# Patient Record
Sex: FEMALE | Race: WHITE | NOT HISPANIC OR LATINO | Employment: OTHER | URBAN - METROPOLITAN AREA
[De-identification: names, ages, dates, MRNs, and addresses within clinical notes are randomized per-mention and may not be internally consistent; named-entity substitution may affect disease eponyms.]

---

## 2019-05-24 ENCOUNTER — TELEPHONE (OUTPATIENT)
Dept: GASTROENTEROLOGY | Facility: CLINIC | Age: 24
End: 2019-05-24

## 2019-05-24 DIAGNOSIS — R79.89 ELEVATED LFTS: Primary | ICD-10-CM

## 2019-06-20 ENCOUNTER — OFFICE VISIT (OUTPATIENT)
Dept: GASTROENTEROLOGY | Facility: CLINIC | Age: 24
End: 2019-06-20
Payer: COMMERCIAL

## 2019-06-20 ENCOUNTER — TELEPHONE (OUTPATIENT)
Dept: GASTROENTEROLOGY | Facility: CLINIC | Age: 24
End: 2019-06-20

## 2019-06-20 VITALS
WEIGHT: 126 LBS | SYSTOLIC BLOOD PRESSURE: 100 MMHG | HEIGHT: 55 IN | HEART RATE: 72 BPM | DIASTOLIC BLOOD PRESSURE: 58 MMHG | BODY MASS INDEX: 29.16 KG/M2

## 2019-06-20 DIAGNOSIS — R79.89 ABNORMAL LFTS: Primary | ICD-10-CM

## 2019-06-20 PROCEDURE — 99213 OFFICE O/P EST LOW 20 MIN: CPT | Performed by: INTERNAL MEDICINE

## 2019-06-20 RX ORDER — MULTIVITAMIN
1 TABLET ORAL DAILY
COMMUNITY
End: 2020-06-30 | Stop reason: HOSPADM

## 2019-06-20 RX ORDER — BUDESONIDE 0.5 MG/2ML
0.5 INHALANT ORAL 2 TIMES DAILY
COMMUNITY
End: 2020-06-30 | Stop reason: HOSPADM

## 2019-06-20 RX ORDER — GLYCOPYRROLATE 1 MG/5ML
0.5 SOLUTION ORAL EVERY 12 HOURS PRN
COMMUNITY
End: 2020-06-30 | Stop reason: HOSPADM

## 2019-06-20 RX ORDER — DOXYCYCLINE 100 MG/1
100 TABLET ORAL DAILY
COMMUNITY
End: 2020-06-30 | Stop reason: HOSPADM

## 2019-06-20 RX ORDER — OXCARBAZEPINE 300 MG/1
450 TABLET, FILM COATED ORAL EVERY 12 HOURS SCHEDULED
COMMUNITY
End: 2020-06-30 | Stop reason: HOSPADM

## 2019-06-20 RX ORDER — POLYETHYLENE GLYCOL 3350 17 G/17G
8.5 POWDER, FOR SOLUTION ORAL 2 TIMES DAILY
COMMUNITY
End: 2019-09-27 | Stop reason: ALTCHOICE

## 2019-06-20 RX ORDER — DESMOPRESSIN ACETATE 0.1 MG/1
0.05 TABLET ORAL EVERY OTHER DAY
COMMUNITY
End: 2020-06-30 | Stop reason: HOSPADM

## 2019-06-20 RX ORDER — URSODIOL 300 MG/1
300 CAPSULE ORAL 2 TIMES DAILY
COMMUNITY
End: 2019-07-17 | Stop reason: ALTCHOICE

## 2019-06-20 RX ORDER — MELATONIN
1000 DAILY
COMMUNITY
End: 2020-06-30 | Stop reason: HOSPADM

## 2019-06-20 RX ORDER — CRANBERRY FRUIT EXTRACT 425 MG
425 CAPSULE ORAL 2 TIMES DAILY
COMMUNITY
End: 2020-06-30 | Stop reason: HOSPADM

## 2019-06-20 RX ORDER — LEVETIRACETAM 100 MG/ML
1000 SOLUTION ORAL 2 TIMES DAILY
COMMUNITY
End: 2020-06-30 | Stop reason: HOSPADM

## 2019-06-20 RX ORDER — METOCLOPRAMIDE HYDROCHLORIDE 5 MG/5ML
SOLUTION ORAL 2 TIMES DAILY
COMMUNITY
End: 2020-06-30 | Stop reason: HOSPADM

## 2019-06-20 RX ORDER — ALBUTEROL SULFATE 1.25 MG/3ML
1 SOLUTION RESPIRATORY (INHALATION) EVERY 6 HOURS PRN
COMMUNITY
End: 2020-06-30 | Stop reason: HOSPADM

## 2019-06-20 RX ORDER — B-COMPLEX WITH VITAMIN C
1 TABLET ORAL
COMMUNITY
End: 2020-06-30 | Stop reason: HOSPADM

## 2019-06-20 RX ORDER — SENNA PLUS 8.6 MG/1
1 TABLET ORAL DAILY
COMMUNITY
End: 2019-12-04 | Stop reason: SDUPTHER

## 2019-06-20 RX ORDER — DANTROLENE SODIUM 100 MG/1
100 CAPSULE ORAL 3 TIMES DAILY
COMMUNITY
End: 2020-06-30 | Stop reason: HOSPADM

## 2019-06-24 ENCOUNTER — TELEPHONE (OUTPATIENT)
Dept: GASTROENTEROLOGY | Facility: CLINIC | Age: 24
End: 2019-06-24

## 2019-06-24 DIAGNOSIS — R79.89 ABNORMAL LFTS: Primary | ICD-10-CM

## 2019-07-17 ENCOUNTER — TELEPHONE (OUTPATIENT)
Dept: GASTROENTEROLOGY | Facility: CLINIC | Age: 24
End: 2019-07-17

## 2019-07-17 NOTE — TELEPHONE ENCOUNTER
Can you please fax an order to discontinue the Ursodiol medication for this patient to both [facility] pharmacy and [facility]?     [facility] fax [phone]  [facility] fax [phone]    Can you please fax the attached letter to Dr Jamel Runner to his office as well send to [facility] and her PCP Dr Casie Taveras?     Thanks HPR

## 2019-07-17 NOTE — TELEPHONE ENCOUNTER
Records faxed as requested to [facility] , [facility], Dr Griselda Martens and Dr Shantanu Crowley

## 2019-08-13 ENCOUNTER — TELEPHONE (OUTPATIENT)
Dept: GASTROENTEROLOGY | Facility: CLINIC | Age: 24
End: 2019-08-13

## 2019-08-13 DIAGNOSIS — R79.89 ABNORMAL LFTS: Primary | ICD-10-CM

## 2019-08-13 NOTE — TELEPHONE ENCOUNTER
Call from Renetta Mills, nurse at [facility] ; states she wants to f/u on patient's labs that were requested; they fax'd on the 5th  Labs were collected on 8/4 for liver enzymes  She asks for [phone]; states have call go to main nurse's station

## 2019-08-13 NOTE — TELEPHONE ENCOUNTER
I called [facility] Cardona and left message stating we have labs faxed over, HPR will review when back on Thursday and call with instructions/follow up   Previous labs in folder on your desk for comparison

## 2019-08-16 NOTE — TELEPHONE ENCOUNTER
I already spoke directly to [facility] prior to my vacation and reviewed the labs and ultrasound that I ordered  I advised discontinuation of the Ursodiol and there is a telephone encounter with my request to send cancellation orders for this  I also wrote a letter to the neurologist, Dr Deb Quintana, who handles her anti spasmodic requesting that he consider changing that medication as her current anti spasmodic could also cause hepatotoxicity  The most recent labs were ordered by her PCP, Dr Keya Amaya    HPR

## 2019-08-21 NOTE — TELEPHONE ENCOUNTER
Noted that you previously provided instructions regarding patient  They would like input on elevation on Alk Phos 250 and AST 82,   On most recent med list Ursodiol not listed  Please advise if any other recommendations

## 2019-08-21 NOTE — TELEPHONE ENCOUNTER
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 that I recommend changing the antispasmodic to something that may not cause hepatotoxicity  That is still my recommendation  The need to speak with Dr Timothy Bosworth about alternatives  If she is no longer taking the doxycycline, I would recheck the LFTs in a month or 2 to see if they trend down again  I hope this makes sense and please let me know if you need any other information from me  Can you please let Joe zavala know this information? If not, let me know and I will call them    Thanks HPR

## 2019-08-22 NOTE — TELEPHONE ENCOUNTER
I called Ike Bliss and obtained fax number to forward your 8/21/19 reply (information)  I did confirm that patient is still on Doxycycline through infectious disease so we will not reorder LFT's at this time   Fax #[phone]

## 2019-08-23 NOTE — TELEPHONE ENCOUNTER
Please confirm that you want LFT's repeated in 2 months whether patient still on Doxycycline or not (you previously noted repeat if off medication) Thanks

## 2019-08-23 NOTE — TELEPHONE ENCOUNTER
Yes, let us repeat them in 2 months  Even if she is still in a doxycycline, if they are worse, then they really need to get the antispasmodic dantrolene changed to something else before she ends up with liver damage  I want to make sure the LFTs are better or worse at that time  I hope this makes sense    HPR

## 2019-08-28 ENCOUNTER — TELEPHONE (OUTPATIENT)
Dept: GASTROENTEROLOGY | Facility: CLINIC | Age: 24
End: 2019-08-28

## 2019-08-28 NOTE — TELEPHONE ENCOUNTER
Attempted to call [place] to schedule  Someone answered but then connection was lost twice   Will attempt to call again tomorrow   Pb Baird

## 2019-08-28 NOTE — TELEPHONE ENCOUNTER
----- Message from Valentino Ludwig, [address] sent at 8/28/2019 12:40 PM EDT -----  Results noted that inflammatory markers elevated    Please schedule an office visit with Margaret Medina in a month or two as she knows the patient  to go over blood work

## 2019-08-29 NOTE — TELEPHONE ENCOUNTER
Attempted to call Sarah again, a lot of static on the line  Sarah stated they would call back     gunnar

## 2019-09-09 ENCOUNTER — TELEPHONE (OUTPATIENT)
Dept: GASTROENTEROLOGY | Facility: CLINIC | Age: 24
End: 2019-09-09

## 2019-09-09 NOTE — TELEPHONE ENCOUNTER
Prachi Daley from [facility]  left  mss stating Pt will have appt this mo but they don't want to wait; ask if Melia Stockton could call back [phone]; if she is unavail/can spk w/ nurse in charge of Pt

## 2019-09-16 ENCOUNTER — TELEPHONE (OUTPATIENT)
Dept: GASTROENTEROLOGY | Facility: CLINIC | Age: 24
End: 2019-09-16

## 2019-09-16 DIAGNOSIS — R79.89 ELEVATED LFTS: Primary | ICD-10-CM

## 2019-09-16 NOTE — TELEPHONE ENCOUNTER
Placed telephone call to [facility] and spoke to the patient's nurse  She confirmed that the dantrolene was discontinued July 17th  The doxycycline was decreased to 100 mg daily on August 13th  Both can cause hepatotoxicity  We did also stop her Ursodiol since her ultrasound was normal-no evidence of cholelithiasis or choledocholithiasis  Will recheck LFTs now prior to office visit for 9/27  If LFTs improved with the medication adjustments will continue to monitor for normalization  If they are not improved, will need to repeat ultrasound with further planning after those results      Can you please fax the hepatic function test order to [facility]?    Thanks,  HPR

## 2019-09-27 ENCOUNTER — OFFICE VISIT (OUTPATIENT)
Dept: GASTROENTEROLOGY | Facility: CLINIC | Age: 24
End: 2019-09-27
Payer: COMMERCIAL

## 2019-09-27 VITALS
DIASTOLIC BLOOD PRESSURE: 70 MMHG | HEART RATE: 96 BPM | SYSTOLIC BLOOD PRESSURE: 112 MMHG | HEIGHT: 55 IN | BODY MASS INDEX: 29.9 KG/M2 | WEIGHT: 129.2 LBS

## 2019-09-27 DIAGNOSIS — Z93.1 GASTROSTOMY TUBE IN PLACE (HCC): ICD-10-CM

## 2019-09-27 DIAGNOSIS — R14.0 ABDOMINAL DISTENSION (GASEOUS): ICD-10-CM

## 2019-09-27 DIAGNOSIS — Z93.4 JEJUNOSTOMY TUBE PRESENT (HCC): ICD-10-CM

## 2019-09-27 DIAGNOSIS — R79.89 ABNORMAL LFTS: Primary | ICD-10-CM

## 2019-09-27 DIAGNOSIS — R19.4 DECREASED FREQUENCY OF BOWEL MOVEMENTS: ICD-10-CM

## 2019-09-27 PROCEDURE — 99214 OFFICE O/P EST MOD 30 MIN: CPT | Performed by: NURSE PRACTITIONER

## 2019-09-27 RX ORDER — CYCLOBENZAPRINE HCL 5 MG
5 TABLET ORAL 3 TIMES DAILY PRN
COMMUNITY
End: 2020-06-30 | Stop reason: HOSPADM

## 2019-09-27 RX ORDER — LORAZEPAM 2 MG/ML
2 CONCENTRATE ORAL AS NEEDED
COMMUNITY
End: 2020-06-30 | Stop reason: HOSPADM

## 2019-09-27 RX ORDER — MEDROXYPROGESTERONE ACETATE 10 MG/1
10 TABLET ORAL DAILY
COMMUNITY
End: 2020-06-30 | Stop reason: HOSPADM

## 2019-09-27 NOTE — PROGRESS NOTES
[facility] - Outpatient Follow-up Note  Yordan Ponce 24 y o  female MRN: [id number]  Encounter: [id number]    ASSESSMENT AND PLAN:      1  Abnormal LFTs  Long-term elevation of LFTs of  indeterminate etiology  Previous viral, autoimmune, metabolic workup has been negative  Previous ultrasounds, including most recent in July, were normal, without cholelithiasis  Given that she is 24years old, with no clear indication for Ursodiol, the medication was discontinued  The chronic elevation of alkaline phosphatase may be related to medication, specifically dantrolene  per our recommendations, the dantrolene was discontinued by Dr Regan Diaz, who prescribed the medication  However she continue being treated with doxycycline for a bone infection and this can cause hepatotoxicity so may be the etiology for recent re-elevation  The dose was recently decreased to daily instead of b i d  It may be discontinued soon which will hopefully allow for normalization of her LFTs  GGT in July was elevated at 164  LFTs repeated this month showed improvement in both AST and ALT  Alk-phos remains elevated and fractionated value showed 73% hepatic and 27% bone, 0% intestinal   Repeat LFTs in November  May need to repeat ultrasound if LFTs remain elevated to exclude any hepatobiliary abnormalities  - Hepatic function panel    2  Abdominal distension (gaseous)  3  Decreased frequency of bowel movements  Abdominal distension was likely related to the MiraLax as this is a common affect  The medication was discontinued and the abdominal distension resolved  Since that time she has been unable to produce a bowel movement independently, requiring a suppository or Fleet enema every 1-2 days  These bowel movements are large, soft  Will add Colace b i d to replace the MiraLax for stool softening  Continue the senna daily    The patient's residence facility will continue to use the suppositories and Fleet enemas as needed to produce bowel movements but hopefully the need for this will decrease by adding the Colace  - docusate (COLACE) 50 mg/5 mL liquid; 10 mL (100 mg total) by Per J Tube route 2 (two) times a day (Patient not taking: Reported on 9/27/2019)  Dispense: 473 mL; Refill: 15    4  Jejunostomy tube present ([facility] [address] )  5  Gastrostomy tube in place [facility])  These tubes were recently changed in August for balloon rupture  New tube remains intact  Feedings and medications continue as ordered via jejunostomy tube  The G-tube is currently capped but occasionally used to gravity for gastric decompression  Followup Appointment:  3 months  ______________________________________________________________________    Chief Complaint   Patient presents with    Follow up to abnormal LFT's     HPI:  41-year-old female patient with complicated past medical history including intellectual disability, spastic quadriplegia, seizure disorder, trach, feeding tube, returns to the office for follow-up  She is accompanied by a facility nurse and staff attended  The patient is unable to provide information or interaction regarding her personal health information or symptoms  All information is provided by the staff who accompanied the patient  Nurse reports that she had some abdominal distension with increased flatus and liquid stool  She was seen by PCP, Dr Li Peralta who stopped her MiraLax  The distension and liquid stools resolved but without the MiraLax she began requiring rectal suppositories or fleets enema to produce bowel movements  Previously with daily senna and the MiraLax she was able to produce bowel movements on her own  Bowel movements remain soft and easy to pass  No evidence of rectal bleeding, abdominal or rectal pain  She is tolerating her tube feeds via jejunostomy tube which was recently changed on 08/20 after the balloon ruptured    The G-tube is capped but occasionally released to gravity for abdominal distension  Insertion site remains slightly reddened and scaly but no evidence of infection following recent treatment of redness and excoriation with Silvadene  She is tolerating her tube feedings without evidence of reflux, aspiration  No nausea or vomiting  She has not been hospitalized for respiratory issues since 11/20/2018 and remains stable with her trach without evidence of dyspnea or shortness of breath  Her weight remains stable  Her LFTs have been elevated but there was improvement recently after discontinuation of dantrolene by Dr Dennis Irene  She remains on the doxycycline, to treat a spinal bone infection, but the dose was recently reduced to daily from b i d  In June her AST was 82,  and alkaline phos 250  Her recent labs from September show AST now normal at 35, ALT reduced to 47 but alk-phos remains elevated at 255  Staff reports that the doxycycline made to be discontinued soon        Historical Information   Past Medical History:   Diagnosis Date    Asthma     Cerebral palsy ([facility] [address] )     Cortical blindness     Diabetes insipidus (HCC)     Elevated LFTs     Encephalopathy     Herpes encephalopathy    GERD (gastroesophageal reflux disease)     History of chronic respiratory failure     Hydrocephalus     Nephrolithiasis     Profound intellectual disability     Pulmonary valve stenosis     Scoliosis     Seizure disorder (HCC)     Spastic quadriplegia (HCC)      Past Surgical History:   Procedure Laterality Date    CSF SHUNT      HIP SURGERY Bilateral     IR PEG/GJ TUBE PLACEMENT  2016    NISSEN FUNDOPLICATION      PEG TUBE PLACEMENT      PEG TUBE REMOVAL  2016    SPINAL FUSION W/ JOSE MARIA UNIT RAJAT      Fontenot rajat    TRACHEOSTOMY       Social History     Substance and Sexual Activity   Alcohol Use Never    Frequency: Never     Social History     Substance and Sexual Activity   Drug Use Never     Social History     Tobacco Use   Smoking Status Never Smoker Smokeless Tobacco Never Used     Family History   Problem Relation Age of Onset    No Known Problems Mother     No Known Problems Father     No Known Problems Sister     No Known Problems Sister          Current Outpatient Medications:     acetaminophen (TYLENOL) 160 MG/5ML elixir    albuterol (ACCUNEB) 1 25 MG/3ML nebulizer solution    bisacodyl (FLEET) 10 MG/30ML ENEM    budesonide (PULMICORT) 0 5 mg/2 mL nebulizer solution    calcium carbonate-vitamin D (OSCAL-D) 500 mg-200 units per tablet    carbamide peroxide (DEBROX) 6 5 % otic solution    cholecalciferol (VITAMIN D3) 1,000 units tablet    Cranberry 425 MG CAPS    cyclobenzaprine (FLEXERIL) 5 mg tablet    desmopressin (DDAVP) 0 1 mg tablet    diazepam (VALIUM) 1 mg/mL solution    doxycycline (ADOXA) 100 MG tablet    fluticasone (FLOVENT DISKUS) 50 MCG/BLIST diskus inhaler    Glycopyrrolate 1 MG/5ML SOLN    guaiFENesin (ROBITUSSIN) 100 MG/5ML oral liquid    ibuprofen (MOTRIN) 100 mg/5 mL suspension    ipratropium (ATROVENT) 0 02 % nebulizer solution    levETIRAcetam (KEPPRA) 100 mg/mL oral solution    LORazepam (ATIVAN) 2 mg/mL concentrated solution    Magnesium Carbonate (MAGONATE) 54 (Mag Equiv) MG/5ML LIQD    medroxyPROGESTERone (PROVERA) 10 mg tablet    metoclopramide (REGLAN) 5 mg/5 mL oral solution    Multiple Vitamin (MULTIVITAMIN) tablet    Nutritional Supplements (PEPTAMEN 1 UTE PO)    OXcarbazepine (TRILEPTAL) 300 mg tablet    senna (SENOKOT) 8 6 MG tablet    dantrolene (DANTRIUM) 100 mg capsule    docusate (COLACE) 50 mg/5 mL liquid    Elastic Bandages & Supports (ABDOMINAL BINDER/ELASTIC MED) MISC  Allergies   Allergen Reactions    Baclofen Other (See Comments)     Reaction Date: [id number];   apena      Cefepime     Cephalosporins      Other reaction(s): Unknown Reaction  Other reaction(s): Unknown Reaction  Other reaction(s): Unknown Reaction      Clonidine      Other reaction(s): Respiratory Distress, Respiratory Distress    Lactase     Penicillins Hives     Reaction Date: [id number];       Phenobarbital Other (See Comments)     Reaction Date: 22Nov2011; Other reaction(s): Other (See Comments)  apnea  Other reaction(s): Other (See Comments)  apnea  apnea      Vancomycin     Zolpidem Other (See Comments)     Other reaction(s): Other (see comments)  Reaction Date: 22Nov2011;   apnea  apnea  apnea      Latex Rash     ROS:  All systems were reviewed with accompanying staff and positives noted in HPI  All other systems are reported as negative per the nurse accompanying the patient today  PHYSICAL EXAM:    Blood pressure 112/70, pulse 96, height 4' 4" (1 321 m), weight 58 6 kg (129 lb 3 2 oz)  Body mass index is 33 59 kg/m²  General Appearance: NAD, alert, smiling   Eyes: Anicteric, EOMI  ENT:  Normocephalic, atraumatic  Neck: symmetrical, trachea midline, trach tube in place and secured  Resp:  Clear but decreased to auscultation bilaterally; no rales, rhonchi or wheezing; respirations unlabored   CV:  S1 S2, Regular rhythm but remains slightly tachycardic at 102; no murmur  GI:  Soft, no evidence of pain, grimace or guarding with palpation, non-distended; normal bowel sounds; no masses, no organomegaly  GJ tube in place, bumper in good position, with mild erythema and scaling noted to insertion site  Rectal: Deferred  Musculoskeletal: No cyanosis, clubbing or edema  Decreased ROM due to medical comorbidities  Seen and examined in special wheelchair  Skin:  No jaundice, rashes, or lesions   Heme/Lymph: No palpable cervical lymphadenopathy  Psych:  Passive, no apparent distress  Neuro: No gross deficits, alert, no evidence of seizure activity     Lab Results:  Reviewed lab results from August and September, 2019       No results found for: WBC, HGB, HCT, MCV, PLT  No results found for: NA, K, CL, CO2, ANIONGAP, BUN, CREATININE, GLUCOSE, GLUF, CALCIUM, CORRECTEDCA, AST, ALT, ALKPHOS, PROT, BILITOT, EGFR  No results found for: IRON, TIBC, FERRITIN  No results found for: LIPASE    Radiology Results:   No results found

## 2019-09-27 NOTE — PATIENT INSTRUCTIONS
Continue this Senokot daily  Will add Colace 100 mg b i d    Continue the suppositories and enemas as previously ordered as needed  Elevated LFTs probably from doxycycline-discontinue if and when able

## 2019-12-04 ENCOUNTER — OFFICE VISIT (OUTPATIENT)
Dept: GASTROENTEROLOGY | Facility: CLINIC | Age: 24
End: 2019-12-04
Payer: COMMERCIAL

## 2019-12-04 VITALS
SYSTOLIC BLOOD PRESSURE: 114 MMHG | DIASTOLIC BLOOD PRESSURE: 70 MMHG | WEIGHT: 125.6 LBS | BODY MASS INDEX: 29.07 KG/M2 | HEIGHT: 55 IN | HEART RATE: 105 BPM

## 2019-12-04 DIAGNOSIS — T14.8XXA SKIN EXCORIATION: ICD-10-CM

## 2019-12-04 DIAGNOSIS — Z93.4 JEJUNOSTOMY TUBE PRESENT (HCC): ICD-10-CM

## 2019-12-04 DIAGNOSIS — K59.04 CHRONIC IDIOPATHIC CONSTIPATION: ICD-10-CM

## 2019-12-04 DIAGNOSIS — R19.4 DECREASED FREQUENCY OF BOWEL MOVEMENTS: ICD-10-CM

## 2019-12-04 DIAGNOSIS — R79.89 ABNORMAL LFTS: Primary | ICD-10-CM

## 2019-12-04 DIAGNOSIS — Z93.1 GASTROSTOMY TUBE IN PLACE (HCC): ICD-10-CM

## 2019-12-04 PROBLEM — R14.0 ABDOMINAL DISTENSION (GASEOUS): Status: RESOLVED | Noted: 2019-09-27 | Resolved: 2019-12-04

## 2019-12-04 PROCEDURE — 99214 OFFICE O/P EST MOD 30 MIN: CPT | Performed by: NURSE PRACTITIONER

## 2019-12-04 RX ORDER — SENNA PLUS 8.6 MG/1
1 TABLET ORAL 2 TIMES DAILY
Qty: 60 TABLET | Refills: 12 | Status: SHIPPED | OUTPATIENT
Start: 2019-12-04 | End: 2019-12-04 | Stop reason: SDUPTHER

## 2019-12-04 RX ORDER — SULFAMETHOXAZOLE AND TRIMETHOPRIM 800; 160 MG/1; MG/1
1 TABLET ORAL EVERY 12 HOURS SCHEDULED
COMMUNITY
End: 2020-06-30 | Stop reason: HOSPADM

## 2019-12-04 RX ORDER — SENNA PLUS 8.6 MG/1
1 TABLET ORAL 2 TIMES DAILY
Qty: 60 TABLET | Refills: 12 | Status: SHIPPED | OUTPATIENT
Start: 2019-12-04 | End: 2020-06-30 | Stop reason: HOSPADM

## 2019-12-04 NOTE — PROGRESS NOTES
[facility] - Outpatient Follow-up Note  Radha Melendez 21 y o  female MRN: [id number]  Encounter: [id number]    ASSESSMENT AND PLAN:      1  Abnormal LFTs  Long term elevation with indeterminate etiology  Most likely explanation is combination of previous medications including Ursodiol, dantrolene and doxycycline which have all been discontinued recently  Most recent alkaline phosphatase was reduced to 208 and all other LFTs are currently normalized  Will recheck LFTs and abdominal US in January  Caution for hepatic impairment with recent addition of Bactrim to treat sacral wound infection with possible osteomyelitis  Fractionated alkaline phosphatase showed 73% originating from liver and 27% originating from bone  - US abdomen complete; Future  - Hepatic function panel; Future      2  Decreased frequency of bowel movements  3  Chronic idiopathic constipation  Per nursing staff abdominal distension resolved after discontinuation of MiraLax  Requiring suppository or enema frequently to facilitate bowel movements  Continue Colace b i d  Increase senna to b i d  Consider increasing Colace dose if this is ineffective  - senna (SENOKOT) 8 6 MG tablet; Take 1 tablet (8 6 mg total) by mouth 2 (two) times a day  Dispense: 60 tablet; Refill: 12    4  Jejunostomy tube present ([address] )  5  Gastrostomy tube in place ([address] )  6  Skin excoriation  Tube changed in August for balloon rupture  New tube remains intact  Feedings and medications continue as ordered via jejunostomy tube  The G-tube is currently capped but occasionally used to gravity for gastric decompression  The site is excoriated with irritation from bumper  The bumper was was adjusted slightly away from direct contact with the skin  Advised to use Silvadene cream or triple paste b i d until healed    Recommend leaving site open to air as much as possible, position to avoid compression of bumper against skin with clothing or abdominal binder  This was discussed with Dr Princess Gonzalez who did view and examine the tube site          Followup Appointment:  3 months  ______________________________________________________________________    Chief Complaint   Patient presents with    Abnormal LFTs     HPI:  25-year-old female patient with complicated past medical history including intellectual disability, spastic quadriplegia, seizure disorder, trach, feeding tube, returns to the office for follow-up  She is accompanied by a facility nurse and staff attended  The patient is unable to provide information or interaction regarding her personal health information or symptoms  All information is provided by the staff who accompanied the patient  no further abdominal distension since discontinuation of MiraLax  Now the patient is requiring rectal suppository or enema for decreased bowel movement frequency to every 2-5 days  No evidence of rectal bleeding, abdominal or rectal pain  She is tolerating her tube feeds via jejunostomy tube without evidence of reflux, aspiration  No nausea or vomiting  She has not been hospitalized for respiratory issues for 1 year and remains stable with her trach without evidence of dyspnea or shortness of breath  The G-tube is capped but occasionally released to gravity for abdominal distension  Insertion site very reddened, excoriated which appears to be from direct compression of the bumper against the skin  Previous excoriation was healed following use of Silvadene cream which was stopped  Her weight remains stable noting 4 lb weight loss since previous office visit      Her LFTs have improved  Transaminases are normalized  Alkaline phosphatase has decreased to 208 likely following previous discontinuation of Ursodiol, dantrolene and doxycycline  Recently started on Bactrim to treat the sacral wound infection with possible osteomyelitis       Historical Information   Past Medical History: Diagnosis Date    Asthma     Cerebral pals[facility] ([facility] [address]     Cortical blindness     Diabetes insipidus (HCC)     Elevated LFTs     Encephalopathy     Herpes encephalopathy    GERD (gastroesophageal reflux disease)     History of chronic respiratory failure     Hydrocephalus (HCC)     Nephrolithiasis     Profound intellectual disability     Pulmonary valve stenosis     Scoliosis     Seizure disorder (HCC)     Spastic quadriplegia (HCC)      Past Surgical History:   Procedure Laterality Date    CSF SHUNT      HIP SURGERY Bilateral     IR PEG/GJ TUBE PLACEMENT  2016    NISSEN FUNDOPLICATION      PEG TUBE PLACEMENT      PEG TUBE REMOVAL  2016    SPINAL FUSION W/ JOSE MARIA UNIT RAJAT      Fontenot rajat    TRACHEOSTOMY       Social History     Substance and Sexual Activity   Alcohol Use Never    Frequency: Never     Social History     Substance and Sexual Activity   Drug Use Never     Social History     Tobacco Use   Smoking Status Never Smoker   Smokeless Tobacco Never Used     Family History   Problem Relation Age of Onset    No Known Problems Mother     No Known Problems Father     No Known Problems Sister     No Known Problems Sister          Current Outpatient Medications:     acetaminophen (TYLENOL) 160 MG/5ML elixir    albuterol (ACCUNEB) 1 25 MG/3ML nebulizer solution    bisacodyl (FLEET) 10 MG/30ML ENEM    budesonide (PULMICORT) 0 5 mg/2 mL nebulizer solution    calcium carbonate-vitamin D (OSCAL-D) 500 mg-200 units per tablet    carbamide peroxide (DEBROX) 6 5 % otic solution    cholecalciferol (VITAMIN D3) 1,000 units tablet    Cranberry 425 MG CAPS    cyclobenzaprine (FLEXERIL) 5 mg tablet    dantrolene (DANTRIUM) 100 mg capsule    desmopressin (DDAVP) 0 1 mg tablet    diazepam (VALIUM) 1 mg/mL solution    docusate (COLACE) 50 mg/5 mL liquid    doxycycline (ADOXA) 100 MG tablet    Elastic Bandages & Supports (ABDOMINAL BINDER/ELASTIC MED) MISC    fluticasone (FLOVENT DISKUS) 50 MCG/BLIST diskus inhaler    Glycopyrrolate 1 MG/5ML SOLN    guaiFENesin (ROBITUSSIN) 100 MG/5ML oral liquid    ibuprofen (MOTRIN) 100 mg/5 mL suspension    ipratropium (ATROVENT) 0 02 % nebulizer solution    levETIRAcetam (KEPPRA) 100 mg/mL oral solution    LORazepam (ATIVAN) 2 mg/mL concentrated solution    Magnesium Carbonate (MAGONATE) 54 (Mag Equiv) MG/5ML LIQD    medroxyPROGESTERone (PROVERA) 10 mg tablet    metoclopramide (REGLAN) 5 mg/5 mL oral solution    Multiple Vitamin (MULTIVITAMIN) tablet    Nutritional Supplements (PEPTAMEN 1 UTE PO)    OXcarbazepine (TRILEPTAL) 300 mg tablet    senna (SENOKOT) 8 6 MG tablet    sulfamethoxazole-trimethoprim (BACTRIM) 400-80 mg per tablet  Allergies   Allergen Reactions    Baclofen Other (See Comments)     Reaction Date: [id number];   apena      Cefepime     Cephalosporins      Other reaction(s): Unknown Reaction  Other reaction(s): Unknown Reaction  Other reaction(s): Unknown Reaction      Clonidine      Other reaction(s): Respiratory Distress, Respiratory Distress    Lactase     Penicillins Hives     Reaction Date: [id number];       Phenobarbital Other (See Comments)     Reaction Date: [id number]; Other reaction(s): Other (See Comments)  apnea  Other reaction(s): Other (See Comments)  apnea  apnea      Vancomycin     Zolpidem Other (See Comments)     Other reaction(s): Other (see comments)  Reaction Date: 22Nov2011;   apnea  apnea  apnea      Latex Rash     Reviewed medications and allergies and updated as indicated    ROS unobtainable by the patient due to intellectual disability, nonverbal, trach    PHYSICAL EXAM:    Blood pressure 114/70, pulse 105, height 4' 4" (1 321 m), weight 57 kg (125 lb 9 6 oz)  Body mass index is 32 66 kg/m²  General Appearance: NAD, alert, nonverbal, seen and examined in wheelchair  Head:Normocephalic, atraumatic  Eyes: Anicteric  ENT:   normal external appearance, normal mucosa      Neck:  Supple, symmetrical, trachea midline  Resp:  Clear to auscultation bilaterally; no rales, rhonchi or wheezing; respirations unlabored   CV:  S1 S2, Regular rate and rhythm; no murmur, rub, or gallop  GI:  Soft, no evidence of tenderness, grimace or guarding with palpation, non-distended; normal bowel sounds; no masses, no organomegaly , JG site reddened, excoriated with some brown drainage,  Rectal: Deferred  Musculoskeletal: No cyanosis, clubbing or edema  Normal ROM  Skin:  No jaundice, rashes, or lesions   Heme/Lymph: No palpable cervical lymphadenopathy  Neuro: No gross deficits    Lab Results:  Reviewed lab results from November, 2019  No results found for: WBC, HGB, HCT, MCV, PLT  No results found for: NA, K, CL, CO2, ANIONGAP, BUN, CREATININE, GLUCOSE, GLUF, CALCIUM, CORRECTEDCA, AST, ALT, ALKPHOS, PROT, BILITOT, EGFR  No results found for: IRON, TIBC, FERRITIN  No results found for: LIPASE    Radiology Results:   No results found

## 2019-12-04 NOTE — PATIENT INSTRUCTIONS
Will increase Senokot to b i d  Continue Colace b i d    will continue to monitor LFTs (watch with initiation of Bactrim)   for GJ tube excoriation; the bumper was readjusted so it is not so tight against her skin, try to adjust J G-tube so it is not applying down work pressure allowing the bumper to press on her skin  Also make sure that the abdominal binder is adjusted well over the feeding tube again so it does not apply downward pressure  Apply Silvadene cream or triple paste b i d  to excoriated J/G site until healed    Try to leave open to air as much as possible  Obtain the repeat LFTs and ultrasound in January, 2020

## 2020-01-29 ENCOUNTER — OFFICE VISIT (OUTPATIENT)
Dept: GASTROENTEROLOGY | Facility: CLINIC | Age: 25
End: 2020-01-29
Payer: COMMERCIAL

## 2020-01-29 VITALS
SYSTOLIC BLOOD PRESSURE: 122 MMHG | BODY MASS INDEX: 29.62 KG/M2 | DIASTOLIC BLOOD PRESSURE: 88 MMHG | WEIGHT: 128 LBS | HEART RATE: 96 BPM | HEIGHT: 55 IN

## 2020-01-29 DIAGNOSIS — R79.89 ABNORMAL LFTS: Primary | ICD-10-CM

## 2020-01-29 DIAGNOSIS — R19.4 DECREASED FREQUENCY OF BOWEL MOVEMENTS: ICD-10-CM

## 2020-01-29 DIAGNOSIS — L73.2 HIDRADENITIS: ICD-10-CM

## 2020-01-29 DIAGNOSIS — K59.04 CHRONIC IDIOPATHIC CONSTIPATION: ICD-10-CM

## 2020-01-29 DIAGNOSIS — Z93.4 JEJUNOSTOMY TUBE PRESENT (HCC): ICD-10-CM

## 2020-01-29 DIAGNOSIS — T14.8XXA SKIN EXCORIATION: ICD-10-CM

## 2020-01-29 DIAGNOSIS — Z93.1 GASTROSTOMY TUBE IN PLACE (HCC): ICD-10-CM

## 2020-01-29 PROCEDURE — 99214 OFFICE O/P EST MOD 30 MIN: CPT | Performed by: NURSE PRACTITIONER

## 2020-01-29 NOTE — PATIENT INSTRUCTIONS
Increase Colace to 200 mg b i d   And continue senna  B i d    continue to use the silver nitrate and stoma he has of powder to the [address] [place] [address] tube site as previously ordered   obtain additional lab work to what Dermatology ordered to complete evaluation of liver

## 2020-01-29 NOTE — PROGRESS NOTES
[facility] - Outpatient Follow-up Note  Duy Melgoza 21 y o  female MRN: [id number]  Encounter: [id number]    ASSESSMENT AND PLAN:      1  Abnormal LFTs  Long term elevation with indeterminate etiology  Most likely explanation is combination of previous medications including Ursodiol, dantrolene and doxycycline which have all been discontinued recently  Most recent alkaline phosphatase was reduced to 204 and all other LFTs remain normalized  Ultrasound showed no gallbladder abnormalities, cholelithiasis  unclear if the complete serological workup for viral, autoimmune , metabolic etiologies was ever completed so will add labs to current Dermatology orders to complete  Caution for hepatic impairment with continuation of Bactrim to treat sacral wound infection  She is currently following with Derm for hidradenitis  Fractionated alkaline phosphatase showed 73% originating from liver and 27% originating from bone  - Alpha-1-antitrypsin; Future  - SUNI w/Reflex; Future  - Antimitochondrial antibody; Future  - Hepatitis A antibody, total; Future  - HCV FIBROSURE; Future  - Anti-smooth muscle antibody, IgG; Future  - EBV acute panel; Future  - CMV IgG/IgM Antibodies; Future    2  Jejunostomy tube present ([facility] [address] )  3  Gastrostomy tube in place [facility])  Tube changed again 1/07/2020 for balloon rupture   New tube remains intact   Feedings and medications continue as ordered via jejunostomy tube   The G-tube is currently capped but occasionally used to gravity for gastric decompression  The site is mildly reddened but bumper remains adjusted slightly away from direct contact with the skin  -continue to use Silvadene cream b i d   -continue to use stomahesive powder daily   - Recommend leaving site open to air as much as possible, position to avoid compression of bumper against skin with clothing or abdominal binder  4  Chronic idiopathic constipation  6   Decreased frequency of bowel movements  Requiring enema almost daily to facilitate bowel movements  Stools are soft according to staff when she does defecate        -increase Colace to 200 mg b i d    -continue senna to b i d    - docusate (COLACE) 50 mg/5 mL liquid; 20 mL (200 mg total) by Per J Tube route 2 (two) times a day  Dispense: 473 mL; Refill: 30    5  Skin excoriation  7  Hidradenitis  Currently working with Dermatology for diagnosis of hidradenitis  She is continuing on the Bactrim for this  Derm is considering using Humira for treatment  Followup Appointment:  3 months  ______________________________________________________________________    Chief Complaint   Patient presents with    Follow-up     Abnormal LFTs     HPI: 69-year-old female patient with complicated past medical history including intellectual disability, spastic quadriplegia, seizure disorder, trach, feeding tube, returns to the office for follow-up  Fan Thomas is accompanied by a facility nurse and staff attended  Sunday [place] patient is unable to provide information or interaction regarding her personal health information or symptoms   All information is provided by the staff who accompanied the patient  Still requiring rectal suppository almost daily to produce bowel movements  No evidence of rectal bleeding, abdominal or rectal pain   She is tolerating her tube feeds via jejunostomy tube without evidence of reflux, aspiration   No nausea or vomiting   She has not been hospitalized for respiratory issues for 1 year and remains stable with her trach without evidence of dyspnea or shortness of breath  The G-tube is capped but occasionally released to gravity for abdominal distension   Insertion site  remains somewhat reddened but improved since the bumper was adjusted away from direct contact with the skin  Staff continue to use Silvadene cream and stomahesive powder    Her weight remains stable noting 3 lb weight loss since previous office visit      Her LFTs are normalized  Alkaline phosphatase has decreased to 204 likely following previous discontinuation of Ursodiol, dantrolene and doxycycline  Continues on Bactrim to treat the sacral wound infection now diagnosed as hidradenitis following with Dermatology    No evidence for biliary disease, cholelithiasis on recent ultrasound        Historical Information   Past Medical History:   Diagnosis Date    Asthma     Cerebral palsy (HCC)     Cortical blindness     Diabetes insipidus (HCC)     Elevated LFTs     Encephalopathy     Herpes encephalopathy    GERD (gastroesophageal reflux disease)     History of chronic respiratory failure     Hydrocephalus (HCC)     Nephrolithiasis     Profound intellectual disability     Pulmonary valve stenosis     Scoliosis     Seizure disorder (HCC)     Spastic quadriplegia (HCC)      Past Surgical History:   Procedure Laterality Date    CSF SHUNT      HIP SURGERY Bilateral     IR PEG/GJ TUBE PLACEMENT  2016    NISSEN FUNDOPLICATION      PEG TUBE PLACEMENT      PEG TUBE REMOVAL  2016    SPINAL FUSION W/ JOSE MARIA UNIT RAJAT      Fontenot rajat    TRACHEOSTOMY       Social History     Substance and Sexual Activity   Alcohol Use Never    Frequency: Never     Social History     Substance and Sexual Activity   Drug Use Never     Social History     Tobacco Use   Smoking Status Never Smoker   Smokeless Tobacco Never Used     Family History   Problem Relation Age of Onset    No Known Problems Mother     No Known Problems Father     No Known Problems Sister     No Known Problems Sister          Current Outpatient Medications:     acetaminophen (TYLENOL) 160 MG/5ML elixir    albuterol (ACCUNEB) 1 25 MG/3ML nebulizer solution    bisacodyl (FLEET) 10 MG/30ML ENEM    budesonide (PULMICORT) 0 5 mg/2 mL nebulizer solution    calcium carbonate-vitamin D (OSCAL-D) 500 mg-200 units per tablet    carbamide peroxide (DEBROX) 6 5 % otic solution    cholecalciferol (VITAMIN D3) 1,000 units tablet    Cranberry 425 MG CAPS    cyclobenzaprine (FLEXERIL) 5 mg tablet    dantrolene (DANTRIUM) 100 mg capsule    desmopressin (DDAVP) 0 1 mg tablet    diazepam (VALIUM) 1 mg/mL solution    docusate (COLACE) 50 mg/5 mL liquid    doxycycline (ADOXA) 100 MG tablet    Elastic Bandages & Supports (ABDOMINAL BINDER/ELASTIC MED) MISC    fluticasone (FLOVENT DISKUS) 50 MCG/BLIST diskus inhaler    Glycopyrrolate 1 MG/5ML SOLN    guaiFENesin (ROBITUSSIN) 100 MG/5ML oral liquid    ibuprofen (MOTRIN) 100 mg/5 mL suspension    ipratropium (ATROVENT) 0 02 % nebulizer solution    levETIRAcetam (KEPPRA) 100 mg/mL oral solution    LORazepam (ATIVAN) 2 mg/mL concentrated solution    Magnesium Carbonate (MAGONATE) 54 (Mag Equiv) MG/5ML LIQD    medroxyPROGESTERone (PROVERA) 10 mg tablet    metoclopramide (REGLAN) 5 mg/5 mL oral solution    Multiple Vitamin (MULTIVITAMIN) tablet    Nutritional Supplements (PEPTAMEN 1 UTE PO)    OXcarbazepine (TRILEPTAL) 300 mg tablet    senna (SENOKOT) 8 6 MG tablet    sulfamethoxazole-trimethoprim (BACTRIM DS) 800-160 mg per tablet  Allergies   Allergen Reactions    Baclofen Other (See Comments)     Reaction Date: [id number];   apena      Cefepime     Cephalosporins      Other reaction(s): Unknown Reaction  Other reaction(s): Unknown Reaction  Other reaction(s): Unknown Reaction      Clonidine      Other reaction(s): Respiratory Distress, Respiratory Distress    Lactase     Penicillins Hives     Reaction Date: [id number];       Phenobarbital Other (See Comments)     Reaction Date: [id number]; Other reaction(s): Other (See Comments)  apnea  Other reaction(s): Other (See Comments)  apnea  apnea      Vancomycin     Zolpidem Other (See Comments)     Other reaction(s):  Other (see comments)  Reaction Date: 22Nov2011;   apnea  apnea  apnea      Latex Rash     Reviewed medications and allergies and updated as indicated    ROS:  All systems were reviewed and positives noted as per HPI  All other systems were reported as negative  PHYSICAL EXAM:    Blood pressure 122/88, pulse 96, height 4' 4" (1 321 m), weight 58 1 kg (128 lb)  Body mass index is 33 28 kg/m²  General Appearance: NAD, alert, nonverbal, seen and examined in wheelchair  Head:  Normocephalic, atraumatic  Eyes: Anicteric, PERRLA, EOMI  ENT:  Normal external appearance, normal mucosa, tracheostomy in place   Neck:  Supple, symmetrical, trachea midline with tracheostomy tube in place  Resp:  Clear but decreased to auscultation bilaterally; no rales, rhonchi or wheezing; respirations unlabored   CV:  S1 S2, Regular rate and rhythm; no murmur, rub, or gallop  GI:  Soft, no evidence of tenderness, grimace or guarding with palpation, non-distended; normal bowel sounds; no masses, no organomegaly, GJ tube in place with some redness to site but otherwise intact    Rectal: Deferred  Musculoskeletal: No cyanosis, clubbing or edema  Normal ROM  Skin:  No jaundice, rashes, or lesions   Heme/Lymph: No palpable cervical lymphadenopathy  Neuro: No gross deficits, alert, smiles    Lab Results:  Reviewed lab results from January, 2020  No results found for: WBC, HGB, HCT, MCV, PLT  No results found for: NA, K, CL, CO2, ANIONGAP, BUN, CREATININE, GLUCOSE, GLUF, CALCIUM, CORRECTEDCA, AST, ALT, ALKPHOS, PROT, BILITOT, EGFR  No results found for: IRON, TIBC, FERRITIN  No results found for: LIPASE    Radiology Results:   Reviewed ultrasound results per [facility] from January, 2020

## 2020-03-13 LAB — HCV AB SER-ACNC: <0.1

## 2020-03-25 ENCOUNTER — TELEPHONE (OUTPATIENT)
Dept: GASTROENTEROLOGY | Facility: CLINIC | Age: 25
End: 2020-03-25

## 2020-03-25 DIAGNOSIS — R79.89 ABNORMAL LFTS: Primary | ICD-10-CM

## 2020-03-27 NOTE — TELEPHONE ENCOUNTER
Please review recall orders  Recent labs scanned into Epic and AST/ALT within normal limits  Do you still want hepatic function panel or do you want to postpone lab recall to later date? Please advise

## 2020-03-31 ENCOUNTER — TELEPHONE (OUTPATIENT)
Dept: GASTROENTEROLOGY | Facility: CLINIC | Age: 25
End: 2020-03-31

## 2020-05-15 ENCOUNTER — TELEMEDICINE (OUTPATIENT)
Dept: GASTROENTEROLOGY | Facility: CLINIC | Age: 25
End: 2020-05-15
Payer: COMMERCIAL

## 2020-05-15 ENCOUNTER — TELEPHONE (OUTPATIENT)
Dept: GASTROENTEROLOGY | Facility: CLINIC | Age: 25
End: 2020-05-15

## 2020-05-15 VITALS
WEIGHT: 127 LBS | SYSTOLIC BLOOD PRESSURE: 125 MMHG | HEIGHT: 55 IN | BODY MASS INDEX: 29.39 KG/M2 | DIASTOLIC BLOOD PRESSURE: 77 MMHG

## 2020-05-15 DIAGNOSIS — Z93.1 GASTROSTOMY TUBE IN PLACE (HCC): ICD-10-CM

## 2020-05-15 DIAGNOSIS — K59.04 CHRONIC IDIOPATHIC CONSTIPATION: ICD-10-CM

## 2020-05-15 DIAGNOSIS — L73.2 HIDRADENITIS: ICD-10-CM

## 2020-05-15 DIAGNOSIS — R79.89 ABNORMAL LFTS: Primary | ICD-10-CM

## 2020-05-15 DIAGNOSIS — Z93.4 JEJUNOSTOMY TUBE PRESENT (HCC): ICD-10-CM

## 2020-05-15 DIAGNOSIS — T14.8XXA SKIN EXCORIATION: ICD-10-CM

## 2020-05-15 PROBLEM — R19.4 DECREASED FREQUENCY OF BOWEL MOVEMENTS: Status: RESOLVED | Noted: 2019-09-27 | Resolved: 2020-05-15

## 2020-05-15 PROCEDURE — 99214 OFFICE O/P EST MOD 30 MIN: CPT | Performed by: NURSE PRACTITIONER

## 2020-05-15 RX ORDER — BISACODYL 10 MG
10 SUPPOSITORY, RECTAL RECTAL DAILY
COMMUNITY
End: 2020-06-30 | Stop reason: HOSPADM

## 2020-06-03 ENCOUNTER — HOSPITAL ENCOUNTER (INPATIENT)
Facility: HOSPITAL | Age: 25
LOS: 27 days | DRG: 711 | End: 2020-06-30
Attending: EMERGENCY MEDICINE | Admitting: EMERGENCY MEDICINE
Payer: COMMERCIAL

## 2020-06-03 ENCOUNTER — APPOINTMENT (INPATIENT)
Dept: NEUROLOGY | Facility: CLINIC | Age: 25
DRG: 711 | End: 2020-06-03
Payer: COMMERCIAL

## 2020-06-03 DIAGNOSIS — R50.9 FEVER: ICD-10-CM

## 2020-06-03 DIAGNOSIS — S31.000A: ICD-10-CM

## 2020-06-03 DIAGNOSIS — G80.9 CEREBRAL PALSY (HCC): ICD-10-CM

## 2020-06-03 DIAGNOSIS — Z98.2 S/P VP SHUNT: ICD-10-CM

## 2020-06-03 DIAGNOSIS — Z93.1 GASTROSTOMY TUBE IN PLACE (HCC): ICD-10-CM

## 2020-06-03 DIAGNOSIS — G93.41 ACUTE METABOLIC ENCEPHALOPATHY: Primary | ICD-10-CM

## 2020-06-03 DIAGNOSIS — R65.20: ICD-10-CM

## 2020-06-03 DIAGNOSIS — A41.53: ICD-10-CM

## 2020-06-03 DIAGNOSIS — Z93.4 JEJUNOSTOMY TUBE PRESENT (HCC): ICD-10-CM

## 2020-06-03 LAB
ANION GAP SERPL CALCULATED.3IONS-SCNC: 11 MMOL/L (ref 4–13)
ARTERIAL PATENCY WRIST A: YES
ATRIAL RATE: 113 BPM
ATRIAL RATE: 117 BPM
BASE EXCESS BLDA CALC-SCNC: -3.5 MMOL/L
BASOPHILS # BLD AUTO: 0.04 THOUSANDS/ΜL (ref 0–0.1)
BASOPHILS NFR BLD AUTO: 0 % (ref 0–1)
BUN SERPL-MCNC: 6 MG/DL (ref 5–25)
CA-I BLD-SCNC: 1.2 MMOL/L (ref 1.12–1.32)
CALCIUM SERPL-MCNC: 9 MG/DL (ref 8.3–10.1)
CHLORIDE SERPL-SCNC: 107 MMOL/L (ref 100–108)
CO2 SERPL-SCNC: 22 MMOL/L (ref 21–32)
CREAT SERPL-MCNC: 0.6 MG/DL (ref 0.6–1.3)
EOSINOPHIL # BLD AUTO: 0.1 THOUSAND/ΜL (ref 0–0.61)
EOSINOPHIL NFR BLD AUTO: 1 % (ref 0–6)
ERYTHROCYTE [DISTWIDTH] IN BLOOD BY AUTOMATED COUNT: 17.4 % (ref 11.6–15.1)
GFR SERPL CREATININE-BSD FRML MDRD: 127 ML/MIN/1.73SQ M
GLUCOSE SERPL-MCNC: 78 MG/DL (ref 65–140)
HCO3 BLDA-SCNC: 20.8 MMOL/L (ref 22–28)
HCT VFR BLD AUTO: 33.2 % (ref 34.8–46.1)
HGB BLD-MCNC: 10 G/DL (ref 11.5–15.4)
HOROWITZ INDEX BLDA+IHG-RTO: 35 MM[HG]
IMM GRANULOCYTES # BLD AUTO: 0.06 THOUSAND/UL (ref 0–0.2)
IMM GRANULOCYTES NFR BLD AUTO: 1 % (ref 0–2)
LACTATE SERPL-SCNC: 1.1 MMOL/L (ref 0.5–2)
LACTATE SERPL-SCNC: 3.5 MMOL/L (ref 0.5–2)
LYMPHOCYTES # BLD AUTO: 3.09 THOUSANDS/ΜL (ref 0.6–4.47)
LYMPHOCYTES NFR BLD AUTO: 28 % (ref 14–44)
MAGNESIUM SERPL-MCNC: 2 MG/DL (ref 1.6–2.6)
MCH RBC QN AUTO: 25.8 PG (ref 26.8–34.3)
MCHC RBC AUTO-ENTMCNC: 30.1 G/DL (ref 31.4–37.4)
MCV RBC AUTO: 86 FL (ref 82–98)
MONOCYTES # BLD AUTO: 1.01 THOUSAND/ΜL (ref 0.17–1.22)
MONOCYTES NFR BLD AUTO: 9 % (ref 4–12)
NEUTROPHILS # BLD AUTO: 6.94 THOUSANDS/ΜL (ref 1.85–7.62)
NEUTS SEG NFR BLD AUTO: 61 % (ref 43–75)
NRBC BLD AUTO-RTO: 0 /100 WBCS
O2 CT BLDA-SCNC: 13.3 ML/DL (ref 16–23)
OXYHGB MFR BLDA: 94.1 % (ref 94–97)
P AXIS: 56 DEGREES
P AXIS: 89 DEGREES
PCO2 BLDA: 34.3 MM HG (ref 36–44)
PEEP RESPIRATORY: 5 CM[H2O]
PH BLDA: 7.4 [PH] (ref 7.35–7.45)
PHOSPHATE SERPL-MCNC: 2.8 MG/DL (ref 2.7–4.5)
PLATELET # BLD AUTO: 154 THOUSANDS/UL (ref 149–390)
PMV BLD AUTO: 8.9 FL (ref 8.9–12.7)
PO2 BLDA: 83.7 MM HG (ref 75–129)
POTASSIUM SERPL-SCNC: 5.1 MMOL/L (ref 3.5–5.3)
PR INTERVAL: 150 MS
PR INTERVAL: 179 MS
PROCALCITONIN SERPL-MCNC: 5.08 NG/ML
QRS AXIS: 104 DEGREES
QRS AXIS: 106 DEGREES
QRSD INTERVAL: 79 MS
QRSD INTERVAL: 92 MS
QT INTERVAL: 317 MS
QT INTERVAL: 329 MS
QTC INTERVAL: 435 MS
QTC INTERVAL: 459 MS
RBC # BLD AUTO: 3.87 MILLION/UL (ref 3.81–5.12)
SODIUM SERPL-SCNC: 140 MMOL/L (ref 136–145)
SPECIMEN SOURCE: ABNORMAL
T WAVE AXIS: 27 DEGREES
T WAVE AXIS: 42 DEGREES
VENT AC: 15
VENT- AC: AC
VENTRICULAR RATE: 113 BPM
VENTRICULAR RATE: 117 BPM
VT SETTING VENT: 250 ML
WBC # BLD AUTO: 11.24 THOUSAND/UL (ref 4.31–10.16)

## 2020-06-03 PROCEDURE — 85025 COMPLETE CBC W/AUTO DIFF WBC: CPT | Performed by: PHYSICIAN ASSISTANT

## 2020-06-03 PROCEDURE — 87186 SC STD MICRODIL/AGAR DIL: CPT | Performed by: EMERGENCY MEDICINE

## 2020-06-03 PROCEDURE — 93005 ELECTROCARDIOGRAM TRACING: CPT

## 2020-06-03 PROCEDURE — 94002 VENT MGMT INPAT INIT DAY: CPT

## 2020-06-03 PROCEDURE — 80048 BASIC METABOLIC PNL TOTAL CA: CPT | Performed by: PHYSICIAN ASSISTANT

## 2020-06-03 PROCEDURE — 93010 ELECTROCARDIOGRAM REPORT: CPT | Performed by: INTERNAL MEDICINE

## 2020-06-03 PROCEDURE — 87070 CULTURE OTHR SPECIMN AEROBIC: CPT | Performed by: EMERGENCY MEDICINE

## 2020-06-03 PROCEDURE — 82330 ASSAY OF CALCIUM: CPT | Performed by: PHYSICIAN ASSISTANT

## 2020-06-03 PROCEDURE — 99291 CRITICAL CARE FIRST HOUR: CPT | Performed by: PHYSICIAN ASSISTANT

## 2020-06-03 PROCEDURE — 84145 PROCALCITONIN (PCT): CPT | Performed by: PHYSICIAN ASSISTANT

## 2020-06-03 PROCEDURE — 83605 ASSAY OF LACTIC ACID: CPT | Performed by: PHYSICIAN ASSISTANT

## 2020-06-03 PROCEDURE — 82805 BLOOD GASES W/O2 SATURATION: CPT | Performed by: PHYSICIAN ASSISTANT

## 2020-06-03 PROCEDURE — 62270 DX LMBR SPI PNXR: CPT | Performed by: EMERGENCY MEDICINE

## 2020-06-03 PROCEDURE — 87205 SMEAR GRAM STAIN: CPT | Performed by: EMERGENCY MEDICINE

## 2020-06-03 PROCEDURE — 84100 ASSAY OF PHOSPHORUS: CPT | Performed by: PHYSICIAN ASSISTANT

## 2020-06-03 PROCEDURE — 95700 EEG CONT REC W/VID EEG TECH: CPT

## 2020-06-03 PROCEDURE — 95715 VEEG EA 12-26HR INTMT MNTR: CPT

## 2020-06-03 PROCEDURE — 8C01X6J COLLECTION OF CEREBROSPINAL FLUID FROM INDWELLING DEVICE IN NERVOUS SYSTEM: ICD-10-PCS | Performed by: EMERGENCY MEDICINE

## 2020-06-03 PROCEDURE — 94760 N-INVAS EAR/PLS OXIMETRY 1: CPT

## 2020-06-03 PROCEDURE — 87077 CULTURE AEROBIC IDENTIFY: CPT | Performed by: EMERGENCY MEDICINE

## 2020-06-03 PROCEDURE — 99292 CRITICAL CARE ADDL 30 MIN: CPT | Performed by: EMERGENCY MEDICINE

## 2020-06-03 PROCEDURE — 5A1955Z RESPIRATORY VENTILATION, GREATER THAN 96 CONSECUTIVE HOURS: ICD-10-PCS | Performed by: EMERGENCY MEDICINE

## 2020-06-03 PROCEDURE — 36600 WITHDRAWAL OF ARTERIAL BLOOD: CPT

## 2020-06-03 PROCEDURE — 83735 ASSAY OF MAGNESIUM: CPT | Performed by: PHYSICIAN ASSISTANT

## 2020-06-03 RX ORDER — PROPOFOL 10 MG/ML
5-50 INJECTION, EMULSION INTRAVENOUS
Status: DISCONTINUED | OUTPATIENT
Start: 2020-06-03 | End: 2020-06-03

## 2020-06-03 RX ORDER — LEVETIRACETAM 100 MG/ML
1000 SOLUTION ORAL 2 TIMES DAILY
Status: DISCONTINUED | OUTPATIENT
Start: 2020-06-04 | End: 2020-06-03

## 2020-06-03 RX ORDER — LEVETIRACETAM 500 MG/1
500 TABLET ORAL
COMMUNITY
End: 2020-06-30 | Stop reason: HOSPADM

## 2020-06-03 RX ORDER — LEVETIRACETAM 100 MG/ML
500 SOLUTION ORAL EVERY 24 HOURS
Status: DISCONTINUED | OUTPATIENT
Start: 2020-06-03 | End: 2020-06-05

## 2020-06-03 RX ORDER — DESMOPRESSIN ACETATE 0.1 MG/1
0.05 TABLET ORAL 2 TIMES DAILY
Status: DISCONTINUED | OUTPATIENT
Start: 2020-06-03 | End: 2020-06-04

## 2020-06-03 RX ORDER — LEVETIRACETAM 100 MG/ML
500 SOLUTION ORAL EVERY 24 HOURS
Status: DISCONTINUED | OUTPATIENT
Start: 2020-06-03 | End: 2020-06-03

## 2020-06-03 RX ORDER — LEVETIRACETAM 100 MG/ML
500 SOLUTION ORAL EVERY 12 HOURS SCHEDULED
Status: DISCONTINUED | OUTPATIENT
Start: 2020-06-03 | End: 2020-06-03

## 2020-06-03 RX ORDER — FLUTICASONE PROPIONATE 220 UG/1
2 AEROSOL, METERED RESPIRATORY (INHALATION) 2 TIMES DAILY
COMMUNITY
End: 2020-06-30 | Stop reason: HOSPADM

## 2020-06-03 RX ORDER — LEVETIRACETAM 100 MG/ML
1000 SOLUTION ORAL 2 TIMES DAILY
Status: DISCONTINUED | OUTPATIENT
Start: 2020-06-04 | End: 2020-06-05

## 2020-06-03 RX ORDER — HEPARIN SODIUM 5000 [USP'U]/ML
5000 INJECTION, SOLUTION INTRAVENOUS; SUBCUTANEOUS EVERY 8 HOURS SCHEDULED
Status: DISPENSED | OUTPATIENT
Start: 2020-06-03 | End: 2020-06-17

## 2020-06-03 RX ORDER — CHLORHEXIDINE GLUCONATE 0.12 MG/ML
15 RINSE ORAL EVERY 12 HOURS SCHEDULED
Status: DISCONTINUED | OUTPATIENT
Start: 2020-06-03 | End: 2020-06-28

## 2020-06-03 RX ADMIN — DESMOPRESSIN ACETATE 0.05 MG: 0.1 TABLET ORAL at 18:56

## 2020-06-03 RX ADMIN — HEPARIN SODIUM 5000 UNITS: 5000 INJECTION INTRAVENOUS; SUBCUTANEOUS at 23:13

## 2020-06-03 RX ADMIN — MEROPENEM 1000 MG: 1 INJECTION, POWDER, FOR SOLUTION INTRAVENOUS at 17:38

## 2020-06-03 RX ADMIN — PROPOFOL 25 MCG/KG/MIN: 10 INJECTION, EMULSION INTRAVENOUS at 14:30

## 2020-06-03 RX ADMIN — OXCARBAZEPINE 450 MG: 300 TABLET, FILM COATED ORAL at 18:57

## 2020-06-03 RX ADMIN — LEVETIRACETAM 500 MG: 100 SOLUTION ORAL at 21:59

## 2020-06-03 RX ADMIN — DAPTOMYCIN 275 MG: 500 INJECTION, POWDER, LYOPHILIZED, FOR SOLUTION INTRAVENOUS at 18:57

## 2020-06-03 RX ADMIN — DEXMEDETOMIDINE 0.2 MCG/KG/HR: 100 INJECTION, SOLUTION, CONCENTRATE INTRAVENOUS at 22:00

## 2020-06-03 RX ADMIN — CHLORHEXIDINE GLUCONATE 0.12% ORAL RINSE 15 ML: 1.2 LIQUID ORAL at 21:11

## 2020-06-03 RX ADMIN — HEPARIN SODIUM 5000 UNITS: 5000 INJECTION INTRAVENOUS; SUBCUTANEOUS at 17:39

## 2020-06-03 RX ADMIN — CHLORHEXIDINE GLUCONATE 0.12% ORAL RINSE 15 ML: 1.2 LIQUID ORAL at 17:29

## 2020-06-03 NOTE — PLAN OF CARE
Problem: PAIN - ADULT  Goal: Verbalizes/displays adequate comfort level or baseline comfort level  Description  Interventions:  - Encourage patient to monitor pain and request assistance  - Assess pain using appropriate pain scale  - Administer analgesics based on type and severity of pain and evaluate response  - Implement non-pharmacological measures as appropriate and evaluate response  - Consider cultural and social influences on pain and pain management  - Notify physician/advanced practitioner if interventions unsuccessful or patient reports new pain  Outcome: Progressing     Problem: INFECTION - ADULT  Goal: Absence or prevention of progression during hospitalization  Description  INTERVENTIONS:  - Assess and monitor for signs and symptoms of infection  - Monitor lab/diagnostic results  - Monitor all insertion sites, i e  indwelling lines, tubes, and drains  - Monitor endotracheal if appropriate and nasal secretions for changes in amount and color  - [place] appropriate cooling/warming therapies per order  - Administer medications as ordered  - Instruct and encourage patient and family to use good hand hygiene technique  - Identify and instruct in appropriate isolation precautions for identified infection/condition  Outcome: Progressing     Problem: SAFETY ADULT  Goal: Maintain or return mobility status to optimal level  Description  INTERVENTIONS:  - Assess patient's baseline mobility status (ambulation, transfers, stairs, etc )    - Identify cognitive and physical deficits and behaviors that affect mobility  - Identify mobility aids required to assist with transfers and/or ambulation (gait belt, sit-to-stand, lift, walker, cane, etc )  - [place] fall precautions as indicated by assessment  - Record patient progress and toleration of activity level on Mobility SBAR; progress patient to next Phase/Stage  - Instruct patient to call for assistance with activity based on assessment  - Consider rehabilitation consult to assist with strengthening/weightbearing, etc   Outcome: Progressing     Problem: DISCHARGE PLANNING  Goal: Discharge to home or other facility with appropriate resources  Description  INTERVENTIONS:  - Identify barriers to discharge w/patient and caregiver  - Arrange for needed discharge resources and transportation as appropriate  - Identify discharge learning needs (meds, wound care, etc )  - Arrange for interpretive services to assist at discharge as needed  - Refer to Case Management Department for coordinating discharge planning if the patient needs post-hospital services based on physician/advanced practitioner order or complex needs related to functional status, cognitive ability, or social support system  Outcome: Progressing     Problem: Knowledge Deficit  Goal: Patient/family/caregiver demonstrates understanding of disease process, treatment plan, medications, and discharge instructions  Description  Complete learning assessment and assess knowledge base    Interventions:  - Provide teaching at level of understanding  - Provide teaching via preferred learning methods  Outcome: Progressing

## 2020-06-03 NOTE — H&P
H&P Exam - Niru [address] 21 y o  female MRN: [id number]  Unit/Bed#: ICU 02 Encounter: [id number]      -------------------------------------------------------------------------------------------------------------  Chief Complaint: Unable to provide 2/2 baseline nonverbal with AMS     History of Present Illness   HX and PE limited by: encephalopathy   Swati Beebe is a 21 y o  female w PMH cerebral palsy w spastic quadraplegia, profound mental retardation (nonverbal, bed and wheel-chair bound at baseline), hydrocephalus with  shunt, chronic respiratory failure s/p tracheostomy, asthma, GERD, nephrolithiasis, seizures, pancreatitis and  diabetes insipidus who presented to [facility] on 6/1/20 with a fever of 101 5, HR of 169, RR of 82, in respiratory distress  Her uncuffed trach was changed for a cuffed trach and she was ventilated  She had an attempt at an IJ CVC which caused her to go into a VF arrest; ROSC achieved following CPR, shock x1, and epinephrine x1  EKG following with ST elevations in III and aVF, more pronounced than prior  She was noted to have L facial and LLE twitching shortly after admission concerning for seizure from benzodiazepine w/d v anoxic encephalopathy  She was loaded w keppra and started on keppra and trileptal per neurology  She continued to have fevers with Tmax of 104 and CT scan showed collections in her L retroperitoneum along her iliac crest and paraspinal lumbar vertebrae   shunt series with lucency and possible infection adjacent to R SI joint and iliac bone  She had several attempts at an LP with concern for meningitis given possible  shunt infection communicating with pelvic infections  LP unsuccessful at bedside and under fluoroscopy with IR  She was started on daptomycin and meropenem as she has listed allergies to vanc and cefepime and has tolerated the above abx previously    Patient is a morfin of the [facility]; code status full as per prior documentation      History obtained from chart review and unobtainable from patient due to mental status   -------------------------------------------------------------------------------------------------------------  Assessment and Plan:    Neuro:   · Acute metabolic encephalopathy with hx cerebral palsy and severe mental retardation   · L facial and LLE twitching on admission at [facility]  · Consider benzo w/d seizure  · Loaded with keppra  · Continue keppra 1g 600 and 1400, 500mg at 2200  · Trileptal 450mg Q12  · Ativan given once at [facility]  · Will initiate cEEG  · Appreciate neuro consultation   · Consider meningitis / infected  shunt   · Multiple failed attempts at LP bedside and under IR fluoroscopy      CV:   · Cardiac arrest 6/1   · At outside hospital during IJ CVC placement  · Consider 2/2 respiratory insufficiency (unclear if she was ventilated at this point)  · Consider 2/2 guidewire insertion given short frame  · CT negative for PE     · Hypotension   · A line for accurate BP goals if needed  · Transiently on levo at [facility]  · ECHO 6/2  · LVEF 55-60%  · Decreased LV internal cavity size  · Normal RV size and function   · Mildly dilated PA 2 2cm       Pulm:  · Chronic respiratory failure s/p trach  · Switched to cuffed trach on arrival to [facility]   · Daily SBT      GI:   · Transaminitis   · Elevated LFTs following cardiac arrest   · Trend / monitor   · On admission  /       :   · No listed history of CKD   · Follow up renal function       F/E/N:   · Fluids  · No mIVF   · Nutrition  · NPO  · G to gravity  · TF via J tube   · Lytes  · Check lytes and replete as needed      Heme/Onc:  · Follow up CBC   · DVT ppx   · SQH     Endo:  · Hyperglycemia   · BS around 300 on arrival   · Presumed stress respone  · No listed hx of DM  · Diabetes insipidus   · On DDAVP at home, resume at 0 05 QOD    ID:  · Febrile illness  · Tmax 104 at outside hospital   · Day 3 abx, daptomycin and meropenem   · CT a/p: small collection 3 3 x 3 1 x 3 3 cm in L retroperitoneum above iliac crest, possible abscess v seroma with additional tiny fluid collection in L paraspinal region along upper lumbar vertebrae   ·  shunt series: "lucency around the screw transfixing the R SI joint into the R iliac bone"  · Iliac collection possibly communicating with shunt, f/u wound culture from posterior spine drainage  · Failed bedside and IR LP attempts   · Covid negative    MSK/Skin:  · Repetitive repositioning and off-loading to prevent skin break down and ulcerative formation  · PT / OT      Disposition: Admit to Critical Care   Code Status: Level 1 - Full Code  --------------------------------------------------------------------------------------------------------------  Review of Systems   Unable to perform ROS: Patient nonverbal       Review of systems was unable to be performed secondary to encephalopathy     Physical Exam   Constitutional: She appears well-developed and well-nourished  No distress  HENT:   Head: Normocephalic and atraumatic  Eyes: Pupils are equal, round, and reactive to light  Neck: Normal range of motion  Neck supple  No tracheal deviation present  Cardiovascular: Normal rate, regular rhythm, normal heart sounds and intact distal pulses  Exam reveals no gallop and no friction rub  No murmur heard  Pulmonary/Chest: Effort normal and breath sounds normal  No respiratory distress  She has no wheezes  She has no rales  [address] 15/250/35/5   Abdominal: Soft  Bowel sounds are normal  She exhibits no distension and no mass  There is no tenderness  There is no guarding  Genitourinary:   Genitourinary Comments: Her w clear UO   Musculoskeletal: She exhibits no edema or deformity  Neurological:   Had twitching of the L side on arrival, resolved following propofol increase to 50  Passive movement of UE  LE w/d to pain   No eye opening   GCS6 [id number]   Skin: Skin is warm and dry  She is not diaphoretic     There is some purulent greenish drainage from posterior wound overlying lumbar spine   No abscess or erythema    Psychiatric: She has a normal mood and affect  Her behavior is normal    Nursing note and vitals reviewed  --------------------------------------------------------------------------------------------------------------  Vitals:   Vitals:    06/03/20 1419 06/03/20 1421 06/03/20 1500 06/03/20 1600   BP:  148/63 111/71 91/52   BP Location:  Right arm     Pulse: (!) 114 72 (!) 116 (!) 108   Resp: (!) 29 (!) 43 (!) 36 (!) 23   Temp:  99 °F (37 2 °C) 98 6 °F (37 °C) 99 °F (37 2 °C)   TempSrc:  Bladder  Bladder   SpO2: 100% 95% 96% 97%   Weight:  61 3 kg (135 lb 2 3 oz)     Height:  4' 2" (1 27 m)       Temp  Min: 98 6 °F (37 °C)  Max: 99 °F (37 2 °C)  IBW: 22 5 kg  Height: 4' 2" (127 cm)  Body mass index is 38 01 kg/m²  N/A    Laboratory and Diagnostics:  Results from last 7 days   Lab Units 06/03/20  1726   WBC Thousand/uL 11 24*   HEMOGLOBIN g/dL 10 0*   HEMATOCRIT % 33 2*   PLATELETS Thousands/uL 154   NEUTROS PCT % 61   MONOS PCT % 9     Results from last 7 days   Lab Units 06/03/20  1726   SODIUM mmol/L 140   POTASSIUM mmol/L 5 1   CHLORIDE mmol/L 107   CO2 mmol/L 22   ANION GAP mmol/L 11   BUN mg/dL 6   CREATININE mg/dL 0 60   CALCIUM mg/dL 9 0   GLUCOSE RANDOM mg/dL 78     Results from last 7 days   Lab Units 06/03/20  1726   MAGNESIUM mg/dL 2 0   PHOSPHORUS mg/dL 2 8                   ABG:  Results from last 7 days   Lab Units 06/03/20  1724   PH ART  7 400   PCO2 ART mm Hg 34 3*   PO2 ART mm Hg 83 7   HCO3 ART mmol/L 20 8*   BASE EXC ART mmol/L -3 5   ABG SOURCE  Radial, Right     VBG:  Results from last 7 days   Lab Units 06/03/20  1724   ABG SOURCE  Radial, Right           Micro:        EKG: as per tele NSR  Imaging: I have personally reviewed pertinent reports     and I have personally reviewed pertinent films in PACS    Historical Information   Past Medical History:   Diagnosis Date    Asthma     Cerebral palsy ([address] )     Cortical blindness     Diabetes insipidus (HCC)     Elevated LFTs     Encephalopathy     Herpes encephalopathy    GERD (gastroesophageal reflux disease)     History of chronic respiratory failure     Hydrocephalus (HCC)     Nephrolithiasis     Profound intellectual disability     Pulmonary valve stenosis     Scoliosis     Seizure disorder (HCC)     Spastic quadriplegia (HCC)      Past Surgical History:   Procedure Laterality Date    CSF SHUNT      HIP SURGERY Bilateral     IR PEG/GJ TUBE PLACEMENT  2016    NISSEN FUNDOPLICATION      PEG TUBE PLACEMENT      PEG TUBE REMOVAL  2016    SPINAL FUSION W/ JOSE MARIA UNIT RAJAT      Fontenot rajat    TRACHEOSTOMY       Social History   Social History     Substance and Sexual Activity   Alcohol Use Never    Frequency: Never     Social History     Substance and Sexual Activity   Drug Use Never     Social History     Tobacco Use   Smoking Status Never Smoker   Smokeless Tobacco Never Used     Exercise History: no exercise, bed / wheelchair bound   Family History:   Family History   Problem Relation Age of Onset    No Known Problems Mother     No Known Problems Father     No Known Problems Sister     No Known Problems Sister      I have reviewed this patient's family history and commented on sigificant items within the HPI      Medications:  Current Facility-Administered Medications   Medication Dose Route Frequency    chlorhexidine (PERIDEX) 0 12 % oral rinse 15 mL  15 mL Swish & Spit Q12H [facility]    DAPTOmycin (CUBICIN) 275 mg in sodium chloride 0 9 % 50 mL IVPB  6 mg/kg (Adjusted) Intravenous Q24H    desmopressin (DDAVP) tablet 0 05 mg  0 05 mg Per J Tube BID    dexmedetomidine (PRECEDEX) 400 mcg in sodium chloride 0 9 % 100 mL infusion  0 1-0 7 mcg/kg/hr Intravenous Titrated    heparin (porcine) subcutaneous injection 5,000 Units  5,000 Units Subcutaneous Q8H [facility]    [START ON 6/4/2020] levETIRAcetam (KEPPRA) oral solution 1,000 mg  1,000 mg Per J Tube BID    levETIRAcetam (KEPPRA) oral solution 500 mg  500 mg Per J Tube Q24H    meropenem (MERREM) 1,000 mg in sodium chloride 0 9 % 100 mL IVPB  1,000 mg Intravenous Q8H    OXcarbazepine (TRILEPTAL) tablet 450 mg  450 mg Per J Tube Q12H [address]     Home medications:  Prior to Admission Medications   Prescriptions Last Dose Informant Patient Reported? Taking?    Cranberry 425 MG CAPS  Outside Facility (Specify) Yes No   Sig: Take 425 mg by mouth 2 (two) times a day   Elastic Bandages & Supports (ABDOMINAL BINDER/ELASTIC MED) MISC  Outside Facility (Specify) Yes No   Sig: by Does not apply route   Glycopyrrolate 1 MG/5ML SOLN  Outside Facility (Specify) Yes No   Sig: Take 0 5 mL by mouth every 12 (twelve) hours as needed    LORazepam (ATIVAN) 2 mg/mL concentrated solution  Outside Facility (Specify) Yes No   Sig: Take 2 mg by mouth as needed for anxiety    Magnesium Carbonate (MAGONATE) 54 (Mag Equiv) MG/5ML LIQD  Outside Facility (Specify) Yes No   Sig: Take 9 mL by mouth 2 (two) times a day   Multiple Vitamin (MULTIVITAMIN) tablet  Outside Facility (Specify) Yes No   Sig: Take 1 tablet by mouth daily   Nutritional Supplements (PEPTAMEN 1 UTE PO)  Outside Facility (Specify) Yes No   Sig: Take 750 mL by mouth 50mL/hr for 15 hours or til complete   OXcarbazepine (TRILEPTAL) 300 mg tablet  Outside Facility (Specify) Yes No   Sig: Take 450 mg by mouth every 12 (twelve) hours   acetaminophen (TYLENOL) 160 MG/5ML elixir  Outside Facility (Specify) Yes No   Sig: Take 15 mg/kg by mouth every 4 (four) hours as needed   albuterol (ACCUNEB) 1 25 MG/3ML nebulizer solution  Outside Facility (Specify) Yes No   Sig: Take 1 ampule by nebulization every 6 (six) hours as needed for wheezing Give with Atrovent   bisacodyl (DULCOLAX) 10 mg suppository   Yes No   Sig: Insert 10 mg into the rectum daily   bisacodyl (FLEET) 10 MG/30ML ENEM  Outside Facility (Specify) Yes No   Sig: Insert 10 mg into the rectum as needed for constipation   budesonide (PULMICORT) 0 5 mg/2 mL nebulizer solution  Outside Facility (Specify) Yes No   Sig: Take 0 5 mg by nebulization 2 (two) times a day Rinse mouth after use  calcium carbonate-vitamin D (OSCAL-D) 500 mg-200 units per tablet  Outside Facility (Specify) Yes No   Sig: Take 1 tablet by mouth daily with breakfast   carbamide peroxide (DEBROX) 6 5 % otic solution  Outside Facility (Specify) Yes No   Si drops 2 (two) times a day   cholecalciferol (VITAMIN D3) 1,000 units tablet  Outside Facility (Specify) Yes No   Sig: Take 1,000 Units by mouth daily   cyclobenzaprine (FLEXERIL) 5 mg tablet  Outside Facility (Specify) Yes No   Sig: Take 5 mg by mouth 3 (three) times a day as needed for muscle spasms   dantrolene (DANTRIUM) 100 mg capsule  Outside Facility (Specify) Yes No   Sig: Take 100 mg by mouth 3 (three) times a day   desmopressin (DDAVP) 0 1 mg tablet  Outside Facility (Specify) Yes No   Sig: Take 0 05 mg by mouth every other day    diazepam (VALIUM) 1 mg/mL solution  Outside Facility (Specify) Yes No   Sig: Take 4 mL by mouth every 8 (eight) hours    doxycycline (ADOXA) 100 MG tablet Not Taking at Unknown time Outside Facility (Specify) Yes No   Sig: Take 100 mg by mouth daily   fluticasone (FLOVENT DISKUS) 50 MCG/BLIST diskus inhaler  Outside Facility (Specify) Yes No   Sig: Inhale 2 puffs 2 (two) times a day   fluticasone (FLOVENT HFA) 220 mcg/act inhaler   Yes Yes   Sig: Inhale 2 puffs 2 (two) times a day Rinse mouth after use     guaiFENesin (ROBITUSSIN) 100 MG/5ML oral liquid  Outside Facility (Specify) Yes No   Sig: Take 400 mg by mouth every 8 (eight) hours    ibuprofen (MOTRIN) 100 mg/5 mL suspension  Outside Facility (Specify) Yes No   Sig: Take 15 mL by mouth every 8 (eight) hours as needed for mild pain    ipratropium (ATROVENT) 0 02 % nebulizer solution  Outside Facility (Specify) Yes No   Sig: Take 0 5 mg by nebulization 4 (four) times a day   levETIRAcetam (KEPPRA) 100 mg/mL oral solution  Outside Facility (Specify) Yes No   Sig: Take 15 mL by mouth 2 (two) times a day   medroxyPROGESTERone (PROVERA) 10 mg tablet  Outside Facility (Specify) Yes No   Sig: Take 10 mg by mouth daily   metoclopramide (REGLAN) 5 mg/5 mL oral solution  Outside Facility (Specify) Yes No   Sig: Take by mouth 2 (two) times a day   senna (SENOKOT) 8 6 MG tablet   No No   Sig: Take 1 tablet (8 6 mg total) by mouth 2 (two) times a day   sulfamethoxazole-trimethoprim (BACTRIM DS) 800-160 mg per tablet   Yes No   Sig: Take 1 tablet by mouth every 12 (twelve) hours       Facility-Administered Medications: None     Allergies: Allergies   Allergen Reactions    Baclofen Other (See Comments)     Reaction Date: [id number];   apena      Cefepime Hives    Cephalosporins      Other reaction(s): Unknown Reaction  Other reaction(s): Unknown Reaction  Other reaction(s): Unknown Reaction      Clonidine      Other reaction(s): Respiratory Distress, Respiratory Distress    Lactase     Milk-Related Compounds     Penicillins Hives     Reaction Date: [id number];       Phenobarbital Other (See Comments)     Reaction Date: [id number]; Other reaction(s): Other (See Comments)  apnea  Other reaction(s): Other (See Comments)  apnea  apnea      Vancomycin     Zolpidem Other (See Comments)     Other reaction(s):  Other (see comments)  Reaction Date: [id number];   apnea  apnea  apnea      Latex Rash     ------------------------------------------------------------------------------------------------------------  Advance Directive and Living Will:      Power of :    POLST:    ------------------------------------------------------------------------------------------------------------  Anticipated Length of Stay is > 2 midnights    Care Time Delivered:   Upon my evaluation, this patient had a high probability of imminent or life-threatening deterioration due to encephalopathy, seizures, sepsis, which required my direct attention, intervention, and personal management  I have personally provided 45 minutes (1500 to 1720) of critical care time, exclusive of procedures, teaching, family meetings, and any prior time recorded by providers other than myself  Sharri Rizzo PA-C        Portions of the record may have been created with voice recognition software  Occasional wrong word or "sound a like" substitutions may have occurred due to the inherent limitations of voice recognition software    Read the chart carefully and recognize, using context, where substitutions have occurred

## 2020-06-03 NOTE — PROGRESS NOTES
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, intellectual disability, cerebral palsy, spastic quadriplegia, seizure disorder on Keppra and Trileptal, chronic tracheostomy, feeding tube, and severe scoliosis with spinal fixation, obstructive hydrocephalus s/p  shunt, nonverbal and wheelchair bound at baseline who was [facility] from [facility] for vEEG monitoring due to evidence of myoclonic jerking (moreso on the left), encephalopathy, and fever s/p cardiac arrest with ROSC achieved after one round of epi and one shock  Unclear etiology, differential includes infectious process from  shunt or other source provoking underlying epilepsy vs status epilepticus vs post anoxic myoclonus vs post anoxic seizure  Plan:  - Continue vEEG monitoring for 24-48 hours  Initiated on 6/3   - Continue AED regimen as follows:    - Keppra 1g 6AM, 1g 2PM, and 500mg 10PM   - Trileptal 450mg BID  - LP attempted twice at [facility]   shunt was tapped and CSF culture and gram stain is pending  Recommend obtaining the following CSF labs: WBC, RBC, glucose, and protein  - Recommend eventual MRI brain with and without contrast  - Ativan PRN  - Neuro checks  - Telemetry  - Notify Neurology with any changes in neuro examination  - Medical management and supportive care as per primary team    Subjective:   Patient initially presented [address] from on 6/1/2020 for fever, tachypnea, and hypoxia  History was obtained by [facility] EMR  Patient was placed in Trendelenburg position for central line placement with subsequent Vtach/Vfib s/p CPR, cardioversion, and one round of epinephrine  ROSC was achieved  It was noted that patient had generalized myoclonic activity  Neurology was consulted at [facility]  Patient was given Ativan 4mg IV and loaded with Keppra 1500mg   She was also placed on propofol, Fentanyl, and IV antibiotics (deptomycin and meropenem)  Lumbar puncture was attempted twice; unsuccessful attempts by Neurology as well as IR  Shunt series was obtained on 6/1 which showed normal shunt series, but showed lucency around the screw in the right iliac bone  Routine EEG was read as frontal intermittent rhythmic delta activity over left frontal lobe, as well as some sharp waves in the left frontotemporal region, but did not clearly appear epileptiform in morphology  Patient was transferred to [facility] for vEEG monitoring  Patient's AED regimen includes Keppra 1g 6AM, 1g 2PM, and 500mg at 10PM and Trileptal 450mg BID      Of note, patient was following with [facility] Neurology in the past  Her last appointment in March 2019  Her baseline was outlined as per [address] Neurology office note as patient being wheel chair bound, nonverbal, and not able to make her needs known  She does not shake her head yes/no, does not make eye contact, but patient does smile       Patient was initiated on vEEG monitoring last night (6/3)  Patient is ventilated and sedated on Precedex  Patient does not follow commands  Patient does not spontaneously open her eyes      Past Medical History:   Diagnosis Date    Asthma     Cerebral palsy (HCC)     Cortical blindness     Diabetes insipidus (HCC)     Elevated LFTs     Encephalopathy     Herpes encephalopathy    GERD (gastroesophageal reflux disease)     History of chronic respiratory failure     Hydrocephalus (HCC)     Nephrolithiasis     Profound intellectual disability     Pulmonary valve stenosis     Scoliosis     Seizure disorder (HCC)     Spastic quadriplegia (HCC)      Past Surgical History:   Procedure Laterality Date    CSF SHUNT      HIP SURGERY Bilateral     IR PEG/GJ TUBE PLACEMENT  2016    NISSEN FUNDOPLICATION      PEG TUBE PLACEMENT      PEG TUBE REMOVAL  2016    SPINAL FUSION W/ JOSE MARIA UNIT RAJAT      Fontenot rajat    TRACHEOSTOMY       Family History   Problem Relation Age of Onset    No Known Problems Mother     No Known Problems Father     No Known Problems Sister     No Known Problems Sister      Social History     Socioeconomic History    Marital status: Single     Spouse name: Not on file    Number of children: Not on file    Years of education: Not on file    Highest education level: Not on file   Occupational History    Not on file   Social Needs    Financial resource strain: Not on file    Food insecurity:     Worry: Not on file     Inability: Not on file    Transportation needs:     Medical: Not on file     Non-medical: Not on file   Tobacco Use    Smoking status: Never Smoker    Smokeless tobacco: Never Used   Substance and Sexual Activity    Alcohol use: Never     Frequency: Never    Drug use: Never    Sexual activity: Not on file   Lifestyle    Physical activity:     Days per week: Not on file     Minutes per session: Not on file    Stress: Not on file   Relationships    Social connections:     Talks on phone: Not on file     Gets together: Not on file     Attends [employer] service: Not on file     Active member of club or organization: Not on file     Attends meetings of clubs or organizations: Not on file     Relationship status: Not on file    Intimate partner violence:     Fear of current or ex partner: Not on file     Emotionally abused: Not on file     Physically abused: Not on file     Forced sexual activity: Not on file   Other Topics Concern    Not on file   Social History Narrative    Not on file     Medications: All current active meds have been reviewed and current meds:  Scheduled Meds:  Current Facility-Administered Medications:  chlorhexidine 15 mL [employer]ish [employer] Spit Q12H [address] Destiny Ware PA-C   heparin (porcine) 5,000 Units Subcutaneous [facility] Destiny Ware PA-C   propofol 5-50 mcg/kg/min Intravenous Titrated Destiny Ware PA-C     Continuous Infusions:  propofol 5-50 mcg/kg/min     PRN Meds:       ROS:   Review of Systems   Reason unable to perform ROS: intubated, sedated, intellectual disability  Vitals:   /71   Pulse (!) 116   Temp 98 6 °F (37 °C)   Resp (!) 36   Ht 4' 2" (1 27 m)   Wt 61 3 kg (135 lb 2 3 oz)   SpO2 96%   BMI 38 01 kg/m²     Physical Exam:   Physical Exam   Constitutional: No distress  HENT:   Right Ear: External ear normal    Left Ear: External ear normal    vEEG monitoring leads in place   Eyes: Right eye exhibits no discharge  Left eye exhibits no discharge  Pulmonary/Chest:   Trach in place   Neurological:   Reflex Scores:       Bicep reflexes are 2+ on the right side and 2+ on the left side  Brachioradialis reflexes are 2+ on the right side and 2+ on the left side  Patellar reflexes are 2+ on the right side and 2+ on the left side  Skin: She is not diaphoretic  No rash noted  Skin is dry     Neurologic Exam     Mental Status   Patient is sedated on Precedex  Patient does not follow any commands  Patient does not spontaneously open eyes     Cranial Nerves   No overt facial asymmetry  Tongue protruding midline from mouth  Difficulty assessing pupils and gaze; unclear if patient is resisting passive eye opening     Motor Exam   Decreased tone throughout   No spontaneous movements noted     Sensory Exam   Patient does not withdrawal to noxious stimuli in BUE  Triple flexion noted in bilateral lower extremities     Gait, Coordination, and Reflexes     Tremor   Resting tremor: absent    Reflexes   Right brachioradialis: 2+  Left brachioradialis: 2+  Right biceps: 2+  Left biceps: 2+  Right patellar: 2+  Left patellar: 2+  Right ankle clonus: absent  Left ankle clonus: absent    Labs: I have personally reviewed pertinent reports  No results found for this or any previous visit (from the past 24 hour(s))  Imaging: I have personally reviewed pertinent imaging and I have personally reviewed PACS reports  EKG, Pathology, and Other Studies: I have personally reviewed pertinent reports       VTE Prophylaxis: Heparin and SCD

## 2020-06-03 NOTE — QUICK NOTE
Copied verbatim from [facility] chart    CC H&P:  77-year-old female from group home with history of spastic quadriplegia, hydrocephalus with  shunt, profound mental delay, diabetes insipidus, pancreatitis with history of seizures, kidney stone, GERD, asthma, post herpes encephalopathy and chronic respiratory failure with tracheostomy  She is also status post PEG tube, bilateral hip repair and  shunt as mentioned above  Social history is negative  She presents this time with fever, tachypnea, hypoxemia  She was unable to offer any review of systems or history  Tracheostomy was changed from uncuffed to cuffed and she was placed on a ventilator  IV access was a problem, while attempting to place central access there is report she had a V  tach/V  fib arrest   CPR was performed, electric cardioversion, and 1 epinephrine ampules given  Return of spontaneous circulation and normal sinus rhythm was obtained  Laboratories consisted of both pre-and post code laboratories and possibly mixed timing of the results  White blood cell count was 24 7, hemoglobin 12 1, platelets 966, INR 1 97 with PTT 39, d-dimer 12 65  ABG 7 33, PaCO2 less than 21, oxygen 341, 99% saturation on AC at 18/300/100%/5  Bicarbonate 10 3 likely consisting of lactate after code with BUN 27 and creatinine 0 91  Ferritin is only 86, LFTs are elevated consistent with shock liver, , troponin , albumin 4 3  SARS-CoV-2 PCR states canceled  Rapid test is NEGATIVE  Stool cultures are pending but negative for C  difficile toxin, blood cultures pending x2  Chest x-ray is clear,  shunt and Fontenot rods are present  Tracheostomy is seen  , otherwise sinus rhythm  Inferior ST elevations are seen in leads III and aVF, possible inferior infarct  These elevations were noted in April 2018 to a lesser degree      ID Note  started on broad abx (daptomycin and meropenem) given her Hx of infections w/ MDRO's (records from prior [facility] admission reviewed by me and incorporated into HPI, A/P as appropriate)  W/U this admission has included CT C/A/P/head, as well as XR studies of her  shunt (images from all studies done heretofore independently viewed and interpreted by me)  These are notable for a collection near the left illiac crest, apparently communicating or adjacent to the  shunt? 1) Leukocytosis, fever - Favor HW or  shunt infection given CT scan, XR, and CT findings  Will need further w/u to delineate, which will be difficult given placement of hardware  May need to consider tagged WBC or CT PET if standard w/u is unrevealing  Regardless, if collection present, suspect localization and source control will be challenging in this medically and surgically complex patient  leukocytosis: Continue DAPTOMYCIN and MEROPENEM  Will follow CK to  assess for daptomycin toxicity, though this will be c/b recent cardiac arrest and expected risk in CK from same   ===> Will order Cx of sacral wound drainage  Suspect tunneling of infection from deeper source > pressure ulcer given complete lack of surrounding erythema, edema, or calor    ===> Given communication w/  shunt tubing, LP (with stain and culture) would be helpful to assess for extension into CNS  Shunt series: There is a ventriculoperitoneal shunt the soft tissues of the right side of the skull  This appears normal without breaks  This extends down the right side of the neck and right side of the chest into the right side of the abdomen without evidence of a break  Shunt tube is coiled in the midabdomen  Lungs appear clear  There is contrast in the patient's kidneys were recent CT  There is a gastrojejunostomy tube  There are large orthopedic rods in the spine and sacroiliac joints  There is a lucency around the screw in the right iliac bone  Findings could represent loosening  Infection should be excluded on the basis of clinical grounds      labs:WBC 24 7, 12 4, glucose 295, 92, troponin 269, 663, d-dimer 12 65  - 6/1/20 Shunt series: per report: Normal shunt series  New abnormal lucency around the screw transfixing the right sacroiliac joint into the right iliac bone  This at least represents loosening  Infection should be excluded on the basis of clinical grounds  - 6/1/20 CT head wo contrast: per report: 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  There is severe atrophy and dilatation of the ventricles unchanged  No hemorrhage is seen  No acute infarct is identified  There is a ventriculoperitoneal shunt in the dilated left lateral ventricle unchanged since 2016  IMPRESSIONS: No acute event  No abnormal enhancement   - CT chest/abdomen/pelvis: per report: small collection of peripheral left retroperitoneum just above the left iliac crest  Size approximately 3 3 x 3 1 x 3 3 cm  Etiology indeterminate  Hematoma, abscess, seroma in the differential diagnosis  Additional tiny fluid collection left paraspinal region, upper lumbar level  Etiology indeterminate  Physical Exam:  General: intubated and sedated, critically ill appearing  HEENT: normocephalic and atraumatic, tongue protruding midline from mouth  Neck: chronic trach in place,   Cardiovascular:tachycardia 125-155 on monitor  No murmurs or gallops  Respiratory: CTAB  tachypneic RR 20-25 on ventilator  GI: PEG tube in place  abdomen soft, non-distended  bowel sounds present  Skin: Warm and dry  No erythema, ecchymosis, or rash  Neurologic Exam:  Mental Status: Intubated and sedated on propofol and fentanyl  CN:  No gross asymmetry is noted, gaze is conjugate, eyes closed mostly, does not blink to threat, pupils were 2-3 mm and reactive bilaterally  Motor: decreased tone initially, subsequently found patient to have increased tone bilaterally with extension at the elbows and flexion at the wrists  unable to assess strength  Reflexes: DTRs 2+ throughout   Plantar reflex normal bilaterally  Home AED dosing:  Keppra 1G at 6am, 1g at 1400 and 500 mg at 2200  Trileptal 450 mg Q12

## 2020-06-03 NOTE — RESPIRATORY THERAPY NOTE
RT Protocol Note  Derrick Mack 21 y o  female MRN: [id number]  Unit/Bed#: ICU 02 Encounter: [id number]    Assessment    Active Problems:    * No active hospital problems   *      Home Pulmonary Medications:  Pulmicort and flovent         Past Medical History:   Diagnosis Date    Asthma     Cerebral palsy (HCC)     Cortical blindness     Diabetes insipidus (HCC)     Elevated LFTs     Encephalopathy     Herpes encephalopathy    GERD (gastroesophageal reflux disease)     History of chronic respiratory failure     Hydrocephalus (HCC)     Nephrolithiasis     Profound intellectual disability     Pulmonary valve stenosis     Scoliosis     Seizure disorder (HCC)     Spastic quadriplegia (HCC)      Social History     Socioeconomic History    Marital status: Single     Spouse name: Not on file    Number of children: Not on file    Years of education: Not on file    Highest education level: Not on file   Occupational History    Not on file   Social Needs    Financial resource strain: Not on file    Food insecurity:     Worry: Not on file     Inability: Not on file    Transportation needs:     Medical: Not on file     Non-medical: Not on file   Tobacco Use    Smoking status: Never Smoker    Smokeless tobacco: Never Used   Substance and Sexual Activity    Alcohol use: Never     Frequency: Never    Drug use: Never    Sexual activity: Not on file   Lifestyle    Physical activity:     Days per week: Not on file     Minutes per session: Not on file    Stress: Not on file   Relationships    Social connections:     Talks on phone: Not on file     Gets together: Not on file     Attends Nondenominational service: Not on file     Active member of club or organization: Not on file     Attends meetings of clubs or organizations: Not on file     Relationship status: Not on file    Intimate partner violence:     Fear of current or ex partner: Not on file     Emotionally abused: Not on file     Physically abused: Not on file     Forced sexual activity: Not on file   Other Topics Concern    Not on file   Social History Narrative    Not on file       Subjective         Objective    Physical Exam:   Assessment Type: Assess only  Respiratory Pattern: Assisted  Chest Assessment: Chest expansion symmetrical  Bilateral Breath Sounds: Diminished    Vitals:  Blood pressure 111/71, pulse (!) 116, temperature 98 6 °F (37 °C), resp  rate (!) 36, height 4' 2" (1 27 m), weight 61 3 kg (135 lb 2 3 oz), SpO2 96 %  Imaging and other studies: I have personally reviewed pertinent reports  Plan    Respiratory Plan: Vent/NIV/HFNC   PT was a transfer from another facility that is a chronic trach and was transferred for seizures

## 2020-06-04 ENCOUNTER — APPOINTMENT (INPATIENT)
Dept: NEUROLOGY | Facility: CLINIC | Age: 25
DRG: 711 | End: 2020-06-04
Payer: COMMERCIAL

## 2020-06-04 PROBLEM — G80.9 CEREBRAL PALSY (HCC): Status: ACTIVE | Noted: 2020-06-04

## 2020-06-04 PROBLEM — R56.9 SEIZURE-LIKE ACTIVITY (HCC): Status: ACTIVE | Noted: 2020-06-04

## 2020-06-04 PROBLEM — I46.9 CARDIAC ARREST (HCC): Status: ACTIVE | Noted: 2020-06-04

## 2020-06-04 LAB
ANION GAP SERPL CALCULATED.3IONS-SCNC: 7 MMOL/L (ref 4–13)
ANION GAP SERPL CALCULATED.3IONS-SCNC: 8 MMOL/L (ref 4–13)
BASOPHILS # BLD AUTO: 0.03 THOUSANDS/ΜL (ref 0–0.1)
BASOPHILS NFR BLD AUTO: 0 % (ref 0–1)
BUN SERPL-MCNC: 11 MG/DL (ref 5–25)
BUN SERPL-MCNC: 7 MG/DL (ref 5–25)
CA-I BLD-SCNC: 1.13 MMOL/L (ref 1.12–1.32)
CALCIUM SERPL-MCNC: 7.8 MG/DL (ref 8.3–10.1)
CALCIUM SERPL-MCNC: 8.1 MG/DL (ref 8.3–10.1)
CHLORIDE SERPL-SCNC: 106 MMOL/L (ref 100–108)
CHLORIDE SERPL-SCNC: 108 MMOL/L (ref 100–108)
CO2 SERPL-SCNC: 25 MMOL/L (ref 21–32)
CO2 SERPL-SCNC: 26 MMOL/L (ref 21–32)
CREAT SERPL-MCNC: 0.42 MG/DL (ref 0.6–1.3)
CREAT SERPL-MCNC: 0.53 MG/DL (ref 0.6–1.3)
EOSINOPHIL # BLD AUTO: 0.08 THOUSAND/ΜL (ref 0–0.61)
EOSINOPHIL NFR BLD AUTO: 1 % (ref 0–6)
ERYTHROCYTE [DISTWIDTH] IN BLOOD BY AUTOMATED COUNT: 17 % (ref 11.6–15.1)
GFR SERPL CREATININE-BSD FRML MDRD: 132 ML/MIN/1.73SQ M
GFR SERPL CREATININE-BSD FRML MDRD: 143 ML/MIN/1.73SQ M
GLUCOSE SERPL-MCNC: 133 MG/DL (ref 65–140)
GLUCOSE SERPL-MCNC: 90 MG/DL (ref 65–140)
HCT VFR BLD AUTO: 25.1 % (ref 34.8–46.1)
HGB BLD-MCNC: 7.6 G/DL (ref 11.5–15.4)
IMM GRANULOCYTES # BLD AUTO: 0.04 THOUSAND/UL (ref 0–0.2)
IMM GRANULOCYTES NFR BLD AUTO: 1 % (ref 0–2)
LYMPHOCYTES # BLD AUTO: 1.7 THOUSANDS/ΜL (ref 0.6–4.47)
LYMPHOCYTES NFR BLD AUTO: 25 % (ref 14–44)
MAGNESIUM SERPL-MCNC: 1.9 MG/DL (ref 1.6–2.6)
MCH RBC QN AUTO: 25.8 PG (ref 26.8–34.3)
MCHC RBC AUTO-ENTMCNC: 30.3 G/DL (ref 31.4–37.4)
MCV RBC AUTO: 85 FL (ref 82–98)
MONOCYTES # BLD AUTO: 0.66 THOUSAND/ΜL (ref 0.17–1.22)
MONOCYTES NFR BLD AUTO: 10 % (ref 4–12)
NEUTROPHILS # BLD AUTO: 4.42 THOUSANDS/ΜL (ref 1.85–7.62)
NEUTS SEG NFR BLD AUTO: 63 % (ref 43–75)
NRBC BLD AUTO-RTO: 0 /100 WBCS
PHOSPHATE SERPL-MCNC: 2.9 MG/DL (ref 2.7–4.5)
PLATELET # BLD AUTO: 126 THOUSANDS/UL (ref 149–390)
PMV BLD AUTO: 8.7 FL (ref 8.9–12.7)
POTASSIUM SERPL-SCNC: 3.3 MMOL/L (ref 3.5–5.3)
POTASSIUM SERPL-SCNC: 4.1 MMOL/L (ref 3.5–5.3)
PROCALCITONIN SERPL-MCNC: 3.91 NG/ML
RBC # BLD AUTO: 2.95 MILLION/UL (ref 3.81–5.12)
SODIUM SERPL-SCNC: 139 MMOL/L (ref 136–145)
SODIUM SERPL-SCNC: 141 MMOL/L (ref 136–145)
WBC # BLD AUTO: 6.93 THOUSAND/UL (ref 4.31–10.16)

## 2020-06-04 PROCEDURE — 84145 PROCALCITONIN (PCT): CPT | Performed by: PHYSICIAN ASSISTANT

## 2020-06-04 PROCEDURE — C1751 CATH, INF, PER/CENT/MIDLINE: HCPCS

## 2020-06-04 PROCEDURE — 02HV33Z INSERTION OF INFUSION DEVICE INTO SUPERIOR VENA CAVA, PERCUTANEOUS APPROACH: ICD-10-PCS | Performed by: INTERNAL MEDICINE

## 2020-06-04 PROCEDURE — 36569 INSJ PICC 5 YR+ W/O IMAGING: CPT

## 2020-06-04 PROCEDURE — 95715 VEEG EA 12-26HR INTMT MNTR: CPT

## 2020-06-04 PROCEDURE — 85025 COMPLETE CBC W/AUTO DIFF WBC: CPT | Performed by: PHYSICIAN ASSISTANT

## 2020-06-04 PROCEDURE — 80048 BASIC METABOLIC PNL TOTAL CA: CPT | Performed by: NURSE PRACTITIONER

## 2020-06-04 PROCEDURE — 94760 N-INVAS EAR/PLS OXIMETRY 1: CPT

## 2020-06-04 PROCEDURE — NC001 PR NO CHARGE: Performed by: PSYCHIATRY & NEUROLOGY

## 2020-06-04 PROCEDURE — 80048 BASIC METABOLIC PNL TOTAL CA: CPT | Performed by: PHYSICIAN ASSISTANT

## 2020-06-04 PROCEDURE — 84100 ASSAY OF PHOSPHORUS: CPT | Performed by: PHYSICIAN ASSISTANT

## 2020-06-04 PROCEDURE — 94003 VENT MGMT INPAT SUBQ DAY: CPT

## 2020-06-04 PROCEDURE — 99291 CRITICAL CARE FIRST HOUR: CPT | Performed by: STUDENT IN AN ORGANIZED HEALTH CARE EDUCATION/TRAINING PROGRAM

## 2020-06-04 PROCEDURE — 94640 AIRWAY INHALATION TREATMENT: CPT

## 2020-06-04 PROCEDURE — 83735 ASSAY OF MAGNESIUM: CPT | Performed by: PHYSICIAN ASSISTANT

## 2020-06-04 PROCEDURE — 99232 SBSQ HOSP IP/OBS MODERATE 35: CPT | Performed by: PSYCHIATRY & NEUROLOGY

## 2020-06-04 PROCEDURE — 99254 IP/OBS CNSLTJ NEW/EST MOD 60: CPT | Performed by: INTERNAL MEDICINE

## 2020-06-04 PROCEDURE — 82330 ASSAY OF CALCIUM: CPT | Performed by: PHYSICIAN ASSISTANT

## 2020-06-04 PROCEDURE — 94664 DEMO&/EVAL PT USE INHALER: CPT

## 2020-06-04 RX ORDER — ALBUMIN, HUMAN INJ 5% 5 %
SOLUTION INTRAVENOUS
Status: DISPENSED
Start: 2020-06-04 | End: 2020-06-05

## 2020-06-04 RX ORDER — BISACODYL 10 MG
10 SUPPOSITORY, RECTAL RECTAL DAILY
Status: DISCONTINUED | OUTPATIENT
Start: 2020-06-04 | End: 2020-06-28

## 2020-06-04 RX ORDER — SODIUM CHLORIDE, SODIUM GLUCONATE, SODIUM ACETATE, POTASSIUM CHLORIDE, MAGNESIUM CHLORIDE, SODIUM PHOSPHATE, DIBASIC, AND POTASSIUM PHOSPHATE .53; .5; .37; .037; .03; .012; .00082 G/100ML; G/100ML; G/100ML; G/100ML; G/100ML; G/100ML; G/100ML
50 INJECTION, SOLUTION INTRAVENOUS CONTINUOUS
Status: DISCONTINUED | OUTPATIENT
Start: 2020-06-04 | End: 2020-06-05

## 2020-06-04 RX ORDER — ACETAMINOPHEN 325 MG/1
650 TABLET ORAL EVERY 6 HOURS PRN
Status: DISCONTINUED | OUTPATIENT
Start: 2020-06-04 | End: 2020-06-05

## 2020-06-04 RX ORDER — CYCLOBENZAPRINE HCL 10 MG
5 TABLET ORAL 3 TIMES DAILY PRN
Status: DISCONTINUED | OUTPATIENT
Start: 2020-06-04 | End: 2020-06-05

## 2020-06-04 RX ORDER — MELATONIN
1000 DAILY
Status: DISCONTINUED | OUTPATIENT
Start: 2020-06-04 | End: 2020-06-26

## 2020-06-04 RX ORDER — BUDESONIDE 0.5 MG/2ML
0.5 INHALANT ORAL 2 TIMES DAILY
Status: DISCONTINUED | OUTPATIENT
Start: 2020-06-04 | End: 2020-06-28

## 2020-06-04 RX ORDER — AMOXICILLIN 250 MG
1 CAPSULE ORAL
Status: DISCONTINUED | OUTPATIENT
Start: 2020-06-04 | End: 2020-06-05

## 2020-06-04 RX ORDER — LORAZEPAM 2 MG/ML
INJECTION INTRAMUSCULAR
Status: COMPLETED
Start: 2020-06-04 | End: 2020-06-04

## 2020-06-04 RX ORDER — METOCLOPRAMIDE HYDROCHLORIDE 5 MG/5ML
10 SOLUTION ORAL 2 TIMES DAILY
Status: DISCONTINUED | OUTPATIENT
Start: 2020-06-04 | End: 2020-06-05

## 2020-06-04 RX ORDER — LEVETIRACETAM 100 MG/ML
1000 SOLUTION ORAL 2 TIMES DAILY
Status: DISCONTINUED | OUTPATIENT
Start: 2020-06-04 | End: 2020-06-04

## 2020-06-04 RX ORDER — DESMOPRESSIN ACETATE 0.1 MG/1
0.05 TABLET ORAL EVERY OTHER DAY
Status: DISCONTINUED | OUTPATIENT
Start: 2020-06-04 | End: 2020-06-04

## 2020-06-04 RX ORDER — POTASSIUM CHLORIDE 20MEQ/15ML
40 LIQUID (ML) ORAL ONCE
Status: COMPLETED | OUTPATIENT
Start: 2020-06-04 | End: 2020-06-04

## 2020-06-04 RX ORDER — GUAIFENESIN 100 MG/5ML
400 SOLUTION ORAL EVERY 8 HOURS
Status: DISCONTINUED | OUTPATIENT
Start: 2020-06-04 | End: 2020-06-28

## 2020-06-04 RX ORDER — DESMOPRESSIN ACETATE 0.1 MG/1
0.05 TABLET ORAL
Status: DISCONTINUED | OUTPATIENT
Start: 2020-06-05 | End: 2020-06-05

## 2020-06-04 RX ORDER — SODIUM CHLORIDE, SODIUM GLUCONATE, SODIUM ACETATE, POTASSIUM CHLORIDE, MAGNESIUM CHLORIDE, SODIUM PHOSPHATE, DIBASIC, AND POTASSIUM PHOSPHATE .53; .5; .37; .037; .03; .012; .00082 G/100ML; G/100ML; G/100ML; G/100ML; G/100ML; G/100ML; G/100ML
500 INJECTION, SOLUTION INTRAVENOUS ONCE
Status: COMPLETED | OUTPATIENT
Start: 2020-06-04 | End: 2020-06-04

## 2020-06-04 RX ORDER — LORAZEPAM 2 MG/ML
1 INJECTION INTRAMUSCULAR EVERY 4 HOURS PRN
Status: DISCONTINUED | OUTPATIENT
Start: 2020-06-04 | End: 2020-06-05

## 2020-06-04 RX ORDER — FENTANYL CITRATE 50 UG/ML
25 INJECTION, SOLUTION INTRAMUSCULAR; INTRAVENOUS
Status: DISCONTINUED | OUTPATIENT
Start: 2020-06-04 | End: 2020-06-05

## 2020-06-04 RX ORDER — DANTROLENE SODIUM 25 MG/1
100 CAPSULE ORAL 3 TIMES DAILY
Status: DISCONTINUED | OUTPATIENT
Start: 2020-06-04 | End: 2020-06-04

## 2020-06-04 RX ORDER — B-COMPLEX WITH VITAMIN C
1 TABLET ORAL
Status: DISCONTINUED | OUTPATIENT
Start: 2020-06-04 | End: 2020-06-26

## 2020-06-04 RX ORDER — MEDROXYPROGESTERONE ACETATE 10 MG/1
10 TABLET ORAL DAILY
Status: DISCONTINUED | OUTPATIENT
Start: 2020-06-04 | End: 2020-06-04

## 2020-06-04 RX ORDER — BUDESONIDE 0.5 MG/2ML
0.5 INHALANT ORAL 2 TIMES DAILY
Status: DISCONTINUED | OUTPATIENT
Start: 2020-06-04 | End: 2020-06-04

## 2020-06-04 RX ORDER — ALBUMIN, HUMAN INJ 5% 5 %
25 SOLUTION INTRAVENOUS ONCE
Status: COMPLETED | OUTPATIENT
Start: 2020-06-04 | End: 2020-06-04

## 2020-06-04 RX ORDER — DIAZEPAM 5 MG/1
5 TABLET ORAL EVERY 8 HOURS
Status: DISCONTINUED | OUTPATIENT
Start: 2020-06-04 | End: 2020-06-05

## 2020-06-04 RX ORDER — MAGNESIUM SULFATE HEPTAHYDRATE 40 MG/ML
2 INJECTION, SOLUTION INTRAVENOUS ONCE
Status: COMPLETED | OUTPATIENT
Start: 2020-06-04 | End: 2020-06-04

## 2020-06-04 RX ADMIN — GUAIFENESIN 400 MG: 100 SOLUTION ORAL at 10:20

## 2020-06-04 RX ADMIN — FENTANYL CITRATE 25 MCG: 50 INJECTION INTRAMUSCULAR; INTRAVENOUS at 21:56

## 2020-06-04 RX ADMIN — SODIUM CHLORIDE 3 MCG/MIN: 0.9 INJECTION, SOLUTION INTRAVENOUS at 17:45

## 2020-06-04 RX ADMIN — MEROPENEM 1000 MG: 1 INJECTION, POWDER, FOR SOLUTION INTRAVENOUS at 10:51

## 2020-06-04 RX ADMIN — POTASSIUM CHLORIDE 40 MEQ: 1.5 SOLUTION ORAL at 07:57

## 2020-06-04 RX ADMIN — METOCLOPRAMIDE HYDROCHLORIDE 10 MG: 5 SOLUTION ORAL at 10:56

## 2020-06-04 RX ADMIN — Medication 1 TABLET: at 10:56

## 2020-06-04 RX ADMIN — LEVETIRACETAM 1000 MG: 100 SOLUTION ORAL at 15:23

## 2020-06-04 RX ADMIN — HEPARIN SODIUM 5000 UNITS: 5000 INJECTION INTRAVENOUS; SUBCUTANEOUS at 05:37

## 2020-06-04 RX ADMIN — OXCARBAZEPINE 450 MG: 300 TABLET, FILM COATED ORAL at 05:36

## 2020-06-04 RX ADMIN — BUDESONIDE 0.5 MG: 0.5 INHALANT RESPIRATORY (INHALATION) at 21:21

## 2020-06-04 RX ADMIN — CHLORHEXIDINE GLUCONATE 0.12% ORAL RINSE 15 ML: 1.2 LIQUID ORAL at 09:03

## 2020-06-04 RX ADMIN — HEPARIN SODIUM 5000 UNITS: 5000 INJECTION INTRAVENOUS; SUBCUTANEOUS at 15:24

## 2020-06-04 RX ADMIN — SODIUM CHLORIDE, SODIUM LACTATE, POTASSIUM CHLORIDE, AND CALCIUM CHLORIDE 500 ML: .6; .31; .03; .02 INJECTION, SOLUTION INTRAVENOUS at 11:53

## 2020-06-04 RX ADMIN — DEXMEDETOMIDINE 0.2 MCG/KG/HR: 100 INJECTION, SOLUTION, CONCENTRATE INTRAVENOUS at 18:06

## 2020-06-04 RX ADMIN — LORAZEPAM 1 MG: 2 INJECTION INTRAMUSCULAR; INTRAVENOUS at 14:30

## 2020-06-04 RX ADMIN — LEVETIRACETAM 1000 MG: 100 SOLUTION ORAL at 05:36

## 2020-06-04 RX ADMIN — FENTANYL CITRATE 25 MCG: 50 INJECTION INTRAMUSCULAR; INTRAVENOUS at 02:50

## 2020-06-04 RX ADMIN — POTASSIUM PHOSPHATE, MONOBASIC AND POTASSIUM PHOSPHATE, DIBASIC 30 MMOL: 224; 236 INJECTION, SOLUTION INTRAVENOUS at 07:56

## 2020-06-04 RX ADMIN — ALBUMIN (HUMAN) 25 G: 12.5 INJECTION, SOLUTION INTRAVENOUS at 16:02

## 2020-06-04 RX ADMIN — HEPARIN SODIUM 5000 UNITS: 5000 INJECTION INTRAVENOUS; SUBCUTANEOUS at 21:23

## 2020-06-04 RX ADMIN — METOCLOPRAMIDE HYDROCHLORIDE 10 MG: 5 SOLUTION ORAL at 17:44

## 2020-06-04 RX ADMIN — DAPTOMYCIN 375 MG: 500 INJECTION, POWDER, LYOPHILIZED, FOR SOLUTION INTRAVENOUS at 18:08

## 2020-06-04 RX ADMIN — DIAZEPAM 5 MG: 5 TABLET ORAL at 10:20

## 2020-06-04 RX ADMIN — OXCARBAZEPINE 450 MG: 300 TABLET, FILM COATED ORAL at 17:45

## 2020-06-04 RX ADMIN — MEROPENEM 2000 MG: 1 INJECTION, POWDER, FOR SOLUTION INTRAVENOUS at 21:21

## 2020-06-04 RX ADMIN — MEROPENEM 1000 MG: 1 INJECTION, POWDER, FOR SOLUTION INTRAVENOUS at 01:59

## 2020-06-04 RX ADMIN — SODIUM CHLORIDE, SODIUM GLUCONATE, SODIUM ACETATE, POTASSIUM CHLORIDE, MAGNESIUM CHLORIDE, SODIUM PHOSPHATE, DIBASIC, AND POTASSIUM PHOSPHATE 50 ML/HR: .53; .5; .37; .037; .03; .012; .00082 INJECTION, SOLUTION INTRAVENOUS at 13:47

## 2020-06-04 RX ADMIN — CHLORHEXIDINE GLUCONATE 0.12% ORAL RINSE 15 ML: 1.2 LIQUID ORAL at 21:23

## 2020-06-04 RX ADMIN — SENNOSIDES AND DOCUSATE SODIUM 1 TABLET: 8.6; 5 TABLET ORAL at 22:32

## 2020-06-04 RX ADMIN — GUAIFENESIN 400 MG: 100 SOLUTION ORAL at 17:43

## 2020-06-04 RX ADMIN — SODIUM CHLORIDE, SODIUM LACTATE, POTASSIUM CHLORIDE, AND CALCIUM CHLORIDE 500 ML: .6; .31; .03; .02 INJECTION, SOLUTION INTRAVENOUS at 13:30

## 2020-06-04 RX ADMIN — DIAZEPAM 5 MG: 5 TABLET ORAL at 17:44

## 2020-06-04 RX ADMIN — SODIUM CHLORIDE, SODIUM GLUCONATE, SODIUM ACETATE, POTASSIUM CHLORIDE, MAGNESIUM CHLORIDE, SODIUM PHOSPHATE, DIBASIC, AND POTASSIUM PHOSPHATE 500 ML: .53; .5; .37; .037; .03; .012; .00082 INJECTION, SOLUTION INTRAVENOUS at 03:50

## 2020-06-04 RX ADMIN — BISACODYL 10 MG: 10 SUPPOSITORY RECTAL at 10:20

## 2020-06-04 RX ADMIN — MAGNESIUM SULFATE HEPTAHYDRATE 2 G: 40 INJECTION, SOLUTION INTRAVENOUS at 07:57

## 2020-06-04 RX ADMIN — MELATONIN 1000 UNITS: at 10:20

## 2020-06-04 NOTE — OCCUPATIONAL THERAPY NOTE
OT CANCEL NOTE    OT orders received  Chart reviewed  Pt is currently intubated/sedated and not appropriate to engage in skilled OT services at this time  Will follow up w/ CM for further information regarding pt's baseline functional status (likely will not require skilled OT services this admission ) Will hold initial OT evaluation  Will continue to follow pt on caseload and see pt when medically stable and as clinically appropriate      Rain Han MS, OTR/L

## 2020-06-04 NOTE — PLAN OF CARE
Problem: PAIN - ADULT  Goal: Verbalizes/displays adequate comfort level or baseline comfort level  Description  Interventions:  - Encourage patient to monitor pain and request assistance  - Assess pain using appropriate pain scale  - Administer analgesics based on type and severity of pain and evaluate response  - Implement non-pharmacological measures as appropriate and evaluate response  - Consider cultural and social influences on pain and pain management  - Notify physician/advanced practitioner if interventions unsuccessful or patient reports new pain  Outcome: Progressing     Problem: INFECTION - ADULT  Goal: Absence or prevention of progression during hospitalization  Description  INTERVENTIONS:  - Assess and monitor for signs and symptoms of infection  - Monitor lab/diagnostic results  - Monitor all insertion sites, i e  indwelling lines, tubes, and drains  - Monitor endotracheal if appropriate and nasal secretions for changes in amount and color  - [place] appropriate cooling/warming therapies per order  - Administer medications as ordered  - Instruct and encourage patient and family to use good hand hygiene technique  - Identify and instruct in appropriate isolation precautions for identified infection/condition  Outcome: Progressing     Problem: SAFETY ADULT  Goal: Maintain or return mobility status to optimal level  Description  INTERVENTIONS:  - Assess patient's baseline mobility status (ambulation, transfers, stairs, etc )    - Identify cognitive and physical deficits and behaviors that affect mobility  - Identify mobility aids required to assist with transfers and/or ambulation (gait belt, sit-to-stand, lift, walker, cane, etc )  - [place] fall precautions as indicated by assessment  - Record patient progress and toleration of activity level on Mobility SBAR; progress patient to next Phase/Stage  - Instruct patient to call for assistance with activity based on assessment  - Consider rehabilitation consult to assist with strengthening/weightbearing, etc   Outcome: Progressing     Problem: DISCHARGE PLANNING  Goal: Discharge to home or other facility with appropriate resources  Description  INTERVENTIONS:  - Identify barriers to discharge w/patient and caregiver  - Arrange for needed discharge resources and transportation as appropriate  - Identify discharge learning needs (meds, wound care, etc )  - Arrange for interpretive services to assist at discharge as needed  - Refer to Case Management Department for coordinating discharge planning if the patient needs post-hospital services based on physician/advanced practitioner order or complex needs related to functional status, cognitive ability, or social support system  Outcome: Progressing     Problem: Knowledge Deficit  Goal: Patient/family/caregiver demonstrates understanding of disease process, treatment plan, medications, and discharge instructions  Description  Complete learning assessment and assess knowledge base    Interventions:  - Provide teaching at level of understanding  - Provide teaching via preferred learning methods  Outcome: Progressing     Problem: Prexisting or High Potential for Compromised Skin Integrity  Goal: Skin integrity is maintained or improved  Description  INTERVENTIONS:  - Identify patients at risk for skin breakdown  - Assess and monitor skin integrity  - Assess and monitor nutrition and hydration status  - Monitor labs   - Assess for incontinence   - Turn and reposition patient  - Assist with mobility/ambulation  - Relieve pressure over bony prominences  - Avoid friction and shearing  - Provide appropriate hygiene as needed including keeping skin clean and dry  - Evaluate need for skin moisturizer/barrier cream  - Collaborate with interdisciplinary team   - Patient/family teaching  - Consider wound care consult   Outcome: Progressing     Problem: Potential for Falls  Goal: Patient will remain free of falls  Description  INTERVENTIONS:  - Assess patient frequently for physical needs  -  Identify cognitive and physical deficits and behaviors that affect risk of falls    -  [place] fall precautions as indicated by assessment   - Educate patient/family on patient safety including physical limitations  - Instruct patient to call for assistance with activity based on assessment  - Modify environment to reduce risk of injury  - Consider OT/PT consult to assist with strengthening/mobility  Outcome: Progressing

## 2020-06-04 NOTE — PROGRESS NOTES
Daily Progress Note - Critical Care   Corrinne Dunn 21 y o  female MRN: [id number]  Unit/Bed#: ICU 02 Encounter: [id number]        ----------------------------------------------------------------------------------------  HPI/24hr events:   · No acute events overnight  · No seizures seen on EMU per overnight  · Hypotensive at times   ---------------------------------------------------------------------------------------  SUBJECTIVE  Unable to provide verbal complaints     Review of Systems   Unable to perform ROS: Intubated     Review of systems was unable to be performed secondary to non-verbal   ---------------------------------------------------------------------------------------  Assessment and Plan    Neuro:   · Seizure-like activity in the setting of cerebral palsy and severe mental retardation with cortical blindnes  ? History of seizures   ? Resume home AED medications:   § Keppra 1000mg BID + 500mg qHS  § Oxcarbazepine 450mg q12h   ? Continue vEEG monitoring  ? Follow up with neurology   ? Ativan 1mg PRN for seizure   · Acute metabolic encephalopathy  ? Possibly related to seizures as detailed above, although, cannot rule out infectious process driving current state  ? May need repeat  shunt tap for further studies    ? Precedex drip  § Titrate to goal RASS 0 to -1  ? Fentanyl 25mcg PRN for agitation   · Cerebral palsy with muscle spasm  ? Restart valium 5mg q8h  § Of note, patient takes 4mg as outpatient, which pharmacy doesn't have  ? Flexeril 5mg TID PRN    CV:   · Cardiac arrest   ? At outside hospital during IJ CVC placement  ? Consider 2/2 guidewire insertion given short frame  ? CT negative for PE   ? No events on telemetry  ? Continue to monitor closely   · Hypotension   ? 500ml IVF bolus now  ? Will place on gentle IVF until tomorrow AM  ? 6/2 Echo: LVEF 55-60%, decreased LV internal cavity size, normal RV size and function, mildly dilated PA 2 2cm   ? Maintain MAP > 65  ?  Unfortunately, no known baseline BP  § Will follow up with outpatient facility         Pulm:  · Chronic respiratory failure s/p trach  ? Questionable 24 hour trach collar vs times of vent at outside facility  ? Yolanda Noble did need to be changed to cuffed device at OSH, which suggests patient may not use vent at facility  ? Daily SBT and trach collar trials as tolerated  ? Follow up with other facility         GI:   · Elevated LFT's   ? Likely following cardiac arrest   ? Check BMP in AM  ? On admission  /  at OSH  · Stress ulcer prophylaxis: not indicated  · Bowel regimen: dulcolax daily, reglan 10mg BID, senna/colace         :   · No acute issues   · Baseline creatinine: unknown   · Admission creatinine: 0 60  · Current creatinine: 0 53  · Patient likely unable to have significantly elevated creatinine given los muscle mass  Monitor BUN and other signs of CHARITO  · Maintain viera catheter for today  · I/O q2h          F/E/N:   · Maintenance fluids: Start isolyte @ 50ml/hr   · Hypokalemia  ? Replete   ? Repeat BMP this afternoon   · Nutrition: Cyclic tube feedings         Heme/Onc:  · Anemia  ? Baseline hemoglobin: unknown  ? Admission hemoglobin: 10 0  ? Current hemoglobin: 7 6  ? Transfuse for hemoglobin < 7 0  ? No sign of bleeding  · Thrombocytopenia  ? Baseline platelet: unknown  ? Admission platelet: 587  ? Current platelet: 514  ? If ongoing drop, consider sending hemolysis labs given associated anemia and unclear neuro-process   · VTE prophylaxis: SQH and SCD's to BLE      Endo:  · Hyperglycemia   ? Hemoglobin A1c unknown with no history of DM  ? ? BS around 300 on arrival   ? Presumed stress respone  ? No listed hx of DM  · Diabetes insipidus   ? Continue DDAVP 0 05 QOD  ? Closely monitor UOP and Na      ID:  · Fevers of unclear etiology   ? Tmax 104 at outside hospital   ? Day #4 daptomycin and meropenem   ? Imaging with collection that is stable from prior  ?  All cultures (blood, urine, sputum, wound, c diff) are negative to date from OSH (20)  ? Follow up 6/3/20 CSF culture   ? Plan to obtain additional fluid from  shunt in order to get cell counts and help tailor antibiotics   ? Will call outpatient facility to see when Dantroline was stopped  ? Possibly when it was stopped this caused central fevers to recur which were previously masked      MSK/Skin:  · Repetitive repositioning and off-loading to prevent skin break down and ulcerative formation  · PT / OT      Disposition: Continue Critical Care   Code Status: Level 1 - Full Code  ---------------------------------------------------------------------------------------  Invasive Devices Review  Invasive Devices     Central Venous Catheter Line            CVC Central Lines Left Femoral -- days          Drain            Gastrostomy/Enterostomy -- days    Urethral Catheter Temperature probe -- days          Airway            Surgical Airway Shiley Cuffed -- days              ---------------------------------------------------------------------------------------  OBJECTIVE    Vitals   Vitals:    20 0630 20 0700 20 0720 20 0800   BP: (!) 89/45 102/51  (!) 97/46   BP Location:    Right arm   Pulse: 86 94 84 82   Resp: (!) 24 (!) 38 20 21   Temp: 99 7 °F (37 6 °C) 99 7 °F (37 6 °C)  99 3 °F (37 4 °C)   TempSrc:       SpO2: 97% 95% 97% 98%   Weight:       Height:         Temp (24hrs), Av 8 °F (37 7 °C), Min:98 6 °F (37 °C), Max:101 1 °F (38 4 °C)  Current: Temperature: 99 3 °F (37 4 °C)    Invasive/non-invasive ventilation settings   Respiratory    Lab Data (Last 4 hours)    None         O2/Vent Data (Last 4 hours)       0720           Vent Mode AC/VC       Resp Rate (BPM) (BPM) 15       Vt (mL) (mL) 250       FIO2 (%) (%) 35       PEEP (cmH2O) (cmH2O) 5       Patient safety screen outcome: Failed       MV 4 9                   Physical Exam   Constitutional: She is sedated  HENT:   Head: Atraumatic     Mouth/Throat: Mucous membranes are normal    Eyes: Pupils are equal, round, and reactive to light  Right conjunctiva is injected  Right conjunctiva has no hemorrhage  Left conjunctiva is injected  Left conjunctiva has no hemorrhage  No scleral icterus  Right eye exhibits nystagmus  Left eye exhibits nystagmus  Neck:   Trach site C/D/I   Cardiovascular: Normal rate, regular rhythm and normal heart sounds  No extrasystoles are present  Pulses:       Radial pulses are 2+ on the right side, and 2+ on the left side  Dorsalis pedis pulses are 2+ on the right side, and 2+ on the left side  Peripheral edema without pitting    Pulmonary/Chest: She has decreased breath sounds  She has no wheezes  She has no rhonchi  She has no rales  Abdominal: Soft  She exhibits no distension  J-tube site C/D/I   Genitourinary:   Genitourinary Comments: Her: clear, light yellow urine    Neurological: She is unresponsive  She displays atrophy and tremor  GCS eye subscore is 2  GCS verbal subscore is 1  GCS motor subscore is 4  Moves BUE 2/5, BLE 1/5   Skin: Skin is warm  Capillary refill takes less than 2 seconds  She is diaphoretic                Laboratory and Diagnostics:  Results from last 7 days   Lab Units 06/04/20  0553 06/03/20  1726   WBC Thousand/uL 6 93 11 24*   HEMOGLOBIN g/dL 7 6* 10 0*   HEMATOCRIT % 25 1* 33 2*   PLATELETS Thousands/uL 126* 154   NEUTROS PCT % 63 61   MONOS PCT % 10 9     Results from last 7 days   Lab Units 06/04/20  0553 06/03/20  1726   SODIUM mmol/L 139 140   POTASSIUM mmol/L 3 3* 5 1   CHLORIDE mmol/L 106 107   CO2 mmol/L 25 22   ANION GAP mmol/L 8 11   BUN mg/dL 7 6   CREATININE mg/dL 0 53* 0 60   CALCIUM mg/dL 7 8* 9 0   GLUCOSE RANDOM mg/dL 90 78     Results from last 7 days   Lab Units 06/04/20  0553 06/03/20  1726   MAGNESIUM mg/dL 1 9 2 0   PHOSPHORUS mg/dL 2 9 2 8               Results from last 7 days   Lab Units 06/03/20 2026 06/03/20  1726   LACTIC ACID mmol/L 1 1 3 5*     ABG:  Results from last 7 days Lab Units 06/03/20  1724   PH ART  7 400   PCO2 ART mm Hg 34 3*   PO2 ART mm Hg 83 7   HCO3 ART mmol/L 20 8*   BASE EXC ART mmol/L -3 5   ABG SOURCE  Radial, Right     VBG:  Results from last 7 days   Lab Units 06/03/20  1724   ABG SOURCE  Radial, Right     Results from last 7 days   Lab Units 06/04/20  0554 06/03/20  1726   PROCALCITONIN ng/ml 3 91* 5 08*       Micro        EKG: NSR  Imaging: No new imaging    Intake and Output  I/O       06/02 0701 - 06/03 0700 06/03 0701 - 06/04 0700 06/04 0701 - 06/05 0700    P  O   0     I V  (mL/kg)  72 1 (1 2)     NG/GT  100     IV Piggyback  150     Total Intake(mL/kg)  322 1 (5 2)     Urine (mL/kg/hr)  925     Total Output  925     Net  -602 9                  Height and Weights   Height: 4' 2" (127 cm)  IBW: 22 5 kg  Body mass index is 38 5 kg/m²  Weight (last 2 days)     Date/Time   Weight    06/04/20 0557   62 1 (136 91)    06/03/20 1421   61 3 (135 14)                Nutrition       Diet Orders   (From admission, onward)             Start     Ordered    06/03/20 1640  Diet Enteral/Parenteral; Tube Feeding No Oral Diet; Non-Formulary; Cyclic; 50; 16 hours  Diet effective now     Comments:  Milk-free Prosource (nonformulary) BID   Question Answer Comment   Diet Type Enteral/Parenteral    Enteral/Parenteral Tube Feeding No Oral Diet    Tube Feeding Formula: Non-Formulary    Bolus/Cyclic/Continuous Cyclic    Tube Feeding Cyclic Rate (mL/hr): 50    Tube Feeding Cyclic: Administer over: 16 hours    RD to adjust diet per protocol?  Yes        06/03/20 1641                  Active Medications  Scheduled Meds:  Current Facility-Administered Medications:  acetaminophen 650 mg Oral Q6H PRN Trina Rodrigez MD    chlorhexidine 15 mL [employer]it Q12H [address] Isidro Gutierrez PA-C    DAPTOmycin 6 mg/kg (Adjusted) Intravenous Q24H Isidro Gutierrez PA-C Last Rate: 275 mg (06/03/20 1857)   desmopressin 0 05 mg Per J Tube BID Isidro Gutierrez PA-C    dexmedetomidine 0 1-0 7 mcg/kg/hr Intravenous Titrated Jodi Olp, PA-C Last Rate: 0 3 mcg/kg/hr (06/04/20 0500)   fentanyl citrate (PF) 25 mcg Intravenous Q1H PRN Subhash Rodrigez MD    heparin (porcine) 5,000 Units Subcutaneous [facility] Jodi Olp, PA-C    levETIRAcetam 1,000 mg Per J Tube BID Jodi Olp, PA-C    levETIRAcetam 500 mg Per J Tube Q24H Jodi Olp, PA-C    magnesium sulfate 2 g Intravenous Once , CRNP    meropenem 1,000 mg Intravenous Q8H Jodi Olp, PA-C Last Rate: Stopped (06/04/20 0259)   OXcarbazepine 450 mg Per J Tube Q12H [address] Jodi Olp, PA-C    potassium phosphate 30 mmol Intravenous Once , CRNP Last Rate: 30 mmol (06/04/20 0756)     Continuous Infusions:    dexmedetomidine 0 1-0 7 mcg/kg/hr Last Rate: 0 3 mcg/kg/hr (06/04/20 0500)     PRN Meds:     acetaminophen 650 mg Q6H PRN   fentanyl citrate (PF) 25 mcg Q1H PRN       Allergies   Allergies   Allergen Reactions    Baclofen Other (See Comments)     Reaction Date: [id number];   apena      Cefepime Hives    Cephalosporins      Other reaction(s): Unknown Reaction  Other reaction(s): Unknown Reaction  Other reaction(s): Unknown Reaction      Clonidine      Other reaction(s): Respiratory Distress, Respiratory Distress    Lactase     Milk-Related Compounds     Penicillins Hives     Reaction Date: [id number];       Phenobarbital Other (See Comments)     Reaction Date: [id number]; Other reaction(s): Other (See Comments)  apnea  Other reaction(s): Other (See Comments)  apnea  apnea      Vancomycin     Zolpidem Other (See Comments)     Other reaction(s):  Other (see comments)  Reaction Date: [id number];   apnea  apnea  apnea      Latex Rash     ---------------------------------------------------------------------------------------  Advance Directive and Living Will:      Power of :    POLST:    ---------------------------------------------------------------------------------------  Care Time Delivered:   No Critical Care time spent MADAY Herrera        Portions of the record may have been created with voice recognition software  Occasional wrong word or "sound a like" substitutions may have occurred due to the inherent limitations of voice recognition software    Read the chart carefully and recognize, using context, where substitutions have occurred

## 2020-06-04 NOTE — NURSING NOTE
CCM Attending Dr Minerva Viveros at bedside to assess with  shunt aspiration sent to lab for CSF culture and gram stain  Patient has upward gaze, E/R pupils  Patient grossly moves upper extremities when turned or stimulated with hands outward  Lower extremities flex and slight withdrawal to pain  Patient has warm upper extremites with +2 pulses and lower extremities have +2 pulses and cap refill <2 but the right foot is cooler than the left  Warm upper LE  Patient has a left femoral central line, must place patient in reverse to allow aspiration of blood via central line  Patient has HR >100  Propofol transitioning over to Precedex  Patient is on EMU

## 2020-06-04 NOTE — CONSULTS
Jd [facility] Neurology saw patient in consultation at [facility] prior to transfer to [facility]  Please see Dr Giselle Spann's Jennifer Angry from 6/3/2020 for outline of hospitalization from [facility] chart

## 2020-06-04 NOTE — PROCEDURES
Insert PICC line  Date/Time: 6/4/2020 3:12 PM  Performed by: Soledad Bagley RN  Authorized by: Nathan Perez MD     Patient location:  Bedside  Other Assisting Provider: Yes (comment) Opal Hicks)    Consent:     Consent obtained:  Verbal (obtained by MD)    Consent given by:  Parent    Procedural risks discussed: reviewed by MD   Universal protocol:     Procedure explained and questions answered to patient or proxy's satisfaction: yes      Relevant documents present and verified: yes      Test results available and properly labeled: yes      Radiology Images displayed and confirmed  If images not available, report reviewed: yes      Required blood products, implants, devices, and special equipment available: yes      Site/side marked: yes      Immediately prior to procedure, a time out was called: yes      Patient identity confirmed:  Arm band and hospital-assigned identification number  Pre-procedure details:     Hand hygiene: Hand hygiene performed prior to insertion      Sterile barrier technique: All elements of maximal sterile technique followed      Skin preparation:  ChloraPrep    Skin preparation agent: Skin preparation agent completely dried prior to procedure    Indications:     PICC line indications: no peripheral vascular access    Anesthesia (see MAR for exact dosages): Anesthesia method:  Local infiltration    Local anesthetic:  Lidocaine 1% w/o epi (2mL)  Procedure details:     Location:  Basilic    Vessel type: vein      Laterality:  Right    Site selection rationale:   All veins too small on left    Approach: percutaneous technique used      Patient position:  Flat    Procedural supplies:  Double lumen    Catheter size:  5 Fr    Landmarks identified: yes      Ultrasound guidance: yes      Sterile ultrasound techniques: Sterile gel and sterile probe covers were used      Number of attempts:  1    Successful placement: yes      Vessel of catheter tip end:  Sherlock 3CG confirmed Total catheter length (cm):  41    Catheter out on skin (cm):  2    Max flow rate:  999    Arm circumference:  33  Post-procedure details:     Post-procedure:  Dressing applied and securement device placed    Assessment:  Blood return through all ports and free fluid flow    Post-procedure complications: none      Patient tolerance of procedure: Tolerated well, no immediate complications  Comments:      OK to use piccline  Primary RN made aware gauze dressing applied and will need to be changed tomorrow

## 2020-06-04 NOTE — PROCEDURES
Lumbar puncture  Date/Time: 6/3/2020 7:19 PM  Performed by: Kamar Vogel DO  Authorized by: Kamar Vogel DO     Patient location:  Bedside  Consent:     Consent obtained:  Emergent situation  Universal protocol:     Site/side marked: yes      Patient identity confirmed:  Arm band  Pre-procedure details:     Preparation: Patient was prepped and draped in usual sterile fashion    Indications:     Indications: evaluation for infection    Anesthesia (see MAR for exact dosages): Anesthesia method:  None  Procedure details:     Lumbar space:  Other (comment) (VPS)    Patient position:  Sitting    Epidural needle gauge: 25G needle      Number of attempts:  1    Fluid appearance:  Clear    Total volume (ml):  1  Comments:      Procedure does not include cc time

## 2020-06-04 NOTE — QUICK NOTE
Called patient's facility to ask about details of her baseline health  Facility confirmed that patient is on trach collar 24/7, and has not required the ventilator in any recent history  She was on Dantrolene until July 2019, when it was stopped for elevated LFT's  The patient typically is normotensive with SBP ranging from 100-120 on monthly checks  Although she is usually tachypnic with RR 20-30, the "episoide" in question when she was tachypnic to > 60 was not typical and prompted her hospitalization  She also has never been noted to have significant tremors or shaking such as the leg-twitching that was described prior to admission, and what she has recurrently now  Update was provided to the facility and questions were answered regarding her condition and progress       Spoke with Apple Kay from [employer]

## 2020-06-04 NOTE — CONSULTS
Consultation - Infectious Disease   Renato Mcintosh 21 y o  female MRN: [id number]  Unit/Bed#: ICU 02 Encounter: [id number]      IMPRESSION & RECOMMENDATIONS:   1  Systemic inflammatory response syndrome-possible sepsis  Possibly secondary to status epilepticus  Possibly secondary to the inflammatory state post VFib arrest   Unclear etiology  Extensive workup at [facility] including extensive radiographic testing did not find any definitive source  Blood cultures were sent and are pending without any report of a positive culture  Consideration for the possibility of a CNS infection although it is difficult to assess her mentation as at baseline she is quite abnormal   Fortunately the patient remains hemodynamically stable despite her systemic illness  Thus far her CSF Gram stain is negative and the cultures pending   -continue the meropenem but will increase the dose to 2 g IV q 8 hours in case this represents a CNS infection  -continue the daptomycin but will increase the dose to 8 milligrams/kilogram IV Q 24 hours in case this is a CNS infection  -will obtain culture results from [facility]  -follow up CSF cultures  -recheck procalcitonin level, CBC with diff, and CMP  -check amylase and lipase in this patient with previous pancreatitis  -supportive care    2  Acute on chronic hypoxic respiratory failure-unclear etiology  The patient presented with respiratory distress but by report no pneumonia was seen on imaging of the chest   Patient is still requiring low level ventilatory support with minimal oxygen needs   -antibiotics as above for now waiting additional data  -will review imaging of the chest once available  -wean off the ventilator as able  -monitor respiratory status    3  Status epilepticus-quite sedated on treatment for the seizure at this time  Remains on EEG    Unclear if primary process such as CNS infection/ shunt infection although this is less likely  -continue Keppra and Trileptal per Neurology  -EEG monitoring  -close neurology follow-up  -followup CSF culture    4  Reported retroperitoneal collection-this is reportedly chronic  Unclear significance  Consideration for the possibility of a smoldering infectious process   -antibiotics as above for now  -check sedimentation rate and CRP  -await formal reports of the imaging  -additional workup as needed    Extensive review of the medical records in epic including review of the notes, radiographs, and laboratory results    Discussed the above management plan in detail with the critical care service    HISTORY OF PRESENT ILLNESS:  Reason for Consult:  Acute metabolic encephalopathy  HPI: Darryl Hernandez is a 21y o  year old female with cerebral palsy with spastic quadriplegia, profound mental retardation, hydrocephalus with a  shunt in place, and chronic respiratory failure with a tracheostomy in place admitted to [facility] in [place] after being transferred from [facility] who I am asked to assist with management of fever  The patient apparently presented to [facility] on 6/1/2020 with fever, tachycardia, and tachypnea with respiratory distress  She was placed on a ventilator and while attempting to place an IJ central venous catheter she went into VFib arrest   She underwent CPR and required a shock and 1 round of epinephrine before recovering from her malignant arrhythmia  She developed some possible seizure activity and was treated with benzodiazepines  She was also treated with Keppra and has remained on the [place]  Her fever spiked as high as 104  She underwent CT scans which revealed collections in her left retroperitoneum along the iliac crest and paraspinal lumbar vertebra but apparently these are chronic  The  shunt series revealed some lucencies adjacent to the right SI joint and iliac bone    She underwent several attempts at lumbar puncture including interventional Radiology that were unsuccessful  Because of her unexplained high fever she was started on daptomycin and meropenem as she is allergic to vancomycin, penicillins, and cephalosporins  She was now transferred to [facility] in [place] for further management  She has remained off all vasopressor support  She continues to be on the ventilator but only requires low level ventilatory support  She has continued to have intermittent fever  Yesterday she underwent a tap of her  shunt with small amount of fluid sent for culture  She is not able to give any history and therefore the entire history is through chart review and discussion with the critical care service      REVIEW OF SYSTEMS:  Not obtainable due the patient's current cognitive state    PAST MEDICAL HISTORY:  Past Medical History:   Diagnosis Date    Asthma     Cerebral palsy ([address] )     Cortical blindness     Diabetes insipidus ([address] )     Elevated LFTs     Encephalopathy     Herpes encephalopathy    GERD (gastroesophageal reflux disease)     History of chronic respiratory failure     Hydrocephalus (HCC)     Nephrolithiasis     Profound intellectual disability     Pulmonary valve stenosis     Scoliosis     Seizure disorder (HCC)     Spastic quadriplegia (HCC)      Past Surgical History:   Procedure Laterality Date    CSF SHUNT      HIP SURGERY Bilateral     IR PEG/GJ TUBE PLACEMENT  2016    NISSEN FUNDOPLICATION      PEG TUBE PLACEMENT      PEG TUBE REMOVAL  2016    SPINAL FUSION W/ JOSE MARIA UNIT RAJAT      Fontenot rajat    TRACHEOSTOMY         FAMILY HISTORY:  Non-contributory    SOCIAL HISTORY:  Social History   Social History     Substance and Sexual Activity   Alcohol Use Never    Frequency: Never     Social History     Substance and Sexual Activity   Drug Use Never     Social History     Tobacco Use   Smoking Status Never Smoker   Smokeless Tobacco Never Used       ALLERGIES:  Allergies   Allergen Reactions    Baclofen Other (See Comments) Reaction Date: ;   apena      Cefepime Hives    Cephalosporins      Other reaction(s): Unknown Reaction  Other reaction(s): Unknown Reaction  Other reaction(s): Unknown Reaction      Clonidine      Other reaction(s): Respiratory Distress, Respiratory Distress    Lactase     Milk-Related Compounds     Penicillins Hives     Reaction Date: ;       Phenobarbital Other (See Comments)     Reaction Date: ; Other reaction(s): Other (See Comments)  apnea  Other reaction(s): Other (See Comments)  apnea  apnea      Vancomycin     Zolpidem Other (See Comments)     Other reaction(s): Other (see comments)  Reaction Date: ;   apnea  apnea  apnea      Latex Rash       MEDICATIONS:  All current active medications have been reviewed  Antibiotics:  Meropenem and daptomycin 3    PHYSICAL EXAM:  Temp:  [98 6 °F (37 °C)-101 1 °F (38 4 °C)] 99 3 °F (37 4 °C)  HR:  [] 82  Resp:  [0-51] 21  BP: ()/(45-84) 97/46  SpO2:  [95 %-100 %] 97 %  Temp (24hrs), Av 8 °F (37 7 °C), Min:98 6 °F (37 °C), Max:101 1 °F (38 4 °C)  Current: Temperature: 99 3 °F (37 4 °C)    Intake/Output Summary (Last 24 hours) at 2020 1255  Last data filed at 2020 1201  Gross per 24 hour   Intake 822 09 ml   Output 1065 ml   Net -242 91 ml       General Appearance:  Debilitated, resting on the ventilator, no acute distress   Head:  Normocephalic, without obvious abnormality, atraumatic   Eyes:  Conjunctiva pale and sclera anicteric, both eyes   Nose: Nares normal, mucosa normal, no drainage   Throat: Oropharynx moist without lesions   Neck: Supple, symmetrical, no adenopathy, no tenderness/mass/nodules    Tracheostomy site without erythema or drainage   Back:   Symmetric, no curvature, ROM normal, no CVA tenderness   Lungs:   Decreased breath sounds bilaterally, respirations unlabored   Chest Wall:  No tenderness or deformity   Heart:  RRR; no murmur, rub or gallop   Abdomen:   Soft, non-tender, non-distended, positive bowel sounds  Peg site without erythema or drainage    Extremities: No cyanosis, clubbing or edema   Skin: No other rashes or lesions  No draining wounds noted  Chronic lumbar wound unchanged by report   Lymph nodes: Cervical, supraclavicular nodes normal   Neurologic: Somnolent, nonverbal, debilitation consistent with cerebral palsy       LABS, IMAGING, & OTHER STUDIES:  Lab Results:  I have personally reviewed pertinent labs  Results from last 7 days   Lab Units 06/04/20  0553 06/03/20  1726   WBC Thousand/uL 6 93 11 24*   HEMOGLOBIN g/dL 7 6* 10 0*   PLATELETS Thousands/uL 126* 154     Results from last 7 days   Lab Units 06/04/20  0553 06/03/20  1726   SODIUM mmol/L 139 140   POTASSIUM mmol/L 3 3* 5 1   CHLORIDE mmol/L 106 107   CO2 mmol/L 25 22   BUN mg/dL 7 6   CREATININE mg/dL 0 53* 0 60   EGFR ml/min/1 73sq m 132 127   CALCIUM mg/dL 7 8* 9 0     Results from last 7 days   Lab Units 06/03/20 2025   GRAM STAIN RESULT  No Polys or Bacteria seen     Results from last 7 days   Lab Units 06/04/20  0554 06/03/20  1726   PROCALCITONIN ng/ml 3 91* 5 08*                   Imaging Studies:     Numerous x-rays and CT scans from [facility] being down loaded by Radiology  No reported pneumonia or intra-abdominal process

## 2020-06-04 NOTE — UTILIZATION REVIEW
Notification of Inpatient Admission/Inpatient Authorization Request   This is a Notification of Inpatient Admission for [address]  Be advised that this patient was admitted to our facility under Inpatient Status  Contact Freda Chin at [phone] for additional admission information  [facility] DEPT  DEDICATED [phone]  Patient Name:   Corrinne Dunn   YOB: 1995       [address]   [address]:   [facility]  Tax ID: [id number]  NPI: [id number] Attending Provider/NPI:  Phone:  Address: Sania Louiseamanda [[id number]]  [phone]  Same as Facility   Place of Service Code: 24     Place of Service Name:  [address] [place] [address]   Start Date: 6/3/20 1415 Discharge Date & Time: No discharge date for patient encounter  Type of Admission: Inpatient Status Discharge Disposition (if discharged): Final discharge disposition not confirmed   Patient Diagnoses: Status epilepticus ([address] ) [T83 058]     Orders: Admission Orders (From admission, onward)     Ordered        06/03/20 1444  Inpatient Admission  Once         06/03/20 1444  Inpatient Admission  Once                    Assigned Utilization Review Contact: Freda Chin  Utilization ,   Network Utilization Review Department  Phone: [phone]; Fax [phone] Email: [email or web address]   ATTENTION PAYERS: Please call the assigned Utilization  directly with any questions or concerns ALL voicemails in the department are confidential  Send all requests for admission clinical reviews, approved or denied determinations and any other requests to dedicated fax number belonging to the campus where the patient is receiving treatment

## 2020-06-04 NOTE — UTILIZATION REVIEW
Initial Clinical Review    Admission: Date/Time/Statement: Admission Orders (From admission, onward)     Ordered        06/03/20 1444  Inpatient Admission  Once         06/03/20 1444  Inpatient Admission  Once                   Orders Placed This Encounter   Procedures    Inpatient Admission     Standing Status:   Standing     Number of Occurrences:   1     Order Specific Question:   Admitting Physician     Answer:   Mati Churchill [[id number]]     Order Specific Question:   Level of Care     Answer:   Critical Care [15]     Order Specific Question:   Estimated length of stay     Answer:   More than 2 Midnights     Order Specific Question:   Certification     Answer:   I certify that inpatient services are medically necessary for this patient for a duration of greater than two midnights  See H&P and MD Progress Notes for additional information about the patient's course of treatment   Inpatient Admission     Standing Status:   Standing     Number of Occurrences:   1     Order Specific Question:   Admitting Physician     Answer:   Mati Churchill [[id number]]     Order Specific Question:   Level of Care     Answer:   Critical Care [15]     Order Specific Question:   Estimated length of stay     Answer:   More than 2 Midnights     Order Specific Question:   Certification     Answer:   I certify that inpatient services are medically necessary for this patient for a duration of greater than two midnights  See H&P and MD Progress Notes for additional information about the patient's course of treatment  Assessment/Plan: transferred from [address] [place] [address]  24yof admitted to [address] [place] [address] 2 days ago secondary to altered mental status with fever  Patient underwent resuscitation and workup evaluation  She had a ventricular fibrillation cardiac arrest during her initial evaluation which required CPR, epinephrine and shock    After patient's cardiac arrest she was noted to have seizure-like movements for which he was unclear if she was having seizure versus myoclonic status post breast   Cardiac arrest time was not reported but being she received 2 rounds of epinephrine was likely not of long duration  Temperature on presentation was noted to be 104°  Hx: spastic quadriplegia, hydrocephalus with  shunt, profound mental delay, diabetes insipidus, pancreatitis with history of seizures, kidney stone, GERD, asthma, post herpes encephalopathy and chronic respiratory failure with tracheostomy, status post PEG tube, bilateral hip repair  Tracheostomy was changed from uncuffed to cuffed and she was placed on a ventilator  IV access was a problem, while attempting to place central access there is report she had a V  tach/V  fib arrest   CPR was performed, electric cardioversion, and 1 epinephrine ampules given  Return of spontaneous circulation and normal sinus rhythm was obtained    Admitted to inpatient status for status epilepticus, s/p cardiac arrest     ED Triage Vitals   Temperature Pulse Respirations Blood Pressure SpO2   06/03/20 1421 06/03/20 1419 06/03/20 1419 06/03/20 1421 06/03/20 1419   99 °F (37 2 °C) (!) 114 (!) 29 148/63 100 %      Temp Source Heart Rate Source Patient Position - Orthostatic VS BP Location FiO2 (%)   06/03/20 1421 06/03/20 1417 06/03/20 1421 06/03/20 1421 06/04/20 0310   Bladder Monitor Lying Right arm 35      Pain Score       06/04/20 0250       Med Not Given for Pain - for MAR use only        Wt Readings from Last 1 Encounters:   06/04/20 62 1 kg (136 lb 14 5 oz)     Additional Vital Signs:   06/03/20 1900  100 °F (37 8 °C)  116Abnormal   27Abnormal   118/63  82  97 %         06/03/20 1800  99 7 °F (37 6 °C)  112Abnormal   23Abnormal   108/59  72  95 %         06/03/20 1700  99 7 °F (37 6 °C)  110Abnormal   20  96/53  71  96 %         06/03/20 1600  99 °F (37 2 °C)  108Abnormal   23Abnormal   91/52  64  97 %    Ventilator     06/03/20 1500  98 6 °F (37 °C)  116Abnormal   36Abnormal 111/71  82  96 %         06/03/20 1421  99 °F (37 2 °C)  72  43Abnormal   148/63  100  95 %    Ventilator  Lying   06/03/20 1419    114Abnormal   29Abnormal       100 %         Pertinent Labs/Diagnostic Test Results:   Results from last 7 days   Lab Units 06/04/20  0553 06/03/20  1726   WBC Thousand/uL 6 93 11 24*   HEMOGLOBIN g/dL 7 6* 10 0*   HEMATOCRIT % 25 1* 33 2*   PLATELETS Thousands/uL 126* 154   NEUTROS ABS Thousands/µL 4 42 6 94     Results from last 7 days   Lab Units 06/04/20  0553 06/03/20  1726   SODIUM mmol/L 139 140   POTASSIUM mmol/L 3 3* 5 1   CHLORIDE mmol/L 106 107   CO2 mmol/L 25 22   ANION GAP mmol/L 8 11   BUN mg/dL 7 6   CREATININE mg/dL 0 53* 0 60   EGFR ml/min/1 73sq m 132 127   CALCIUM mg/dL 7 8* 9 0   CALCIUM, IONIZED mmol/L 1 13 1 20   MAGNESIUM mg/dL 1 9 2 0   PHOSPHORUS mg/dL 2 9 2 8     Results from last 7 days   Lab Units 06/04/20  0553 06/03/20  1726   GLUCOSE RANDOM mg/dL 90 78      Results from last 7 days   Lab Units 06/03/20  1724   PH ART  7 400   PCO2 ART mm Hg 34 3*   PO2 ART mm Hg 83 7   HCO3 ART mmol/L 20 8*   BASE EXC ART mmol/L -3 5   O2 CONTENT ART mL/dL 13 3*   O2 HGB, ARTERIAL % 94 1   ABG SOURCE  Radial, Right     Results from last 7 days   Lab Units 06/04/20  0554 06/03/20  1726   PROCALCITONIN ng/ml 3 91* 5 08*     Results from last 7 days   Lab Units 06/03/20  2026 06/03/20  1726   LACTIC ACID mmol/L 1 1 3 5*     Past Medical History:   Diagnosis Date    Asthma     Cerebral palsy (HCC)     Cortical blindness     Diabetes insipidus (HCC)     Elevated LFTs     Encephalopathy     Herpes encephalopathy    GERD (gastroesophageal reflux disease)     History of chronic respiratory failure     Hydrocephalus (HCC)     Nephrolithiasis     Profound intellectual disability     Pulmonary valve stenosis     Scoliosis     Seizure disorder (HCC)     Spastic quadriplegia (HCC)      Present on Admission:   Acute metabolic encephalopathy   Cerebral palsy [facility])   Cardiac arrest ([facility] )   Abnormal LFTs   Seizure-like activity ([facility] )  Admitting Diagnosis: Status epilepticus ([facility] [address] ) [G40 901]  Age/Sex: 21 y o  female  Admission Orders:  Lissa Dull care  PEG tube care  Fall precautions  Neuro checks hourly  Pt/ot eval & tx  Scd/foot pumps  Consult neuro  Consult ID  Scheduled Medications:  bisacodyl 10 mg Rectal Daily   budesonide 0 5 mg Nebulization BID   calcium carbonate-vitamin D 1 tablet Oral Daily With Breakfast   chlorhexidine 15 mL Swish & Spit Q12H [address]   cholecalciferol 1,000 Units Oral Daily   DAPTOmycin 6 mg/kg (Adjusted) Intravenous Q24H   [START ON 6/5/2020] desmopressin 0 05 mg Per J Tube Q48H   diazepam 4 mg Oral Q8H   guaiFENesin 400 mg Oral Q8H   heparin (porcine) 5,000 Units Subcutaneous Q8H [address]   levETIRAcetam 1,000 mg Per J Tube BID   levETIRAcetam 500 mg Per J Tube Q24H   meropenem 1,000 mg Intravenous Q8H   metoclopramide 10 mg Oral BID   OXcarbazepine 450 mg Per J Tube Q12H [address]   potassium phosphate 30 mmol Intravenous Once     Continuous IV Infusions:    dexmedetomidine 0 1-0 7 mcg/kg/hr Intravenous Titrated     PRN Meds:  acetaminophen 650 mg Oral Q6H PRN   cyclobenzaprine 5 mg Oral TID PRN   fentanyl citrate (PF) 25 mcg Intravenous Q1H PRN     Network Utilization Review Department  [email or web address]  ATTENTION: Please call with any questions or concerns to [phone] and carefully listen to the prompts so that you are directed to the right person  All voicemails are confidential   Indra Hernandez all requests for admission clinical reviews, approved or denied determinations and any other requests to dedicated fax number below belonging to the campus where the patient is receiving treatment   List of dedicated fax numbers for the Facilities:  FACILITY NAME UR FAX NUMBER   ADMISSION DENIALS (Administrative/Medical Necessity) [phone]   [address] (Maternity/NICU/Pediatrics) [place] [address] [facility] [phone]   Nithin Santos [phone]   [place] [address] [facility] [phone]   [facility]  [phone]   [address] [facility]  [address] [phone]

## 2020-06-04 NOTE — PHYSICAL THERAPY NOTE
Physical Therapy Cancellation Note    Pt orders received  Chart reviewed  Pt currently intubated/sedated and not appropriate for PT at this time   Will continue to follow  Gianna Romero, Pt, DPT

## 2020-06-04 NOTE — NURSING NOTE
Patient has had 4 episodes of tachypnea with RR  rapid shallow breathes  The episodes last five minutes  Patient on precedex progressively up to  4 mcg/kg/min  Patients' blood pressure dropped to 81/37 @ 3:00  Tube Feedings started   Isolyte 500 cc bolus started @ 5548

## 2020-06-04 NOTE — SOCIAL WORK
TC to pt's mother Katelynn Brown [phone] [facility] pt is from [address], [address] in [address] is for her to return when pt medically cleared  [facility] facility is a medical facility  TC to [facility] [phone] & informed pt is dependent in adl's  Pt is nonverbal at baseline & w/c bound  They uses ed lift  Has trach  G-tube with tube feeds  Informed to contact, Carlos Manuel Alvarez at [address], [address] phone [phone] ext [phone] when pt is ready for dc to see if pt can return  Mani Aldrich is from admissions & d/c [facility] is a 30 bed long term care facility for children & adults  Per mother, she is POA & main contact  Katelynn Brown [phone]  CM reviewed d/c planning process including the following: identifying help at home, patient preference for d/c planning needs, Discharge Lounge, [facility] Meds to Bed program, availability of treatment team to discuss questions or concerns patient and/or family may have regarding understanding medications and recognizing signs and symptoms once discharged  CM also encouraged patient to follow up with all recommended appointments after discharge  Patient advised of importance for patient and family to participate in managing patients medical well being

## 2020-06-05 ENCOUNTER — APPOINTMENT (INPATIENT)
Dept: RADIOLOGY | Facility: HOSPITAL | Age: 25
DRG: 711 | End: 2020-06-05
Payer: COMMERCIAL

## 2020-06-05 LAB
ALBUMIN SERPL BCP-MCNC: 2.7 G/DL (ref 3.5–5)
ALP SERPL-CCNC: 117 U/L (ref 46–116)
ALT SERPL W P-5'-P-CCNC: 56 U/L (ref 12–78)
AMYLASE SERPL-CCNC: 25 IU/L (ref 25–115)
ANION GAP SERPL CALCULATED.3IONS-SCNC: 5 MMOL/L (ref 4–13)
ANION GAP SERPL CALCULATED.3IONS-SCNC: 6 MMOL/L (ref 4–13)
ARTERIAL PATENCY WRIST A: YES
AST SERPL W P-5'-P-CCNC: 34 U/L (ref 5–45)
BASE EXCESS BLDA CALC-SCNC: 2.5 MMOL/L
BASOPHILS # BLD AUTO: 0.02 THOUSANDS/ΜL (ref 0–0.1)
BASOPHILS NFR BLD AUTO: 0 % (ref 0–1)
BILIRUB SERPL-MCNC: 0.39 MG/DL (ref 0.2–1)
BUN SERPL-MCNC: 10 MG/DL (ref 5–25)
BUN SERPL-MCNC: 7 MG/DL (ref 5–25)
CALCIUM SERPL-MCNC: 7.8 MG/DL (ref 8.3–10.1)
CALCIUM SERPL-MCNC: 8.3 MG/DL (ref 8.3–10.1)
CHLORIDE SERPL-SCNC: 104 MMOL/L (ref 100–108)
CHLORIDE SERPL-SCNC: 106 MMOL/L (ref 100–108)
CK MB SERPL-MCNC: <1 % (ref 0–2.5)
CK MB SERPL-MCNC: <1 NG/ML (ref 0–5)
CK SERPL-CCNC: 510 U/L (ref 26–192)
CO2 SERPL-SCNC: 28 MMOL/L (ref 21–32)
CO2 SERPL-SCNC: 30 MMOL/L (ref 21–32)
CREAT SERPL-MCNC: 0.33 MG/DL (ref 0.6–1.3)
CREAT SERPL-MCNC: 0.36 MG/DL (ref 0.6–1.3)
EOSINOPHIL # BLD AUTO: 0.29 THOUSAND/ΜL (ref 0–0.61)
EOSINOPHIL NFR BLD AUTO: 3 % (ref 0–6)
ERYTHROCYTE [DISTWIDTH] IN BLOOD BY AUTOMATED COUNT: 17.3 % (ref 11.6–15.1)
EST. AVERAGE GLUCOSE BLD GHB EST-MCNC: 88 MG/DL
GFR SERPL CREATININE-BSD FRML MDRD: 150 ML/MIN/1.73SQ M
GFR SERPL CREATININE-BSD FRML MDRD: 155 ML/MIN/1.73SQ M
GLUCOSE SERPL-MCNC: 100 MG/DL (ref 65–140)
GLUCOSE SERPL-MCNC: 100 MG/DL (ref 65–140)
GLUCOSE SERPL-MCNC: 105 MG/DL (ref 65–140)
GLUCOSE SERPL-MCNC: 123 MG/DL (ref 65–140)
HBA1C MFR BLD: 4.7 %
HCO3 BLDA-SCNC: 27.1 MMOL/L (ref 22–28)
HCT VFR BLD AUTO: 28.3 % (ref 34.8–46.1)
HGB BLD-MCNC: 8.8 G/DL (ref 11.5–15.4)
IMM GRANULOCYTES # BLD AUTO: 0.03 THOUSAND/UL (ref 0–0.2)
IMM GRANULOCYTES NFR BLD AUTO: 0 % (ref 0–2)
INR PPP: 1.04 (ref 0.84–1.19)
LIPASE SERPL-CCNC: 154 U/L (ref 73–393)
LYMPHOCYTES # BLD AUTO: 2.44 THOUSANDS/ΜL (ref 0.6–4.47)
LYMPHOCYTES NFR BLD AUTO: 29 % (ref 14–44)
MAGNESIUM SERPL-MCNC: 2 MG/DL (ref 1.6–2.6)
MCH RBC QN AUTO: 26.1 PG (ref 26.8–34.3)
MCHC RBC AUTO-ENTMCNC: 31.1 G/DL (ref 31.4–37.4)
MCV RBC AUTO: 84 FL (ref 82–98)
MONOCYTES # BLD AUTO: 0.75 THOUSAND/ΜL (ref 0.17–1.22)
MONOCYTES NFR BLD AUTO: 9 % (ref 4–12)
NEUTROPHILS # BLD AUTO: 4.94 THOUSANDS/ΜL (ref 1.85–7.62)
NEUTS SEG NFR BLD AUTO: 59 % (ref 43–75)
NRBC BLD AUTO-RTO: 0 /100 WBCS
O2 CT BLDA-SCNC: 14.5 ML/DL (ref 16–23)
OXYHGB MFR BLDA: 95.5 % (ref 94–97)
PCO2 BLDA: 42 MM HG (ref 36–44)
PH BLDA: 7.43 [PH] (ref 7.35–7.45)
PHOSPHATE SERPL-MCNC: 3.2 MG/DL (ref 2.7–4.5)
PLATELET # BLD AUTO: 175 THOUSANDS/UL (ref 149–390)
PMV BLD AUTO: 9.1 FL (ref 8.9–12.7)
PO2 BLDA: 97 MM HG (ref 75–129)
POTASSIUM SERPL-SCNC: 3.6 MMOL/L (ref 3.5–5.3)
POTASSIUM SERPL-SCNC: 4.3 MMOL/L (ref 3.5–5.3)
PROCALCITONIN SERPL-MCNC: 1.77 NG/ML
PROT SERPL-MCNC: 6.2 G/DL (ref 6.4–8.2)
PROTHROMBIN TIME: 13.2 SECONDS (ref 11.6–14.5)
PS CM H2O: 12
PS VENT FIO2: 40
PS VENT PEEP: 5
RBC # BLD AUTO: 3.37 MILLION/UL (ref 3.81–5.12)
SODIUM SERPL-SCNC: 139 MMOL/L (ref 136–145)
SODIUM SERPL-SCNC: 140 MMOL/L (ref 136–145)
SPECIMEN SOURCE: ABNORMAL
VENT - PS: ABNORMAL
WBC # BLD AUTO: 8.47 THOUSAND/UL (ref 4.31–10.16)

## 2020-06-05 PROCEDURE — 99232 SBSQ HOSP IP/OBS MODERATE 35: CPT | Performed by: PSYCHIATRY & NEUROLOGY

## 2020-06-05 PROCEDURE — 94003 VENT MGMT INPAT SUBQ DAY: CPT

## 2020-06-05 PROCEDURE — 83690 ASSAY OF LIPASE: CPT | Performed by: NURSE PRACTITIONER

## 2020-06-05 PROCEDURE — 80053 COMPREHEN METABOLIC PANEL: CPT | Performed by: NURSE PRACTITIONER

## 2020-06-05 PROCEDURE — 0W9H3ZZ DRAINAGE OF RETROPERITONEUM, PERCUTANEOUS APPROACH: ICD-10-PCS | Performed by: RADIOLOGY

## 2020-06-05 PROCEDURE — 94640 AIRWAY INHALATION TREATMENT: CPT

## 2020-06-05 PROCEDURE — 87070 CULTURE OTHR SPECIMN AEROBIC: CPT | Performed by: EMERGENCY MEDICINE

## 2020-06-05 PROCEDURE — 10160 PNXR ASPIR ABSC HMTMA BULLA: CPT | Performed by: RADIOLOGY

## 2020-06-05 PROCEDURE — 83036 HEMOGLOBIN GLYCOSYLATED A1C: CPT | Performed by: STUDENT IN AN ORGANIZED HEALTH CARE EDUCATION/TRAINING PROGRAM

## 2020-06-05 PROCEDURE — 82553 CREATINE MB FRACTION: CPT | Performed by: NURSE PRACTITIONER

## 2020-06-05 PROCEDURE — 82948 REAGENT STRIP/BLOOD GLUCOSE: CPT

## 2020-06-05 PROCEDURE — 87205 SMEAR GRAM STAIN: CPT | Performed by: EMERGENCY MEDICINE

## 2020-06-05 PROCEDURE — 82150 ASSAY OF AMYLASE: CPT | Performed by: NURSE PRACTITIONER

## 2020-06-05 PROCEDURE — 99291 CRITICAL CARE FIRST HOUR: CPT | Performed by: STUDENT IN AN ORGANIZED HEALTH CARE EDUCATION/TRAINING PROGRAM

## 2020-06-05 PROCEDURE — 36600 WITHDRAWAL OF ARTERIAL BLOOD: CPT

## 2020-06-05 PROCEDURE — 84100 ASSAY OF PHOSPHORUS: CPT | Performed by: NURSE PRACTITIONER

## 2020-06-05 PROCEDURE — 99233 SBSQ HOSP IP/OBS HIGH 50: CPT | Performed by: INTERNAL MEDICINE

## 2020-06-05 PROCEDURE — 80048 BASIC METABOLIC PNL TOTAL CA: CPT | Performed by: INTERNAL MEDICINE

## 2020-06-05 PROCEDURE — 77012 CT SCAN FOR NEEDLE BIOPSY: CPT

## 2020-06-05 PROCEDURE — 87075 CULTR BACTERIA EXCEPT BLOOD: CPT | Performed by: EMERGENCY MEDICINE

## 2020-06-05 PROCEDURE — 94760 N-INVAS EAR/PLS OXIMETRY 1: CPT

## 2020-06-05 PROCEDURE — 82805 BLOOD GASES W/O2 SATURATION: CPT | Performed by: EMERGENCY MEDICINE

## 2020-06-05 PROCEDURE — 84145 PROCALCITONIN (PCT): CPT | Performed by: INTERNAL MEDICINE

## 2020-06-05 PROCEDURE — 70250 X-RAY EXAM OF SKULL: CPT

## 2020-06-05 PROCEDURE — 83735 ASSAY OF MAGNESIUM: CPT | Performed by: NURSE PRACTITIONER

## 2020-06-05 PROCEDURE — 77012 CT SCAN FOR NEEDLE BIOPSY: CPT | Performed by: RADIOLOGY

## 2020-06-05 PROCEDURE — 85025 COMPLETE CBC W/AUTO DIFF WBC: CPT | Performed by: NURSE PRACTITIONER

## 2020-06-05 PROCEDURE — 85610 PROTHROMBIN TIME: CPT | Performed by: NURSE PRACTITIONER

## 2020-06-05 PROCEDURE — 82550 ASSAY OF CK (CPK): CPT | Performed by: NURSE PRACTITIONER

## 2020-06-05 PROCEDURE — 49406 IMAGE CATH FLUID PERI/RETRO: CPT

## 2020-06-05 PROCEDURE — 95720 EEG PHY/QHP EA INCR W/VEEG: CPT | Performed by: PSYCHIATRY & NEUROLOGY

## 2020-06-05 RX ORDER — DESMOPRESSIN ACETATE 0.1 MG/1
0.05 TABLET ORAL
Status: DISCONTINUED | OUTPATIENT
Start: 2020-06-07 | End: 2020-06-11

## 2020-06-05 RX ORDER — ACETAMINOPHEN 325 MG/1
650 TABLET ORAL EVERY 6 HOURS PRN
Status: DISCONTINUED | OUTPATIENT
Start: 2020-06-05 | End: 2020-06-07

## 2020-06-05 RX ORDER — DIAZEPAM 5 MG/1
5 TABLET ORAL EVERY 8 HOURS
Status: DISCONTINUED | OUTPATIENT
Start: 2020-06-05 | End: 2020-06-30 | Stop reason: HOSPADM

## 2020-06-05 RX ORDER — DANTROLENE SODIUM 25 MG/1
100 CAPSULE ORAL 3 TIMES DAILY
Status: DISCONTINUED | OUTPATIENT
Start: 2020-06-05 | End: 2020-06-11

## 2020-06-05 RX ORDER — LORAZEPAM 2 MG/ML
1 INJECTION INTRAMUSCULAR EVERY 4 HOURS PRN
Status: DISCONTINUED | OUTPATIENT
Start: 2020-06-05 | End: 2020-06-18

## 2020-06-05 RX ORDER — LEVETIRACETAM 100 MG/ML
1000 SOLUTION ORAL 2 TIMES DAILY
Status: DISCONTINUED | OUTPATIENT
Start: 2020-06-06 | End: 2020-06-30 | Stop reason: HOSPADM

## 2020-06-05 RX ORDER — LEVETIRACETAM 100 MG/ML
500 SOLUTION ORAL EVERY 24 HOURS
Status: DISCONTINUED | OUTPATIENT
Start: 2020-06-05 | End: 2020-06-30 | Stop reason: HOSPADM

## 2020-06-05 RX ORDER — AMOXICILLIN 250 MG
1 CAPSULE ORAL
Status: DISCONTINUED | OUTPATIENT
Start: 2020-06-05 | End: 2020-06-28

## 2020-06-05 RX ORDER — FENTANYL CITRATE-0.9 % NACL/PF 10 MCG/ML
25 PLASTIC BAG, INJECTION (ML) INTRAVENOUS CONTINUOUS
Status: DISCONTINUED | OUTPATIENT
Start: 2020-06-05 | End: 2020-06-06

## 2020-06-05 RX ORDER — CYCLOBENZAPRINE HCL 10 MG
5 TABLET ORAL 3 TIMES DAILY PRN
Status: DISCONTINUED | OUTPATIENT
Start: 2020-06-05 | End: 2020-06-28

## 2020-06-05 RX ORDER — POTASSIUM CHLORIDE 20MEQ/15ML
40 LIQUID (ML) ORAL ONCE
Status: COMPLETED | OUTPATIENT
Start: 2020-06-05 | End: 2020-06-05

## 2020-06-05 RX ORDER — OXCARBAZEPINE 150 MG/1
450 TABLET, FILM COATED ORAL EVERY 12 HOURS SCHEDULED
Status: DISCONTINUED | OUTPATIENT
Start: 2020-06-05 | End: 2020-06-30 | Stop reason: HOSPADM

## 2020-06-05 RX ORDER — DANTROLENE SODIUM 25 MG/1
100 CAPSULE ORAL 3 TIMES DAILY
Status: DISCONTINUED | OUTPATIENT
Start: 2020-06-05 | End: 2020-06-05

## 2020-06-05 RX ADMIN — BUDESONIDE 0.5 MG: 0.5 INHALANT RESPIRATORY (INHALATION) at 19:16

## 2020-06-05 RX ADMIN — HEPARIN SODIUM 5000 UNITS: 5000 INJECTION INTRAVENOUS; SUBCUTANEOUS at 21:11

## 2020-06-05 RX ADMIN — DANTROLENE SODIUM 100 MG: 25 CAPSULE ORAL at 21:12

## 2020-06-05 RX ADMIN — MEROPENEM 2000 MG: 1 INJECTION, POWDER, FOR SOLUTION INTRAVENOUS at 18:03

## 2020-06-05 RX ADMIN — LEVETIRACETAM 500 MG: 100 SOLUTION ORAL at 05:21

## 2020-06-05 RX ADMIN — LEVETIRACETAM 1000 MG: 100 SOLUTION ORAL at 15:01

## 2020-06-05 RX ADMIN — DANTROLENE SODIUM 100 MG: 25 CAPSULE ORAL at 12:35

## 2020-06-05 RX ADMIN — LEVETIRACETAM 1000 MG: 100 SOLUTION ORAL at 08:49

## 2020-06-05 RX ADMIN — LORAZEPAM 1 MG: 2 INJECTION INTRAMUSCULAR; INTRAVENOUS at 09:10

## 2020-06-05 RX ADMIN — FENTANYL CITRATE 25 MCG: 50 INJECTION INTRAMUSCULAR; INTRAVENOUS at 05:23

## 2020-06-05 RX ADMIN — LEVETIRACETAM 500 MG: 100 SOLUTION ORAL at 21:13

## 2020-06-05 RX ADMIN — SENNOSIDES AND DOCUSATE SODIUM 1 TABLET: 8.6; 5 TABLET ORAL at 21:11

## 2020-06-05 RX ADMIN — MEROPENEM 2000 MG: 1 INJECTION, POWDER, FOR SOLUTION INTRAVENOUS at 10:57

## 2020-06-05 RX ADMIN — GUAIFENESIN 400 MG: 100 SOLUTION ORAL at 16:38

## 2020-06-05 RX ADMIN — BISACODYL 10 MG: 10 SUPPOSITORY RECTAL at 08:48

## 2020-06-05 RX ADMIN — DIAZEPAM 5 MG: 5 TABLET ORAL at 10:31

## 2020-06-05 RX ADMIN — OXCARBAZEPINE 450 MG: 300 TABLET, FILM COATED ORAL at 05:25

## 2020-06-05 RX ADMIN — MEROPENEM 2000 MG: 1 INJECTION, POWDER, FOR SOLUTION INTRAVENOUS at 04:00

## 2020-06-05 RX ADMIN — Medication 1 TABLET: at 08:50

## 2020-06-05 RX ADMIN — DIAZEPAM 5 MG: 5 TABLET ORAL at 04:31

## 2020-06-05 RX ADMIN — MELATONIN 1000 UNITS: at 08:49

## 2020-06-05 RX ADMIN — DEXMEDETOMIDINE 0.7 MCG/KG/HR: 100 INJECTION, SOLUTION, CONCENTRATE INTRAVENOUS at 16:18

## 2020-06-05 RX ADMIN — GUAIFENESIN 400 MG: 100 SOLUTION ORAL at 04:30

## 2020-06-05 RX ADMIN — GUAIFENESIN 400 MG: 100 SOLUTION ORAL at 08:50

## 2020-06-05 RX ADMIN — DESMOPRESSIN ACETATE 18.8 MCG: 4 INJECTION INTRAVENOUS at 16:33

## 2020-06-05 RX ADMIN — FENTANYL CITRATE 25 MCG: 50 INJECTION INTRAMUSCULAR; INTRAVENOUS at 09:46

## 2020-06-05 RX ADMIN — DEXMEDETOMIDINE 0.5 MCG/KG/HR: 100 INJECTION, SOLUTION, CONCENTRATE INTRAVENOUS at 05:52

## 2020-06-05 RX ADMIN — DIAZEPAM 5 MG: 5 TABLET ORAL at 18:03

## 2020-06-05 RX ADMIN — DANTROLENE SODIUM 100 MG: 25 CAPSULE ORAL at 16:38

## 2020-06-05 RX ADMIN — PHENYLEPHRINE HYDROCHLORIDE 50 MCG/MIN: 10 INJECTION INTRAVENOUS at 11:30

## 2020-06-05 RX ADMIN — OXCARBAZEPINE 450 MG: 300 TABLET, FILM COATED ORAL at 18:05

## 2020-06-05 RX ADMIN — HEPARIN SODIUM 5000 UNITS: 5000 INJECTION INTRAVENOUS; SUBCUTANEOUS at 15:03

## 2020-06-05 RX ADMIN — METOCLOPRAMIDE HYDROCHLORIDE 10 MG: 5 SOLUTION ORAL at 08:50

## 2020-06-05 RX ADMIN — CHLORHEXIDINE GLUCONATE 0.12% ORAL RINSE 15 ML: 1.2 LIQUID ORAL at 21:12

## 2020-06-05 RX ADMIN — POTASSIUM CHLORIDE 40 MEQ: 1.5 SOLUTION ORAL at 10:55

## 2020-06-05 RX ADMIN — FENTANYL CITRATE 25 MCG: 50 INJECTION INTRAMUSCULAR; INTRAVENOUS at 08:35

## 2020-06-05 RX ADMIN — ACETAMINOPHEN 650 MG: 325 TABLET ORAL at 10:01

## 2020-06-05 RX ADMIN — DAPTOMYCIN 375 MG: 500 INJECTION, POWDER, LYOPHILIZED, FOR SOLUTION INTRAVENOUS at 18:03

## 2020-06-05 RX ADMIN — BUDESONIDE 0.5 MG: 0.5 INHALANT RESPIRATORY (INHALATION) at 07:26

## 2020-06-05 RX ADMIN — DESMOPRESSIN ACETATE 0.05 MG: 0.1 TABLET ORAL at 05:23

## 2020-06-05 RX ADMIN — Medication 25 MCG/HR: at 10:32

## 2020-06-05 RX ADMIN — CHLORHEXIDINE GLUCONATE 0.12% ORAL RINSE 15 ML: 1.2 LIQUID ORAL at 08:49

## 2020-06-05 RX ADMIN — ACETAMINOPHEN 650 MG: 325 TABLET ORAL at 21:11

## 2020-06-05 NOTE — PROGRESS NOTES
Daily Progress Note - Critical Care   Heydi Ellis 21 y o  female MRN: [id number]  Unit/Bed#: ICU 02 Encounter: [id number]        ----------------------------------------------------------------------------------------  HPI/24hr events: Evaluated by Neuro and ID  PICC line placed in the afternoon  Facility of residence contacted by team  Reports she is tachypneic at baseline in the 30's but without tremors or seizures as she presented  Wound in back accidentally expressed hence culture collected and sent for analysis  Overnight, noted by RN to be agitated with RR > 65, + tremor in LLE  Given 1 dose of fentanyl 25mcg  Responded well with improved RR to mid 20's      ---------------------------------------------------------------------------------------  SUBJECTIVE  Sedated  Ventilated  Review of Systems  Review of systems was unable to be performed secondary to Sedated  Ventilated  ---------------------------------------------------------------------------------------  Assessment and Plan:  Neuro -   Sedation with Precedex - increased overnight 2/2 agitation   · Seizure-like activity in the setting of cerebral palsy and severe mental retardation with cortical blindnes  ? History of seizures - as per facility of residence without tremors at at bseline  ? Resume home AED medications:   § Keppra 1000mg BID + 500mg qHS  § Oxcarbazepine 450mg q12h   ? Continue vEEG monitoring - No epileptic form until now   ? Follow up with neurology   ? Ativan 1mg PRN for seizure/agitation  · Acute metabolic encephalopathy  ? Possibly related to seizures as detailed above, although, cannot rule out infectious process driving current state  ?  shut tapped on 6/03 - CSF culture Negative   ? May need repeat  shunt tap for further studies    ? Precedex drip  § Titrate to goal RASS 0 to -1  ? Fentanyl 25mcg PRN for agitation   · Cerebral palsy with muscle spasm  ?  Restart valium 5mg q8h  § Patient takes 4mg at home, not in formulary  ? Flexeril 5mg TID PRN     CV:   · Cardiac arrest   ? At outside hospital during IJ CVC placement  § Consider 2/2 guidewire insertion given short frame  ? CT negative for PE   ? No events on telemetry  Sinus tachy on telemetry with artifact  ? Continue to monitor closely   · Hypotension   ? Normotensive as per facility if residence with -120   ? S/p 500ml IVF bolus and infusion d/c at 0646 today   ? 6/2 Echo: LVEF 55-60%, decreased LV internal cavity size, normal RV size and function, mildly dilated PA 2 2cm   ? BP support with Levophed with MAP goal > 65       Pulm:  · Chronic respiratory failure s/p trach  ? As per facility of residence, pt is on trach collar 24/7 without vent requirements  ? Currently ventilated via trach on AC/VC settings  ? Daily SBT        GI:   · H/o Elevated LFT's   ? Likely secondary to dantrolene as reported which was d/c on 7/2019  ? Reported to be elevated on GVH, Likely following cardiac arrest   ? On admission  /  at OSH  ? AST/ALT today 34/36  · Stress ulcer prophylaxis: not indicated  · Bowel regimen: dulcolax daily, Senakot , reglan 10mg BID        :   · No acute issues   ? Baseline creatinine: unknown   ? Admission creatinine: 0 60  ? Current creatinine: 0 53  ? Patient likely unable to have significantly elevated creatinine given loss muscle mass  ? Monitor BUN and other signs of CHARITO  ? UOP 1725 (1 2ml/kg/hr) in last 24 hrs  ? Her catheter         F/E/N:   · Maintenance fluids: isolyte d/c today at 0646  · Hypokalemia on admission  3 6 today  ? Monitor and replete as needed   · Nutrition: tube feedings         Heme/Onc:  · Anemia  ? Baseline hemoglobin: unknown  ? Admission hemoglobin: 10 0 --> 7 6 --> 8 8 today   ? Transfuse for hemoglobin < 7 0  ? No sign of bleeding  · Thrombocytopenia  ? Baseline platelet: unknown  ? Admission platelet: 973 --> 071 --> 175 today   ?  Continue to monitor, consider sending hemolysis labs given associated anemia and unclear neuro-process   · VTE prophylaxis: SQH and SCD's to BLE      Endo:  · Hyperglycemia   ? Hemoglobin A1c unknown with no history of DM  ? Will check A1c   ? BS around 300 on arrival   ? Presumed stress respone  ? No listed hx of DM  · Diabetes insipidus   ? Continue DDAVP 0 05 QOD  ? Closely monitor UOP and Na      ID:  · Fevers of unclear etiology   ? Tmax 104 at outside hospital  Last Fever of 100 1 at 0431 on 6/04  ? Tmax of 100 in last 24 hrs  ? Day #5 daptomycin and meropenem   ? Imaging with collection that is stable from prior  ? All cultures (blood, urine, sputum, wound, c diff) are negative to date from OSH (6/1/20)  ? CSF culture on 6/03 negative  ? Plan to obtain additional fluid from  shunt in order to get cell counts and help tailor antibiotics   ? As per facility of residence, Dantrolene discontinued on 07/2019 2/2 increased LFT's     MSK/Skin:  · Wound on back - wound culture pending   · Repetitive repositioning and off-loading to prevent skin break down and ulcerative formation  · PT / OT - NO current interventions     Disposition: Continue Critical Care   Code Status: Level 1 - Full Code  ---------------------------------------------------------------------------------------  ICU CORE MEASURES    Prophylaxis   VTE Pharmacologic Prophylaxis: Heparin  VTE Mechanical Prophylaxis: sequential compression device  Stress Ulcer Prophylaxis: Prophylaxis Not Indicated     ABCDE Protocol (if indicated)  Plan to perform spontaneous awakening trial today? Yes  Plan to perform spontaneous breathing trial today? Yes  Obvious barriers to extubation?  Yes  CAM-ICU: Negative    Invasive Devices Review  Invasive Devices     Peripherally Inserted Central Catheter Line            PICC Line 99/76/99 Right Basilic less than 1 day          Drain            Gastrostomy/Enterostomy -- days    Urethral Catheter Temperature probe -- days          Airway            Surgical Airway Shiley Cuffed -- days              Can any invasive devices be discontinued today? No (provide explanation): Medically necessary  ---------------------------------------------------------------------------------------  OBJECTIVE    Physical Exam  Physical Exam:   GENERAL: small for her age  diaphoretic  GCS 5 off sedation  HEENT:  NC/AT, PERRL, EOMI, MMM, no scleral icterus, diaphoretic  CARDIAC:  RRR, +S1/S2, no S3/S4 heard, no m/g/r  PULMONARY:  Crackles t/o fields  ABDOMEN:  Soft, NT/ND, +BS, no rebound/guarding/rigidity  Extremities:  2+ Pulses in DP/PT  No edema, cyanosis, or clubbing  NEUROLOGIC:  GCS 5 off sedation  SKIN:  No rashes or erythema    Vitals   Vitals:    20 0500 20 0515 20 0545 20 0600   BP:  141/93 103/58 98/65   Pulse:  (!) 124 80 76   Resp:  22 (!) 24 (!) 23   Temp: 100 °F (37 8 °C) 100 °F (37 8 °C) 100 °F (37 8 °C) 100 °F (37 8 °C)   TempSrc:       SpO2:  92% 92% 94%   Weight:    62 1 kg (136 lb 14 5 oz)   Height:         Temp (24hrs), Av 4 °F (37 4 °C), Min:98 2 °F (36 8 °C), Max:100 °F (37 8 °C)  Current: Temperature: 100 °F (37 8 °C)      Respiratory:  SpO2 Activity: SpO2 Activity: At Rest       Invasive/non-invasive ventilation settings   Respiratory    Lab Data (Last 4 hours)    None         O2/Vent Data (Last 4 hours)       0254           Vent Mode AC/VC       Resp Rate (BPM) (BPM) 15       Vt (mL) (mL) 250       FIO2 (%) (%) 30       PEEP (cmH2O) (cmH2O) 5       MV 5 82                   Height and Weights   Height: 4' 2" (127 cm)  IBW: 22 5 kg  Body mass index is 38 5 kg/m²  Weight (last 2 days)     Date/Time   Weight    20 0600   62 1 (136 91)    20 0557   62 1 (136 91)    20 1421   61 3 (135 14)              Intake and Output  I/O        07 -  0700  07 -  0700    P  O  0     I V  (mL/kg) 72 1 (1 2) 947 3 (15 3)    NG/ 280    IV Piggyback 150 2200    Feedings  800    Total Intake(mL/kg) 322 1 (5 2) 4227 3 (68  1)    Urine (mL/kg/hr) 925 1725 (1 2)    Total Output 925 1725    Net -602 9 +2502 3                  Nutrition       Diet Orders   (From admission, onward)             Start     Ordered    06/05/20 0500  Diet NPO  Effective tomorrow     Comments:  Meds okay   Question Answer Comment   Diet Type NPO    RD to adjust diet per protocol?  Yes        06/04/20 1752                  Laboratory and Diagnostics:  Results from last 7 days   Lab Units 06/05/20  0509 06/04/20  0553 06/03/20  1726   WBC Thousand/uL 8 47 6 93 11 24*   HEMOGLOBIN g/dL 8 8* 7 6* 10 0*   HEMATOCRIT % 28 3* 25 1* 33 2*   PLATELETS Thousands/uL 175 126* 154   NEUTROS PCT % 59 63 61   MONOS PCT % 9 10 9     Results from last 7 days   Lab Units 06/05/20  0509 06/04/20  1609 06/04/20  0553 06/03/20  1726   SODIUM mmol/L 140 141 139 140   POTASSIUM mmol/L 3 6 4 1 3 3* 5 1   CHLORIDE mmol/L 106 108 106 107   CO2 mmol/L 28 26 25 22   ANION GAP mmol/L 6 7 8 11   BUN mg/dL 7 11 7 6   CREATININE mg/dL 0 33* 0 42* 0 53* 0 60   CALCIUM mg/dL 7 8* 8 1* 7 8* 9 0   GLUCOSE RANDOM mg/dL 105 133 90 78   ALT U/L 56  --   --   --    AST U/L 34  --   --   --    ALK PHOS U/L 117*  --   --   --    ALBUMIN g/dL 2 7*  --   --   --    TOTAL BILIRUBIN mg/dL 0 39  --   --   --      Results from last 7 days   Lab Units 06/05/20  0509 06/04/20  0553 06/03/20  1726   MAGNESIUM mg/dL 2 0 1 9 2 0   PHOSPHORUS mg/dL 3 2 2 9 2 8               Results from last 7 days   Lab Units 06/03/20 2026 06/03/20  1726   LACTIC ACID mmol/L 1 1 3 5*     ABG:  Results from last 7 days   Lab Units 06/03/20  1724   PH ART  7 400   PCO2 ART mm Hg 34 3*   PO2 ART mm Hg 83 7   HCO3 ART mmol/L 20 8*   BASE EXC ART mmol/L -3 5   ABG SOURCE  Radial, Right     VBG:  Results from last 7 days   Lab Units 06/03/20  1724   ABG SOURCE  Radial, Right     Results from last 7 days   Lab Units 06/05/20  0509 06/04/20  0554 06/03/20  1726   PROCALCITONIN ng/ml 1 77* 3 91* 5 08*       Micro  Results from last 7 days   Lab Units 06/03/20 2025   GRAM STAIN RESULT  No Polys or Bacteria seen       EKG: n/a  Imaging: I have personally reviewed pertinent reports  No results found      Active Medications  Scheduled Meds:  Current Facility-Administered Medications:  acetaminophen 650 mg Oral Q6H PRN Kinga Rodrigez MD    bisacodyl 10 mg Rectal Daily MADAY Samayoa    budesonide 0 5 mg Nebulization BID Ric Jennings DO    calcium carbonate-vitamin D 1 tablet Oral Daily With Breakfast MADAY Samayoa    chlorhexidine 15 mL Swish & Spit Q12H [facility] & halfway Merritt Diamond PA-C    cholecalciferol 1,000 Units Oral Daily MADAY Samayoa    cyclobenzaprine 5 mg Oral TID PRN MADAY Samayoa    DAPTOmycin 8 mg/kg (Adjusted) Intravenous Q24H Cisco Borrero MD Last Rate: Stopped (06/04/20 9018)   desmopressin 0 05 mg Per J Tube Q48H MADAY Samayoa    dexmedetomidine 0 1-0 7 mcg/kg/hr Intravenous Titrated Merritt Diamond PA-C Last Rate: 0 4 mcg/kg/hr (06/05/20 0553)   diazepam 5 mg Oral Q8H MADAY Alatorre    fentanyl citrate (PF) 25 mcg Intravenous Q1H PRN Kinga Rodrigez MD    guaiFENesin 400 mg Oral Q8H MADAY Alatorre    heparin (porcine) 5,000 Units Subcutaneous [facility] Merritt Diamond PA-C    levETIRAcetam 1,000 mg Per J Tube BID Merritt Diamond PA-C    levETIRAcetam 500 mg Per J Tube Q24H Merritt Diamond PA-C    LORazepam 1 mg Intravenous Q4H PRN MADAY Samayoa    meropenem 2,000 mg Intravenous Q8H Cisco Borrero MD Last Rate: Stopped (06/05/20 0430)   metoclopramide 10 mg Oral BID MADAY Samayoa    norepinephrine 1-30 mcg/min Intravenous Titrated MADAY Samayoa Last Rate: 3 mcg/min (06/04/20 3914)   OXcarbazepine 450 mg Per J Tube Q12H [facility] & halfway Merritt Diamond PA-C    senna-docusate sodium 1 tablet Oral HS MADAY Samayoa      Continuous Infusions:    dexmedetomidine 0 1-0 7 mcg/kg/hr Last Rate: 0 4 mcg/kg/hr (06/05/20 0581) norepinephrine 1-30 mcg/min Last Rate: 3 mcg/min (06/04/20 1745)     PRN Meds:     acetaminophen 650 mg Q6H PRN   cyclobenzaprine 5 mg TID PRN   fentanyl citrate (PF) 25 mcg Q1H PRN   LORazepam 1 mg Q4H PRN       Allergies   Allergies   Allergen Reactions    Baclofen Other (See Comments)     Reaction Date: [id number];   apena      Cefepime Hives    Cephalosporins      Other reaction(s): Unknown Reaction  Other reaction(s): Unknown Reaction  Other reaction(s): Unknown Reaction      Clonidine      Other reaction(s): Respiratory Distress, Respiratory Distress    Lactase     Milk-Related Compounds     Penicillins Hives     Reaction Date: [id number];       Phenobarbital Other (See Comments)     Reaction Date: [id number]; Other reaction(s): Other (See Comments)  apnea  Other reaction(s): Other (See Comments)  apnea  apnea      Vancomycin     Zolpidem Other (See Comments)     Other reaction(s): Other (see comments)  Reaction Date: [id number];   apnea  apnea  apnea      Latex Rash       Advance Directive and Living Will:      Power of :    POLST:      Counseling / Coordination of Care  Total Critical Care time spent 30 minutes excluding procedures, teaching and family updates  Cordell Oseguera MD  Internal Medicine Residency, PGY-1  [facility] record may have been created with voice recognition software  Occasional wrong word or "sound a like" substitutions may have occurred due to the inherent limitations of voice recognition software    Read the chart carefully and recognize, using context, where substitutions have occurred

## 2020-06-05 NOTE — PHYSICAL THERAPY NOTE
Physical Therapy Cancellation Note    PT orders received  Chart reviewed  Pt intubated/sedated will hold PT at this time  Pt is from [address] [place] [address], [address] in [facility] and plans on returning  Pt was dependent for ADL's, nonverbal and requires ed lift for transfers  Pt is also w/c bound at baseline  Due to patients baseline, pt will be D/C from PT at this time  Please re-consult if medically appropriate     Lisa Judd, Pt, DPT

## 2020-06-05 NOTE — PLAN OF CARE
Problem: PAIN - ADULT  Goal: Verbalizes/displays adequate comfort level or baseline comfort level  Description  Interventions:  - Encourage patient to monitor pain and request assistance  - Assess pain using appropriate pain scale  - Administer analgesics based on type and severity of pain and evaluate response  - Implement non-pharmacological measures as appropriate and evaluate response  - Consider cultural and social influences on pain and pain management  - Notify physician/advanced practitioner if interventions unsuccessful or patient reports new pain  Outcome: Progressing     Problem: INFECTION - ADULT  Goal: Absence or prevention of progression during hospitalization  Description  INTERVENTIONS:  - Assess and monitor for signs and symptoms of infection  - Monitor lab/diagnostic results  - Monitor all insertion sites, i e  indwelling lines, tubes, and drains  - Monitor endotracheal if appropriate and nasal secretions for changes in amount and color  - [place] appropriate cooling/warming therapies per order  - Administer medications as ordered  - Instruct and encourage patient and family to use good hand hygiene technique  - Identify and instruct in appropriate isolation precautions for identified infection/condition  Outcome: Progressing     Problem: SAFETY ADULT  Goal: Maintain or return mobility status to optimal level  Description  INTERVENTIONS:  - Assess patient's baseline mobility status (ambulation, transfers, stairs, etc )    - Identify cognitive and physical deficits and behaviors that affect mobility  - Identify mobility aids required to assist with transfers and/or ambulation (gait belt, sit-to-stand, lift, walker, cane, etc )  - [place] fall precautions as indicated by assessment  - Record patient progress and toleration of activity level on Mobility SBAR; progress patient to next Phase/Stage  - Instruct patient to call for assistance with activity based on assessment  - Consider rehabilitation consult to assist with strengthening/weightbearing, etc   Outcome: Progressing     Problem: DISCHARGE PLANNING  Goal: Discharge to home or other facility with appropriate resources  Description  INTERVENTIONS:  - Identify barriers to discharge w/patient and caregiver  - Arrange for needed discharge resources and transportation as appropriate  - Identify discharge learning needs (meds, wound care, etc )  - Arrange for interpretive services to assist at discharge as needed  - Refer to Case Management Department for coordinating discharge planning if the patient needs post-hospital services based on physician/advanced practitioner order or complex needs related to functional status, cognitive ability, or social support system  Outcome: Progressing     Problem: Knowledge Deficit  Goal: Patient/family/caregiver demonstrates understanding of disease process, treatment plan, medications, and discharge instructions  Description  Complete learning assessment and assess knowledge base    Interventions:  - Provide teaching at level of understanding  - Provide teaching via preferred learning methods  Outcome: Progressing     Problem: Prexisting or High Potential for Compromised Skin Integrity  Goal: Skin integrity is maintained or improved  Description  INTERVENTIONS:  - Identify patients at risk for skin breakdown  - Assess and monitor skin integrity  - Assess and monitor nutrition and hydration status  - Monitor labs   - Assess for incontinence   - Turn and reposition patient  - Assist with mobility/ambulation  - Relieve pressure over bony prominences  - Avoid friction and shearing  - Provide appropriate hygiene as needed including keeping skin clean and dry  - Evaluate need for skin moisturizer/barrier cream  - Collaborate with interdisciplinary team   - Patient/family teaching  - Consider wound care consult   Outcome: Progressing     Problem: Potential for Falls  Goal: Patient will remain free of falls  Description  INTERVENTIONS:  - Assess patient frequently for physical needs  -  Identify cognitive and physical deficits and behaviors that affect risk of falls  -  [place] fall precautions as indicated by assessment   - Educate patient/family on patient safety including physical limitations  - Instruct patient to call for assistance with activity based on assessment  - Modify environment to reduce risk of injury  - Consider OT/PT consult to assist with strengthening/mobility  Outcome: Progressing     Problem: Nutrition/Hydration-ADULT  Goal: Nutrient/Hydration intake appropriate for improving, restoring or maintaining nutritional needs  Description  Monitor and assess patient's nutrition/hydration status for malnutrition  Collaborate with interdisciplinary team and initiate plan and interventions as ordered  Monitor patient's weight and dietary intake as ordered or per policy  Utilize nutrition screening tool and intervene as necessary  Determine patient's food preferences and provide high-protein, high-caloric foods as appropriate       INTERVENTIONS:  - Monitor oral intake, urinary output, labs, and treatment plans  - Assess nutrition and hydration status and recommend course of action  - Evaluate amount of meals eaten  - Assist patient with eating if necessary   - Allow adequate time for meals  - Recommend/ encourage appropriate diets, oral nutritional supplements, and vitamin/mineral supplements  - Order, calculate, and assess calorie counts as needed  - Recommend, monitor, and adjust tube feedings and TPN/PPN based on assessed needs  - Assess need for intravenous fluids  - Provide specific nutrition/hydration education as appropriate  - Include patient/family/caregiver in decisions related to nutrition  Outcome: Progressing

## 2020-06-05 NOTE — RESPIRATORY THERAPY NOTE
RT Ventilator Management Note  Meda Curling 21 y o  female MRN: [id number]  Unit/Bed#: ICU 02 Encounter: [id number]      Daily Screen       6/4/2020  0720 6/4/2020  1132          Patient safety screen outcome[de-identified]  Failed  Failed      Not Ready for Weaning due to[de-identified]  Underline problem not resolved                Physical Exam:   Assessment Type: Assess only  General Appearance: Sedated  Respiratory Pattern: Assisted  Chest Assessment: Chest expansion symmetrical, Trachea midline  Bilateral Breath Sounds: Clear, Diminished  Suction: Trach  O2 Device: vent      Resp Comments: (P) Pt remains on the current vent settings AC/VC  Pt has a size 6 shiley  Continues to have periods of Tachypnea   No new changes at the present time   Will continue to monitor and assess per protocol

## 2020-06-05 NOTE — PROGRESS NOTES
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, intellectual disability, cerebral palsy, spastic quadriplegia, seizure disorder on Keppra and Trileptal, chronic tracheostomy, feeding tube, severe scoliosis with spinal fixation, obstructive hydrocephalus s/p  shunt, and nonverbal and wheelchair bound at baseline who was transferred to [facility] from [facility] for vEEG monitoring due to evidence of myoclonic jerking (moreso on the left), encephalopathy, and fever s/p cardiac arrest with ROSC achieved after one shock and one round of epinephrine  Unclear etiology, vEEG monitoring has been negative for epileptiform discharges or electrographic seizures  Will obtain MRI brain to further evaluate  Plan:  - Spoke with Epileptology, approximately 43 hours of vEEG monitoring showed no evidence of epileptiform discharges or electrographic seizures  Initiated on 6/3  Plans to discontinue vEEG monitoring later this afternoon   - Continue AED regimen as follows:    - Keppra 1g 6AM, 1g 2PM, an 500mg 10PM   - Trileptal 450mg BID  - Recommend repeat  shunt tap to obtain the following CSF labs: WBC, RBC, glucose, and protein  - Recommend eventual MRI brain with and without contrast  - Ativan PRN  - Neuro checks  - Telemetry  - Notify Neurology with any changes in neuro examination  - Medical management and supportive care as per Critical Care team    Results:  1  CSF culture: no polys or bacteria seen  2  vEEG monitoring (6/3-6/4): Markedly abnormal 24 hour continuous video-EEG recording, due to continuous diffuse background suppression and delta slowing, consistent with severe diffuse cerebral dysfunction  Episodes of left lower extremity trembling and episodes of tachypnea are not epileptic seizures  Subjective:   Upon examination, patient became agitated with suctioning   Patient became spastic with extensor posturing and had reproducible non-sustained clonus in bilateral lower extremities to stimulation  Spoke with Epileptology, episode was not an epileptic seizure  Patient was given a dose of Ativan following examination due to agitation      Past Medical History:   Diagnosis Date    Asthma     Cerebral palsy (HCC)     Cortical blindness     Diabetes insipidus (HCC)     Elevated LFTs     Encephalopathy     Herpes encephalopathy    GERD (gastroesophageal reflux disease)     History of chronic respiratory failure     Hydrocephalus (HCC)     Nephrolithiasis     Profound intellectual disability     Pulmonary valve stenosis     Scoliosis     Seizure disorder (HCC)     Spastic quadriplegia (HCC)      Past Surgical History:   Procedure Laterality Date    CSF SHUNT      HIP SURGERY Bilateral     IR PEG/GJ TUBE PLACEMENT  2016    NISSEN FUNDOPLICATION      PEG TUBE PLACEMENT      PEG TUBE REMOVAL  2016    SPINAL FUSION W/ JOSE MARIA UNIT RAJAT      Fontenot rajat    TRACHEOSTOMY       Family History   Problem Relation Age of Onset    No Known Problems Mother     No Known Problems Father     No Known Problems Sister     No Known Problems Sister      Social History     Socioeconomic History    Marital status: Single     Spouse name: Not on file    Number of children: Not on file    Years of education: Not on file    Highest education level: Not on file   Occupational History    Not on file   Social Needs    Financial resource strain: Not on file    Food insecurity:     Worry: Not on file     Inability: Not on file    Transportation needs:     Medical: Not on file     Non-medical: Not on file   Tobacco Use    Smoking status: Never Smoker    Smokeless tobacco: Never Used   Substance and Sexual Activity    Alcohol use: Never     Frequency: Never    Drug use: Never    Sexual activity: Not on file   Lifestyle    Physical activity:     Days per week: Not on file     Minutes per session: Not on file    Stress: Not on file   Relationships    Social connections:     Talks on phone: Not on file     Gets together: Not on file     Attends Buddhism service: Not on file     Active member of club or organization: Not on file     Attends meetings of clubs or organizations: Not on file     Relationship status: Not on file    Intimate partner violence:     Fear of current or ex partner: Not on file     Emotionally abused: Not on file     Physically abused: Not on file     Forced sexual activity: Not on file   Other Topics Concern    Not on file   Social History Narrative    Not on file     Medications:   All current active meds have been reviewed and current meds:  Scheduled Meds:  Current Facility-Administered Medications:  acetaminophen 650 mg Oral Q6H PRN Marce Rodrigez MD    bisacodyl 10 mg Rectal Daily MADAY Terrazas    budesonide 0 5 mg Nebulization BID Mónica Jennings DO    calcium carbonate-vitamin D 1 tablet Oral Daily With Breakfast MADAY Terrazas    chlorhexidine 15 mL Swish & Spit Q12H [facility] Jaya Perry PA-C    cholecalciferol 1,000 Units Oral Daily MADAY Terrazas    cyclobenzaprine 5 mg Oral TID PRN MADAY Terrazas    DAPTOmycin 8 mg/kg (Adjusted) Intravenous Q24H Juni Dinero MD Last Rate: Stopped (06/04/20 8708)   desmopressin 0 05 mg Per J Tube Q48H MADAY Terrazas    dexmedetomidine 0 1-0 7 mcg/kg/hr Intravenous Titrated Jaya Perry PA-C Last Rate: 0 4 mcg/kg/hr (06/05/20 0553)   diazepam 5 mg Oral Q8H MADAY Alatorre    fentanyl citrate (PF) 25 mcg Intravenous Q1H PRN Marce Rodrigez MD    guaiFENesin 400 mg Oral Q8H MADAY Alatorre    heparin (porcine) 5,000 Units Subcutaneous [facility] Jaya Perry PA-C    levETIRAcetam 1,000 mg Per J Tube BID Jaya Perry PA-C    levETIRAcetam 500 mg Per J Tube Q24H Jaya Perry PA-C    LORazepam 1 mg Intravenous Q4H PRN MADAY Terrazas    meropenem 2,000 mg Intravenous Q8H Choco Ndiaye MD Last Rate: Stopped (06/05/20 0430)   metoclopramide 10 mg Oral BID MADAY Og    norepinephrine 1-30 mcg/min Intravenous Titrated MARC OgNP Last Rate: 3 mcg/min (06/04/20 1745)   OXcarbazepine 450 mg Per J Tube Q12H [facility] Brie Lees PA-C    senna-docusate sodium 1 tablet Oral HS MADAY Paul      Continuous Infusions:  dexmedetomidine 0 1-0 7 mcg/kg/hr Last Rate: 0 4 mcg/kg/hr (06/05/20 0553)   norepinephrine 1-30 mcg/min Last Rate: 3 mcg/min (06/04/20 1745)     PRN Meds:   acetaminophen    cyclobenzaprine    fentanyl citrate (PF)    LORazepam     ROS:   Review of Systems   Reason unable to perform ROS: intellectual disability, trach, sedated  Vitals:   BP 99/69   Pulse 76   Temp 100 °F (37 8 °C)   Resp 15   Ht 4' 2" (1 27 m)   Wt 62 1 kg (136 lb 14 5 oz)   SpO2 95%   BMI 38 50 kg/m²     Physical Exam:   Physical Exam   HENT:   Right Ear: External ear normal    Left Ear: External ear normal    Nose: Nose normal    vEEG monitoring leads in place   Pulmonary/Chest:   Trach in place  Tachypneic   Skin: She is not diaphoretic  Neurologic Exam     Mental Status   Patient was agitated following suctioning  Patient does not follow commands and is nonverbal (baseline)     Cranial Nerves   Patient was repeatedly blinking her eyes during examination  Difficulty visualizing patient's gaze/pupils; appears to have slight R gaze   No overt facial asymmetry  Tongue protruding midline from mouth     Motor Exam   Patient was spastic in all extremities following stimulation  Extensor posturing noted in BUE     Sensory Exam   Patient does not withdrawal to noxious stimuli X 4 extremities     Gait, Coordination, and Reflexes   Reproducible non-sustained clonus noted in bilateral lower extremities with stimuli     Labs: I have personally reviewed pertinent reports     Recent Results (from the past 24 hour(s))   Basic metabolic panel Collection Time: 06/04/20  4:09 PM   Result Value Ref Range    Sodium 141 136 - 145 mmol/L    Potassium 4 1 3 5 - 5 3 mmol/L    Chloride 108 100 - 108 mmol/L    CO2 26 21 - 32 mmol/L    ANION GAP 7 4 - 13 mmol/L    BUN 11 5 - 25 mg/dL    Creatinine 0 42 (L) 0 60 - 1 30 mg/dL    Glucose 133 65 - 140 mg/dL    Calcium 8 1 (L) 8 3 - 10 1 mg/dL    eGFR 143 ml/min/1 73sq m   CBC and differential    Collection Time: 06/05/20  5:09 AM   Result Value Ref Range    WBC 8 47 4 31 - 10 16 Thousand/uL    RBC 3 37 (L) 3 81 - 5 12 Million/uL    Hemoglobin 8 8 (L) 11 5 - 15 4 g/dL    Hematocrit 28 3 (L) 34 8 - 46 1 %    MCV 84 82 - 98 fL    MCH 26 1 (L) 26 8 - 34 3 pg    MCHC 31 1 (L) 31 4 - 37 4 g/dL    RDW 17 3 (H) 11 6 - 15 1 %    MPV 9 1 8 9 - 12 7 fL    Platelets 434 653 - 968 Thousands/uL    nRBC 0 /100 WBCs    Neutrophils Relative 59 43 - 75 %    Immat GRANS % 0 0 - 2 %    Lymphocytes Relative 29 14 - 44 %    Monocytes Relative 9 4 - 12 %    Eosinophils Relative 3 0 - 6 %    Basophils Relative 0 0 - 1 %    Neutrophils Absolute 4 94 1 85 - 7 62 Thousands/µL    Immature Grans Absolute 0 03 0 00 - 0 20 Thousand/uL    Lymphocytes Absolute 2 44 0 60 - 4 47 Thousands/µL    Monocytes Absolute 0 75 0 17 - 1 22 Thousand/µL    Eosinophils Absolute 0 29 0 00 - 0 61 Thousand/µL    Basophils Absolute 0 02 0 00 - 0 10 Thousands/µL   Magnesium    Collection Time: 06/05/20  5:09 AM   Result Value Ref Range    Magnesium 2 0 1 6 - 2 6 mg/dL   Phosphorus    Collection Time: 06/05/20  5:09 AM   Result Value Ref Range    Phosphorus 3 2 2 7 - 4 5 mg/dL   Comprehensive metabolic panel    Collection Time: 06/05/20  5:09 AM   Result Value Ref Range    Sodium 140 136 - 145 mmol/L    Potassium 3 6 3 5 - 5 3 mmol/L    Chloride 106 100 - 108 mmol/L    CO2 28 21 - 32 mmol/L    ANION GAP 6 4 - 13 mmol/L    BUN 7 5 - 25 mg/dL    Creatinine 0 33 (L) 0 60 - 1 30 mg/dL    Glucose 105 65 - 140 mg/dL    Calcium 7 8 (L) 8 3 - 10 1 mg/dL    AST 34 5 - 45 U/L ALT 56 12 - 78 U/L    Alkaline Phosphatase 117 (H) 46 - 116 U/L    Total Protein 6 2 (L) 6 4 - 8 2 g/dL    Albumin 2 7 (L) 3 5 - 5 0 g/dL    Total Bilirubin 0 39 0 20 - 1 00 mg/dL    eGFR 155 ml/min/1 73sq m   Procalcitonin    Collection Time: 06/05/20  5:09 AM   Result Value Ref Range    Procalcitonin 1 77 (H) <=0 25 ng/ml   Hemoglobin A1C w/ EAG Estimation    Collection Time: 06/05/20  5:09 AM   Result Value Ref Range    Hemoglobin A1C 4 7 Normal 3 8-5 6%; PreDiabetic 5 7-6 4%; Diabetic >=6 5%; Glycemic control for adults with diabetes <7 0% %    EAG 88 mg/dl     Imaging: I have personally reviewed pertinent imaging and I have personally reviewed PACS reports  EKG, Pathology, and Other Studies: I have personally reviewed pertinent reports       VTE Prophylaxis: Sequential compression device (Venodyne)  and Heparin

## 2020-06-05 NOTE — NURSING NOTE
Patient given 25 mcg of Fentanyl as ordered for Agitation  Patient continues to be on EMU  Patient does not tolerate suctioning orally, airway, turning or pupil checks  Patient's RR goes >65 and will shake left leg when stimulated    Patient's precedex drip increased to   5 mcg/kg/hr  Patient continues to be on on Levophed 3 mcg/min and Isolyte @ 50 cc/hr  Patient's respiratory rate decreases and settles to 25 breaths per minute after approximately 4-5 minutes

## 2020-06-05 NOTE — QUICK NOTE
Update from studies from [facility]  - Sputum Cx: + GNR,  - Stool: negative for Shigella, Salmonella, C-diff  - Wound Cx: negative  - Urine Cx: negative        Trisha Hodges MD  Internal Medicine Residency, PGY-1  [address] [facility] / [facility]

## 2020-06-05 NOTE — SEDATION DOCUMENTATION
Left Iliac crest fluid collection aspiration completed by Dr Tracy Nick in [place] with RT present  Specimens to lab per orders  Adhesive dressing CDI  Bedside report given to ICU RN Alia Cea and pt transported back to ICU

## 2020-06-05 NOTE — PROGRESS NOTES
Progress Note - Infectious Disease   Karin Bello 21 y o  female MRN: [id number]  Unit/Bed#: ICU 02 Encounter: [id number]      Impression/Plan:  1  Sepsis- Unclear etiology  Possibly all secondary to lumbar spine infection with hardware in place  Possibly secondary to retroperitoneal collection being infected  Possibly another source  Blood cultures at [facility], [place], operated by [facility] are negative thus far  Unlikely a CNS infection as the Gram stain did not show any inflammatory cells and the cultures negative thus far; although this cannot be completely excluded  Fortunately the patient remains hemodynamically stable despite her systemic illness  She is still requiring some low dose vasopressor support   -continue the meropenem for now at current dose  -if CSF cultures remain negative, can decrease the meropenem to 1 g IV q 8 hours  -continue daptomycin for now at current dose  -if CSF cultures remain negative, can decrease the daptomycin to 6 milligrams/kilogram IV Q 24 hours with adjusted weight use for calculation  -followup blood cultures from [place]  -follow up CSF cultures  -follow-up lumbar spine culture and retroperitoneal fluid culture and adjust antibiotics as needed  -may need additional imaging of the spine and/or pelvis  -recheck procalcitonin level, CBC with diff, and CMP  -supportive care     3  Acute on chronic hypoxic respiratory failure-unclear etiology  The patient presented with respiratory distress but by report no pneumonia was seen on imaging of the chest   Patient is still requiring low level ventilatory support with minimal oxygen needs   -antibiotics as above for now waiting additional data  -wean off the ventilator as able  -monitor respiratory status     4  Status epilepticus-quite sedated on treatment for the seizure at this time  Remains on EEG    Unclear if primary process such as CNS infection/ shunt infection although this is less likely  -continue Keppra and Trileptal per Neurology  -close neurology follow-up  -followup CSF culture     5  Retroperitoneal collection-this is reportedly chronic and seen on imaging done at [address] [place] [address]  Unclear significance  Patient is status post aspiration of the collection which has been sent for culture  Consideration for the possibility of a smoldering infectious process   -antibiotics as above for now  -follow-up cultures of the collection  -check sedimentation rate and CRP  -additional workup as needed    Discussed the above management plan in detail with the critical care service    ID service will see the patient again 2020  Please call if questions  Antibiotics:  Meropenem 4  Daptomycin 4  Subjective:  Patient continues to have fever; no reported vomiting, diarrhea; continues to require ventilatory support; on low-dose vasopressor support; last night the patient spontaneously drained purulent fluid from her lumbar spine and culture was sent  Today the patient underwent percutaneous aspiration of the collection along the iliac crest region  Objective:  Vitals:  Temp:  [99 °F (37 2 °C)-101 8 °F (38 8 °C)] 99 3 °F (37 4 °C)  HR:  [] 68  Resp:  [12-46] 46  BP: ()/(46-93) 99/76  SpO2:  [92 %-99 %] 98 %  Temp (24hrs), Av 8 °F (37 7 °C), Min:99 °F (37 2 °C), Max:101 8 °F (38 8 °C)  Current: Temperature: 99 3 °F (37 4 °C)    Physical Exam:   General Appearance:  Resting on the ventilator no acute distress   Throat: Oropharynx moist without lesions  Tracheostomy site without erythema or drainage   Lungs:   Decreased breath sounds bilaterally bilaterally; no wheezes, rhonchi or rales; respirations unlabored   Heart:  RRR; no murmur, rub or gallop   Abdomen:   Soft, non-tender, non-distended, positive bowel sounds  Peg tube in place     Extremities: No clubbing, cyanosis or edema   Skin: No new rashes or lesions  No draining wounds noted  Lumbar wound with purulent drainage    No surrounding erythema       Labs, Imaging, & Other studies: All pertinent labs and imaging studies were personally reviewed  Results from last 7 days   Lab Units 06/05/20  0509 06/04/20  0553 06/03/20  1726   WBC Thousand/uL 8 47 6 93 11 24*   HEMOGLOBIN g/dL 8 8* 7 6* 10 0*   PLATELETS Thousands/uL 175 126* 154     Results from last 7 days   Lab Units 06/05/20  0509 06/04/20  1609 06/04/20  0553   SODIUM mmol/L 140 141 139   POTASSIUM mmol/L 3 6 4 1 3 3*   CHLORIDE mmol/L 106 108 106   CO2 mmol/L 28 26 25   BUN mg/dL 7 11 7   CREATININE mg/dL 0 33* 0 42* 0 53*   EGFR ml/min/1 73sq m 155 143 132   CALCIUM mg/dL 7 8* 8 1* 7 8*   AST U/L 34  --   --    ALT U/L 56  --   --    ALK PHOS U/L 117*  --   --      Results from last 7 days   Lab Units 06/05/20  0507 06/03/20 2025   GRAM STAIN RESULT  Rare Disintegrating polys  No organisms seen No Polys or Bacteria seen     Results from last 7 days   Lab Units 06/05/20  0509 06/04/20  0554 06/03/20  1726   PROCALCITONIN ng/ml 1 77* 3 91* 5 08*

## 2020-06-06 LAB
ANION GAP SERPL CALCULATED.3IONS-SCNC: 5 MMOL/L (ref 4–13)
BASOPHILS # BLD AUTO: 0.03 THOUSANDS/ΜL (ref 0–0.1)
BASOPHILS NFR BLD AUTO: 0 % (ref 0–1)
BUN SERPL-MCNC: 12 MG/DL (ref 5–25)
CALCIUM SERPL-MCNC: 8.1 MG/DL (ref 8.3–10.1)
CHLORIDE SERPL-SCNC: 102 MMOL/L (ref 100–108)
CO2 SERPL-SCNC: 29 MMOL/L (ref 21–32)
CREAT SERPL-MCNC: 0.3 MG/DL (ref 0.6–1.3)
CRP SERPL QL: 86.1 MG/L
EOSINOPHIL # BLD AUTO: 0.22 THOUSAND/ΜL (ref 0–0.61)
EOSINOPHIL NFR BLD AUTO: 2 % (ref 0–6)
ERYTHROCYTE [DISTWIDTH] IN BLOOD BY AUTOMATED COUNT: 18 % (ref 11.6–15.1)
ERYTHROCYTE [SEDIMENTATION RATE] IN BLOOD: 19 MM/HOUR (ref 0–20)
GFR SERPL CREATININE-BSD FRML MDRD: 160 ML/MIN/1.73SQ M
GLUCOSE SERPL-MCNC: 102 MG/DL (ref 65–140)
GLUCOSE SERPL-MCNC: 102 MG/DL (ref 65–140)
GLUCOSE SERPL-MCNC: 106 MG/DL (ref 65–140)
GLUCOSE SERPL-MCNC: 108 MG/DL (ref 65–140)
GLUCOSE SERPL-MCNC: 112 MG/DL (ref 65–140)
HCT VFR BLD AUTO: 31.7 % (ref 34.8–46.1)
HGB BLD-MCNC: 9.3 G/DL (ref 11.5–15.4)
IMM GRANULOCYTES # BLD AUTO: 0.04 THOUSAND/UL (ref 0–0.2)
IMM GRANULOCYTES NFR BLD AUTO: 0 % (ref 0–2)
LYMPHOCYTES # BLD AUTO: 2.41 THOUSANDS/ΜL (ref 0.6–4.47)
LYMPHOCYTES NFR BLD AUTO: 23 % (ref 14–44)
MCH RBC QN AUTO: 25.6 PG (ref 26.8–34.3)
MCHC RBC AUTO-ENTMCNC: 29.3 G/DL (ref 31.4–37.4)
MCV RBC AUTO: 87 FL (ref 82–98)
MONOCYTES # BLD AUTO: 0.87 THOUSAND/ΜL (ref 0.17–1.22)
MONOCYTES NFR BLD AUTO: 8 % (ref 4–12)
NEUTROPHILS # BLD AUTO: 6.8 THOUSANDS/ΜL (ref 1.85–7.62)
NEUTS SEG NFR BLD AUTO: 67 % (ref 43–75)
NRBC BLD AUTO-RTO: 0 /100 WBCS
PLATELET # BLD AUTO: 203 THOUSANDS/UL (ref 149–390)
PMV BLD AUTO: 9.3 FL (ref 8.9–12.7)
POTASSIUM SERPL-SCNC: 4.2 MMOL/L (ref 3.5–5.3)
PROCALCITONIN SERPL-MCNC: 1 NG/ML
RBC # BLD AUTO: 3.63 MILLION/UL (ref 3.81–5.12)
SODIUM SERPL-SCNC: 136 MMOL/L (ref 136–145)
WBC # BLD AUTO: 10.37 THOUSAND/UL (ref 4.31–10.16)

## 2020-06-06 PROCEDURE — 99291 CRITICAL CARE FIRST HOUR: CPT | Performed by: INTERNAL MEDICINE

## 2020-06-06 PROCEDURE — 94640 AIRWAY INHALATION TREATMENT: CPT

## 2020-06-06 PROCEDURE — 99232 SBSQ HOSP IP/OBS MODERATE 35: CPT | Performed by: PSYCHIATRY & NEUROLOGY

## 2020-06-06 PROCEDURE — 85652 RBC SED RATE AUTOMATED: CPT | Performed by: INTERNAL MEDICINE

## 2020-06-06 PROCEDURE — 94760 N-INVAS EAR/PLS OXIMETRY 1: CPT

## 2020-06-06 PROCEDURE — 86140 C-REACTIVE PROTEIN: CPT | Performed by: INTERNAL MEDICINE

## 2020-06-06 PROCEDURE — 94003 VENT MGMT INPAT SUBQ DAY: CPT

## 2020-06-06 PROCEDURE — 84145 PROCALCITONIN (PCT): CPT | Performed by: INTERNAL MEDICINE

## 2020-06-06 PROCEDURE — 80048 BASIC METABOLIC PNL TOTAL CA: CPT | Performed by: NURSE PRACTITIONER

## 2020-06-06 PROCEDURE — NC001 PR NO CHARGE: Performed by: EMERGENCY MEDICINE

## 2020-06-06 PROCEDURE — 85025 COMPLETE CBC W/AUTO DIFF WBC: CPT | Performed by: NURSE PRACTITIONER

## 2020-06-06 PROCEDURE — 82948 REAGENT STRIP/BLOOD GLUCOSE: CPT

## 2020-06-06 RX ADMIN — PHENYLEPHRINE HYDROCHLORIDE 35 MCG/MIN: 10 INJECTION INTRAVENOUS at 16:52

## 2020-06-06 RX ADMIN — LORAZEPAM 1 MG: 2 INJECTION INTRAMUSCULAR; INTRAVENOUS at 20:49

## 2020-06-06 RX ADMIN — HEPARIN SODIUM 5000 UNITS: 5000 INJECTION INTRAVENOUS; SUBCUTANEOUS at 05:21

## 2020-06-06 RX ADMIN — GUAIFENESIN 400 MG: 100 SOLUTION ORAL at 16:37

## 2020-06-06 RX ADMIN — HEPARIN SODIUM 5000 UNITS: 5000 INJECTION INTRAVENOUS; SUBCUTANEOUS at 15:00

## 2020-06-06 RX ADMIN — GUAIFENESIN 400 MG: 100 SOLUTION ORAL at 10:01

## 2020-06-06 RX ADMIN — DAPTOMYCIN 375 MG: 500 INJECTION, POWDER, LYOPHILIZED, FOR SOLUTION INTRAVENOUS at 20:49

## 2020-06-06 RX ADMIN — DEXMEDETOMIDINE 0.7 MCG/KG/HR: 100 INJECTION, SOLUTION, CONCENTRATE INTRAVENOUS at 02:40

## 2020-06-06 RX ADMIN — MEROPENEM 2000 MG: 1 INJECTION, POWDER, FOR SOLUTION INTRAVENOUS at 11:19

## 2020-06-06 RX ADMIN — BUDESONIDE 0.5 MG: 0.5 INHALANT RESPIRATORY (INHALATION) at 19:35

## 2020-06-06 RX ADMIN — ACETAMINOPHEN 650 MG: 325 TABLET ORAL at 10:00

## 2020-06-06 RX ADMIN — CHLORHEXIDINE GLUCONATE 0.12% ORAL RINSE 15 ML: 1.2 LIQUID ORAL at 10:00

## 2020-06-06 RX ADMIN — OXCARBAZEPINE 450 MG: 300 TABLET, FILM COATED ORAL at 05:21

## 2020-06-06 RX ADMIN — LEVETIRACETAM 1000 MG: 100 SOLUTION ORAL at 05:21

## 2020-06-06 RX ADMIN — DIAZEPAM 5 MG: 5 TABLET ORAL at 10:01

## 2020-06-06 RX ADMIN — DANTROLENE SODIUM 100 MG: 25 CAPSULE ORAL at 16:51

## 2020-06-06 RX ADMIN — MELATONIN 1000 UNITS: at 10:00

## 2020-06-06 RX ADMIN — CHLORHEXIDINE GLUCONATE 0.12% ORAL RINSE 15 ML: 1.2 LIQUID ORAL at 20:49

## 2020-06-06 RX ADMIN — MEROPENEM 2000 MG: 1 INJECTION, POWDER, FOR SOLUTION INTRAVENOUS at 18:46

## 2020-06-06 RX ADMIN — ACETAMINOPHEN 650 MG: 325 TABLET ORAL at 03:57

## 2020-06-06 RX ADMIN — DEXMEDETOMIDINE 0.7 MCG/KG/HR: 100 INJECTION, SOLUTION, CONCENTRATE INTRAVENOUS at 15:01

## 2020-06-06 RX ADMIN — DIAZEPAM 5 MG: 5 TABLET ORAL at 02:33

## 2020-06-06 RX ADMIN — MEROPENEM 2000 MG: 1 INJECTION, POWDER, FOR SOLUTION INTRAVENOUS at 02:40

## 2020-06-06 RX ADMIN — LEVETIRACETAM 1000 MG: 100 SOLUTION ORAL at 15:00

## 2020-06-06 RX ADMIN — LEVETIRACETAM 500 MG: 100 SOLUTION ORAL at 21:01

## 2020-06-06 RX ADMIN — BUDESONIDE 0.5 MG: 0.5 INHALANT RESPIRATORY (INHALATION) at 07:05

## 2020-06-06 RX ADMIN — DIAZEPAM 5 MG: 5 TABLET ORAL at 18:44

## 2020-06-06 RX ADMIN — OXCARBAZEPINE 450 MG: 300 TABLET, FILM COATED ORAL at 18:45

## 2020-06-06 RX ADMIN — DANTROLENE SODIUM 100 MG: 25 CAPSULE ORAL at 20:50

## 2020-06-06 RX ADMIN — ACETAMINOPHEN 650 MG: 325 TABLET ORAL at 20:49

## 2020-06-06 RX ADMIN — SENNOSIDES AND DOCUSATE SODIUM 1 TABLET: 8.6; 5 TABLET ORAL at 21:00

## 2020-06-06 RX ADMIN — Medication 1 TABLET: at 16:42

## 2020-06-06 RX ADMIN — DANTROLENE SODIUM 100 MG: 25 CAPSULE ORAL at 10:00

## 2020-06-06 RX ADMIN — HEPARIN SODIUM 5000 UNITS: 5000 INJECTION INTRAVENOUS; SUBCUTANEOUS at 21:00

## 2020-06-06 RX ADMIN — BISACODYL 10 MG: 10 SUPPOSITORY RECTAL at 10:00

## 2020-06-06 RX ADMIN — PHENYLEPHRINE HYDROCHLORIDE 25 MCG/MIN: 10 INJECTION INTRAVENOUS at 12:53

## 2020-06-06 RX ADMIN — GUAIFENESIN 400 MG: 100 SOLUTION ORAL at 02:30

## 2020-06-06 NOTE — PROGRESS NOTES
Daily Progress Note - Critical Care   Opal Perez 21 y o  female MRN: [id number]  Unit/Bed#: MICU 02 Encounter: [id number]        ----------------------------------------------------------------------------------------  HPI/24hr events: No overnight events     ---------------------------------------------------------------------------------------  SUBJECTIVE  Encephalopathic    Review of Systems  Review of systems was unable to be performed secondary to encephalopathy  ---------------------------------------------------------------------------------------  Assessment and Plan:    Neuro:    Diagnosis: Concern for seizure like activity at outside hospital with seizure disorder hx  o Plan:   - No seizure activity noted on 48 hrs of cEEG, has since been discontinued  - Continue home keppra and trileptal   Diagnosis: Spastic quadriplegia  o Plan:   - Continue Valium and PRN flexeril   Diagnosis: Profound intellectual disability and cerebral palsy now with acute encephalopathy  o Plan:   - Unclear etiology, possibly septic in nature, no evidence of seizures  - Unable to obtain LP due to physical barriers, shunt previously tapped and is without growth  - Pending MRI   Diagnosis: PAD  o Plan:   - D/C Fentanyl gtt  - D/C precedex gtt      CV:    Diagnosis: Cardiac arrest during insertion of central line likely arrythmic mediated  o Plan:   - Monitor on telemetry    Diagnosis: Hypotension  o Plan:   - Less likely secondary to sepsis at this time, more likely sedation related, hold sedation and wean for MAP >65      Pulm:   Diagnosis: Chronic respiratory failure s/p trach  o Plan:   - Currently AC/VC, SBT today, tolerate RR <30 per facility RR 20-30 normally, event patient came to hospital with was RR>60, normally TC 24/7  - Normally has cuff-less trach was exchanged at [facility] to cuffed      GI:   o No active issues     :    Diagnosis: No active issues  o Plan: Consider d/c maximiliano      F/E/N:    Plan: Continue cyclic TF      Heme/Onc:    Diagnosis: Anemia  o Plan: Stable, continue to monitor       Endo:    Diagnosis: Hx of DI  o Plan:   - Continue DDAVP    ID:    Diagnosis: Fever of unclear etiology  o Plan:   - Day #6 Daptomycin and meropenem   - All cultures NGTD, continue to follow  - ID following     MSK/Skin:   o Turn q2  o Local wound care as needed      Disposition: Continue Critical Care   Code Status: Level 1 - Full Code  ---------------------------------------------------------------------------------------  ICU CORE MEASURES    Prophylaxis   VTE Pharmacologic Prophylaxis: Heparin  VTE Mechanical Prophylaxis: sequential compression device  Stress Ulcer Prophylaxis: Prophylaxis Not Indicated       Invasive Devices Review  Invasive Devices     Peripherally Inserted Central Catheter Line            PICC Line  Right Basilic 1 day          Drain            Gastrostomy/Enterostomy -- days    Urethral Catheter Temperature probe -- days          Airway            Surgical Airway Shiley Cuffed -- days              Can any invasive devices be discontinued today?  No  ---------------------------------------------------------------------------------------  OBJECTIVE    Vitals   Vitals:    20 0615 20 0700 20 0707 20 0708   BP: (!) 85/54 131/99     BP Location:       Pulse:  72     Resp:  18     Temp:  100 4 °F (38 °C)     TempSrc:       SpO2:  100% 100% 100%   Weight:       Height:         Temp (24hrs), Av 4 °F (38 °C), Min:99 3 °F (37 4 °C), Max:101 8 °F (38 8 °C)  Current: Temperature: 100 4 °F (38 °C)      Respiratory:  SpO2: SpO2: 100 %       Invasive/non-invasive ventilation settings   Respiratory    Lab Data (Last 4 hours)    None         O2/Vent Data (Last 4 hours)       0708  0844         Vent Mode AC/VC       Resp Rate (BPM) (BPM) 15       Vt (mL) (mL) 250       FIO2 (%) (%) 40       PEEP (cmH2O) (cmH2O) 5       Patient safety screen outcome: Failed       MV 6 FIO2 (%) (%)  40      PEEP (cmH2O) (cmH2O)  5      Pressure Support (cmH2O) (cmH20)  18                  Physical Exam   Constitutional: No distress  underdeveloped   Eyes:   Holds eyes tightly closed, unable to evaluate    Neck: No JVD present  Cardiovascular: Normal rate and regular rhythm  Pulmonary/Chest:   tachypenic   Coarse b/l breath sounds    Abdominal: Soft  She exhibits no distension  There is no tenderness  Musculoskeletal: She exhibits no edema  Neurological:   Does not open eyes to voice or noxious stimuli, holds eyes tightly closed  Does not follow commands or move to noxious stimuli    Skin: Skin is warm and dry           Laboratory and Diagnostics:  Results from last 7 days   Lab Units 06/06/20  0512 06/05/20  0509 06/04/20  0553 06/03/20  1726   WBC Thousand/uL 10 37* 8 47 6 93 11 24*   HEMOGLOBIN g/dL 9 3* 8 8* 7 6* 10 0*   HEMATOCRIT % 31 7* 28 3* 25 1* 33 2*   PLATELETS Thousands/uL 203 175 126* 154   NEUTROS PCT % 67 59 63 61   MONOS PCT % 8 9 10 9     Results from last 7 days   Lab Units 06/06/20  0512 06/05/20  2107 06/05/20  0509 06/04/20  1609 06/04/20  0553 06/03/20  1726   SODIUM mmol/L 136 139 140 141 139 140   POTASSIUM mmol/L 4 2 4 3 3 6 4 1 3 3* 5 1   CHLORIDE mmol/L 102 104 106 108 106 107   CO2 mmol/L 29 30 28 26 25 22   ANION GAP mmol/L 5 5 6 7 8 11   BUN mg/dL 12 10 7 11 7 6   CREATININE mg/dL 0 30* 0 36* 0 33* 0 42* 0 53* 0 60   CALCIUM mg/dL 8 1* 8 3 7 8* 8 1* 7 8* 9 0   GLUCOSE RANDOM mg/dL 106 100 105 133 90 78   ALT U/L  --   --  56  --   --   --    AST U/L  --   --  34  --   --   --    ALK PHOS U/L  --   --  117*  --   --   --    ALBUMIN g/dL  --   --  2 7*  --   --   --    TOTAL BILIRUBIN mg/dL  --   --  0 39  --   --   --      Results from last 7 days   Lab Units 06/05/20  0509 06/04/20  0553 06/03/20  1726   MAGNESIUM mg/dL 2 0 1 9 2 0   PHOSPHORUS mg/dL 3 2 2 9 2 8      Results from last 7 days   Lab Units 06/05/20  1049   INR  1 04          Results from last 7 days   Lab Units 06/03/20 2026 06/03/20  1726   LACTIC ACID mmol/L 1 1 3 5*     ABG:  Results from last 7 days   Lab Units 06/05/20  1141   PH ART  7 427   PCO2 ART mm Hg 42 0   PO2 ART mm Hg 97 0   HCO3 ART mmol/L 27 1   BASE EXC ART mmol/L 2 5   ABG SOURCE  Radial, Right     VBG:  Results from last 7 days   Lab Units 06/05/20  1141   ABG SOURCE  Radial, Right     Results from last 7 days   Lab Units 06/06/20  0514 06/05/20  0509 06/04/20  0554 06/03/20  1726   PROCALCITONIN ng/ml 1 00* 1 77* 3 91* 5 08*       Micro  Results from last 7 days   Lab Units 06/05/20  0507 06/03/20 2025   GRAM STAIN RESULT  Rare Disintegrating polys  No organisms seen No Polys or Bacteria seen       EKG: no new  Imaging: no new     Intake and Output  I/O       06/04 0701 - 06/05 0700 06/05 0701 - 06/06 0700 06/06 0701 - 06/07 0700    P  O        I V  (mL/kg) 947 3 (15 3) 1026 1 (16 9)     NG/ 250     IV Piggyback 2200 350     Feedings 800 270     Total Intake(mL/kg) 4227 3 (68 1) 1896 1 (31 2)     Urine (mL/kg/hr) 1725 (1 2) 3240 (2 2) 175 (0 9)    Stool  0     Total Output 1725 3240 175    Net +2502 3 -1343 9 -175           Unmeasured Stool Occurrence  2 x         UOP: 2 2 ml/hr     Height and Weights   Height: 4' 2" (127 cm)  IBW: 22 5 kg  Body mass index is 37 7 kg/m²    Weight (last 2 days)     Date/Time   Weight    06/06/20 0538   60 8 (134 04)    06/05/20 0600   62 1 (136 91)    06/04/20 0557   62 1 (136 91)                Nutrition       Diet Orders   (From admission, onward)             Start     Ordered    06/05/20 1500  Diet Enteral/Parenteral; Tube Feeding No Oral Diet; Non-Formulary; Cyclic; 50; 16 hours  Diet effective now     Comments:  Milk-free Prosource (nonformulary) BID  Use J-tube for feeds   Question Answer Comment   Diet Type Enteral/Parenteral    Enteral/Parenteral Tube Feeding No Oral Diet    Tube Feeding Formula: Non-Formulary    Bolus/Cyclic/Continuous Cyclic    Tube Feeding Cyclic Rate (mL/hr): 50 Tube Feeding Cyclic: Administer over: 16 hours    RD to adjust diet per protocol?  Yes        06/05/20 1500                Active Medications  Scheduled Meds:  Current Facility-Administered Medications:  acetaminophen 650 mg Per G Tube Q6H PRN Mari Cao MD    bisacodyl 10 mg Rectal Daily MADAY Terrazas    budesonide 0 5 mg Nebulization BID Mónica Sheilafariba Jennings,     calcium carbonate-vitamin D 1 tablet Oral Daily With Breakfast MADAY Terrazas    chlorhexidine 15 mL Swish & Spit Q12H [facility] Jaya Perry PA-C    cholecalciferol 1,000 Units Oral Daily MADAY Terrazas    cyclobenzaprine 5 mg Per G Tube TID PRN Mari Cao MD    dantrolene 100 mg Per G Tube TID Mari Cao MD    DAPTOmycin 8 mg/kg (Adjusted) Intravenous Q24H Juni Dinero MD Last Rate: Stopped (06/05/20 1903)   [START ON 6/7/2020] desmopressin 0 05 mg Per G Tube Q48H Mari Cao MD    dexmedetomidine 0 1-0 7 mcg/kg/hr Intravenous Titrated Jaya Perry PA-C Last Rate: 0 7 mcg/kg/hr (06/06/20 0240)   diazepam 5 mg Per G Tube Q8H Mari Cao MD    fentaNYL 25 mcg/hr Intravenous Continuous Kelle Ma MD Last Rate: 25 mcg/hr (06/05/20 1032)   guaiFENesin 400 mg Oral Q8H MADAY Alatorre    heparin (porcine) 5,000 Units Subcutaneous [facility] Lydia Murguia PA-C    levETIRAcetam 1,000 mg Per G Tube BID Mari Cao MD    levETIRAcetam 500 mg Per G Tube Q24H Mrai Cao MD    LORazepam 1 mg Intravenous Q4H PRN Kelle Ma MD    meropenem 2,000 mg Intravenous Q8H Juni Dinero MD Last Rate: 2,000 mg (06/06/20 0240)   norepinephrine 1-30 mcg/min Intravenous Titrated MADAY Terrazas Last Rate: Stopped (06/05/20 1217)   OXcarbazepine 450 mg Per G Tube Q12H [facility] Mari Cao MD    phenylephine  mcg/min Intravenous Titrated MARC TerrazasNP Last Rate: 25 mcg/min (06/06/20 0900)   senna-docusate sodium 1 tablet Per G Tube HS Mari Cao MD      Continuous Infusions:    dexmedetomidine 0 1-0 7 mcg/kg/hr Last Rate: 0 7 mcg/kg/hr (06/06/20 0240)   fentaNYL 25 mcg/hr Last Rate: 25 mcg/hr (06/05/20 1032)   norepinephrine 1-30 mcg/min Last Rate: Stopped (06/05/20 1217)   phenylephine  mcg/min Last Rate: 25 mcg/min (06/06/20 0900)     PRN Meds:     acetaminophen 650 mg Q6H PRN   cyclobenzaprine 5 mg TID PRN   LORazepam 1 mg Q4H PRN       Allergies   Allergies   Allergen Reactions    Baclofen Other (See Comments)     Reaction Date: [id number];   apena      Cefepime Hives    Cephalosporins      Other reaction(s): Unknown Reaction  Other reaction(s): Unknown Reaction  Other reaction(s): Unknown Reaction      Clonidine      Other reaction(s): Respiratory Distress, Respiratory Distress    Lactase     Milk-Related Compounds     Penicillins Hives     Reaction Date: [id number];       Phenobarbital Other (See Comments)     Reaction Date: [id number]; Other reaction(s): Other (See Comments)  apnea  Other reaction(s): Other (See Comments)  apnea  apnea      Vancomycin     Zolpidem Other (See Comments)     Other reaction(s): Other (see comments)  Reaction Date: [id number];   apnea  apnea  apnea      Latex Rash     ---------------------------------------------------------------------------------------  Advance Directive and Living Will:      Power of :    POLST:    ---------------------------------------------------------------------------------------  Care Time Delivered:   No Critical Care time spent     Evans YOLANDA ClementC      Portions of the record may have been created with voice recognition software  Occasional wrong word or "sound a like" substitutions may have occurred due to the inherent limitations of voice recognition software    Read the chart carefully and recognize, using context, where substitutions have occurred

## 2020-06-06 NOTE — NURSING NOTE
Attempted to call both Mother and Father (listed in chart) for completion of pt MRI screening form  No answer for either parent  Messages left and will continue to attempt calling

## 2020-06-06 NOTE — RESPIRATORY THERAPY NOTE
RT Ventilator Management Note  Kerri Iredell 21 y o  female MRN: [id number]  [facility] [id number]      Daily Screen       6/5/2020  0726 6/5/2020  1130          Patient safety screen outcome[de-identified]  Failed  Passed      Not Ready for Weaning due to[de-identified]  Underline problem not resolved                Physical Exam:   Assessment Type: Assess only  General Appearance: Sedated  Respiratory Pattern: Assisted  Chest Assessment: Chest expansion symmetrical  Bilateral Breath Sounds: Coarse  Cough: Productive  Suction: Trach  O2 Device: Ventilator      Resp Comments: (P) Pt was recieved around mid night  on acvc mode  No changes were made over night  Will cont  to monitor

## 2020-06-06 NOTE — RESPIRATORY THERAPY NOTE
RT Ventilator Management Note  Nikki Gonzalez 21 y o  female MRN: [id number]  Unit/Bed#: [facility] 02 Encounter: [id number]      Daily Screen       6/5/2020  1130 6/6/2020  0708          Patient safety screen outcome[de-identified]  Passed  Failed      Not Ready for Weaning due to[de-identified]    Underline problem not resolved              Physical Exam:   Assessment Type: Assess only  General Appearance: Sedated  Respiratory Pattern: Assisted, Normal  Chest Assessment: Chest expansion symmetrical  Bilateral Breath Sounds: Diminished, Coarse  R Breath Sounds: Diminished, Coarse  L Breath Sounds: Diminished, Coarse  Cough: Weak, Non-productive  Suction: Trach  O2 Device: ventilator      Resp Comments: Pt tolerating current vent settings and appears to be resting comfortably  Fio2 found at 100%  Decreased to  40 with a spo2 maintaining at 97%  Will continue to monitor and assess per resp protocol

## 2020-06-06 NOTE — RESPIRATORY THERAPY NOTE
RT Ventilator Management Note  Susan Bartholomew 21 y o  female MRN: [id number]  Unit/Bed#: MICU 02 Encounter: [id number]      Daily Screen       6/5/2020  0726 6/5/2020  1130          Patient safety screen outcome[de-identified]  Failed  Passed      Not Ready for Weaning due to[de-identified]  Underline problem not resolved               06/06/20 0002   Vent Information   Vent ID [id number]   Vent type     Vent Mode AC/VC   $ Pulse Oximetry Spot Check Charge Completed   SpO2 99 %   AC/VC Settings   Resp Rate (BPM) 15 BPM   Vt (mL) 250 mL   FIO2 (%) 30 %   PEEP (cmH2O) 5 cmH2O   Flow Pattern (LPM) 45 L/min   Trigger Sensitivity Flow (lpm) 3 %   Humidification Heater   Heater Temperature (Set) 98 6 °F (37 °C)   AC/VC Actuals   Resp Rate (BPM) 25 BPM   VT (mL) 262   MV 6 01   MAP (cmH2O) 9 cmH2O   Peak Pressure (cmH2O) 20 cmH2O   I/E Ratio (Obs) 1:3 8   Heater Temperature (Obs) 94 5 °F (34 7 °C)   AC/VC Alarms   High Peak Pressure (cmH2O) 40   High Resp Rate (BPM) 50 BPM   High MV (L/min) 22 L/min   Low MV (L/min) 4 L/min   Vt High (mL) 600 mL   Vt Low (mL) 150 mL   AC/VC Apnea Settings   Resp Rate (BPM) 15 BPM   VT (mL) 250 mL   FIO2 (%) 100 %   Apnea Time (s) 20 S   Apnea Flow (L/min) 45 L/min   Maintenance   Alarm (pink) cable attached Yes   Resuscitation bag with peep valve at bedside Yes   Water bag changed No   Circuit changed No   Surgical Airway Shiley Cuffed   No Placement Date or Time found  Tube Size: 6 mm  Previously placed by?: Present on admission  Type: Tracheostomy  Brand: Ta  Style: Cuffed   Status Cuff Inflated;Secured   Site Assessment Dry   Ties Assessment Clean;Dry; Intact; Secure   Equipment at bedside BVM; Wall Suction setup; Additional complete same size trach tube; Obturator;Sterile saline       Physical Exam:   Assessment Type: Assess only  General Appearance: Sedated  Respiratory Pattern: Assisted  Chest Assessment: Chest expansion symmetrical  Bilateral Breath Sounds: Coarse  Cough: Productive  Suction: Trach  O2 Device: Ventilator      Pt received from ICU  Placed on  vent once arrived to MICU  No incident during transport  Pt comfortable on current vent settings  No changes made at this time  Will continue to monitor as per protocol

## 2020-06-06 NOTE — PROGRESS NOTES
Progress Note - Neurology   Nilal Farias 21 y o  female MRN: [id number]  [facility] [id number]    Assessment:  Encephalopathy in setting of baseline poor functional status due to cerebral palsy, complicated by infection of unclear source (potentially retroperitoneal collection or hardware) and cardiac arrest  No evidence of epileptiform activity to suggest seizure  Unclear whether underlying anoxic injury may be contributing  Plan:  -continue neurochecks; she often has stimulus-induced clonus/posturing that is reflexic in nature with captured tremors/myoclonus not being of epileptic origin  -video EEG with diffuse slowing, now discontinued  -continue Keppra 1gm at 6AM and 2PM, 500mg 10PM  -continue Trileptal 450mg BID  -appreciate ID recommendations; if there is further unclear source for infection can obtain CSF sample from  shunt  -prior CSF cx unremarkable  -awaiting MRI brain w/wo contrast  -further recommendations pending MRI imaging      Subjective:   Transferred from ICU on 6th floor to MICU  CSF culture is negative from prior thus far  Video EEG has had diffuse slowing but no epileptiform activity; episodes of myoclonus, tremors, and spasticity without EEG correlate  MRI remains pending  Has continued to be febrile overnight  ROS:  Unable to obtain due to mental status/intubation  Vitals: Blood pressure 131/99, pulse 72, temperature 100 4 °F (38 °C), resp  rate 18, height 4' 2" (1 27 m), weight 60 8 kg (134 lb 0 6 oz), SpO2 100 %  ,Body mass index is 37 7 kg/m²  Physical Exam: General appearance: intubated, appears comfortable  Head: Normocephalic, without obvious abnormality, atraumatic  Eyes: conjunctivae/corneas clear  PERRL, EOM's intact  Fundi benign    Neck: no adenopathy, no carotid bruit, no JVD and supple, symmetrical, trachea midline  Lungs: clear to auscultation bilaterally  Heart: regular rate and rhythm, S1, S2 normal, no murmur, click, rub or gallop  Abdomen: soft, non-tender; bowel sounds normal; no masses,  no organomegaly  Extremities: extremities normal, warm and well-perfused; no cyanosis, clubbing, or edema    Neurologic exam:  Eyes closed, does not open to voice or stimulation  Does not follow commands  No clear gaze deviation  No withdrawal to noxious stim in any extremity; triple flexion noted in RLE  No abnormal movements noted      Lab, Imaging and other studies:   CBC:   Results from last 7 days   Lab Units 06/06/20  0512 06/05/20  0509 06/04/20  0553   WBC Thousand/uL 10 37* 8 47 6 93   RBC Million/uL 3 63* 3 37* 2 95*   HEMOGLOBIN g/dL 9 3* 8 8* 7 6*   HEMATOCRIT % 31 7* 28 3* 25 1*   MCV fL 87 84 85   PLATELETS Thousands/uL 203 175 126*   , BMP/CMP:   Results from last 7 days   Lab Units 06/06/20  0512 06/05/20  2107 06/05/20  0509   SODIUM mmol/L 136 139 140   POTASSIUM mmol/L 4 2 4 3 3 6   CHLORIDE mmol/L 102 104 106   CO2 mmol/L 29 30 28   BUN mg/dL 12 10 7   CREATININE mg/dL 0 30* 0 36* 0 33*   CALCIUM mg/dL 8 1* 8 3 7 8*   AST U/L  --   --  34   ALT U/L  --   --  56   ALK PHOS U/L  --   --  117*   EGFR ml/min/1 73sq m 160 150 155     VTE Prophylaxis: Sequential compression device (Venodyne)

## 2020-06-07 ENCOUNTER — APPOINTMENT (INPATIENT)
Dept: NEUROLOGY | Facility: CLINIC | Age: 25
DRG: 711 | End: 2020-06-07
Payer: COMMERCIAL

## 2020-06-07 PROBLEM — Z98.2 S/P VP SHUNT: Status: ACTIVE | Noted: 2020-06-07

## 2020-06-07 PROBLEM — A41.53: Status: ACTIVE | Noted: 2020-06-07

## 2020-06-07 PROBLEM — R65.20: Status: ACTIVE | Noted: 2020-06-07

## 2020-06-07 LAB
ANION GAP SERPL CALCULATED.3IONS-SCNC: 5 MMOL/L (ref 4–13)
ANION GAP SERPL CALCULATED.3IONS-SCNC: 7 MMOL/L (ref 4–13)
ATRIAL RATE: 68 BPM
BACTERIA CSF CULT: ABNORMAL
BASOPHILS # BLD AUTO: 0.06 THOUSANDS/ΜL (ref 0–0.1)
BASOPHILS NFR BLD AUTO: 1 % (ref 0–1)
BUN SERPL-MCNC: 11 MG/DL (ref 5–25)
BUN SERPL-MCNC: 13 MG/DL (ref 5–25)
CALCIUM SERPL-MCNC: 7.4 MG/DL (ref 8.3–10.1)
CALCIUM SERPL-MCNC: 8.3 MG/DL (ref 8.3–10.1)
CHLORIDE SERPL-SCNC: 101 MMOL/L (ref 100–108)
CHLORIDE SERPL-SCNC: 103 MMOL/L (ref 100–108)
CO2 SERPL-SCNC: 28 MMOL/L (ref 21–32)
CO2 SERPL-SCNC: 30 MMOL/L (ref 21–32)
CREAT SERPL-MCNC: 0.29 MG/DL (ref 0.6–1.3)
CREAT SERPL-MCNC: 0.38 MG/DL (ref 0.6–1.3)
EOSINOPHIL # BLD AUTO: 0.15 THOUSAND/ΜL (ref 0–0.61)
EOSINOPHIL NFR BLD AUTO: 1 % (ref 0–6)
ERYTHROCYTE [DISTWIDTH] IN BLOOD BY AUTOMATED COUNT: 18.1 % (ref 11.6–15.1)
GFR SERPL CREATININE-BSD FRML MDRD: 148 ML/MIN/1.73SQ M
GFR SERPL CREATININE-BSD FRML MDRD: 161 ML/MIN/1.73SQ M
GLUCOSE SERPL-MCNC: 107 MG/DL (ref 65–140)
GLUCOSE SERPL-MCNC: 112 MG/DL (ref 65–140)
GLUCOSE SERPL-MCNC: 114 MG/DL (ref 65–140)
GLUCOSE SERPL-MCNC: 116 MG/DL (ref 65–140)
GLUCOSE SERPL-MCNC: 99 MG/DL (ref 65–140)
GRAM STN SPEC: ABNORMAL
HCT VFR BLD AUTO: 32.6 % (ref 34.8–46.1)
HGB BLD-MCNC: 9.9 G/DL (ref 11.5–15.4)
IMM GRANULOCYTES # BLD AUTO: 0.07 THOUSAND/UL (ref 0–0.2)
IMM GRANULOCYTES NFR BLD AUTO: 1 % (ref 0–2)
LYMPHOCYTES # BLD AUTO: 2.41 THOUSANDS/ΜL (ref 0.6–4.47)
LYMPHOCYTES NFR BLD AUTO: 18 % (ref 14–44)
MAGNESIUM SERPL-MCNC: 1.8 MG/DL (ref 1.6–2.6)
MCH RBC QN AUTO: 26.2 PG (ref 26.8–34.3)
MCHC RBC AUTO-ENTMCNC: 30.4 G/DL (ref 31.4–37.4)
MCV RBC AUTO: 86 FL (ref 82–98)
MONOCYTES # BLD AUTO: 0.74 THOUSAND/ΜL (ref 0.17–1.22)
MONOCYTES NFR BLD AUTO: 6 % (ref 4–12)
NEUTROPHILS # BLD AUTO: 9.77 THOUSANDS/ΜL (ref 1.85–7.62)
NEUTS SEG NFR BLD AUTO: 73 % (ref 43–75)
NRBC BLD AUTO-RTO: 0 /100 WBCS
P AXIS: 34 DEGREES
PLATELET # BLD AUTO: 246 THOUSANDS/UL (ref 149–390)
PMV BLD AUTO: 10 FL (ref 8.9–12.7)
POTASSIUM SERPL-SCNC: 3 MMOL/L (ref 3.5–5.3)
POTASSIUM SERPL-SCNC: 3.8 MMOL/L (ref 3.5–5.3)
PR INTERVAL: 183 MS
QRS AXIS: 58 DEGREES
QRSD INTERVAL: 88 MS
QT INTERVAL: 383 MS
QTC INTERVAL: 408 MS
RBC # BLD AUTO: 3.78 MILLION/UL (ref 3.81–5.12)
SODIUM SERPL-SCNC: 134 MMOL/L (ref 136–145)
SODIUM SERPL-SCNC: 140 MMOL/L (ref 136–145)
T WAVE AXIS: 23 DEGREES
VENTRICULAR RATE: 68 BPM
WBC # BLD AUTO: 13.2 THOUSAND/UL (ref 4.31–10.16)

## 2020-06-07 PROCEDURE — 95715 VEEG EA 12-26HR INTMT MNTR: CPT

## 2020-06-07 PROCEDURE — 94003 VENT MGMT INPAT SUBQ DAY: CPT

## 2020-06-07 PROCEDURE — 94640 AIRWAY INHALATION TREATMENT: CPT

## 2020-06-07 PROCEDURE — 95700 EEG CONT REC W/VID EEG TECH: CPT

## 2020-06-07 PROCEDURE — 93010 ELECTROCARDIOGRAM REPORT: CPT | Performed by: INTERNAL MEDICINE

## 2020-06-07 PROCEDURE — 99291 CRITICAL CARE FIRST HOUR: CPT | Performed by: INTERNAL MEDICINE

## 2020-06-07 PROCEDURE — 84100 ASSAY OF PHOSPHORUS: CPT | Performed by: EMERGENCY MEDICINE

## 2020-06-07 PROCEDURE — 94760 N-INVAS EAR/PLS OXIMETRY 1: CPT

## 2020-06-07 PROCEDURE — 95720 EEG PHY/QHP EA INCR W/VEEG: CPT | Performed by: PSYCHIATRY & NEUROLOGY

## 2020-06-07 PROCEDURE — 93005 ELECTROCARDIOGRAM TRACING: CPT

## 2020-06-07 PROCEDURE — 85025 COMPLETE CBC W/AUTO DIFF WBC: CPT | Performed by: EMERGENCY MEDICINE

## 2020-06-07 PROCEDURE — 99255 IP/OBS CONSLTJ NEW/EST HI 80: CPT | Performed by: PHYSICIAN ASSISTANT

## 2020-06-07 PROCEDURE — 80048 BASIC METABOLIC PNL TOTAL CA: CPT | Performed by: EMERGENCY MEDICINE

## 2020-06-07 PROCEDURE — 83735 ASSAY OF MAGNESIUM: CPT | Performed by: EMERGENCY MEDICINE

## 2020-06-07 PROCEDURE — 82948 REAGENT STRIP/BLOOD GLUCOSE: CPT

## 2020-06-07 RX ORDER — ATROPINE SULFATE 0.1 MG/ML
INJECTION INTRAVENOUS
Status: DISPENSED
Start: 2020-06-07 | End: 2020-06-08

## 2020-06-07 RX ORDER — FENTANYL CITRATE 50 UG/ML
50 INJECTION, SOLUTION INTRAMUSCULAR; INTRAVENOUS ONCE
Status: COMPLETED | OUTPATIENT
Start: 2020-06-07 | End: 2020-06-07

## 2020-06-07 RX ORDER — PROPOFOL 10 MG/ML
INJECTION, EMULSION INTRAVENOUS
Status: COMPLETED
Start: 2020-06-07 | End: 2020-06-07

## 2020-06-07 RX ORDER — POTASSIUM CHLORIDE 29.8 MG/ML
40 INJECTION INTRAVENOUS ONCE
Status: COMPLETED | OUTPATIENT
Start: 2020-06-07 | End: 2020-06-08

## 2020-06-07 RX ORDER — POTASSIUM CHLORIDE 20MEQ/15ML
20 LIQUID (ML) ORAL ONCE
Status: COMPLETED | OUTPATIENT
Start: 2020-06-07 | End: 2020-06-07

## 2020-06-07 RX ORDER — PROPOFOL 10 MG/ML
5-50 INJECTION, EMULSION INTRAVENOUS
Status: DISCONTINUED | OUTPATIENT
Start: 2020-06-07 | End: 2020-06-07

## 2020-06-07 RX ORDER — ACETAMINOPHEN 325 MG/1
650 TABLET ORAL EVERY 4 HOURS PRN
Status: DISCONTINUED | OUTPATIENT
Start: 2020-06-07 | End: 2020-06-28

## 2020-06-07 RX ORDER — FENTANYL CITRATE 50 UG/ML
INJECTION, SOLUTION INTRAMUSCULAR; INTRAVENOUS
Status: COMPLETED
Start: 2020-06-07 | End: 2020-06-07

## 2020-06-07 RX ORDER — MAGNESIUM SULFATE HEPTAHYDRATE 40 MG/ML
2 INJECTION, SOLUTION INTRAVENOUS ONCE
Status: COMPLETED | OUTPATIENT
Start: 2020-06-07 | End: 2020-06-08

## 2020-06-07 RX ORDER — POLYVINYL ALCOHOL 14 MG/ML
1 SOLUTION/ DROPS OPHTHALMIC
Status: DISCONTINUED | OUTPATIENT
Start: 2020-06-07 | End: 2020-06-07

## 2020-06-07 RX ORDER — LORAZEPAM 2 MG/ML
3 INJECTION INTRAMUSCULAR ONCE
Status: COMPLETED | OUTPATIENT
Start: 2020-06-07 | End: 2020-06-07

## 2020-06-07 RX ADMIN — PROPOFOL 30 MCG/KG/MIN: 10 INJECTION, EMULSION INTRAVENOUS at 15:14

## 2020-06-07 RX ADMIN — FENTANYL CITRATE 50 MCG: 50 INJECTION, SOLUTION INTRAMUSCULAR; INTRAVENOUS at 23:06

## 2020-06-07 RX ADMIN — HEPARIN SODIUM 5000 UNITS: 5000 INJECTION INTRAVENOUS; SUBCUTANEOUS at 21:15

## 2020-06-07 RX ADMIN — LEVETIRACETAM 1000 MG: 100 SOLUTION ORAL at 05:20

## 2020-06-07 RX ADMIN — POTASSIUM CHLORIDE 40 MEQ: 29.8 INJECTION, SOLUTION INTRAVENOUS at 22:46

## 2020-06-07 RX ADMIN — CHLORHEXIDINE GLUCONATE 0.12% ORAL RINSE 15 ML: 1.2 LIQUID ORAL at 21:08

## 2020-06-07 RX ADMIN — LEVETIRACETAM 500 MG: 100 SOLUTION ORAL at 21:11

## 2020-06-07 RX ADMIN — FENTANYL CITRATE 50 MCG: 50 INJECTION, SOLUTION INTRAMUSCULAR; INTRAVENOUS at 02:32

## 2020-06-07 RX ADMIN — DEXMEDETOMIDINE 0.2 MCG/KG/HR: 100 INJECTION, SOLUTION, CONCENTRATE INTRAVENOUS at 14:11

## 2020-06-07 RX ADMIN — LEVETIRACETAM 1000 MG: 100 SOLUTION ORAL at 13:48

## 2020-06-07 RX ADMIN — ACETAMINOPHEN 650 MG: 325 TABLET ORAL at 15:14

## 2020-06-07 RX ADMIN — POTASSIUM CHLORIDE 20 MEQ: 1.5 SOLUTION ORAL at 11:29

## 2020-06-07 RX ADMIN — PHENYLEPHRINE HYDROCHLORIDE 25 MCG/MIN: 10 INJECTION INTRAVENOUS at 23:07

## 2020-06-07 RX ADMIN — CHLORHEXIDINE GLUCONATE 0.12% ORAL RINSE 15 ML: 1.2 LIQUID ORAL at 08:44

## 2020-06-07 RX ADMIN — DANTROLENE SODIUM 100 MG: 25 CAPSULE ORAL at 16:51

## 2020-06-07 RX ADMIN — BUDESONIDE 0.5 MG: 0.5 INHALANT RESPIRATORY (INHALATION) at 07:12

## 2020-06-07 RX ADMIN — OXCARBAZEPINE 450 MG: 300 TABLET, FILM COATED ORAL at 17:42

## 2020-06-07 RX ADMIN — MEROPENEM 2000 MG: 1 INJECTION, POWDER, FOR SOLUTION INTRAVENOUS at 11:20

## 2020-06-07 RX ADMIN — Medication 1 TABLET: at 08:45

## 2020-06-07 RX ADMIN — ACETAMINOPHEN 650 MG: 325 TABLET ORAL at 02:36

## 2020-06-07 RX ADMIN — HEPARIN SODIUM 5000 UNITS: 5000 INJECTION INTRAVENOUS; SUBCUTANEOUS at 05:19

## 2020-06-07 RX ADMIN — GUAIFENESIN 400 MG: 100 SOLUTION ORAL at 08:44

## 2020-06-07 RX ADMIN — HEPARIN SODIUM 5000 UNITS: 5000 INJECTION INTRAVENOUS; SUBCUTANEOUS at 13:50

## 2020-06-07 RX ADMIN — FENTANYL CITRATE 50 MCG: 50 INJECTION, SOLUTION INTRAMUSCULAR; INTRAVENOUS at 14:18

## 2020-06-07 RX ADMIN — SENNOSIDES AND DOCUSATE SODIUM 1 TABLET: 8.6; 5 TABLET ORAL at 21:12

## 2020-06-07 RX ADMIN — MEROPENEM 2000 MG: 1 INJECTION, POWDER, FOR SOLUTION INTRAVENOUS at 18:28

## 2020-06-07 RX ADMIN — DIAZEPAM 5 MG: 5 TABLET ORAL at 10:27

## 2020-06-07 RX ADMIN — LORAZEPAM 1 MG: 2 INJECTION INTRAMUSCULAR; INTRAVENOUS at 02:14

## 2020-06-07 RX ADMIN — DESMOPRESSIN ACETATE 0.05 MG: 0.1 TABLET ORAL at 05:18

## 2020-06-07 RX ADMIN — DANTROLENE SODIUM 100 MG: 25 CAPSULE ORAL at 21:08

## 2020-06-07 RX ADMIN — BUDESONIDE 0.5 MG: 0.5 INHALANT RESPIRATORY (INHALATION) at 19:26

## 2020-06-07 RX ADMIN — DIAZEPAM 5 MG: 5 TABLET ORAL at 02:36

## 2020-06-07 RX ADMIN — MEROPENEM 2000 MG: 1 INJECTION, POWDER, FOR SOLUTION INTRAVENOUS at 02:36

## 2020-06-07 RX ADMIN — OXCARBAZEPINE 450 MG: 300 TABLET, FILM COATED ORAL at 05:18

## 2020-06-07 RX ADMIN — DIAZEPAM 5 MG: 5 TABLET ORAL at 17:42

## 2020-06-07 RX ADMIN — GLYCERIN, HYPROMELLOSE, POLYETHYLENE GLYCOL 1 DROP: .2; .2; 1 LIQUID OPHTHALMIC at 16:51

## 2020-06-07 RX ADMIN — GUAIFENESIN 400 MG: 100 SOLUTION ORAL at 02:00

## 2020-06-07 RX ADMIN — LORAZEPAM 3 MG: 2 INJECTION INTRAMUSCULAR; INTRAVENOUS at 14:51

## 2020-06-07 RX ADMIN — GUAIFENESIN 400 MG: 100 SOLUTION ORAL at 16:50

## 2020-06-07 RX ADMIN — DANTROLENE SODIUM 100 MG: 25 CAPSULE ORAL at 08:45

## 2020-06-07 RX ADMIN — LORAZEPAM 1 MG: 2 INJECTION INTRAMUSCULAR; INTRAVENOUS at 14:48

## 2020-06-07 RX ADMIN — MELATONIN 1000 UNITS: at 08:44

## 2020-06-07 RX ADMIN — DAPTOMYCIN 375 MG: 500 INJECTION, POWDER, LYOPHILIZED, FOR SOLUTION INTRAVENOUS at 20:55

## 2020-06-07 RX ADMIN — DEXMEDETOMIDINE 0.7 MCG/KG/HR: 100 INJECTION, SOLUTION, CONCENTRATE INTRAVENOUS at 00:41

## 2020-06-07 NOTE — ASSESSMENT & PLAN NOTE
· Pt has a hx of  shunt insertion for hydrocephalus  · Pw/ SIRS and acute encephalopathy  · Pt also had cardiac arrest and was resuscitated  · Imaging reviewed personally and by attending  Final results as below  · Xray Shunt series 6/1/20 loaded in to PACs from outside hospital: Right frontal  shunt catheter appears intact  Hardware from thoracolumbar fusion for scoliosis noted  Of note the right sacroliliac screw noted to have lucency concerning for possible screw loosening  Plan  · Continue regular neurologic checks   · Ongoing medical management per primary team   · Neurology consulted and following for seizures and encephalopathy  · MRI brain w wo ordered and pending  · ID consulted and following  · CSF cultures 6/7/20: 2+ Growth of Serratia Marcescens  · SED rate 6/6: 19  · CRP 6/6: 86 1  · Eval and mobilize per PT/OT  · DVT PPX: SCDs, Heparin  · No emergent neurosurgical intervention anticipated at this time  Per Dr Riley Levine, may consider obtaining repeat CSF cultures in 2 days  · Neurosurgery will continue to follow  Please call with any questions or concerns

## 2020-06-07 NOTE — PROGRESS NOTES
Daily Progress Note - Critical Care   Karin Bello 21 y o  female MRN: [id number]  Unit/Bed#: MICU 02 Encounter: [id number]        ----------------------------------------------------------------------------------------  HPI/24hr events: Patient was febrile overnight with a Tmax 101 1     ---------------------------------------------------------------------------------------  SUBJECTIVE    Patient seen and examined at bedside  Review of Systems   Unable to perform ROS: Acuity of condition       ---------------------------------------------------------------------------------------  Assessment and Plan:    Neuro:   · Diagnosis: Concern for seizure like activity at outside hospital with seizure disorder hx  ? Plan:   § No seizure activity noted on 48 hrs of cEEG, has since been discontinued  § Continue home keppra and trileptal  · Diagnosis: Spastic quadriplegia  ? Plan:   § Continue Valium and PRN flexeril  · Diagnosis: Profound intellectual disability and cerebral palsy now with acute encephalopathy  ? Plan:   § Unclear etiology, possibly septic in nature, no evidence of seizures  § Pending MRI     CV:   · Diagnosis: Cardiac arrest during insertion of central line likely arrythmic mediated  ? Plan:   § Monitor on telemetry   · Diagnosis: Hypotension  ? Plan:   § More likely sedation related  § Maintain MAP >65  § Currently on 25mcg of Elia  § Continue to wean as tolerated       Pulm:  · Diagnosis: Chronic respiratory failure s/p trach  ? Plan:   § Currently AC/VC, SBT today,  § Per facility, patient is usually tachypneic with RR 20-30  § Normally has cuff-less trach was exchanged at [facility] to cuffed  § Continue precedex gtt for sedation/agitation     GI:    ? No active issues      :   · Diagnosis: No active issues  ? Plan: Consider d/c viera        F/E/N:   · Plan:   · F: No IVF  · E: Monitor and replete PRN  · N: Continue cyclic tube feeds        Heme/Onc:   · Diagnosis: Anemia  ? Plan:   ? Stable  ?  Hgb 9 9 today  ? Continue to monitor         Endo:   · Diagnosis: Hx of DI  ? Plan:   § Continue DDAVP     ID:   · Diagnosis: Fever of unclear etiology  ? Plan:   § Day #7 Daptomycin and meropenem   § CSF culture and gram stain +Serratia  § ID following      MSK/Skin:   ? Repositiong q2  ? Local wound care as needed    Disposition: Continue Critical Care   Code Status: Level 1 - Full Code  ---------------------------------------------------------------------------------------  ICU CORE MEASURES    Prophylaxis   VTE Pharmacologic Prophylaxis: Heparin  VTE Mechanical Prophylaxis: sequential compression device  Stress Ulcer Prophylaxis: Prophylaxis Not Indicated     ABCDE Protocol (if indicated)  Plan to perform spontaneous awakening trial today? Yes  Plan to perform spontaneous breathing trial today? Yes  Obvious barriers to extubation? Yes  CAM-ICU: unable to assess    Invasive Devices Review  Invasive Devices     Peripherally Inserted Central Catheter Line            PICC Line 44/92/ Right Basilic 2 days          Drain            Gastrostomy/Enterostomy -- days    Urethral Catheter Temperature probe -- days          Airway            Surgical Airway Shiley Cuffed -- days              Can any invasive devices be discontinued today?  No  ---------------------------------------------------------------------------------------  OBJECTIVE    Vitals   Vitals:    20 0400 20 0500 20 0536 20 0600   BP: 121/87 126/91  121/88   BP Location: Left arm      Pulse: 72 72  70   Resp:   19   Temp: 100 °F (37 8 °C) 100 °F (37 8 °C)  99 7 °F (37 6 °C)   TempSrc: Bladder      SpO2: 96% 98%  98%   Weight:   61 kg (134 lb 7 7 oz)    Height:         Temp (24hrs), Av 5 °F (38 1 °C), Min:99 7 °F (37 6 °C), Max:101 1 °F (38 4 °C)  Current: Temperature: 99 7 °F (37 6 °C)    Respiratory:  SpO2: SpO2: 98 %, SpO2 Device: O2 Device: Ventilator       Invasive/non-invasive ventilation settings   Respiratory    Lab Data (Last 4 hours)    None         O2/Vent Data (Last 4 hours)      06/07 0348           Vent Mode AC/VC       Resp Rate (BPM) (BPM) 15       Vt (mL) (mL) 250       FIO2 (%) (%) 40       PEEP (cmH2O) (cmH2O) 5       MV 6 1                   Physical Exam   Constitutional: No distress  HENT:   Head: Normocephalic and atraumatic  Nose: Nose normal    Neck: Neck supple  Cardiovascular: Normal rate, regular rhythm, normal heart sounds and intact distal pulses  Pulmonary/Chest: Effort normal  No respiratory distress  Abdominal: Soft  Bowel sounds are normal  She exhibits no distension  Musculoskeletal: She exhibits no edema  Neurological:   Does not follow commands and does not respond to vocal or painful stimuli   Skin: Skin is warm and dry  She is not diaphoretic           Laboratory and Diagnostics:  Results from last 7 days   Lab Units 06/07/20  0530 06/06/20  0512 06/05/20  0509 06/04/20  0553 06/03/20  1726   WBC Thousand/uL 13 20* 10 37* 8 47 6 93 11 24*   HEMOGLOBIN g/dL 9 9* 9 3* 8 8* 7 6* 10 0*   HEMATOCRIT % 32 6* 31 7* 28 3* 25 1* 33 2*   PLATELETS Thousands/uL 246 203 175 126* 154   NEUTROS PCT % 73 67 59 63 61   MONOS PCT % 6 8 9 10 9     Results from last 7 days   Lab Units 06/07/20  0530 06/06/20  0512 06/05/20  2107 06/05/20  0509 06/04/20  1609 06/04/20  0553 06/03/20  1726   SODIUM mmol/L 134* 136 139 140 141 139 140   POTASSIUM mmol/L 3 8 4 2 4 3 3 6 4 1 3 3* 5 1   CHLORIDE mmol/L 101 102 104 106 108 106 107   CO2 mmol/L 28 29 30 28 26 25 22   ANION GAP mmol/L 5 5 5 6 7 8 11   BUN mg/dL 13 12 10 7 11 7 6   CREATININE mg/dL 0 38* 0 30* 0 36* 0 33* 0 42* 0 53* 0 60   CALCIUM mg/dL 8 3 8 1* 8 3 7 8* 8 1* 7 8* 9 0   GLUCOSE RANDOM mg/dL 116 106 100 105 133 90 78   ALT U/L  --   --   --  56  --   --   --    AST U/L  --   --   --  34  --   --   --    ALK PHOS U/L  --   --   --  117*  --   --   --    ALBUMIN g/dL  --   --   --  2 7*  --   --   --    TOTAL BILIRUBIN mg/dL  --   --   --  0 39  -- --   --      Results from last 7 days   Lab Units 06/05/20  0509 06/04/20  0553 06/03/20  1726   MAGNESIUM mg/dL 2 0 1 9 2 0   PHOSPHORUS mg/dL 3 2 2 9 2 8      Results from last 7 days   Lab Units 06/05/20  1049   INR  1 04          Results from last 7 days   Lab Units 06/03/20  2026 06/03/20  1726   LACTIC ACID mmol/L 1 1 3 5*     ABG:  Results from last 7 days   Lab Units 06/05/20  1141   PH ART  7 427   PCO2 ART mm Hg 42 0   PO2 ART mm Hg 97 0   HCO3 ART mmol/L 27 1   BASE EXC ART mmol/L 2 5   ABG SOURCE  Radial, Right     VBG:  Results from last 7 days   Lab Units 06/05/20  1141   ABG SOURCE  Radial, Right     Results from last 7 days   Lab Units 06/06/20  0514 06/05/20  0509 06/04/20  0554 06/03/20  1726   PROCALCITONIN ng/ml 1 00* 1 77* 3 91* 5 08*       Micro  Results from last 7 days   Lab Units 06/05/20  1418 06/05/20  0507 06/03/20 2025   GRAM STAIN RESULT  1+ Polys  No bacteria seen Rare Disintegrating polys  No organisms seen No Polys or Bacteria seen   WOUND CULTURE   --  No growth  --    BODY FLUID CULTURE, STERILE  No growth  --   --        EKG: Normal sinus on tele  Imaging: No new pertinent imaging, MRI pending    Intake and Output  I/O       06/05 0701 - 06/06 0700 06/06 0701 - 06/07 0700    I V  (mL/kg) 1026 1 (16 9) 655 5 (10 7)    NG/ 230    IV Piggyback 350 330    Feedings 270 795    Total Intake(mL/kg) 1896 1 (31 2) 2010 5 (33)    Urine (mL/kg/hr) 3240 (2 2) 1250 (0 9)    Stool 0 0    Total Output 3240 1250    Net -1343 9 +760 5          Unmeasured Stool Occurrence 2 x 1 x          Height and Weights   Height: 4' 2" (127 cm)  IBW: 22 5 kg  Body mass index is 37 82 kg/m²    Weight (last 2 days)     Date/Time   Weight    06/07/20 0536   61 (134 48)    06/06/20 0538   60 8 (134 04)    06/05/20 0600   62 1 (237 91)                Nutrition       Diet Orders   (From admission, onward)             Start     Ordered    06/05/20 1500  Diet Enteral/Parenteral; Tube Feeding No Oral Diet; Non-Formulary; Cyclic; 50; 16 hours  Diet effective now     Comments:  Milk-free Prosource (nonformulary) BID  Use J-tube for feeds   Question Answer Comment   Diet Type Enteral/Parenteral    Enteral/Parenteral Tube Feeding No Oral Diet    Tube Feeding Formula: Non-Formulary    Bolus/Cyclic/Continuous Cyclic    Tube Feeding Cyclic Rate (mL/hr): 50    Tube Feeding Cyclic: Administer over: 16 hours    RD to adjust diet per protocol?  Yes        06/05/20 1500                Active Medications  Scheduled Meds:  Current Facility-Administered Medications:  acetaminophen 650 mg Per G Tube Q4H PRN Benjamin Campos,     bisacodyl 10 mg Rectal Daily MADAY Almazan    budesonide 0 5 mg Nebulization BID Leti Jennings,     calcium carbonate-vitamin D 1 tablet Oral Daily With Breakfast MADAY Almazan    chlorhexidine 15 mL Swish & Spit Q12H [facility] Henry Brasher PA-C    cholecalciferol 1,000 Units Oral Daily MADAY Almazan    cyclobenzaprine 5 mg Per G Tube TID PRN Bobby Callaway MD    dantrolene 100 mg Per G Tube TID Bobby Callaway MD    DAPTOmycin 8 mg/kg (Adjusted) Intravenous Q24H Astrid Mahoney MD Last Rate: 375 mg (06/06/20 2049)   desmopressin 0 05 mg Per G Tube Q48H Bobby Callaway MD    dexmedetomidine 0 1-0 7 mcg/kg/hr Intravenous Titrated Henry Brasher PA-C Last Rate: 0 7 mcg/kg/hr (06/07/20 0041)   diazepam 5 mg Per G Tube Q8H Bobby Callaway MD    guaiFENesin 400 mg Oral Q8H MADAY Alatorre    heparin (porcine) 5,000 Units Subcutaneous [facility] Henry Brasher PA-C    levETIRAcetam 1,000 mg Per G Tube BID Bobby Callaway MD    levETIRAcetam 500 mg Per G Tube Q24H Bobby Callaway MD    LORazepam 1 mg Intravenous Q4H PRN Venu Oakes MD    meropenem 2,000 mg Intravenous Q8H Astrid Mahoney MD Last Rate: 2,000 mg (06/07/20 0236)   OXcarbazepine 450 mg Per G Tube Q12H Radha Leonard MD    phenylephine  mcg/min Intravenous Titrated Estella MARC LoboNP Last Rate: 25 mcg/min (06/07/20 7978)   senna-docusate sodium 1 tablet Per G Tube HS Karie Bartholomew MD      Continuous Infusions:    dexmedetomidine 0 1-0 7 mcg/kg/hr Last Rate: 0 7 mcg/kg/hr (06/07/20 0041)   phenylephine  mcg/min Last Rate: 25 mcg/min (06/07/20 0553)     PRN Meds:     acetaminophen 650 mg Q4H PRN   cyclobenzaprine 5 mg TID PRN   LORazepam 1 mg Q4H PRN       Allergies   Allergies   Allergen Reactions    Baclofen Other (See Comments)     Reaction Date: [id number];   apena      Cefepime Hives    Cephalosporins      Other reaction(s): Unknown Reaction  Other reaction(s): Unknown Reaction  Other reaction(s): Unknown Reaction      Clonidine      Other reaction(s): Respiratory Distress, Respiratory Distress    Lactase     Milk-Related Compounds     Penicillins Hives     Reaction Date: [id number];       Phenobarbital Other (See Comments)     Reaction Date: [id number]; Other reaction(s): Other (See Comments)  apnea  Other reaction(s): Other (See Comments)  apnea  apnea      Vancomycin     Zolpidem Other (See Comments)     Other reaction(s): Other (see comments)  Reaction Date: [id number];   apnea  apnea  apnea      Latex Rash     ---------------------------------------------------------------------------------------  Advance Directive and Living Will:      Power of :    POLST:    ---------------------------------------------------------------------------------------  Care Time Delivered:   No Critical Care time spent     Cinda Vela MD      Portions of the record may have been created with voice recognition software  Occasional wrong word or "sound a like" substitutions may have occurred due to the inherent limitations of voice recognition software    Read the chart carefully and recognize, using context, where substitutions have occurred

## 2020-06-07 NOTE — NURSING NOTE
Patient noted to have increased agitation at 1410, medicated with fentanyl  Patient continued with tachypnea and tachycardia then at 1450 patient developed sustained decerebrate posturing of b/l upper extremities with sustained non rhythmic fluttering of b/l eyelids and sustained b/l non-rhythmic twitching of bilateral feet  Episode lasted approximately 10 minutes  See Betsy Osler at bedside and witnessed episode  See provider note and MAR

## 2020-06-07 NOTE — RESPIRATORY THERAPY NOTE
RT Ventilator Management Note  Renea Boxer 21 y o  female MRN: [id number]  Unit/Bed#: MICU 02 Encounter: [id number]      Daily Screen       6/6/2020  0708 6/7/2020  0713          Patient safety screen outcome[de-identified]  Failed  Failed      Not Ready for Weaning due to[de-identified]  Underline problem not resolved  Alternative forms of ventilation              Physical Exam:   Assessment Type: (P) Assess only  General Appearance: (P) Sedated  Respiratory Pattern: (P) Assisted, Normal  Chest Assessment: (P) Chest expansion symmetrical  Bilateral Breath Sounds: (P) Diminished, Rhonchi  R Breath Sounds: (P) Diminished, Rhonchi  L Breath Sounds: (P) Diminished, Rhonchi  Cough: (P) None  Suction: (P) Trach  O2 Device: (P) ventilator      Resp Comments: (P) Pt tolerating current vent settings and appears to be resting comfortably  No vent changes at this time  Do to high pressure support requirements yesterday, will discuss goals and weaning parameters during morning rounds  Will continue to monitor and assess per resp protocol

## 2020-06-07 NOTE — PLAN OF CARE
Problem: INFECTION - ADULT  Goal: Absence or prevention of progression during hospitalization  Description  INTERVENTIONS:  - Assess and monitor for signs and symptoms of infection  - Monitor lab/diagnostic results  - Monitor all insertion sites, i e  indwelling lines, tubes, and drains  - Monitor endotracheal if appropriate and nasal secretions for changes in amount and color  - [place] appropriate cooling/warming therapies per order  - Administer medications as ordered  - Instruct and encourage patient and family to use good hand hygiene technique  - Identify and instruct in appropriate isolation precautions for identified infection/condition  Outcome: Progressing     Problem: PAIN - ADULT  Goal: Verbalizes/displays adequate comfort level or baseline comfort level  Description  Interventions:  - Encourage patient to monitor pain and request assistance  - Assess pain using appropriate pain scale  - Administer analgesics based on type and severity of pain and evaluate response  - Implement non-pharmacological measures as appropriate and evaluate response  - Consider cultural and social influences on pain and pain management  - Notify physician/advanced practitioner if interventions unsuccessful or patient reports new pain  Outcome: Progressing

## 2020-06-07 NOTE — PROGRESS NOTES
Cultures returned positive from CSF removed from  [facility]unt on 06/03/2020 for Serratia  Discussed with neurosurgery aware of findings  Continue antibiotics  Infectious disease consult earlier today by rounding team   Patient remains this unchanged clinically and neurologically

## 2020-06-07 NOTE — CONSULTS
Consult- Shima Aurelio 1995, 21 y o  female MRN: [id number]    [facility]: [id number]    Primary Care Provider: Remi Ambrocio MD   Date and time admitted to hospital: 6/3/2020  2:15 PM      Inpatient consult to Neurosurgery  Consult performed by: Kobe Baltazar PA-C  Consult ordered by: Luis Fernando Lake MD          S/P  shunt  Assessment & Plan  · Pt has a hx of  shunt insertion for hydrocephalus  · Pw/ SIRS and acute encephalopathy  · Pt also had cardiac arrest and was resuscitated  · Imaging reviewed personally and by attending  Final results as below  · Xray Shunt series 6/1/20 loaded in to PACs from outside hospital: Right frontal  shunt catheter appears intact  Hardware from thoracolumbar fusion for scoliosis noted  Of note the right sacroliliac screw noted to have lucency concerning for possible screw loosening  Plan  · Continue regular neurologic checks   · Ongoing medical management per primary team   · Neurology consulted and following for seizures and encephalopathy  · MRI brain w wo ordered and pending  · ID consulted and following  · CSF cultures 6/7/20: 2+ Growth of Serratia Marcescens  · SED rate 6/6: 19  · CRP 6/6: 86 1  · Eval and mobilize per PT/OT  · DVT PPX: SCDs, Heparin  · No emergent neurosurgical intervention anticipated at this time  Per Dr Niki Burr, may consider obtaining repeat CSF cultures in 2 days  · Neurosurgery will continue to follow  Please call with any questions or concerns  Severe sepsis with acute organ dysfunction due to Serratia species [facility])  Assessment & Plan  ID following    Cerebral palsy [facility])  Assessment & Plan  Patient with history of cerebral palsy      Acute metabolic encephalopathy  Assessment & Plan  Ongoing monitoring and management per primary team    Abnormal LFTs  Assessment & Plan  Management per primary team     * Seizure-like activity [facility])  Assessment & Plan  Neurology following      AHistory of Present Illness   History, ROS and PFSH obtained from chart review  Hx and exam 2/2 pt nonverbal/ intubation, acute encephalopathy and baseline cognitive impairment  HPI: Leonie Pedersen is a 21 y o  female with PMH including cerebral palsy with spastic quadriplegia, profound cognitive impairment (nonverbal, bed and wheelchair bound at baseline), hydrocephalus with  shunt, chronic respiratory failure status post tracheostomy, asthma, gastroesophageal reflux, nephrolithiasis, seizures, pancreatitis and diabetes insipidus who reportedly initially presented to [facility] on 06/01/2020 with a fever 101 5,  and RR of 82 in respiratory distress  Per report pt had Vfib arrest, ROSC was achieved after CPR and shock x 1 and epi  Pt was noted to have seizure like activity for which neurology was consulted  Pt was found to have SIRS, initial BCx were negative  There was concern for CSF infection for which CSF from  shunt was sent that were positive 2+ Growth of Serratia marcescens  Pt continues to be intubated on ventilator       Review of Systems   Unable to perform ROS: Patient nonverbal       Historical Information   Past Medical History:   Diagnosis Date    Asthma     [facility]erebral palsy ([facility] [address] [facility]     Cortical blindness     Diabetes insipidus ([facility] [address] [facility]     Elevated LFTs     Encephalopathy     Herpes encephalopathy    GERD (gastroesophageal reflux disease)     History of chronic respiratory failure     Hydrocephalus (HCC)     Nephrolithiasis     Profound intellectual disability     Pulmonary valve stenosis     Scoliosis     Seizure disorder (HCC)     Spastic quadriplegia (HCC)      Past Surgical History:   Procedure Laterality Date    CSF SHUNT      HIP SURGERY Bilateral     IR PEG/GJ TUBE PLACEMENT  2016    NISSEN FUNDOPLICATION      PEG TUBE PLACEMENT      PEG TUBE REMOVAL  2016    SPINAL FUSION W/ JOSE MARIA UNIT RAJAT      Fontenot rajat    TRACHEOSTOMY       Social History     Substance and Sexual Activity   Alcohol Use Never    Frequency: Never     Social History     Substance and Sexual Activity   Drug Use Never     Social History     Tobacco Use   Smoking Status Never Smoker   Smokeless Tobacco Never Used     Family History   Problem Relation Age of Onset    No Known Problems Mother     No Known Problems Father     No Known Problems Sister     No Known Problems Sister        Meds/Allergies   all current active meds have been reviewed, current meds:   Current Facility-Administered Medications   Medication Dose Route Frequency    acetaminophen (TYLENOL) tablet 650 mg  650 mg Per G Tube Q4H PRN    bisacodyl (DULCOLAX) rectal suppository 10 mg  10 mg Rectal Daily    budesonide (PULMICORT) inhalation solution 0 5 mg  0 5 mg Nebulization BID    calcium carbonate-vitamin D (OSCAL-D) 500 mg-200 units per tablet 1 tablet  1 tablet Oral Daily With Breakfast    chlorhexidine (PERIDEX) 0 12 % oral rinse 15 mL  15 mL Swish & Spit Q12H [facility]    cholecalciferol (VITAMIN D3) tablet 1,000 Units  1,000 Units Oral Daily    cyclobenzaprine (FLEXERIL) tablet 5 mg  5 mg Per G Tube TID PRN    dantrolene (DANTRIUM) capsule 100 mg  100 mg Per G Tube TID    DAPTOmycin (CUBICIN) 375 mg in sodium chloride 0 9 % 50 mL IVPB  8 mg/kg (Adjusted) Intravenous Q24H    desmopressin (DDAVP) tablet 0 05 mg  0 05 mg Per G Tube Q48H    dexmedetomidine (PRECEDEX) 400 mcg in sodium chloride 0 9 % 100 mL infusion  0 1-0 7 mcg/kg/hr Intravenous Titrated    diazepam (VALIUM) tablet 5 mg  5 mg Per G Tube Q8H    fentanyl citrate (PF) 100 MCG/2ML 50 mcg  50 mcg Intravenous Once    glycerin-hypromellose- (ARTIFICIAL TEARS) ophthalmic solution 1 drop  1 drop Both Eyes Q3H PRN    guaiFENesin (ROBITUSSIN) oral solution 400 mg  400 mg Oral Q8H    heparin (porcine) subcutaneous injection 5,000 Units  5,000 Units Subcutaneous Q8H [facility]    levETIRAcetam (KEPPRA) oral solution 1,000 mg  1,000 mg Per G Tube BID    levETIRAcetam (KEPPRA) oral solution 500 mg  500 mg Per G Tube Q24H    LORazepam (ATIVAN) injection 1 mg  1 mg Intravenous Q4H PRN    meropenem (MERREM) 2,000 mg in sodium chloride 0 9 % 100 mL IVPB  2,000 mg Intravenous Q8H    OXcarbazepine (TRILEPTAL) tablet 450 mg  450 mg Per G Tube Q12H [address]    phenylephrine (JACKIE-SYNEPHRINE) 50 mg (STANDARD CONCENTRATION) in sodium chloride 0 9% 250 mL   mcg/min Intravenous Titrated    propofol (DIPRIVAN) 1000 mg in 100 mL infusion (premix)  5-50 mcg/kg/min Intravenous Titrated    senna-docusate sodium (SENOKOT S) 8 6-50 mg per tablet 1 tablet  1 tablet Per G Tube HS    and PTA meds:   Prior to Admission Medications   Prescriptions Last Dose Informant Patient Reported? Taking?    Cranberry 425 MG CAPS  Outside Facility (Specify) Yes No   Sig: Take 425 mg by mouth 2 (two) times a day   Elastic Bandages & Supports (ABDOMINAL BINDER/ELASTIC MED) MISC  Outside Facility (Specify) Yes No   Sig: by Does not apply route   Glycopyrrolate 1 MG/5ML SOLN  Outside Facility (Specify) Yes No   Sig: Take 0 5 mg by mouth every 12 (twelve) hours as needed    LORazepam (ATIVAN) 2 mg/mL concentrated solution  Outside Facility (Specify) Yes No   Sig: Take 2 mg by mouth as needed for anxiety    Magnesium Carbonate (MAGONATE) 54 (Mag Equiv) MG/5ML LIQD  Outside Facility (Specify) Yes No   Sig: Take 9 mL by mouth 2 (two) times a day   Multiple Vitamin (MULTIVITAMIN) tablet  Outside Facility (Specify) Yes No   Sig: Take 1 tablet by mouth daily   Nutritional Supplements (PEPTAMEN 1 UTE PO)  Outside Facility (Specify) Yes No   Sig: Take 750 mL by mouth 50mL/hr for 15 hours or til complete   OXcarbazepine (TRILEPTAL) 300 mg tablet  Outside Facility (Specify) Yes No   Sig: Take 450 mg by mouth every 12 (twelve) hours   acetaminophen (TYLENOL) 160 MG/5ML elixir  Outside Facility (Specify) Yes No   Sig: Take 15 mg/kg by mouth every 4 (four) hours as needed   albuterol (ACCUNEB) 1 25 MG/3ML nebulizer solution  Outside Facility (Specify) Yes No   Sig: Take 1 ampule by nebulization every 6 (six) hours as needed for wheezing Give with Atrovent   bisacodyl (DULCOLAX) 10 mg suppository   Yes No   Sig: Insert 10 mg into the rectum daily   bisacodyl (FLEET) 10 MG/30ML ENEM  Outside Facility (Specify) Yes No   Sig: Insert 10 mg into the rectum as needed for constipation   budesonide (PULMICORT) 0 5 mg/2 mL nebulizer solution  Outside Facility (Specify) Yes No   Sig: Take 0 5 mg by nebulization 2 (two) times a day Rinse mouth after use  calcium carbonate-vitamin D (OSCAL-D) 500 mg-200 units per tablet  Outside Facility (Specify) Yes No   Sig: Take 1 tablet by mouth daily with breakfast   carbamide peroxide (DEBROX) 6 5 % otic solution  Outside Facility (Specify) Yes No   Sig: 3 drops 2 (two) times a day Monthly on days 1-5   cholecalciferol (VITAMIN D3) 1,000 units tablet  Outside Facility (Specify) Yes No   Sig: Take 1,000 Units by mouth daily   cyclobenzaprine (FLEXERIL) 5 mg tablet  Outside Facility (Specify) Yes No   Sig: Take 5 mg by mouth 3 (three) times a day as needed for muscle spasms   dantrolene (DANTRIUM) 100 mg capsule Not Taking at Unknown time Outside Facility (Specify) Yes No   Sig: Take 100 mg by mouth 3 (three) times a day   desmopressin (DDAVP) 0 1 mg tablet  Outside Facility (Specify) Yes No   Sig: Take 0 05 mg by mouth every other day    diazepam (VALIUM) 1 mg/mL solution  Outside Facility (Specify) Yes No   Sig: Take 4 mL by mouth every 8 (eight) hours    doxycycline (ADOXA) 100 MG tablet  Outside Facility (Specify) Yes No   Sig: Take 100 mg by mouth daily   fluticasone (FLOVENT DISKUS) 50 MCG/BLIST diskus inhaler  Outside Facility (Specify) Yes No   Sig: Inhale 2 puffs 2 (two) times a day   fluticasone (FLOVENT HFA) 220 mcg/act inhaler   Yes Yes   Sig: Inhale 2 puffs 2 (two) times a day Rinse mouth after use     guaiFENesin (ROBITUSSIN) 100 MG/5ML oral liquid  Outside Facility (Specify) Yes No   Sig: Take 400 mg by mouth every 8 (eight) hours    ibuprofen (MOTRIN) 100 mg/5 mL suspension  Outside Facility (Specify) Yes No   Sig: Take 15 mL by mouth every 8 (eight) hours as needed for mild pain    ipratropium (ATROVENT) 0 02 % nebulizer solution  Outside Facility (Specify) Yes No   Sig: Take 0 5 mg by nebulization 4 (four) times a day   levETIRAcetam (KEPPRA) 100 mg/mL oral solution  Outside Facility (Specify) Yes No   Sig: Take 1,000 mg by mouth 2 (two) times a day 0600, 1400   levETIRAcetam (KEPPRA) 500 mg tablet   Yes Yes   Sig: Take 500 mg by mouth daily at bedtime   medroxyPROGESTERone (PROVERA) 10 mg tablet Not Taking at Unknown time Outside Facility (Specify) Yes No   Sig: Take 10 mg by mouth daily Days 1-5 every 3 months as needed, received in march   metoclopramide (REGLAN) 5 mg/5 mL oral solution  Outside Facility (Specify) Yes No   Sig: Take by mouth 2 (two) times a day   senna (SENOKOT) 8 6 MG tablet   No No   Sig: Take 1 tablet (8 6 mg total) by mouth 2 (two) times a day   sulfamethoxazole-trimethoprim (BACTRIM DS) 800-160 mg per tablet   Yes No   Sig: Take 1 tablet by mouth every 12 (twelve) hours       Facility-Administered Medications: None     Allergies   Allergen Reactions    Baclofen Other (See Comments)     Reaction Date: 22Nov2011;   apena      Cefepime Hives    Cephalosporins      Other reaction(s): Unknown Reaction  Other reaction(s): Unknown Reaction  Other reaction(s): Unknown Reaction      Clonidine      Other reaction(s): Respiratory Distress, Respiratory Distress    Lactase     Milk-Related Compounds     Penicillins Hives     Reaction Date: [id number];       Phenobarbital Other (See Comments)     Reaction Date: [id number]; Other reaction(s): Other (See Comments)  apnea  Other reaction(s): Other (See Comments)  apnea  apnea      Vancomycin     Zolpidem Other (See Comments)     Other reaction(s):  Other (see comments)  Reaction Date: [id number];   apnea  apnea  apnea      Latex Rash Objective   I/O       06/05 0701 - 06/06 0700 06/06 0701 - 06/07 0700 06/07 0701 - 06/08 0700    I V  (mL/kg) 1026 1 (16 9) 655 5 (10 7) 95 2 (1 6)    NG/ 230 100    IV Piggyback 350 330     Feedings 270 795 100    Total Intake(mL/kg) 1896 1 (31 2) 2010 5 (33) 295 2 (4 8)    Urine (mL/kg/hr) 3240 (2 2) 1250 (0 9) 1000 (1 9)    Stool 0 0 0    Total Output 3240 1250 1000    Net -1343 9 +760 5 -704 9           Unmeasured Stool Occurrence 2 x 1 x 1 x          Physical Exam   HENT:   Head: Atraumatic  Right frontal  shunt palpable  Neck:   Trach in place  Cardiovascular:   Tachycardia   Pulmonary/Chest:   Tachypnea  Pt intubated on ventillator  Neurological:   Reflex Scores:       Tricep reflexes are 1+ on the right side and 1+ on the left side  Bicep reflexes are 1+ on the right side and 1+ on the left side  Brachioradialis reflexes are 1+ on the right side and 1+ on the left side  Patellar reflexes are 1+ on the right side and 1+ on the left side  Achilles reflexes are 1+ on the right side and 1+ on the left side  Neurologic Exam     Mental Status   Attention: decreased  Speech: (Nonverbal, intubated)  Level of consciousness: responsive to painful stimuli   GCS 6T E1 V1T  M4     Cranial Nerves     CN III, IV, VI   Right pupil: Right pupil size in mm: Bilateral pupils dilated 4-5 mm  Motor Exam   Muscle bulk: normal  Overall muscle tone: decreased    Sensory Exam   Slight withdrawal to pain BUE and RLE  Gait, Coordination, and Reflexes     Reflexes   Right brachioradialis: 1+  Left brachioradialis: 1+  Right biceps: 1+  Left biceps: 1+  Right triceps: 1+  Left triceps: 1+  Right patellar: 1+  Left patellar: 1+  Right achilles: 1+  Left achilles: 1+        Vitals:Blood pressure 134/82, pulse (!) 132, temperature 97 9 °F (36 6 °C), resp  rate (!) 64, height 4' 2" (1 27 m), weight 61 kg (134 lb 7 7 oz), SpO2 100 %  ,Body mass index is 37 82 kg/m²       Lab Results:   Results from last 7 days   Lab Units 06/07/20  0530 06/06/20  0512 06/05/20  0509   WBC Thousand/uL 13 20* 10 37* 8 47   HEMOGLOBIN g/dL 9 9* 9 3* 8 8*   HEMATOCRIT % 32 6* 31 7* 28 3*   PLATELETS Thousands/uL 246 203 175   NEUTROS PCT % 73 67 59   MONOS PCT % 6 8 9     Results from last 7 days   Lab Units 06/07/20  0530 06/06/20  0512 06/05/20  2107 06/05/20  0509   POTASSIUM mmol/L 3 8 4 2 4 3 3 6   CHLORIDE mmol/L 101 102 104 106   CO2 mmol/L 28 29 30 28   BUN mg/dL 13 12 10 7   CREATININE mg/dL 0 38* 0 30* 0 36* 0 33*   CALCIUM mg/dL 8 3 8 1* 8 3 7 8*   ALK PHOS U/L  --   --   --  117*   ALT U/L  --   --   --  56   AST U/L  --   --   --  34     Results from last 7 days   Lab Units 06/05/20  0509 06/04/20  0553 06/03/20  1726   MAGNESIUM mg/dL 2 0 1 9 2 0     Results from last 7 days   Lab Units 06/05/20  0509 06/04/20  0553 06/03/20  1726   PHOSPHORUS mg/dL 3 2 2 9 2 8     Results from last 7 days   Lab Units 06/05/20  1049   INR  1 04     No results found for: TROPONINT  ABG:  Lab Results   Component Value Date    PHART 7 427 06/05/2020    WET4JOL 42 0 06/05/2020    PO2ART 97 0 06/05/2020    KOQ7UML 27 1 06/05/2020    BEART 2 5 06/05/2020    SOURCE Radial, Right 06/05/2020       Imaging Studies: I have personally reviewed pertinent reports  and I have personally reviewed pertinent films in PACS    EKG, Pathology, and Other Studies: I have personally reviewed pertinent reports  VTE Prophylaxis: Heparin    Code Status: Level 1 - Full Code  Advance Directive and Living Will:      Power of :    POLST:      Counseling / Coordination of Care  I spent 30 minutes with the patient  PLEASE NOTE:  This encounter may have been completed utilizing the [employer] Direct Speech Voice Recognition Software   Grammatical errors, random word insertions, pronoun errors and incomplete sentences are occasional consequences of the system due to software limitations, ambient noise and hardware issues  These may be missed even after proof reading prior to affixing electronic signature  Please do not hesitate to contact me directly if you have any questions or concerns about the content, text or information contained within the body of this dictation

## 2020-06-07 NOTE — RESPIRATORY THERAPY NOTE
RT Ventilator Management Note  Renea Boxer 21 y o  female MRN: [id number]  Unit/Bed#: MICU 02 Encounter: [id number]      Daily Screen       6/5/2020  1130 6/6/2020  0708          Patient safety screen outcome[de-identified]  Passed  Failed      Not Ready for Weaning due to[de-identified]    Underline problem not resolved              Physical Exam:   Assessment Type: (P) Assess only  General Appearance: Alert, Awake  Respiratory Pattern: (P) Assisted  Chest Assessment: (P) Chest expansion symmetrical  Bilateral Breath Sounds: (P) Diminished, Coarse  R Breath Sounds: Diminished, Coarse  L Breath Sounds: Diminished, Coarse  Cough: Weak  Suction: (P) Trach  O2 Device: ventilator      Resp Comments: (P) Pt remained on ACVC vent settings overnight  Pt resting comfortably and tolerating well at this time  Will wean to PSV in AM as able

## 2020-06-08 ENCOUNTER — APPOINTMENT (INPATIENT)
Dept: NEUROLOGY | Facility: CLINIC | Age: 25
DRG: 711 | End: 2020-06-08
Payer: COMMERCIAL

## 2020-06-08 PROBLEM — R00.1 BRADYCARDIA: Status: ACTIVE | Noted: 2020-06-08

## 2020-06-08 LAB
ANION GAP SERPL CALCULATED.3IONS-SCNC: 3 MMOL/L (ref 4–13)
BACTERIA SPEC ANAEROBE CULT: NO GROWTH
BACTERIA SPEC BFLD CULT: NO GROWTH
BACTERIA WND AEROBE CULT: NO GROWTH
BASOPHILS # BLD AUTO: 0.03 THOUSANDS/ΜL (ref 0–0.1)
BASOPHILS NFR BLD AUTO: 0 % (ref 0–1)
BUN SERPL-MCNC: 10 MG/DL (ref 5–25)
CALCIUM SERPL-MCNC: 8.6 MG/DL (ref 8.3–10.1)
CHLORIDE SERPL-SCNC: 109 MMOL/L (ref 100–108)
CO2 SERPL-SCNC: 30 MMOL/L (ref 21–32)
CREAT SERPL-MCNC: 0.39 MG/DL (ref 0.6–1.3)
EOSINOPHIL # BLD AUTO: 0.24 THOUSAND/ΜL (ref 0–0.61)
EOSINOPHIL NFR BLD AUTO: 3 % (ref 0–6)
ERYTHROCYTE [DISTWIDTH] IN BLOOD BY AUTOMATED COUNT: 18.5 % (ref 11.6–15.1)
GFR SERPL CREATININE-BSD FRML MDRD: 146 ML/MIN/1.73SQ M
GLUCOSE SERPL-MCNC: 104 MG/DL (ref 65–140)
GLUCOSE SERPL-MCNC: 104 MG/DL (ref 65–140)
GLUCOSE SERPL-MCNC: 117 MG/DL (ref 65–140)
GLUCOSE SERPL-MCNC: 120 MG/DL (ref 65–140)
GLUCOSE SERPL-MCNC: 125 MG/DL (ref 65–140)
GRAM STN SPEC: NORMAL
HCT VFR BLD AUTO: 32.2 % (ref 34.8–46.1)
HGB BLD-MCNC: 9.6 G/DL (ref 11.5–15.4)
IMM GRANULOCYTES # BLD AUTO: 0.05 THOUSAND/UL (ref 0–0.2)
IMM GRANULOCYTES NFR BLD AUTO: 1 % (ref 0–2)
LYMPHOCYTES # BLD AUTO: 1.7 THOUSANDS/ΜL (ref 0.6–4.47)
LYMPHOCYTES NFR BLD AUTO: 20 % (ref 14–44)
MCH RBC QN AUTO: 25.7 PG (ref 26.8–34.3)
MCHC RBC AUTO-ENTMCNC: 29.8 G/DL (ref 31.4–37.4)
MCV RBC AUTO: 86 FL (ref 82–98)
MONOCYTES # BLD AUTO: 0.75 THOUSAND/ΜL (ref 0.17–1.22)
MONOCYTES NFR BLD AUTO: 9 % (ref 4–12)
NEUTROPHILS # BLD AUTO: 5.62 THOUSANDS/ΜL (ref 1.85–7.62)
NEUTS SEG NFR BLD AUTO: 67 % (ref 43–75)
NRBC BLD AUTO-RTO: 0 /100 WBCS
PHOSPHATE SERPL-MCNC: 4.2 MG/DL (ref 2.7–4.5)
PLATELET # BLD AUTO: 280 THOUSANDS/UL (ref 149–390)
PMV BLD AUTO: 9.5 FL (ref 8.9–12.7)
POTASSIUM SERPL-SCNC: 4.9 MMOL/L (ref 3.5–5.3)
RBC # BLD AUTO: 3.73 MILLION/UL (ref 3.81–5.12)
SODIUM SERPL-SCNC: 142 MMOL/L (ref 136–145)
WBC # BLD AUTO: 8.39 THOUSAND/UL (ref 4.31–10.16)

## 2020-06-08 PROCEDURE — 94760 N-INVAS EAR/PLS OXIMETRY 1: CPT

## 2020-06-08 PROCEDURE — 82948 REAGENT STRIP/BLOOD GLUCOSE: CPT

## 2020-06-08 PROCEDURE — 94640 AIRWAY INHALATION TREATMENT: CPT

## 2020-06-08 PROCEDURE — 80048 BASIC METABOLIC PNL TOTAL CA: CPT | Performed by: FAMILY MEDICINE

## 2020-06-08 PROCEDURE — 95715 VEEG EA 12-26HR INTMT MNTR: CPT

## 2020-06-08 PROCEDURE — 94003 VENT MGMT INPAT SUBQ DAY: CPT

## 2020-06-08 PROCEDURE — 99291 CRITICAL CARE FIRST HOUR: CPT | Performed by: INTERNAL MEDICINE

## 2020-06-08 PROCEDURE — 99233 SBSQ HOSP IP/OBS HIGH 50: CPT | Performed by: INTERNAL MEDICINE

## 2020-06-08 PROCEDURE — 99232 SBSQ HOSP IP/OBS MODERATE 35: CPT | Performed by: PSYCHIATRY & NEUROLOGY

## 2020-06-08 PROCEDURE — 99233 SBSQ HOSP IP/OBS HIGH 50: CPT | Performed by: PHYSICIAN ASSISTANT

## 2020-06-08 PROCEDURE — 85025 COMPLETE CBC W/AUTO DIFF WBC: CPT | Performed by: FAMILY MEDICINE

## 2020-06-08 RX ORDER — FENTANYL CITRATE-0.9 % NACL/PF 10 MCG/ML
25 PLASTIC BAG, INJECTION (ML) INTRAVENOUS CONTINUOUS
Status: DISCONTINUED | OUTPATIENT
Start: 2020-06-08 | End: 2020-06-10

## 2020-06-08 RX ORDER — FENTANYL CITRATE 50 UG/ML
50 INJECTION, SOLUTION INTRAMUSCULAR; INTRAVENOUS
Status: DISCONTINUED | OUTPATIENT
Start: 2020-06-08 | End: 2020-06-08

## 2020-06-08 RX ORDER — FENTANYL CITRATE 50 UG/ML
50 INJECTION, SOLUTION INTRAMUSCULAR; INTRAVENOUS
Status: DISCONTINUED | OUTPATIENT
Start: 2020-06-08 | End: 2020-06-21

## 2020-06-08 RX ORDER — DESMOPRESSIN ACETATE 0.1 MG/1
0.05 TABLET ORAL ONCE
Status: COMPLETED | OUTPATIENT
Start: 2020-06-08 | End: 2020-06-08

## 2020-06-08 RX ADMIN — DANTROLENE SODIUM 100 MG: 25 CAPSULE ORAL at 15:01

## 2020-06-08 RX ADMIN — HEPARIN SODIUM 5000 UNITS: 5000 INJECTION INTRAVENOUS; SUBCUTANEOUS at 21:04

## 2020-06-08 RX ADMIN — DIAZEPAM 5 MG: 5 TABLET ORAL at 02:14

## 2020-06-08 RX ADMIN — OXCARBAZEPINE 450 MG: 300 TABLET, FILM COATED ORAL at 17:43

## 2020-06-08 RX ADMIN — DESMOPRESSIN ACETATE 0.05 MG: 0.1 TABLET ORAL at 13:00

## 2020-06-08 RX ADMIN — CHLORHEXIDINE GLUCONATE 0.12% ORAL RINSE 15 ML: 1.2 LIQUID ORAL at 20:53

## 2020-06-08 RX ADMIN — OXCARBAZEPINE 450 MG: 300 TABLET, FILM COATED ORAL at 05:05

## 2020-06-08 RX ADMIN — BUDESONIDE 0.5 MG: 0.5 INHALANT RESPIRATORY (INHALATION) at 08:04

## 2020-06-08 RX ADMIN — MEROPENEM 2000 MG: 1 INJECTION, POWDER, FOR SOLUTION INTRAVENOUS at 02:21

## 2020-06-08 RX ADMIN — GUAIFENESIN 400 MG: 100 SOLUTION ORAL at 09:23

## 2020-06-08 RX ADMIN — FENTANYL CITRATE 50 MCG: 50 INJECTION, SOLUTION INTRAMUSCULAR; INTRAVENOUS at 06:15

## 2020-06-08 RX ADMIN — Medication 25 MCG/HR: at 12:00

## 2020-06-08 RX ADMIN — Medication 1 TABLET: at 09:25

## 2020-06-08 RX ADMIN — LEVETIRACETAM 1000 MG: 100 SOLUTION ORAL at 15:00

## 2020-06-08 RX ADMIN — CHLORHEXIDINE GLUCONATE 0.12% ORAL RINSE 15 ML: 1.2 LIQUID ORAL at 09:23

## 2020-06-08 RX ADMIN — MELATONIN 1000 UNITS: at 09:23

## 2020-06-08 RX ADMIN — BUDESONIDE 0.5 MG: 0.5 INHALANT RESPIRATORY (INHALATION) at 19:44

## 2020-06-08 RX ADMIN — LEVETIRACETAM 500 MG: 100 SOLUTION ORAL at 21:05

## 2020-06-08 RX ADMIN — GUAIFENESIN 400 MG: 100 SOLUTION ORAL at 17:42

## 2020-06-08 RX ADMIN — DANTROLENE SODIUM 100 MG: 25 CAPSULE ORAL at 09:25

## 2020-06-08 RX ADMIN — DIAZEPAM 5 MG: 5 TABLET ORAL at 09:42

## 2020-06-08 RX ADMIN — MEROPENEM 2000 MG: 1 INJECTION, POWDER, FOR SOLUTION INTRAVENOUS at 18:28

## 2020-06-08 RX ADMIN — HEPARIN SODIUM 5000 UNITS: 5000 INJECTION INTRAVENOUS; SUBCUTANEOUS at 05:05

## 2020-06-08 RX ADMIN — DANTROLENE SODIUM 100 MG: 25 CAPSULE ORAL at 20:54

## 2020-06-08 RX ADMIN — GUAIFENESIN 400 MG: 100 SOLUTION ORAL at 02:14

## 2020-06-08 RX ADMIN — LEVETIRACETAM 1000 MG: 100 SOLUTION ORAL at 05:05

## 2020-06-08 RX ADMIN — ACETAMINOPHEN 650 MG: 325 TABLET ORAL at 06:00

## 2020-06-08 RX ADMIN — BISACODYL 10 MG: 10 SUPPOSITORY RECTAL at 09:22

## 2020-06-08 RX ADMIN — MAGNESIUM SULFATE HEPTAHYDRATE 2 G: 40 INJECTION, SOLUTION INTRAVENOUS at 00:01

## 2020-06-08 RX ADMIN — SENNOSIDES AND DOCUSATE SODIUM 1 TABLET: 8.6; 5 TABLET ORAL at 21:05

## 2020-06-08 RX ADMIN — HEPARIN SODIUM 5000 UNITS: 5000 INJECTION INTRAVENOUS; SUBCUTANEOUS at 13:56

## 2020-06-08 RX ADMIN — DIAZEPAM 5 MG: 5 TABLET ORAL at 17:42

## 2020-06-08 RX ADMIN — MEROPENEM 2000 MG: 1 INJECTION, POWDER, FOR SOLUTION INTRAVENOUS at 12:03

## 2020-06-08 NOTE — NUTRITION
Patient will continue to tolerate current TF regimen of Peptamen AF 1 2 at 50 ml/hr  X 16 hours/day with PROSOURCE TF BID  Monitor Electrolytes

## 2020-06-08 NOTE — PROGRESS NOTES
Progress Note - Infectious Disease   Meda Curling 21 y o  female MRN: [id number]  Unit/Bed#: MICU 05 Encounter: [id number]      Impression/Plan:  1  Sepsis-appears to be secondary to  shunt infection  No other clear source appreciated  Patient's blood cultures at [facility] were negative  CSF cultures are growing Serratia  The patient's temperatures come down and she has remained hemodynamically stable  She has just come off vasopressor support this morning   -continue the meropenem for now at current dose  -discontinue daptomycin  -recheck procalcitonin level, CBC with diff, and CMP  -supportive care    2  Serratia  shunt infection-clinically stable  She remains cognitively quite abnormal   She has not had repeat analysis of the CSF  Very limited antibiotic options in this patient due to the patient's numerous allergies, organism resistance pattern, and the fact that she has active seizure disorder   -if plan to continue aggressive care, recommend removal of  shunt  -antibiotics as above  -close neurosurgical follow-up  -patient have repeat CSF analysis 6/10/2020     3  Acute on chronic hypoxic respiratory failure-unclear etiology   The patient presented with respiratory distress but by report no pneumonia was seen on imaging of the chest   Patient is still requiring low level ventilatory support with minimal oxygen needs   -antibiotics as above for now waiting additional data  -wean off the ventilator as able  -monitor respiratory status     4  Status epilepticus-quite sedated on treatment for the seizure at this time   Remains on EEG  This all in the setting of a  shunt infection   -continue Keppra and Trileptal per Neurology  -close neurology follow-up     5  Retroperitoneal collection-this is reportedly chronic and seen on imaging done [place]  Patient is status post aspiration of the collection which has been sent for culture  Does not appear infected    Fluid collection is sterile   -no directed antibiotics needed  -additional workup as needed     Discussed the above management plan in detail with the critical care service and Neurosurgery    Antibiotics:  Meropenem 7  Daptomycin 7     Subjective:  Patient has no reported fever, chills, sweats; no reported vomiting, diarrhea; remains on the ventilator; not requiring any vasopressor support  Remains nonverbal    Objective:  Vitals:  Temp:  [97 5 °F (36 4 °C)-100 4 °F (38 °C)] 99 7 °F (37 6 °C)  HR:  [] 92  Resp:  [0-41] 20  BP: ()/(41-83) 111/60  SpO2:  [90 %-100 %] 98 %  Temp (24hrs), Av °F (37 2 °C), Min:97 5 °F (36 4 °C), Max:100 4 °F (38 °C)  Current: Temperature: 99 7 °F (37 6 °C)    Physical Exam:   General Appearance:  Resting on a ventilator no acute distress  Remains on EEG   Throat: Oropharynx moist without lesions  Tracheostomy in place  Lungs:   Decreased breath sounds bilaterally; no wheezes, rhonchi or rales; respirations unlabored   Heart:  RRR; no murmur, rub or gallop   Abdomen:   Soft, non-tender, non-distended, positive bowel sounds  Extremities: No clubbing, cyanosis or edema   Skin: No new rashes or lesions  No draining wounds noted         Labs, Imaging, & Other studies:   All pertinent labs and imaging studies were personally reviewed  Results from last 7 days   Lab Units 20  0501 20  0530 20  0512   WBC Thousand/uL 8 39 13 20* 10 37*   HEMOGLOBIN g/dL 9 6* 9 9* 9 3*   PLATELETS Thousands/uL 280 246 203     Results from last 7 days   Lab Units 20  0501 20  1918 20  0530  20  0509   SODIUM mmol/L 142 140 134*   < > 140   POTASSIUM mmol/L 4 9 3 0* 3 8   < > 3 6   CHLORIDE mmol/L 109* 103 101   < > 106   CO2 mmol/L 30 30 28   < > 28   BUN mg/dL 10 11 13   < > 7   CREATININE mg/dL 0 39* 0 29* 0 38*   < > 0 33*   EGFR ml/min/1 73sq m 146 161 148   < > 155   CALCIUM mg/dL 8 6 7 4* 8 3   < > 7 8*   AST U/L  --   --   --   --  34   ALT U/L --   --   --   --  56   ALK PHOS U/L  --   --   --   --  117*    < > = values in this interval not displayed       Results from last 7 days   Lab Units 06/05/20  1418 06/05/20  0507 06/03/20 2025   GRAM STAIN RESULT  1+ Polys  No bacteria seen Rare Disintegrating polys  No organisms seen No Polys or Bacteria seen   WOUND CULTURE   --  No growth  --    BODY FLUID CULTURE, STERILE  No growth  --   --      Results from last 7 days   Lab Units 06/06/20  0514 06/05/20  0509 06/04/20  0554 06/03/20  1726   PROCALCITONIN ng/ml 1 00* 1 77* 3 91* 5 08*     Results from last 7 days   Lab Units 06/06/20  0512   CRP mg/L 86 1*

## 2020-06-08 NOTE — RESPIRATORY THERAPY NOTE
RT Ventilator Management Note  Mariama Stearns 21 y o  female MRN: [id number]  Unit/Bed#: MICU 05 Encounter: [id number]      Daily Screen       6/7/2020  0713 6/8/2020  0758          Patient safety screen outcome[de-identified]  Failed  Failed      Not Ready for Weaning due to[de-identified]  Alternative forms of ventilation  Poor inspiratory effort              Physical Exam:   Assessment Type: Assess only  General Appearance: Sedated  Respiratory Pattern: Assisted  Chest Assessment: Chest expansion symmetrical  O2 Device: vent      Resp Comments: (P) pt continues on A/C-VC settuings no changes at this time

## 2020-06-08 NOTE — UTILIZATION REVIEW
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  Serratia  shunt infection  Cardiac arrest with insertion of central line to arrhythmia  Bradycardia secondary to sedation  Acute on chronic respiratory failure status post trach  Anemia  Diabetes insipidus  24hr events: Had 2 episodes of bradycardia with rates in the low 40's that spontaneously resolved  T max 100 4  UO 4 Liters in 24 hours  Received fentanyl 50 mcg iv x 1 for agitation  Trend neuro exam  Continue Continue home Keppra and Trileptal  Continuous EEG times 40 hours no seizure activity noted continued 6/7  Continue Valium and prn flexer for spastic quadriplegia  Tele monitoring  MAP goal >65  Daily SBT assess not applicable  Pulmonary toilet  Continue Pulmicort nebs  Wean FiO2 as able  Her with Strict I/O  TF  Plan for retap of   shunt on 06/10  Continue Iv antibiotics  Trend temps and Wb count      Pertinent Labs/Diagnostic Results:       Results from last 7 days   Lab Units 06/08/20  0501 06/07/20  0530 06/06/20  0512 06/05/20  0509 06/04/20  0553   WBC Thousand/uL 8 39 13 20* 10 37* 8 47 6 93   HEMOGLOBIN g/dL 9 6* 9 9* 9 3* 8 8* 7 6*   HEMATOCRIT % 32 2* 32 6* 31 7* 28 3* 25 1*   PLATELETS Thousands/uL 280 246 203 175 126*   NEUTROS ABS Thousands/µL 5 62 9 77* 6 80 4 94 4 42         Results from last 7 days   Lab Units 06/08/20  0501 06/07/20  1918 06/07/20  0530 06/06/20  0512 06/05/20  2107 06/05/20  0509  06/04/20  0553 06/03/20  1726   SODIUM mmol/L 142 140 134* 136 139 140   < > 139 140   POTASSIUM mmol/L 4 9 3 0* 3 8 4 2 4 3 3 6   < > 3 3* 5 1   CHLORIDE mmol/L 109* 103 101 102 104 106   < > 106 107   CO2 mmol/L 30 30 28 29 30 28   < > 25 22   ANION GAP mmol/L 3* 7 5 5 5 6   < > 8 11   BUN mg/dL 10 11 13 12 10 7   < > 7 6   CREATININE mg/dL 0 39* 0 29* 0 38* 0 30* 0 36* 0 33*   < > 0 53* 0 60   EGFR ml/min/1 73sq m 146 161 148 160 150 155   < > 132 127   CALCIUM mg/dL 8 6 7 4* 8 3 8 1* 8 3 7 8*   < > 7 8* 9 0   CALCIUM, IONIZED mmol/L  --   --   --   --   --   --   --  1 13 1 20   MAGNESIUM mg/dL  --  1 8  --   --   --  2 0  --  1 9 2 0   PHOSPHORUS mg/dL  --  4 2  --   --   --  3 2  --  2 9 2 8    < > = values in this interval not displayed       Results from last 7 days   Lab Units 06/05/20  0509   AST U/L 34   ALT U/L 56   ALK PHOS U/L 117*   TOTAL PROTEIN g/dL 6 2*   ALBUMIN g/dL 2 7*   TOTAL BILIRUBIN mg/dL 0 39     Results from last 7 days   Lab Units 06/08/20  1159 06/08/20  0543 06/08/20  0011 06/07/20  1211 06/07/20  0521 06/07/20  0020 06/06/20  1836 06/06/20  1250 06/06/20  0628 06/06/20  0030 06/05/20  1758 06/05/20  1152   POC GLUCOSE mg/dl 104 117 125 112 99 114 102 108 102 112 123 100     Results from last 7 days   Lab Units 06/08/20  0501 06/07/20  1918 06/07/20  0530 06/06/20  0512 06/05/20  2107 06/05/20  0509 06/04/20  1609 06/04/20  0553 06/03/20  1726   GLUCOSE RANDOM mg/dL 104 107 116 106 100 105 133 90 78         Results from last 7 days   Lab Units 06/05/20  0509   HEMOGLOBIN A1C % 4 7   EAG mg/dl 88     Results from last 7 days   Lab Units 06/05/20  1141 06/03/20  1724   PH ART  7 427 7 400   PCO2 ART mm Hg 42 0 34 3*   PO2 ART mm Hg 97 0 83 7   HCO3 ART mmol/L 27 1 20 8*   BASE EXC ART mmol/L 2 5 -3 5   O2 CONTENT ART mL/dL 14 5* 13 3*   O2 HGB, ARTERIAL % 95 5 94 1   ABG SOURCE  Radial, Right Radial, Right             Results from last 7 days   Lab Units 06/05/20  1049   CK TOTAL U/L 510*   CK MB INDEX % <1 0   CK MB ng/mL <1 0             Results from last 7 days   Lab Units 06/05/20  1049   PROTIME seconds 13 2   INR  1 04         Results from last 7 days   Lab Units 06/06/20  0514 06/05/20  0509 06/04/20  0554 06/03/20  1726   PROCALCITONIN ng/ml 1 00* 1 77* 3 91* 5 08*     Results from last 7 days   Lab Units 06/03/20 2026 06/03/20  1726   LACTIC ACID mmol/L 1 1 3 5*                         Results from last 7 days   Lab Units 06/05/20  0509   LIPASE u/L 154   AMYLASE IU/L 25     Results from last 7 days   Lab Units 06/06/20  0512   CRP mg/L 86 1*   SED RATE mm/hour 19     Results from last 7 days   Lab Units 06/05/20  1418 06/05/20  0507 06/03/20 2025   GRAM STAIN RESULT  1+ Polys  No bacteria seen Rare Disintegrating polys  No organisms seen No Polys or Bacteria seen   WOUND CULTURE   --  No growth  --    BODY FLUID CULTURE, STERILE  No growth  --   --          Results from last 7 days   Lab Units 06/03/20 2025   CSF CULTURE  2+ Growth of Serratia marcescens*       Vital Signs:   06/08/20 1059  99 3 °F (37 4 °C)  74  23  Abnormal   95/62  74  100 %         06/08/20 0959  99 3 °F (37 4 °C)  74  15      100 %         06/08/20 0921  99 7 °F (37 6 °C)  98  25  Abnormal   121/72  87  100 %         06/08/20 0900  99 7 °F (37 6 °C)  80  19  97/61  72  100 %           Medications:   Scheduled Medications:  Medications:  bisacodyl 10 mg Rectal Daily   budesonide 0 5 mg Nebulization BID   calcium carbonate-vitamin D 1 tablet Oral Daily With Breakfast   chlorhexidine 15 mL Swish & Spit Q12H [address]   cholecalciferol 1,000 Units Oral Daily   dantrolene 100 mg Per G Tube TID   desmopressin 0 05 mg Per G Tube Q48H   diazepam 5 mg Per G Tube Q8H   guaiFENesin 400 mg Oral Q8H   heparin (porcine) 5,000 Units Subcutaneous Q8H [address]   levETIRAcetam 1,000 mg Per G Tube BID   levETIRAcetam 500 mg Per G Tube Q24H   meropenem 2,000 mg Intravenous Q8H   OXcarbazepine 450 mg Per G Tube Q12H [address]   senna-docusate sodium 1 tablet Per G Tube HS     Continuous IV Infusions:  fentaNYL 25 mcg/hr Intravenous Continuous   phenylephine  mcg/min Intravenous Titrated     PRN Meds:    acetaminophen 650 mg Per G Tube Q4H PRN   cyclobenzaprine 5 mg Per G Tube TID PRN   fentanyl citrate (PF) 50 mcg Intravenous Q1H PRN glycerin-hypromellose- 1 drop Both Eyes Q3H PRN   LORazepam 1 mg Intravenous Q4H PRN       Discharge Plan: TBD    Network Utilization Review Department  [email or web address]  ATTENTION: Please call with any questions or concerns to [phone] and carefully listen to the prompts so that you are directed to the right person  All voicemails are confidential   Indra Hernandez all requests for admission clinical reviews, approved or denied determinations and any other requests to dedicated fax number below belonging to the campus where the patient is receiving treatment   List of dedicated fax numbers for the Facilities:  [address] DENIALS (Administrative/Medical Necessity) [phone]   [address] (Maternity/NICU/Pediatrics) [phone]   Juliana Lugo [phone]   Erick Truong [phone]   Charlette Mendosa [phone]   Janene Youngblood [phone]   [address] [phone]   [facility]  [phone]   [address] [facility], [facility]  [address] [address] [address] [facility] [phone]

## 2020-06-08 NOTE — ASSESSMENT & PLAN NOTE
· ID following  · 100 4 Tmax overnight  · WBC 8 39 today  · Meropenem 2g Q8 at this time  · Daptomycin 375 mg Q24  · CSF from shunt tap 6/3 positive for serratia marcescens  · Back wound culture and left iliac tap negative for growth at this time

## 2020-06-08 NOTE — PROGRESS NOTES
Called pt's mother and healthcare proxy, Chelojose roberto Mars, to update her on pt's clinical status and hospital course  Questions encouraged and answered      Barbara Eaton DO

## 2020-06-08 NOTE — ASSESSMENT & PLAN NOTE
· Pt has a hx of  shunt insertion for congenital hydrocephalus  · Pw/ SIRS, acute encephalopathy, and seizure like activity  · [facility]      Imaging reviewed personally and by attending  Final results as below  · Xray Shunt series 6/1/20 loaded in to PACs from outside hospital: Right frontal  shunt catheter appears intact  Hardware from thoracolumbar fusion for scoliosis noted  Of note the right sacroliliac screw noted to have lucency concerning for possible screw loosening  Plan  · Continue regular neurologic checks   · Ongoing medical management per primary team   · Neurology consulted and following for seizures and encephalopathy  · MRI brain w wo ordered and pending  · ID consulted and following  · Wound/left iliac cultures negative to date so far  · CSF cultures 6/7/20: 2+ Growth of Serratia Marcescens  · SED rate 6/6: 19  · CRP 6/6: 86 1  · Eval and mobilize per PT/OT  · DVT PPX: SCDs, Heparin  · No emergent neurosurgical intervention anticipated at this time  · Plan for repeat CSF draw on Wednesday 6/10/2020  Likely would continue conservative ABX treatment per ID rather than surgical intervention  · Neurosurgery will continue to follow at this time  Please call with any questions or concerns

## 2020-06-08 NOTE — PROGRESS NOTES
Progress Note - Neurology   Veto Speaks 21 y o  female MRN: [id number]  Unit/Bed#: [facility] 05 Encounter: [id number]    Assessment:  Encephalopathy in the setting of baseline poor function status due to cerebral palsy, complicated by infection concerning for shunt infection, and cardiac arrest   No evidence of epileptiform activity to suggest seizure at this time  Unclear whether underlying anoxic injury may be contributing  Plan:  - continue neuro checks  - video EEG with diffuse slowing no seizures seen, will continue to follow and review with epilepsy team  - continue on Keppra and Trileptal  - appreciate ID recommendations, reviewed neurosurgical and ICU notes (serratia positive in CSF fluid) - antibiotics per critical care team  - awaiting MRI brain w/wo contrast  - further recommendations pending MRI imaging    Subjective:   Events overnight reported that the patient had 2 episodes of bradycardia with rates in the low 40s that spontaneously resolved  The patient had a T-max a 100 4°, received fentanyl for agitation   + Serratia  shunt infection - on antibiotics, ID, neurosurgery following reviewed notes  Reviewed case with critical care team    ROS:  Unable intubated and sedated  Vitals: Blood pressure 97/61, pulse 80, temperature 99 7 °F (37 6 °C), resp  rate 19, height 4' 2" (1 27 m), weight 58 9 kg (129 lb 13 6 oz), SpO2 100 %  ,Body mass index is 36 52 kg/m²         Current Facility-Administered Medications:  acetaminophen 650 mg Per G Tube Q4H PRN Josey Campos DO    bisacodyl 10 mg Rectal Daily Aura MADAY Martins    budesonide 0 5 mg Nebulization BID Jazmin Jennings,     calcium carbonate-vitamin D 1 tablet Oral Daily With Breakfast AurMADAY Oconnor    chlorhexidine 15 mL Swish & Spit Q12H [address] Robert MATTHEW Bautista    cholecalciferol 1,000 Units Oral Daily Aura MADAY Martins    cyclobenzaprine 5 mg Per G Tube TID PRN Christiano Whyte MD    dantrolene 100 mg Per G Tube TID Savita Arsmtrong MD    DAPTOmycin 8 mg/kg (Adjusted) Intravenous Q24H Rohith Flores MD Last Rate: 375 mg (06/07/20 2055)   desmopressin 0 05 mg Per G Tube Q48H Savita Armstrong MD    dexmedetomidine 0 1-0 7 mcg/kg/hr Intravenous Titrated Toya Adler PA-C Last Rate: 0 2 mcg/kg/hr (06/07/20 2356)   diazepam 5 mg Per G Tube Q8H Savita Armstrong MD    fentanyl citrate (PF) 50 mcg Intravenous Q1H PRN MADAY Galindo    glycerin-hypromellose- 1 drop Both Eyes Q3H PRN Saintclair Pock Axelband, DO    guaiFENesin 400 mg Oral Q8H MADAY Alatorre    heparin (porcine) 5,000 Units Subcutaneous [facility] Lydia Murguia PA-C    levETIRAcetam 1,000 mg Per G Tube BID Savita Armstrong MD    levETIRAcetam 500 mg Per G Tube Q24H Savita Armstrong MD    LORazepam 1 mg Intravenous Q4H PRN Carlota Ocampo MD    meropenem 2,000 mg Intravenous Q8H Rohith Flores MD Last Rate: 2,000 mg (06/08/20 0221)   OXcarbazepine 450 mg Per G Tube Q12H Radha Leonard MD    phenylephine  mcg/min Intravenous Titrated MADAY Wood Last Rate: 25 mcg/min (06/08/20 0745)   senna-docusate sodium 1 tablet Per G Tube HS Savita Armstrong MD      Physical Exam:  Vital signs reviewed   Constitutional - ill appearing, sedated on vent  HEENT - sedated on vent, trach intact on vent  Cardiac - tachycardia  Lungs -  Intubated no obvious sings of respiratory distress  Extremities - No edema noted - no movement in uppers or lowers, she does extension noted in the upper extremities  Skin - no rashes noted    Neurological    Mental status - the patient did not open eyes or track to examiner, on manual eye opening she did keep eyes open during examination, she did not track  She did not follow commands  Cranial nerves 2 through 12 - no blink to threat, eyes were midline no gaze preference or palsy, kept eyes open during examination - decrease blink bilaterally, no obvious facial droop to testing       Motor - minimal withdrawal in uppers, in lowers she did slight withdrawal her right, not her left  No spontaneous movement seen at this time  She did have extensor posture of her uppers noted bilaterally  No tremor noted  Sensation - no withdrawal x 4    Coordination -  Unable to assess    Toes are equivical    No evidence of myoclonus or tremor  No evidence of seizure activity, observed at this time    Lab, Imaging and other studies:   I have personally reviewed pertinent reports  , CBC:   Results from last 7 days   Lab Units 06/08/20  0501 06/07/20  0530 06/06/20  0512   WBC Thousand/uL 8 39 13 20* 10 37*   RBC Million/uL 3 73* 3 78* 3 63*   HEMOGLOBIN g/dL 9 6* 9 9* 9 3*   HEMATOCRIT % 32 2* 32 6* 31 7*   MCV fL 86 86 87   PLATELETS Thousands/uL 280 246 203   , BMP/CMP:   Results from last 7 days   Lab Units 06/08/20  0501 06/07/20  1918 06/07/20  0530  06/05/20  0509   SODIUM mmol/L 142 140 134*   < > 140   POTASSIUM mmol/L 4 9 3 0* 3 8   < > 3 6   CHLORIDE mmol/L 109* 103 101   < > 106   CO2 mmol/L 30 30 28   < > 28   BUN mg/dL 10 11 13   < > 7   CREATININE mg/dL 0 39* 0 29* 0 38*   < > 0 33*   CALCIUM mg/dL 8 6 7 4* 8 3   < > 7 8*   AST U/L  --   --   --   --  34   ALT U/L  --   --   --   --  56   ALK PHOS U/L  --   --   --   --  117*   EGFR ml/min/1 73sq m 146 161 148   < > 155    < > = values in this interval not displayed  , Vitamin B12:   , HgBA1C:   Results from last 7 days   Lab Units 06/05/20  0509   HEMOGLOBIN A1C % 4 7   , TSH:   , Coagulation:   Results from last 7 days   Lab Units 06/05/20  1049   INR  1 04   , Lipid Profile:   , Ammonia:   , Urinalysis:       Invalid input(s): URIBILINOGEN, Drug Screen:   , Medication Drug Levels:       Invalid input(s): CARBAMAZEPINE,  PHENOBARB, LACOSAMIDE, OXCARBAZEPINE     Procedure: Xr Skull < 4 Vw    Result Date: 6/6/2020  Narrative: POST MR SHUNT EVALUATION INDICATION:   Ventriculoperitoneal shunt placement, evaluate pre-MRI   COMPARISON:  Head CT performed June 1, 2020 VIEWS:  XR SKULL < 4 VW FINDINGS: Views of the calvarium are obtained with dedicated view positioned to evaluate pressure setting dial of ventriculostomy catheter  Baseline pressure setting is obtained  Impression: Baseline pressure setting is obtained   Workstation performed: [id number]

## 2020-06-08 NOTE — RESPIRATORY THERAPY NOTE
RT Ventilator Management Note  Opal Perez 21 y o  female MRN: [id number]  Unit/Bed#: [facility] 05 Encounter: [id number]      Daily Screen       6/7/2020  0713 6/8/2020  0758          Patient safety screen outcome[de-identified]  Failed  Failed      Not Ready for Weaning due to[de-identified]  Alternative forms of ventilation  Poor inspiratory effort              Physical Exam:   Assessment Type: (P) Assess only  Respiratory Pattern: (P) Assisted  Chest Assessment: (P) Chest expansion symmetrical  Bilateral Breath Sounds: (P) Coarse, Diminished  Cough: (P) Productive  Suction: (P) Trach      Resp Comments: (P) pt tolerating current vent settings no changes at this time   will continue to monitor

## 2020-06-08 NOTE — PROGRESS NOTES
Daily Progress Note - Critical Care   Derrick Mack 21 y o  female MRN: [id number]  Unit/Bed#: MICU 05 Encounter: [id number]        ----------------------------------------------------------------------------------------  HPI/24hr events:   Had 2 episodes of bradycardia with rates in the low 40's that spontaneously resolved  T max 100 4  UO 4 Liters in 24 hours   Received fentanyl 50 mcg iv x 1 for agitation     ---------------------------------------------------------------------------------------  SUBJECTIVE  "Intubated and sedated"    Review of Systems  Review of systems was unable to be performed secondary to Intubated sedated  ---------------------------------------------------------------------------------------  Assessment and Plan:    Neuro:   Diagnosis: Seizures, CP with acute encephalopathy,   shunt 2/2 hyrdocephalus  · Sedation plan/Daily sedation holiday: Precedex  · RASS goal: -5 Unarousable and -3 Moderate Sedation  · Pain controlled with:  Fentanyl  · Delirium Precautions  · CAM ICU per protocol  · Regulate sleep/wake cycle+  · Trend neuro exam  · Continue home Keppra and Trileptal  · Continuous EEG times 40 hours no seizure activity noted continued 6/7  Diagnosis:  spastic quadriplegia,  · Continue Valium and prn flexeril      CV:   Diagnosis:  Cardiac arrest with insertion of central line to arrhythmia  · Monitor telemetry   · Sinus tachycardia   · MAP goal > 65    Diagnosis: Hypotenison-resolved  · Weaned of pressors 6/ 7  · Most likely related to sedation    Diagnosis:  Bradycardia secondary to sedation   Patient had 2 episodes of bradycardia with heart rates in the 40s that spontaneously resolved not require any atropine   Consider cariology consult         Pulm:  Diagnosis:  Acute on chronic respiratory failure status post trach  · AC/EC 15/250/50/5, sp >96%  · Normally has cuffless trach was exchanged at [facility] to cuffed  · Daily SBT assessment per protocol: Not applicable  · Chlorhexidine/HOB>30degrees ordered: yes  · Pulmonary toileting  · Wean fi O2 as able  · Continue Pulmicort nebs    GI:   Diagnosis:  No acute issues  · Stress ulcer prophylaxis: No prophylaxis needed  · Bowel regimen: dulcolax suppository  and senna  · TF for nutrition      :   Diagnosis:  No acute issue  · Indwelling Her present: yes   · Trend UOP and BUN/creat  · Strict I and O  · Uo of 4 L/24 HR    F/E/N:   · Goal 24 hour fluid balance:  Net -2 0 3 L in 24   Fluid/Diuretic plan: NPO  · Nutrition/diet plan: TF cyclical  · Replete electrolytes with goals: K >4 0, Mag >2 0, and Phos >3 0      Heme/Onc:   Diagnosis:  Anemia  · Hemoglobin 9 6 unstable   · Trend hgb and plts  · Transfuse as needed for goal hgb >7      Endo:   Diagnosis:  Diabetes insipidus  · UO 4 L in 24 hours  · Consider giving extra dose of DDD AP 0 05 mg today and continue Q 48 hours via J-tube      ID:   Diagnosis:  Serratia  shunt infection  · Abx ordered:  Meropenem and daptomycin day 8  · Serratia positive in CSF fluid  · Neurosurgery following-will attempt to treat with antibiotics  · Plan for retap of   shunt on 06/10  · ID following   · Trend temps and WBC count        MSK/Skin:   Diagnosis:  Severe contractures in upper extremity  · Frequent turning and pressure off-loading      Disposition: Continue Critical Care   Code Status: Level 1 - Full Code  ---------------------------------------------------------------------------------------  ICU CORE MEASURES    Prophylaxis   VTE Pharmacologic Prophylaxis: Heparin  VTE Mechanical Prophylaxis: sequential compression device  Stress Ulcer Prophylaxis: Prophylaxis Not Indicated     ABCDE Protocol (if indicated)  Plan to perform spontaneous awakening trial today? No  Plan to perform spontaneous breathing trial today? Not applicable  Obvious barriers to extubation?  Yes  CAM-ICU: Negative    Invasive Devices Review  Invasive Devices     Peripherally Inserted Central Catheter Line PICC Line  Right Basilic 3 days          Drain            Gastrostomy/Enterostomy -- days    Urethral Catheter Temperature probe -- days          Airway            Surgical Airway Shiley Cuffed -- days              Can any invasive devices be discontinued today? No  ---------------------------------------------------------------------------------------  OBJECTIVE    Vitals   Vitals:    20 0549 20 0551 20 0559 20 0604   BP: 119/62   104/76   BP Location:       Pulse:       Resp:       Temp:   100 4 °F (38 °C)    TempSrc:       SpO2:       Weight:  58 9 kg (129 lb 13 6 oz)     Height:         Temp (24hrs), Av 9 °F (37 2 °C), Min:97 5 °F (36 4 °C), Max:100 4 °F (38 °C)  Current: Temperature: 100 4 °F (38 °C)(tylenol given)  HR: 93  BP: 1047/76  RR: 14  SpO2: 96%    Respiratory:  SpO2 Device: O2 Device: Ventilator       Invasive/non-invasive ventilation settings   Respiratory    Lab Data (Last 4 hours)    None         O2/Vent Data (Last 4 hours)       0305           Vent Mode AC/VC       Resp Rate (BPM) (BPM) 15       Vt (mL) (mL) 250       FIO2 (%) (%) 70       PEEP (cmH2O) (cmH2O) 5       MV 8 42                   Physical Exam   Constitutional: She appears ill  She is sedated and intubated  HENT:   Head: Normocephalic  Eyes: Pupils are equal, round, and reactive to light  Conjunctivae and lids are normal    Neck:   Trach intact and connected to vent   Cardiovascular: Regular rhythm, S1 normal and S2 normal  Tachycardia present  No murmur heard  Pulmonary/Chest: She is intubated  No respiratory distress  She has decreased breath sounds in the right lower field and the left lower field  Abdominal: Normal appearance and bowel sounds are normal  There is no tenderness  Genitourinary:   Genitourinary Comments: Indwelling Her clear yellow urine   Musculoskeletal:   Upper extremities with dwecerebrate posturing and contractures   Neurological: She is alert   GCS eye subscore is 1  GCS verbal subscore is 1  GCS motor subscore is 3  Skin: Skin is warm, dry and intact           Laboratory and Diagnostics:  Results from last 7 days   Lab Units 06/08/20  0501 06/07/20  0530 06/06/20  0512 06/05/20  0509 06/04/20  0553 06/03/20  1726   WBC Thousand/uL 8 39 13 20* 10 37* 8 47 6 93 11 24*   HEMOGLOBIN g/dL 9 6* 9 9* 9 3* 8 8* 7 6* 10 0*   HEMATOCRIT % 32 2* 32 6* 31 7* 28 3* 25 1* 33 2*   PLATELETS Thousands/uL 280 246 203 175 126* 154   NEUTROS PCT % 67 73 67 59 63 61   MONOS PCT % 9 6 8 9 10 9     Results from last 7 days   Lab Units 06/08/20  0501 06/07/20 1918 06/07/20  0530 06/06/20  0512 06/05/20  2107 06/05/20  0509 06/04/20  1609   SODIUM mmol/L 142 140 134* 136 139 140 141   POTASSIUM mmol/L 4 9 3 0* 3 8 4 2 4 3 3 6 4 1   CHLORIDE mmol/L 109* 103 101 102 104 106 108   CO2 mmol/L 30 30 28 29 30 28 26   ANION GAP mmol/L 3* 7 5 5 5 6 7   BUN mg/dL 10 11 13 12 10 7 11   CREATININE mg/dL 0 39* 0 29* 0 38* 0 30* 0 36* 0 33* 0 42*   CALCIUM mg/dL 8 6 7 4* 8 3 8 1* 8 3 7 8* 8 1*   GLUCOSE RANDOM mg/dL 104 107 116 106 100 105 133   ALT U/L  --   --   --   --   --  56  --    AST U/L  --   --   --   --   --  34  --    ALK PHOS U/L  --   --   --   --   --  117*  --    ALBUMIN g/dL  --   --   --   --   --  2 7*  --    TOTAL BILIRUBIN mg/dL  --   --   --   --   --  0 39  --      Results from last 7 days   Lab Units 06/07/20 1918 06/05/20  0509 06/04/20  0553 06/03/20  1726   MAGNESIUM mg/dL 1 8 2 0 1 9 2 0   PHOSPHORUS mg/dL 4 2 3 2 2 9 2 8      Results from last 7 days   Lab Units 06/05/20  1049   INR  1 04          Results from last 7 days   Lab Units 06/03/20 2026 06/03/20  1726   LACTIC ACID mmol/L 1 1 3 5*     ABG:  Results from last 7 days   Lab Units 06/05/20  1141   PH ART  7 427   PCO2 ART mm Hg 42 0   PO2 ART mm Hg 97 0   HCO3 ART mmol/L 27 1   BASE EXC ART mmol/L 2 5   ABG SOURCE  Radial, Right     VBG:  Results from last 7 days   Lab Units 06/05/20  1141   ABG SOURCE Radial, Right     Results from last 7 days   Lab Units 06/06/20  0514 06/05/20  0509 06/04/20  0554 06/03/20  1726   PROCALCITONIN ng/ml 1 00* 1 77* 3 91* 5 08*       Micro  Results from last 7 days   Lab Units 06/05/20  1418 06/05/20  0507 06/03/20  2025   GRAM STAIN RESULT  1+ Polys  No bacteria seen Rare Disintegrating polys  No organisms seen No Polys or Bacteria seen   WOUND CULTURE   --  No growth  --    BODY FLUID CULTURE, STERILE  No growth  --   --        EKG: Sinus Tachycardia  Imaging: No new imaging    Intake and Output  I/O       06/06 0701 - 06/07 0700 06/07 0701 - 06/08 0700    I V  (mL/kg) 655 5 (10 7) 394 8 (6 7)    NG/ 200    IV Piggyback 330 150    Feedings 795 995    Total Intake(mL/kg) 2010 5 (33) 1739 8 (29 5)    Urine (mL/kg/hr) 1250 (0 9) 4090 (2 9)    Stool 0 0    Total Output 1250 4090    Net +760 5 -2350 3          Unmeasured Stool Occurrence 1 x 1 x        UOP: 167 ml/hr     Height and Weights   Height: 4' 2" (127 cm)  IBW: 22 5 kg  Body mass index is 36 52 kg/m²  Weight (last 2 days)     Date/Time   Weight    06/08/20 0551   58 9 (129 85)    06/07/20 0536   61 (134 48)    06/06/20 0538   60 8 (134 04)                Nutrition       Diet Orders   (From admission, onward)             Start     Ordered    06/05/20 1500  Diet Enteral/Parenteral; Tube Feeding No Oral Diet; Non-Formulary; Cyclic; 50; 16 hours  Diet effective now     Comments:  Milk-free Prosource (nonformulary) BID  Use J-tube for feeds   Question Answer Comment   Diet Type Enteral/Parenteral    Enteral/Parenteral Tube Feeding No Oral Diet    Tube Feeding Formula: Non-Formulary    Bolus/Cyclic/Continuous Cyclic    Tube Feeding Cyclic Rate (mL/hr): 50    Tube Feeding Cyclic: Administer over: 16 hours    RD to adjust diet per protocol?  Yes        06/05/20 1500                    Active Medications  Scheduled Meds:  Current Facility-Administered Medications:  acetaminophen 650 mg Per G Tube Q4H PRN Poli Humphries, DO atropine        bisacodyl 10 mg Rectal Daily MADAY Roberts    budesonide 0 5 mg Nebulization BID Anna Jennings DO    calcium carbonate-vitamin D 1 tablet Oral Daily With Breakfast MADAY Roberts    chlorhexidine 15 mL Swish & Spit Q12H [facility] Charlie Bryan PA-C    cholecalciferol 1,000 Units Oral Daily MADAY Roberts    cyclobenzaprine 5 mg Per G Tube TID PRN Tisha Yates MD    dantrolene 100 mg Per G Tube TID Tisha Yates MD    DAPTOmycin 8 mg/kg (Adjusted) Intravenous Q24H Lopez Nuñez MD Last Rate: 375 mg (06/07/20 2055)   desmopressin 0 05 mg Per G Tube Q48H Tisha Yates MD    dexmedetomidine 0 1-0 7 mcg/kg/hr Intravenous Titrated Charlie Bryan PA-C Last Rate: 0 2 mcg/kg/hr (06/07/20 2356)   diazepam 5 mg Per G Tube Q8H Tisha Yates MD    fentanyl citrate (PF) 50 mcg Intravenous Q1H PRN MADAY Desir    glycerin-hypromellose- 1 drop Both Eyes Q3H PRN Anna Jennings DO    guaiFENesin 400 mg Oral Q8H MADAY Alatorre    heparin (porcine) 5,000 Units Subcutaneous [facility] Charlie Bryan PA-C    levETIRAcetam 1,000 mg Per G Tube BID Tisha Yates MD    levETIRAcetam 500 mg Per G Tube Q24H Tisha Yates MD    LORazepam 1 mg Intravenous Q4H PRN Rebecca Cesar MD    meropenem 2,000 mg Intravenous Q8H Lopez Nuñez MD Last Rate: 2,000 mg (06/08/20 0221)   OXcarbazepine 450 mg Per G Tube Q12H Radha Leonard MD    phenylephine  mcg/min Intravenous Titrated MADAY Roberts Last Rate: Stopped (06/08/20 0549)   senna-docusate sodium 1 tablet Per G Tube HS Tisha Yates MD      Continuous Infusions:    dexmedetomidine 0 1-0 7 mcg/kg/hr Last Rate: 0 2 mcg/kg/hr (06/07/20 2356)   phenylephine  mcg/min Last Rate: Stopped (06/08/20 0549)     PRN Meds:     acetaminophen 650 mg Q4H PRN   cyclobenzaprine 5 mg TID PRN   fentanyl citrate (PF) 50 mcg Q1H PRN   glycerin-hypromellose- 1 drop Q3H PRN   LORazepam 1 mg Q4H PRN Allergies   Allergies   Allergen Reactions    Baclofen Other (See Comments)     Reaction Date: [id number];   apena      Cefepime Hives    Cephalosporins      Other reaction(s): Unknown Reaction  Other reaction(s): Unknown Reaction  Other reaction(s): Unknown Reaction      Clonidine      Other reaction(s): Respiratory Distress, Respiratory Distress    Lactase     Milk-Related Compounds     Penicillins Hives     Reaction Date: [id number];       Phenobarbital Other (See Comments)     Reaction Date: [id number]; Other reaction(s): Other (See Comments)  apnea  Other reaction(s): Other (See Comments)  apnea  apnea      Vancomycin     Zolpidem Other (See Comments)     Other reaction(s): Other (see comments)  Reaction Date: [id number];   apnea  apnea  apnea      Latex Rash     ---------------------------------------------------------------------------------------  Advance Directive and Living Will:      Power of :    POLST:    ---------------------------------------------------------------------------------------  Care Time Delivered:   No Critical Care time spent     MADAY Varghese      Portions of the record may have been created with voice recognition software  Occasional wrong word or "sound a like" substitutions may have occurred due to the inherent limitations of voice recognition software    Read the chart carefully and recognize, using context, where substitutions have occurred

## 2020-06-08 NOTE — RESPIRATORY THERAPY NOTE
RT Ventilator Management Note  Greg Santos 21 y o  female MRN: [id number]  Unit/Bed#: MICU 05 Encounter: [id number]      Daily Screen       6/6/2020  0708 6/7/2020  0713          Patient safety screen outcome[de-identified]  Failed  Failed      Not Ready for Weaning due to[de-identified]  Underline problem not resolved  Alternative forms of ventilation              Physical Exam:   Assessment Type: Assess only  General Appearance: Sedated  Respiratory Pattern: Assisted  Chest Assessment: Chest expansion symmetrical  Bilateral Breath Sounds: Diminished, Coarse  O2 Device: vent      Resp Comments: pt remained on vent, AC/VC mode/settings all evening/night

## 2020-06-08 NOTE — PROGRESS NOTES
Pastoral visit- only prayer     06/08/20 1400   Clinical Encounter Type   Visited With Patient   Routine Visit Follow-up

## 2020-06-08 NOTE — PROGRESS NOTES
Progress Note - Dorethia Fossa 1995, 21 y o  female MRN: [id number]    [facility] [id number]    Primary Care Provider: Nolan Oliveira MD   Date and time admitted to hospital: 6/3/2020  2:15 PM    S/P  shunt  Assessment & Plan  · Pt has a hx of  shunt insertion for congenital hydrocephalus  · Pw/ SIRS, acute encephalopathy, and seizure like activity  · Pt also had cardiac arrest and was resuscitated while at [facility]      Imaging reviewed personally and by attending  Final results as below  · Xray Shunt series 6/1/20 loaded in to PACs from outside hospital: Right frontal  shunt catheter appears intact  Hardware from thoracolumbar fusion for scoliosis noted  Of note the right sacroliliac screw noted to have lucency concerning for possible screw loosening  Plan  · Continue regular neurologic checks   · Ongoing medical management per primary team   · Neurology consulted and following for seizures and encephalopathy  · MRI brain w wo ordered and pending  · ID consulted and following  · Wound/left iliac cultures negative to date so far  · CSF cultures 6/7/20: 2+ Growth of Serratia Marcescens  · SED rate 6/6: 19  · CRP 6/6: 86 1  · Eval and mobilize per PT/OT  · DVT PPX: SCDs, Heparin  · No emergent neurosurgical intervention anticipated at this time  · Plan for repeat CSF draw on Wednesday 6/10/2020  Likely would continue conservative ABX treatment per ID rather than surgical intervention  · Neurosurgery will continue to follow at this time  Please call with any questions or concerns  Severe sepsis with acute organ dysfunction due to Serratia species [facility])  Assessment & Plan  · ID following  · 100 4 Tmax overnight  · WBC 8 39 today  · Meropenem 2g Q8 at this time  · Daptomycin 375 mg Q24  · CSF from shunt tap 6/3 positive for serratia marcescens  · Back wound culture and left iliac tap negative for growth at this time       Cerebral palsy [facility])  Assessment & Plan  · Patient has cerebral palsy  Acute metabolic encephalopathy  Assessment & Plan  · Ongoing monitoring and management per primary team    Abnormal LFTs  Assessment & Plan  · Management per primary team     * Seizure-like activity [facility])  Assessment & Plan  · Neurology following  · On vEEG  · Keppra 1000mg BID   · Trileptal 450mg BID     Subjective/Objective   Chief Complaint: None     Subjective: No acute issues overnight per nursing staff  Spoke with critical care AP as well  Ongoing conservative measures at this time  Patient remains intubated ion   2mg Precedex for sedation  Afebrile other than single spike of 100 4 with tylenol given at that time  Objective: laying in bed in NAD  I/O       06/06 0701 - 06/07 0700 06/07 0701 - 06/08 0700 06/08 0701 - 06/09 0700    I V  (mL/kg) 655 5 (10 7) 394 8 (6 7) 19 6 (0 3)    NG/ 200     IV Piggyback 330 150     Feedings 795 995     Total Intake(mL/kg) 2010 5 (33) 1739 8 (29 5) 19 6 (0 3)    Urine (mL/kg/hr) 1250 (0 9) 4090 (2 9) 175 (0 6)    Stool 0 0     Total Output 1250 4090 175    Net +760 5 -2350 3 -155 4           Unmeasured Stool Occurrence 1 x 1 x           Invasive Devices     Peripherally Inserted Central Catheter Line            PICC Line 55/80/95 Right Basilic 3 days          Drain            Gastrostomy/Enterostomy -- days    Urethral Catheter Temperature probe -- days          Airway            Surgical Airway Shiley Cuffed -- days                Physical Exam:  Vitals: Blood pressure 95/62, pulse 74, temperature 99 3 °F (37 4 °C), resp  rate (!) 23, height 4' 2" (1 27 m), weight 58 9 kg (129 lb 13 6 oz), SpO2 100 %  ,Body mass index is 36 52 kg/m²  General appearance: appears stated age  Head: Normocephalic, without obvious abnormality  vEEG leads in place  Unable to palpate shunt at this time  Eyes: Pupils 7mm bilaterally  Equal and reactive to light  Rhythmic repetitious blinking noted while in the room  Lungs: non labored breathing  Heart: regular heart rate  Neurologic:   Mental status: GCS 6T  Patient withdraw briskly in BUE to pain  Minimal withdraw in BLE to noxious stimuli  Patient with extruded tongue  Reflexes: 1+ RLE and 0 in LLE patella  Bilateral best's  No clonus  Lab Results:  Results from last 7 days   Lab Units 06/08/20  0501 06/07/20  0530 06/06/20  0512   WBC Thousand/uL 8 39 13 20* 10 37*   HEMOGLOBIN g/dL 9 6* 9 9* 9 3*   HEMATOCRIT % 32 2* 32 6* 31 7*   PLATELETS Thousands/uL 280 246 203   NEUTROS PCT % 67 73 67   MONOS PCT % 9 6 8     Results from last 7 days   Lab Units 06/08/20  0501 06/07/20 1918 06/07/20  0530  06/05/20  0509   POTASSIUM mmol/L 4 9 3 0* 3 8   < > 3 6   CHLORIDE mmol/L 109* 103 101   < > 106   CO2 mmol/L 30 30 28   < > 28   BUN mg/dL 10 11 13   < > 7   CREATININE mg/dL 0 39* 0 29* 0 38*   < > 0 33*   CALCIUM mg/dL 8 6 7 4* 8 3   < > 7 8*   ALK PHOS U/L  --   --   --   --  117*   ALT U/L  --   --   --   --  56   AST U/L  --   --   --   --  34    < > = values in this interval not displayed  Results from last 7 days   Lab Units 06/07/20 1918 06/05/20  0509 06/04/20  0553   MAGNESIUM mg/dL 1 8 2 0 1 9     Results from last 7 days   Lab Units 06/07/20 1918 06/05/20  0509 06/04/20  0553   PHOSPHORUS mg/dL 4 2 3 2 2 9     Results from last 7 days   Lab Units 06/05/20  1049   INR  1 04     No results found for: TROPONINT  ABG:  Lab Results   Component Value Date    PHART 7 427 06/05/2020    CMD4OGI 42 0 06/05/2020    PO2ART 97 0 06/05/2020    TNU2VPL 27 1 06/05/2020    BEART 2 5 06/05/2020    SOURCE Radial, Right 06/05/2020       Imaging Studies: I have personally reviewed pertinent reports  and I have personally reviewed pertinent films in PACS  Xr Skull < 4 Vw    Result Date: 6/6/2020  Impression: Baseline pressure setting is obtained  Workstation performed: [id number]       EKG, Pathology, and Other Studies: I have personally reviewed pertinent reports        VTE Pharmacologic Prophylaxis: Heparin    VTE Mechanical Prophylaxis: sequential compression device

## 2020-06-08 NOTE — SOCIAL WORK
CM consulted for POA/Guardianship paperwork  TC to [facility]  for care coordination, Shiv Bangura [phone] ext [phone]  As per Juan Thompson pt parents, Jeremie Perry and Glo Sylvester, are pt co-guardian  Juan Thompson will fax guardianship paperwork to CM  CM received guardianship paperwork - placed in pt chart  Cm notified Calixto Simmons and bedside RN

## 2020-06-09 LAB
ALBUMIN SERPL BCP-MCNC: 2.7 G/DL (ref 3.5–5)
ALP SERPL-CCNC: 126 U/L (ref 46–116)
ALT SERPL W P-5'-P-CCNC: 184 U/L (ref 12–78)
ANION GAP SERPL CALCULATED.3IONS-SCNC: 6 MMOL/L (ref 4–13)
ANION GAP SERPL CALCULATED.3IONS-SCNC: 6 MMOL/L (ref 4–13)
AST SERPL W P-5'-P-CCNC: 47 U/L (ref 5–45)
BASOPHILS # BLD AUTO: 0.03 THOUSANDS/ΜL (ref 0–0.1)
BASOPHILS # BLD AUTO: 0.03 THOUSANDS/ΜL (ref 0–0.1)
BASOPHILS NFR BLD AUTO: 1 % (ref 0–1)
BASOPHILS NFR BLD AUTO: 1 % (ref 0–1)
BILIRUB SERPL-MCNC: 0.3 MG/DL (ref 0.2–1)
BUN SERPL-MCNC: 12 MG/DL (ref 5–25)
BUN SERPL-MCNC: 12 MG/DL (ref 5–25)
CALCIUM SERPL-MCNC: 8.5 MG/DL (ref 8.3–10.1)
CALCIUM SERPL-MCNC: 8.5 MG/DL (ref 8.3–10.1)
CHLORIDE SERPL-SCNC: 105 MMOL/L (ref 100–108)
CHLORIDE SERPL-SCNC: 105 MMOL/L (ref 100–108)
CO2 SERPL-SCNC: 30 MMOL/L (ref 21–32)
CO2 SERPL-SCNC: 30 MMOL/L (ref 21–32)
CREAT SERPL-MCNC: 0.35 MG/DL (ref 0.6–1.3)
CREAT SERPL-MCNC: 0.35 MG/DL (ref 0.6–1.3)
EOSINOPHIL # BLD AUTO: 0.23 THOUSAND/ΜL (ref 0–0.61)
EOSINOPHIL # BLD AUTO: 0.23 THOUSAND/ΜL (ref 0–0.61)
EOSINOPHIL NFR BLD AUTO: 4 % (ref 0–6)
EOSINOPHIL NFR BLD AUTO: 4 % (ref 0–6)
ERYTHROCYTE [DISTWIDTH] IN BLOOD BY AUTOMATED COUNT: 18.5 % (ref 11.6–15.1)
ERYTHROCYTE [DISTWIDTH] IN BLOOD BY AUTOMATED COUNT: 18.6 % (ref 11.6–15.1)
GFR SERPL CREATININE-BSD FRML MDRD: 152 ML/MIN/1.73SQ M
GFR SERPL CREATININE-BSD FRML MDRD: 152 ML/MIN/1.73SQ M
GLUCOSE SERPL-MCNC: 100 MG/DL (ref 65–140)
GLUCOSE SERPL-MCNC: 109 MG/DL (ref 65–140)
GLUCOSE SERPL-MCNC: 119 MG/DL (ref 65–140)
GLUCOSE SERPL-MCNC: 128 MG/DL (ref 65–140)
HCT VFR BLD AUTO: 28.7 % (ref 34.8–46.1)
HCT VFR BLD AUTO: 29 % (ref 34.8–46.1)
HGB BLD-MCNC: 8.4 G/DL (ref 11.5–15.4)
HGB BLD-MCNC: 8.6 G/DL (ref 11.5–15.4)
IMM GRANULOCYTES # BLD AUTO: 0.03 THOUSAND/UL (ref 0–0.2)
IMM GRANULOCYTES # BLD AUTO: 0.03 THOUSAND/UL (ref 0–0.2)
IMM GRANULOCYTES NFR BLD AUTO: 1 % (ref 0–2)
IMM GRANULOCYTES NFR BLD AUTO: 1 % (ref 0–2)
LYMPHOCYTES # BLD AUTO: 1.52 THOUSANDS/ΜL (ref 0.6–4.47)
LYMPHOCYTES # BLD AUTO: 1.66 THOUSANDS/ΜL (ref 0.6–4.47)
LYMPHOCYTES NFR BLD AUTO: 28 % (ref 14–44)
LYMPHOCYTES NFR BLD AUTO: 30 % (ref 14–44)
MAGNESIUM SERPL-MCNC: 2.3 MG/DL (ref 1.6–2.6)
MCH RBC QN AUTO: 25.8 PG (ref 26.8–34.3)
MCH RBC QN AUTO: 26.2 PG (ref 26.8–34.3)
MCHC RBC AUTO-ENTMCNC: 29.3 G/DL (ref 31.4–37.4)
MCHC RBC AUTO-ENTMCNC: 29.7 G/DL (ref 31.4–37.4)
MCV RBC AUTO: 88 FL (ref 82–98)
MCV RBC AUTO: 88 FL (ref 82–98)
MONOCYTES # BLD AUTO: 0.51 THOUSAND/ΜL (ref 0.17–1.22)
MONOCYTES # BLD AUTO: 0.59 THOUSAND/ΜL (ref 0.17–1.22)
MONOCYTES NFR BLD AUTO: 11 % (ref 4–12)
MONOCYTES NFR BLD AUTO: 9 % (ref 4–12)
NEUTROPHILS # BLD AUTO: 2.99 THOUSANDS/ΜL (ref 1.85–7.62)
NEUTROPHILS # BLD AUTO: 3 THOUSANDS/ΜL (ref 1.85–7.62)
NEUTS SEG NFR BLD AUTO: 55 % (ref 43–75)
NEUTS SEG NFR BLD AUTO: 55 % (ref 43–75)
NRBC BLD AUTO-RTO: 0 /100 WBCS
NRBC BLD AUTO-RTO: 0 /100 WBCS
PLATELET # BLD AUTO: 259 THOUSANDS/UL (ref 149–390)
PLATELET # BLD AUTO: 273 THOUSANDS/UL (ref 149–390)
PMV BLD AUTO: 9.4 FL (ref 8.9–12.7)
PMV BLD AUTO: 9.6 FL (ref 8.9–12.7)
POTASSIUM SERPL-SCNC: 4.1 MMOL/L (ref 3.5–5.3)
POTASSIUM SERPL-SCNC: 4.1 MMOL/L (ref 3.5–5.3)
PROCALCITONIN SERPL-MCNC: 0.36 NG/ML
PROCALCITONIN SERPL-MCNC: 0.36 NG/ML
PROT SERPL-MCNC: 6.8 G/DL (ref 6.4–8.2)
RBC # BLD AUTO: 3.26 MILLION/UL (ref 3.81–5.12)
RBC # BLD AUTO: 3.28 MILLION/UL (ref 3.81–5.12)
SODIUM SERPL-SCNC: 141 MMOL/L (ref 136–145)
SODIUM SERPL-SCNC: 141 MMOL/L (ref 136–145)
WBC # BLD AUTO: 5.39 THOUSAND/UL (ref 4.31–10.16)
WBC # BLD AUTO: 5.46 THOUSAND/UL (ref 4.31–10.16)

## 2020-06-09 PROCEDURE — 80053 COMPREHEN METABOLIC PANEL: CPT | Performed by: INTERNAL MEDICINE

## 2020-06-09 PROCEDURE — 80048 BASIC METABOLIC PNL TOTAL CA: CPT | Performed by: NURSE PRACTITIONER

## 2020-06-09 PROCEDURE — 99232 SBSQ HOSP IP/OBS MODERATE 35: CPT | Performed by: INTERNAL MEDICINE

## 2020-06-09 PROCEDURE — 85025 COMPLETE CBC W/AUTO DIFF WBC: CPT | Performed by: INTERNAL MEDICINE

## 2020-06-09 PROCEDURE — 84145 PROCALCITONIN (PCT): CPT | Performed by: INTERNAL MEDICINE

## 2020-06-09 PROCEDURE — 94640 AIRWAY INHALATION TREATMENT: CPT

## 2020-06-09 PROCEDURE — 83735 ASSAY OF MAGNESIUM: CPT | Performed by: NURSE PRACTITIONER

## 2020-06-09 PROCEDURE — 99233 SBSQ HOSP IP/OBS HIGH 50: CPT | Performed by: NURSE PRACTITIONER

## 2020-06-09 PROCEDURE — 99232 SBSQ HOSP IP/OBS MODERATE 35: CPT | Performed by: PSYCHIATRY & NEUROLOGY

## 2020-06-09 PROCEDURE — 85025 COMPLETE CBC W/AUTO DIFF WBC: CPT | Performed by: NURSE PRACTITIONER

## 2020-06-09 PROCEDURE — 95720 EEG PHY/QHP EA INCR W/VEEG: CPT | Performed by: PSYCHIATRY & NEUROLOGY

## 2020-06-09 PROCEDURE — 99233 SBSQ HOSP IP/OBS HIGH 50: CPT | Performed by: PHYSICIAN ASSISTANT

## 2020-06-09 PROCEDURE — 94003 VENT MGMT INPAT SUBQ DAY: CPT

## 2020-06-09 PROCEDURE — 94760 N-INVAS EAR/PLS OXIMETRY 1: CPT

## 2020-06-09 PROCEDURE — 82948 REAGENT STRIP/BLOOD GLUCOSE: CPT

## 2020-06-09 RX ADMIN — DESMOPRESSIN ACETATE 0.05 MG: 0.1 TABLET ORAL at 06:07

## 2020-06-09 RX ADMIN — LEVETIRACETAM 1000 MG: 100 SOLUTION ORAL at 06:07

## 2020-06-09 RX ADMIN — SENNOSIDES AND DOCUSATE SODIUM 1 TABLET: 8.6; 5 TABLET ORAL at 22:26

## 2020-06-09 RX ADMIN — LEVETIRACETAM 500 MG: 100 SOLUTION ORAL at 22:27

## 2020-06-09 RX ADMIN — DANTROLENE SODIUM 100 MG: 25 CAPSULE ORAL at 16:35

## 2020-06-09 RX ADMIN — BUDESONIDE 0.5 MG: 0.5 INHALANT RESPIRATORY (INHALATION) at 07:47

## 2020-06-09 RX ADMIN — OXCARBAZEPINE 450 MG: 300 TABLET, FILM COATED ORAL at 06:07

## 2020-06-09 RX ADMIN — DANTROLENE SODIUM 100 MG: 25 CAPSULE ORAL at 22:26

## 2020-06-09 RX ADMIN — GUAIFENESIN 400 MG: 100 SOLUTION ORAL at 17:30

## 2020-06-09 RX ADMIN — CHLORHEXIDINE GLUCONATE 0.12% ORAL RINSE 15 ML: 1.2 LIQUID ORAL at 08:12

## 2020-06-09 RX ADMIN — HEPARIN SODIUM 5000 UNITS: 5000 INJECTION INTRAVENOUS; SUBCUTANEOUS at 22:26

## 2020-06-09 RX ADMIN — HEPARIN SODIUM 5000 UNITS: 5000 INJECTION INTRAVENOUS; SUBCUTANEOUS at 14:34

## 2020-06-09 RX ADMIN — DIAZEPAM 5 MG: 5 TABLET ORAL at 18:11

## 2020-06-09 RX ADMIN — DANTROLENE SODIUM 100 MG: 25 CAPSULE ORAL at 08:13

## 2020-06-09 RX ADMIN — BISACODYL 10 MG: 10 SUPPOSITORY RECTAL at 10:06

## 2020-06-09 RX ADMIN — MEROPENEM 2000 MG: 1 INJECTION, POWDER, FOR SOLUTION INTRAVENOUS at 02:09

## 2020-06-09 RX ADMIN — GUAIFENESIN 400 MG: 100 SOLUTION ORAL at 02:09

## 2020-06-09 RX ADMIN — Medication 25 MCG/HR: at 16:34

## 2020-06-09 RX ADMIN — DIAZEPAM 5 MG: 5 TABLET ORAL at 10:06

## 2020-06-09 RX ADMIN — DIAZEPAM 5 MG: 5 TABLET ORAL at 02:09

## 2020-06-09 RX ADMIN — LEVETIRACETAM 1000 MG: 100 SOLUTION ORAL at 14:35

## 2020-06-09 RX ADMIN — GUAIFENESIN 400 MG: 100 SOLUTION ORAL at 09:30

## 2020-06-09 RX ADMIN — MEROPENEM 2000 MG: 1 INJECTION, POWDER, FOR SOLUTION INTRAVENOUS at 10:23

## 2020-06-09 RX ADMIN — Medication 1 TABLET: at 08:13

## 2020-06-09 RX ADMIN — BUDESONIDE 0.5 MG: 0.5 INHALANT RESPIRATORY (INHALATION) at 19:36

## 2020-06-09 RX ADMIN — MELATONIN 1000 UNITS: at 08:12

## 2020-06-09 RX ADMIN — OXCARBAZEPINE 450 MG: 300 TABLET, FILM COATED ORAL at 18:11

## 2020-06-09 RX ADMIN — CHLORHEXIDINE GLUCONATE 0.12% ORAL RINSE 15 ML: 1.2 LIQUID ORAL at 22:25

## 2020-06-09 RX ADMIN — HEPARIN SODIUM 5000 UNITS: 5000 INJECTION INTRAVENOUS; SUBCUTANEOUS at 06:06

## 2020-06-09 RX ADMIN — MEROPENEM 2000 MG: 1 INJECTION, POWDER, FOR SOLUTION INTRAVENOUS at 18:27

## 2020-06-09 NOTE — RESPIRATORY THERAPY NOTE
RT Ventilator Management Note  Luis Enrique Lopes 21 y o  female MRN: [id number]  Unit/Bed#: MICU 05 Encounter: [id number]      Daily Screen       6/8/2020  0758 6/9/2020  0779          Patient safety screen outcome[de-identified]  Failed  Passed      Not Ready for Weaning due to[de-identified]  Poor inspiratory effort        Spont breathing trial % for 30 min:                  Physical Exam:   Assessment Type: (P) Pre-treatment  General Appearance: (P) Sedated  Respiratory Pattern: (P) Assisted  Chest Assessment: (P) Chest expansion symmetrical  Bilateral Breath Sounds: (P) Coarse, Diminished  Suction: (P) Trach  O2 Device: vent  Subjective Data: Trached      Resp Comments: (P) Pt  placed on ps wean as tolerated  Pt  resting comfortably at this time  Will continue wean as tolerated

## 2020-06-09 NOTE — PLAN OF CARE
Problem: PAIN - ADULT  Goal: Verbalizes/displays adequate comfort level or baseline comfort level  Description  Interventions:  - Encourage patient to monitor pain and request assistance  - Assess pain using appropriate pain scale  - Administer analgesics based on type and severity of pain and evaluate response  - Implement non-pharmacological measures as appropriate and evaluate response  - Consider cultural and social influences on pain and pain management  - Notify physician/advanced practitioner if interventions unsuccessful or patient reports new pain  Outcome: Progressing     Problem: INFECTION - ADULT  Goal: Absence or prevention of progression during hospitalization  Description  INTERVENTIONS:  - Assess and monitor for signs and symptoms of infection  - Monitor lab/diagnostic results  - Monitor all insertion sites, i e  indwelling lines, tubes, and drains  - Monitor endotracheal if appropriate and nasal secretions for changes in amount and color  - [place] appropriate cooling/warming therapies per order  - Administer medications as ordered  - Instruct and encourage patient and family to use good hand hygiene technique  - Identify and instruct in appropriate isolation precautions for identified infection/condition  Outcome: Progressing     Problem: SAFETY ADULT  Goal: Maintain or return mobility status to optimal level  Description  INTERVENTIONS:  - Assess patient's baseline mobility status (ambulation, transfers, stairs, etc )    - Identify cognitive and physical deficits and behaviors that affect mobility  - Identify mobility aids required to assist with transfers and/or ambulation (gait belt, sit-to-stand, lift, walker, cane, etc )  - [place] fall precautions as indicated by assessment  - Record patient progress and toleration of activity level on Mobility SBAR; progress patient to next Phase/Stage  - Instruct patient to call for assistance with activity based on assessment  - Consider rehabilitation consult to assist with strengthening/weightbearing, etc   Outcome: Progressing     Problem: DISCHARGE PLANNING  Goal: Discharge to home or other facility with appropriate resources  Description  INTERVENTIONS:  - Identify barriers to discharge w/patient and caregiver  - Arrange for needed discharge resources and transportation as appropriate  - Identify discharge learning needs (meds, wound care, etc )  - Arrange for interpretive services to assist at discharge as needed  - Refer to Case Management Department for coordinating discharge planning if the patient needs post-hospital services based on physician/advanced practitioner order or complex needs related to functional status, cognitive ability, or social support system  Outcome: Progressing     Problem: Knowledge Deficit  Goal: Patient/family/caregiver demonstrates understanding of disease process, treatment plan, medications, and discharge instructions  Description  Complete learning assessment and assess knowledge base    Interventions:  - Provide teaching at level of understanding  - Provide teaching via preferred learning methods  Outcome: Progressing     Problem: Prexisting or High Potential for Compromised Skin Integrity  Goal: Skin integrity is maintained or improved  Description  INTERVENTIONS:  - Identify patients at risk for skin breakdown  - Assess and monitor skin integrity  - Assess and monitor nutrition and hydration status  - Monitor labs   - Assess for incontinence   - Turn and reposition patient  - Assist with mobility/ambulation  - Relieve pressure over bony prominences  - Avoid friction and shearing  - Provide appropriate hygiene as needed including keeping skin clean and dry  - Evaluate need for skin moisturizer/barrier cream  - Collaborate with interdisciplinary team   - Patient/family teaching  - Consider wound care consult   Outcome: Progressing     Problem: Potential for Falls  Goal: Patient will remain free of falls  Description  INTERVENTIONS:  - Assess patient frequently for physical needs  -  Identify cognitive and physical deficits and behaviors that affect risk of falls  -  [place] fall precautions as indicated by assessment   - Educate patient/family on patient safety including physical limitations  - Instruct patient to call for assistance with activity based on assessment  - Modify environment to reduce risk of injury  - Consider OT/PT consult to assist with strengthening/mobility  Outcome: Progressing     Problem: Nutrition/Hydration-ADULT  Goal: Nutrient/Hydration intake appropriate for improving, restoring or maintaining nutritional needs  Description  Monitor and assess patient's nutrition/hydration status for malnutrition  Collaborate with interdisciplinary team and initiate plan and interventions as ordered  Monitor patient's weight and dietary intake as ordered or per policy  Utilize nutrition screening tool and intervene as necessary  Determine patient's food preferences and provide high-protein, high-caloric foods as appropriate       INTERVENTIONS:  - Monitor oral intake, urinary output, labs, and treatment plans  - Assess nutrition and hydration status and recommend course of action  - Evaluate amount of meals eaten  - Assist patient with eating if necessary   - Allow adequate time for meals  - Recommend/ encourage appropriate diets, oral nutritional supplements, and vitamin/mineral supplements  - Order, calculate, and assess calorie counts as needed  - Recommend, monitor, and adjust tube feedings and TPN/PPN based on assessed needs  - Assess need for intravenous fluids  - Provide specific nutrition/hydration education as appropriate  - Include patient/family/caregiver in decisions related to nutrition  Outcome: Progressing

## 2020-06-09 NOTE — PROGRESS NOTES
Progress Note - Nikki Ramos 1995, 21 y o  female MRN: [id number]    [facility]: [id number]    Primary Care Provider: Chapo Chappell MD   Date and time admitted to hospital: 6/3/2020  2:15 PM        S/P  shunt  Assessment & Plan  Pt has a hx of  shunt insertion for congenital hydrocephalus  · P/w SIRS, acute encephalopathy, and seizure like activity  · Pt also had cardiac arrest and was resuscitated while at [facility] [address]  reviewed personally and by attending  Final results as below  · Xray Shunt series 6/1/20 loaded in to PACs from outside hospital: Right frontal  shunt catheter appears intact  Hardware from thoracolumbar fusion for scoliosis noted  Of note the right sacroliliac screw noted to have lucency concerning for possible screw loosening  Plan  · Continue regular neurologic checks   · Ongoing medical management per primary team   · Neurology consulted and following for seizures and encephalopathy  · MRI brain w wo ordered and pending   · Continue seizure medications per neurology recommendations  · Currently on vEEG with no seizure activity noted   · ID consulted and following  · Wound/left iliac cultures negative to date so far  · CSF cultures 6/7/20: 2+ Growth of Serratia Marcescens  · SED rate 6/6: 19  · CRP 6/6: 86 1  · Eval and mobilize per PT/OT  · DVT PPX: SCDs, Heparin  · No emergent neurosurgical intervention anticipated at this time  · At this time recommend continued conservative ABX treatment per ID rather than surgical intervention; will plan to repeat CSF draw when medically stable  · Neurosurgery will continue to follow at this time  Please call with any questions or concerns  Severe sepsis with acute organ dysfunction due to Serratia species [facility])  Assessment & Plan  · ID following  · 99 3 max overnight  · WBC improved to 5 39 today  · Meropenem 2g Q8 at this time     · Daptomycin discontinued  · CSF from shunt tap 6/3 positive for serratia marcescens  · Back wound culture and left iliac tap negative for growth at this time  Cerebral palsy ([address] )  Assessment & Plan  · Continue medical management    Acute metabolic encephalopathy  Assessment & Plan  · Ongoing monitoring and management per primary team    Abnormal LFTs  Assessment & Plan  · Management per primary team     * Seizure-like activity [facility])  Assessment & Plan  · Neurology following  · Patient remains on vEEG with no noted seizure activity  · Keppra 1000mg BID   · Trileptal 450mg BID         Subjective/Objective     Subjective: Patient remains intubated and sedated  Required silvia overnight for MAP <60; currently weaned off  Precedex switched to fentanyl for bradycardia per CCM  Objective: Patient intubated and sedated, vEEG in place    Intake/Output       06/09/20 0701 - 06/10/20 0700      [id number] Total       Intake    I V   4 8  -- 4 8    Total Intake 4 8 -- 4 8       Output    Urine  150  -- 150    Output (mL) (Urethral Catheter Temperature probe) 150 -- 150    Total Output 150 -- 150       Net I/O     -145 2 -- -145  2          Invasive Devices     Peripherally Inserted Central Catheter Line            PICC Line 64/06/54 Right Basilic 4 days          Drain            Gastrostomy/Enterostomy -- days    Urethral Catheter Temperature probe -- days          Airway            Surgical Airway Shiley Cuffed -- days                Vitals: Blood pressure (!) 87/48, pulse 80, temperature 98 2 °F (36 8 °C), resp  rate 17, height 4' 2" (1 27 m), weight 57 4 kg (126 lb 8 7 oz), SpO2 99 %  ,Body mass index is 35 59 kg/m²      General appearance: intubated and sedated  Head: Normocephalic, vEEG in place  Eyes: PERRL at 6-7mm bilaterally  Lungs: intubated  Heart: regular heart rate, hypotensive  Neurologic:   Mental status: GCS 6T (E1, V1t, E4)   Cranial nerves: grossly intact (Cranial nerves II-XII)  Sensory: withdraws BUE to PS  Motor: withdraws BUE > BLE to PS, BUE contracted with increased tone    Lab Results: I have personally reviewed pertinent results  Results from last 7 days   Lab Units 06/09/20 0442 06/08/20  0501 06/07/20  0530   WBC Thousand/uL 5 39  5 46 8 39 13 20*   HEMOGLOBIN g/dL 8 6*  8 4* 9 6* 9 9*   HEMATOCRIT % 29 0*  28 7* 32 2* 32 6*   PLATELETS Thousands/uL 273  259 280 246   NEUTROS PCT % 55  55 67 73   MONOS PCT % 11  9 9 6     Results from last 7 days   Lab Units 06/09/20 0442 06/08/20  0501 06/07/20 1918 06/05/20  0509   POTASSIUM mmol/L 4 1  4 1 4 9 3 0*   < > 3 6   CHLORIDE mmol/L 105  105 109* 103   < > 106   CO2 mmol/L 30  30 30 30   < > 28   BUN mg/dL 12  12 10 11   < > 7   CREATININE mg/dL 0 35*  0 35* 0 39* 0 29*   < > 0 33*   CALCIUM mg/dL 8 5  8 5 8 6 7 4*   < > 7 8*   ALK PHOS U/L 126*  --   --   --  117*   ALT U/L 184*  --   --   --  56   AST U/L 47*  --   --   --  34    < > = values in this interval not displayed  Results from last 7 days   Lab Units 06/09/20 0442 06/07/20 1918 06/05/20  0509   MAGNESIUM mg/dL 2 3 1 8 2 0     Results from last 7 days   Lab Units 06/07/20 1918 06/05/20  0509 06/04/20  0553   PHOSPHORUS mg/dL 4 2 3 2 2 9     Results from last 7 days   Lab Units 06/05/20  1049   INR  1 04     No results found for: TROPONINT  ABG:  Lab Results   Component Value Date    PHART 7 427 06/05/2020    GRH8KIV 42 0 06/05/2020    PO2ART 97 0 06/05/2020    YXD1MST 27 1 06/05/2020    BEART 2 5 06/05/2020    SOURCE Radial, Right 06/05/2020       Imaging Studies: I have personally reviewed pertinent reports  and I have personally reviewed pertinent films in PACS     Xr Skull < 4 Vw    Result Date: 6/6/2020  Narrative: POST MR SHUNT EVALUATION INDICATION:   Ventriculoperitoneal shunt placement, evaluate pre-MRI   COMPARISON:  Head CT performed June 1, 2020 VIEWS:  XR SKULL < 4 VW FINDINGS: Views of the calvarium are obtained with dedicated view positioned to evaluate pressure setting dial of ventriculostomy catheter  Baseline pressure setting is obtained  Impression: Baseline pressure setting is obtained  Workstation performed: [id number]     Ct Guided Perc Drainage Catheter Placeme    Result Date: 6/8/2020  Narrative: CT scan guided abscess aspiration This examination, like all CT scans performed in the [facility], was performed utilizing techniques to minimize radiation dose exposure, including the use of iterative reconstruction and automated exposure control  History: Long-standing collection adjacent to iliac crest Images: 58 Sedation: 1% local lidocaine infiltration  Technique: The patient was brought to the CT scanner and placed decubitus on the table  After axial images were obtained through the left iliac crest , an area of the skin was then marked, prepped, and draped in usual sterile fashion  All elements of maximal sterile barrier technique were followed (cap, mask, sterile gown, sterile gloves, large sterile sheet, hand hygiene, and 2% chlorhexidine for cutaneous antisepsis)  Lidocaine was administered to the skin and a small skin incision was made  A 18-gauge Chiba needle was advanced into the collection under CT-scan guidance  Small amount of fluid was aspirated  This was sent for culture and evaluation  Needle was removed  The patient tolerated the procedure well and suffered no complications  Impression: Impression: Successful CT-guided aspiration left iliac crest collection  Workstation performed: [id number]     EKG, Pathology, and Other Studies: I have personally reviewed pertinent reports        VTE Pharmacologic Prophylaxis: Heparin    VTE Mechanical Prophylaxis: sequential compression device

## 2020-06-09 NOTE — SOCIAL WORK
CM consulted by CCM MADAY Pryor to schedule a [facility] mtg with pt parents: Roxanna Baumgarten and Orni Vieyra, CCM, [facility], and ID today or tomorrow  TC to pt mother Roxanna Baumgarten  CM left VM requesting return call for availability for family mtg  Update: return call form pt mother, Roxanna Baumgarten Roxanna Baumgarten reports she is available tomorrow morning before 12 pm  Roxanna Baumgarten reports her and pt father, Orin Vieyra, are together  Roxanna Baumgarten reports Orin Vieyra may not be available for a conference call but she would keep him updated  CM spoke with Dr Meryl Loja with ID and Roxstephanie Grain from [facility] requesting them for meeting tomorrow  Both available prior to 12 pm  Mtg set up for 11 am with CC, ID, and [facility]  All aware  TC to Roxanna Baumgarten  CM informed her team would contact her around 11:15 am tomorrow

## 2020-06-09 NOTE — RESPIRATORY THERAPY NOTE
resp care      06/09/20 1119   Respiratory Assessment   Resp Comments pt  is tolerating cpap/ps well  will continue to wean as tolerated  Surgical Airway Shiley Cuffed   No Placement Date or Time found  Tube Size: 6 mm  Previously placed by?: Present on admission  Type: Tracheostomy  Brand: Ta  Style: Cuffed   Status Secured   Site Assessment Clean;Dry   Equipment at bedside BVM; Wall Suction setup; Additional inner cannula   Additional Assessments   SpO2 100 %

## 2020-06-09 NOTE — PROGRESS NOTES
Daily Progress Note - Critical Care   Luis Enrique Lopes 21 y o  female MRN: [id number]  Unit/Bed#: MICU 05 Encounter: [id number]        ----------------------------------------------------------------------------------------  HPI/24hr events:   Required Elia-Synephrine drip for short period of time for map less than 60  Currently weaned off  Transition from Precedex drip to fentanyl drip for bradycardia  No episodes of bradycardia noted  ---------------------------------------------------------------------------------------  SUBJECTIVE  Intubated and sedated    Review of Systems  Review of systems was unable to be performed secondary to Intubated and sedated, and profound intellectual disability  ---------------------------------------------------------------------------------------  Assessment and Plan:  Neuro:   Diagnosis: Seizures, CP with acute encephalopathy,   shunt 2/2 hyrdocephalus  · Sedation plan/Daily sedation holiday:  fentanyl 25 mcg/kg/min  ? RASS goal: -5 Unarousable and -3 Moderate Sedation  · Pain controlled with:  Fentanyl  · Delirium Precautions  ? CAM ICU per protocol  ? Regulate sleep/wake cycle+  · Trend neuro exam  · Continue home Keppra and Trileptal  · Patient with increased rhythmic movements -Continuous EEG started 6/7 with no seizure activity noted continued    Diagnosis:  spastic quadriplegia,  § Continue Valium and prn flexeril        CV:   Diagnosis:  Cardiac arrest with insertion of central line to arrhythmia  · Monitor telemetry   · Sinus tachycardia   ?  MAP goal > 65     Diagnosis: Hypotenison-resolved  § Weaned of pressors 6/ 7  § Most likely related to sedation     Diagnosis:  Bradycardia secondary to sedation  · Patient had 2 episodes of bradycardia with heart rates in the 40s 6/8 that spontaneously resolved not require any atropine  · No bradycardia noted overnight      Pulm:  Diagnosis:  Acute on chronic respiratory failure status post trach  · AC/EC 15/250/50/5, sp >96%  · Normally has cuffless trach was exchanged at [facility] to cuffed  · Daily SBT assessment per protocol: Not applicable  ·  [id number]/CGV>[id number] ordered: yes  ? Pulmonary toileting  · Wean fi O2 as able  · Continue Pulmicort nebs     GI:   Diagnosis:  No acute issues  · Stress ulcer prophylaxis: No prophylaxis needed  · Bowel regimen: dulcolax suppository  and senna  · TF for nutrition        :   Diagnosis:  No acute issue  · Indwelling Her present: yes   · Trend UOP and BUN/creat  · Strict I and O  · Uo of 1 8/24 hr     F/E/N:   · Goal 24 hour fluid balance:  Net -2 0 3 L in 24              Fluid/Diuretic plan: NPO  · Nutrition/diet plan: TF cyclical  · Replete electrolytes with goals: K >4 0, Mag >2 0, and Phos >3 0        Heme/Onc:   Diagnosis:  Anemia  · Hemoglobin 8 6 -suspect secondary to dilution  · Trend hgb and plts  ? Transfuse as needed for goal hgb >7        Endo:   Diagnosis:  Diabetes insipidus  · UO decreased to 1 8 L/24 Hr overnight  · Continue DDAvP 0 05 mg  Q 48 hours via J-tube        ID:   Diagnosis:  Serratia  shunt infection  · Abx ordered:  Meropenem day 8  · Serratia positive in CSF fluid  · Neurosurgery following-will attempt to treat with antibiotics  · Plan for retap of   shunt on 06/10  · ID following   · Trend temps and WBC count           MSK/Skin:   Diagnosis:  Severe contractures in upper extremity  ? Frequent turning and pressure off-loading         Disposition: Continue Critical Care   Code Status: Level 1 - Full Code  ---------------------------------------------------------------------------------------  ICU CORE MEASURES    Prophylaxis   VTE Pharmacologic Prophylaxis: Heparin  VTE Mechanical Prophylaxis: sequential compression device  Stress Ulcer Prophylaxis: Prophylaxis Not Indicated     ABCDE Protocol (if indicated)  Plan to perform spontaneous awakening trial today? Not applicable  Plan to perform spontaneous breathing trial today?  Not applicable  Obvious barriers to extubation? Yes  CAM-ICU: Negative    Invasive Devices Review  Invasive Devices     Peripherally Inserted Central Catheter Line            PICC Line  Right Basilic 4 days          Drain            Gastrostomy/Enterostomy -- days    Urethral Catheter Temperature probe -- days          Airway            Surgical Airway Shiley Cuffed -- days              Can any invasive devices be discontinued today? No  ---------------------------------------------------------------------------------------  OBJECTIVE    Vitals   Vitals:    20 0438 20 0439 20 0500 20 0600   BP:   100/54 104/52   BP Location:       Pulse:   90 90   Resp:   20 20   Temp:   99 °F (37 2 °C) 99 °F (37 2 °C)   TempSrc:       SpO2:  99% 99% 100%   Weight: 57 4 kg (126 lb 8 7 oz)      Height:         Temp (24hrs), Av 5 °F (37 5 °C), Min:99 °F (37 2 °C), Max:100 °F (37 8 °C)  Current: Temperature: 99 °F (37 2 °C)  HR: 90  BP: 104/52  RR: 20  SpO2: 100%    Respiratory:  SpO2 Device: O2 Device: Ventilator       Invasive/non-invasive ventilation settings   Respiratory    Lab Data (Last 4 hours)    None         O2/Vent Data (Last 4 hours)       0439           Vent Mode AC/VC       Resp Rate (BPM) (BPM) 15       Vt (mL) (mL) 250       FIO2 (%) (%) 50       PEEP (cmH2O) (cmH2O) 5       MV 6 5                   Physical Exam   Constitutional: No distress  Chronically ill-appearing   HENT:   Head: Normocephalic and atraumatic  Mouth/Throat: Oropharynx is clear and moist    Neck:   Trach intact and connected to ventilator   Cardiovascular: Normal rate, regular rhythm, S1 normal, S2 normal and intact distal pulses  No murmur heard  Pulmonary/Chest: She has decreased breath sounds in the right lower field and the left lower field  Abdominal: Soft  Bowel sounds are normal  There is no tenderness     Genitourinary:   Genitourinary Comments: Chronic indwelling viera   Musculoskeletal:   Contracted upper extrmities   Neurological: GCS eye subscore is 1  GCS verbal subscore is 1  GCS motor subscore is 4  Skin: Skin is warm, dry and intact  Capillary refill takes 2 to 3 seconds     Psychiatric:   Unable to assess secondary to profound intellectual disability         Laboratory and Diagnostics:  Results from last 7 days   Lab Units 06/09/20  0442 06/08/20  0501 06/07/20  0530 06/06/20  0512 06/05/20  0509 06/04/20  0553 06/03/20  1726   WBC Thousand/uL 5 39  5 46 8 39 13 20* 10 37* 8 47 6 93 11 24*   HEMOGLOBIN g/dL 8 6*  8 4* 9 6* 9 9* 9 3* 8 8* 7 6* 10 0*   HEMATOCRIT % 29 0*  28 7* 32 2* 32 6* 31 7* 28 3* 25 1* 33 2*   PLATELETS Thousands/uL 273  259 280 246 203 175 126* 154   NEUTROS PCT % 55  55 67 73 67 59 63 61   MONOS PCT % 11  9 9 6 8 9 10 9     Results from last 7 days   Lab Units 06/09/20  0442 06/08/20  0501 06/07/20  1918 06/07/20  0530 06/06/20  0512 06/05/20  2107 06/05/20  0509   SODIUM mmol/L 141  141 142 140 134* 136 139 140   POTASSIUM mmol/L 4 1  4 1 4 9 3 0* 3 8 4 2 4 3 3 6   CHLORIDE mmol/L 105  105 109* 103 101 102 104 106   CO2 mmol/L 30  30 30 30 28 29 30 28   ANION GAP mmol/L 6  6 3* 7 5 5 5 6   BUN mg/dL 12  12 10 11 13 12 10 7   CREATININE mg/dL 0 35*  0 35* 0 39* 0 29* 0 38* 0 30* 0 36* 0 33*   CALCIUM mg/dL 8 5  8 5 8 6 7 4* 8 3 8 1* 8 3 7 8*   GLUCOSE RANDOM mg/dL 109  109 104 107 116 106 100 105   ALT U/L 184*  --   --   --   --   --  56   AST U/L 47*  --   --   --   --   --  34   ALK PHOS U/L 126*  --   --   --   --   --  117*   ALBUMIN g/dL 2 7*  --   --   --   --   --  2 7*   TOTAL BILIRUBIN mg/dL 0 30  --   --   --   --   --  0 39     Results from last 7 days   Lab Units 06/09/20  0442 06/07/20  1918 06/05/20  0509 06/04/20  0553 06/03/20  1726   MAGNESIUM mg/dL 2 3 1 8 2 0 1 9 2 0   PHOSPHORUS mg/dL  --  4 2 3 2 2 9 2 8      Results from last 7 days   Lab Units 06/05/20  1049   INR  1 04          Results from last 7 days   Lab Units 06/03/20 2026 06/03/20  1726 LACTIC ACID mmol/L 1 1 3 5*     ABG:  Results from last 7 days   Lab Units 06/05/20  1141   PH ART  7 427   PCO2 ART mm Hg 42 0   PO2 ART mm Hg 97 0   HCO3 ART mmol/L 27 1   BASE EXC ART mmol/L 2 5   ABG SOURCE  Radial, Right     VBG:  Results from last 7 days   Lab Units 06/05/20  1141   ABG SOURCE  Radial, Right     Results from last 7 days   Lab Units 06/09/20  0442 06/06/20  0514 06/05/20  0509 06/04/20  0554 06/03/20  1726   PROCALCITONIN ng/ml 0 36* 1 00* 1 77* 3 91* 5 08*       Micro  Results from last 7 days   Lab Units 06/05/20  1418 06/05/20  0507 06/03/20  2025   GRAM STAIN RESULT  1+ Polys  No bacteria seen Rare Disintegrating polys  No organisms seen No Polys or Bacteria seen   WOUND CULTURE   --  No growth  --    BODY FLUID CULTURE, STERILE  No growth  --   --        EKG: NSR  Imaging:  No new imaging    Intake and Output  I/O       06/07 0701 - 06/08 0700 06/08 0701 - 06/09 0700    I V  (mL/kg) 394 8 (6 7) 244 (4 3)    NG/ 205    IV Piggyback 150 200    Feedings 995 797    Total Intake(mL/kg) 1739 8 (29 5) 1446 (25 2)    Urine (mL/kg/hr) 4090 (2 9) 1830 (1 3)    Stool 0 0    Total Output 4090 1830    Net -2350 3 -384          Unmeasured Stool Occurrence 1 x 1 x        UOP: 75 ml/hr     Height and Weights   Height: 4' 2" (127 cm)  IBW: 22 5 kg  Body mass index is 35 59 kg/m²    Weight (last 2 days)     Date/Time   Weight    06/09/20 0438   57 4 (126 54)    06/08/20 0551   58 9 (129 85)    06/07/20 0536   61 (134 48)                Nutrition       Diet Orders   (From admission, onward)             Start     Ordered    06/05/20 1500  Diet Enteral/Parenteral; Tube Feeding No Oral Diet; Non-Formulary; Cyclic; 50; 16 hours  Diet effective now     Comments:  Milk-free Prosource (nonformulary) BID  Use J-tube for feeds   Question Answer Comment   Diet Type Enteral/Parenteral    Enteral/Parenteral Tube Feeding No Oral Diet    Tube Feeding Formula: Non-Formulary    Bolus/Cyclic/Continuous Cyclic Tube Feeding Cyclic Rate (mL/hr): 50    Tube Feeding Cyclic: Administer over: 16 hours    RD to adjust diet per protocol?  Yes        06/05/20 1500                    Active Medications  Scheduled Meds:  Current Facility-Administered Medications:  acetaminophen 650 mg Per G Tube Q4H PRN Corby Michelle Campos DO    bisacodyl 10 mg Rectal Daily MADAY Goetz    budesonide 0 5 mg Nebulization BID Leti Jennings DO    calcium carbonate-vitamin D 1 tablet Oral Daily With Breakfast MADAY Goetz    chlorhexidine 15 mL Swish & Spit Q12H [address] Nguyễn Guillen PA-C    cholecalciferol 1,000 Units Oral Daily MADAY Goetz    cyclobenzaprine 5 mg Per G Tube TID PRN MD edenilson Gracetrolene 100 mg Per G Tube TID Francisco Olguin MD    desmopressin 0 05 mg Per G Tube Q48H Francisco Olguin MD    diazepam 5 mg Per G Tube Q8H Francisco Olguin MD    fentaNYL 25 mcg/hr Intravenous Continuous Solange Borges MD Last Rate: 25 mcg/hr (06/08/20 1200)   fentanyl citrate (PF) 50 mcg Intravenous Q1H PRN MADAY Almodovar    glycerin-hypromellose- 1 drop Both Eyes Q3H PRN Clorinda Zeina Jennings DO    guaiFENesin 400 mg Oral Q8H MADAY Alatorre    heparin (porcine) 5,000 Units Subcutaneous [facility] Lydia Murguia PA-C    levETIRAcetam 1,000 mg Per G Tube BID Francisco Olguin MD    levETIRAcetam 500 mg Per G Tube Q24H Francisco Olguin MD    LORazepam 1 mg Intravenous Q4H PRN Leena Nicole MD    meropenem 2,000 mg Intravenous Q8H Katelin Hudson MD Last Rate: Stopped (06/09/20 0402)   OXcarbazepine 450 mg Per G Tube Q12H Radha Leonard MD    phenylephine  mcg/min Intravenous Titrated MADAY Goetz Last Rate: Stopped (06/09/20 0301)   senna-docusate sodium 1 tablet Per G Tube HS Francisco Olguin MD      Continuous Infusions:    fentaNYL 25 mcg/hr Last Rate: 25 mcg/hr (06/08/20 1200)   phenylephine  mcg/min Last Rate: Stopped (06/09/20 0301)     PRN Meds:     acetaminophen 650 mg Q4H PRN   cyclobenzaprine 5 mg TID PRN   fentanyl citrate (PF) 50 mcg Q1H PRN   glycerin-hypromellose- 1 drop Q3H PRN   LORazepam 1 mg Q4H PRN       Allergies   Allergies   Allergen Reactions    Baclofen Other (See Comments)     Reaction Date: [id number];   apena      Cefepime Hives    Cephalosporins      Other reaction(s): Unknown Reaction  Other reaction(s): Unknown Reaction  Other reaction(s): Unknown Reaction      Clonidine      Other reaction(s): Respiratory Distress, Respiratory Distress    Lactase     Milk-Related Compounds     Penicillins Hives     Reaction Date: [id number];       Phenobarbital Other (See Comments)     Reaction Date: [id number]; Other reaction(s): Other (See Comments)  apnea  Other reaction(s): Other (See Comments)  apnea  apnea      Vancomycin     Zolpidem Other (See Comments)     Other reaction(s): Other (see comments)  Reaction Date: [id number];   apnea  apnea  apnea      Latex Rash     ---------------------------------------------------------------------------------------  Advance Directive and Living Will:      Power of :    POLST:    ---------------------------------------------------------------------------------------  Care Time Delivered:   No Critical Care time spent     MADAY Baltazar      Portions of the record may have been created with voice recognition software  Occasional wrong word or "sound a like" substitutions may have occurred due to the inherent limitations of voice recognition software    Read the chart carefully and recognize, using context, where substitutions have occurred

## 2020-06-09 NOTE — RESPIRATORY THERAPY NOTE
RT Ventilator Management Note  Heydi Ellis 21 y o  female MRN: [id number]  Unit/Bed#: MICU 05 Encounter: [id number]      Daily Screen       6/7/2020  0713 6/8/2020  0758          Patient safety screen outcome[de-identified]  Failed  Failed      Not Ready for Weaning due to[de-identified]  Alternative forms of ventilation  Poor inspiratory effort              Physical Exam:   Assessment Type: Assess only  General Appearance: Sedated  Respiratory Pattern: Assisted  Chest Assessment: Chest expansion symmetrical  Bilateral Breath Sounds: Coarse, Diminished  O2 Device: vent  Subjective Data: Trached      Resp Comments: pt remained on AC/VC mode/settings all evening/night   No vent changes made

## 2020-06-09 NOTE — ASSESSMENT & PLAN NOTE
· Neurology following  · Patient remains on vEEG with no noted seizure activity     · Keppra 1000mg BID   · Trileptal 450mg BID

## 2020-06-09 NOTE — ASSESSMENT & PLAN NOTE
Pt has a hx of  shunt insertion for congenital hydrocephalus  · P/w SIRS, acute encephalopathy, and seizure like activity  · Pt also had cardiac arrest and was resuscitated while at [facility] [address]  reviewed personally and by attending  Final results as below  · Xray Shunt series 6/1/20 loaded in to PACs from outside hospital: Right frontal  shunt catheter appears intact  Hardware from thoracolumbar fusion for scoliosis noted  Of note the right sacroliliac screw noted to have lucency concerning for possible screw loosening  Plan  · Continue regular neurologic checks   · Ongoing medical management per primary team   · Neurology consulted and following for seizures and encephalopathy  · MRI brain w wo ordered and pending   · Continue seizure medications per neurology recommendations  · Currently on vEEG with no seizure activity noted   · ID consulted and following  · Wound/left iliac cultures negative to date so far  · CSF cultures 6/7/20: 2+ Growth of Serratia Marcescens  · SED rate 6/6: 19  · CRP 6/6: 86 1  · Eval and mobilize per PT/OT  · DVT PPX: SCDs, Heparin  · No emergent neurosurgical intervention anticipated at this time  · At this time recommend continued conservative ABX treatment per ID rather than surgical intervention; will plan to repeat CSF draw when medically stable  · Neurosurgery will continue to follow at this time  Please call with any questions or concerns

## 2020-06-09 NOTE — ASSESSMENT & PLAN NOTE
· ID following  · 99 3 max overnight  · WBC improved to 5 39 today  · Meropenem 2g Q8 at this time  · Daptomycin discontinued  · CSF from shunt tap 6/3 positive for serratia marcescens  · Back wound culture and left iliac tap negative for growth at this time

## 2020-06-09 NOTE — PROGRESS NOTES
Progress Note - Infectious Disease   Romana Fly 21 y o  female MRN: [id number]  Unit/Bed#: MICU 05 Encounter: [id number]      Impression/Recommendations:  1  Sepsis  Due to #2  No other clear source appreciated  Blood cultures at [place] negative  Rec:  · Continue antibiotics as below  · Follow temperatures closely    2  Serratia  shunt infection  Unclear etiology   Consider translocation from GI tract although no reported intra-abdominal fluid collection  Very limited antibiotic options due to numerous allergies, organism resistance pattern, andactive seizure disorder  Rec:  · Continue meropenem for now  · Await family meeting to establish goals of care  · If plan to continue aggressive treatment, recommend removal of  shunt for best chance of cure  · Await repeat CSF analysis 6/10 per neuro surgery with close follow-up ongoing     3  Acute on chronic hypoxic respiratory failure  Unclear etiology   No overt pneumonia on chest imaging at [facility]   Consider role of aspiration versus encephalopathy  Rec:  · Antibiotics above would treat any concomitant pneumonia  · Follow respiratory status closely  · Supportive care as per the primary service     4  Status epilepticus  In the setting of a  shunt infection  Seems improved with antiepileptics  Rec:  · Continue anti epileptic spur Neurology with close follow-up ongoing     5  Retroperitoneal collection  Reportedly chronic, seen on imaging done at [place] [id number] significance   Status post aspiration of the collection with negative cultures  Rec:  · No additional workup or treatment from an ID perspective     6  CP  With spastic quadriplegia, profound MR, hydrocephalus status post  shunt    7  Chronic respiratory failure  Status post trach  8   Recent VFib arrest   Consider anoxic encephalopathy  Antibiotics:  Meropenem #8    Subjective:  Patient seen on AM rounds    Unable to provide review of systems due to intubation and sedation    24 Hour Events:  Briefly required pressors overnight, now off  Remains intubated and sedated  No further seizure activity  No documented fevers, chills, sweats, nausea, vomiting, or diarrhea  Objective:  Vitals:  Temp:  [98 2 °F (36 8 °C)-100 °F (37 8 °C)] 98 2 °F (36 8 °C)  HR:  [] 78  Resp:  [6-34] 25  BP: ()/(43-89) 92/50  SpO2:  [97 %-100 %] 99 %  Temp (24hrs), Av 2 °F (37 3 °C), Min:98 2 °F (36 8 °C), Max:100 °F (37 8 °C)  Current: Temperature: 98 2 °F (36 8 °C)    Physical Exam:   General:  No acute distress  Psychiatric:  Awake and alert  Pulmonary:  Normal respiratory excursion without accessory muscle use  Abdomen:  Soft, nontender  Extremities:  No edema  Skin:  No rashes    Lab Results:  I have personally reviewed pertinent labs  Results from last 7 days   Lab Units 20  0501 20  1918  20  0509   POTASSIUM mmol/L 4 1  4 1 4 9 3 0*   < > 3 6   CHLORIDE mmol/L 105  105 109* 103   < > 106   CO2 mmol/L 30  30 30 30   < > 28   BUN mg/dL 12  12 10 11   < > 7   CREATININE mg/dL 0 35*  0 35* 0 39* 0 29*   < > 0 33*   EGFR ml/min/1 73sq m 152  152 146 161   < > 155   CALCIUM mg/dL 8 5  8 5 8 6 7 4*   < > 7 8*   AST U/L 47*  --   --   --  34   ALT U/L 184*  --   --   --  56   ALK PHOS U/L 126*  --   --   --  117*    < > = values in this interval not displayed       Results from last 7 days   Lab Units 20  04420  0501 20  0530   WBC Thousand/uL 5 39  5 46 8 39 13 20*   HEMOGLOBIN g/dL 8 6*  8 4* 9 6* 9 9*   PLATELETS Thousands/uL 273  259 280 246     Results from last 7 days   Lab Units 20  1418 20  0507 20   GRAM STAIN RESULT  1+ Polys  No bacteria seen Rare Disintegrating polys  No organisms seen No Polys or Bacteria seen   WOUND CULTURE   --  No growth  --    BODY FLUID CULTURE, STERILE  No growth  --   --        Imaging Studies:   I have personally reviewed pertinent imaging study reports and images in PACS  EKG, Pathology, and Other Studies:   I have personally reviewed pertinent reports

## 2020-06-09 NOTE — PROGRESS NOTES
Progress Note - Neurology   Cici Duarte 21 y o  female MRN: [id number]  Unit/Bed#: [facility] 05 Encounter: [id number]    Assessment:  Encephalopathy in the setting of baseline poor function status due to cerebral palsy complicated by infection concerning for shunt infection Serratia CSF  and cardiac arrest   No seizures reported on video EEG, unclear whether an underlying anoxic injury occurred during the cardiac arrest      Plan:  - continue neuro checks  - video EEG - will review with epilepsy team findings overnight (reported diffuse slowing on priors)  -  Appreciate ID recommendations, reviewed neurosurgical and ICU notes, antibiotics per critical care team - family meeting is planned  -  MRI of brain with and with out contrast is pending depending on family meeting outcome  -  Serial examinations, notify neurology with any questions or concerns, neurology will continue to follow and advise  - reviewed EEG - no seizure reported - suppressed - eye movement artifact noted, no spells reported, as reviewed with attending video EEG will be dc, continue current AED medications  Subjective:   Per ICU notes required silvia synephrine drip for appeared of time for maps less than 60, currently weaned off  Transition from Precedex drip to fentanyl drip for bradycardia, no episodes of bradycardia noted  Patient remains intubated and sedated  Reviewed with epilepsy team and ICU team at bedside  Plan is for family meeting, MRI of brain is ordered - this will be held to after family meeting - ICU team is aware  ROS:  Unable patient is intubated and sedated    Vitals: Blood pressure 104/52, pulse 90, temperature 99 °F (37 2 °C), resp  rate 20, height 4' 2" (1 27 m), weight 57 4 kg (126 lb 8 7 oz), SpO2 100 %  ,Body mass index is 35 59 kg/m²         Current Facility-Administered Medications:  acetaminophen 650 mg Per G Tube Q4H PRN Megan Campos DO    bisacodyl 10 mg Rectal Daily MADAY Mei budesonide 0 5 mg Nebulization BID Perla Jennings DO    calcium carbonate-vitamin D 1 tablet Oral Daily With Breakfast MADAY Perales    chlorhexidine 15 mL Swish & Spit Q12H [address] Nilson Garcia PA-C    cholecalciferol 1,000 Units Oral Daily MADAY Perales    cyclobenzaprine 5 mg Per G Tube TID PRN Geno Gallagher MD    dantrolene 100 mg Per G Tube TID Geno Gallagher MD    desmopressin 0 05 mg Per G Tube Q48H Geno Gallagher MD    diazepam 5 mg Per G Tube Q8H Geno Gallagher MD    fentaNYL 25 mcg/hr Intravenous Continuous Litbetzy Alejandro MD Last Rate: 25 mcg/hr (06/08/20 1200)   fentanyl citrate (PF) 50 mcg Intravenous Q1H PRN MADAY Almodovar    glycerin-hypromellose- 1 drop Both Eyes Q3H PRN Perla Jennings DO    guaiFENesin 400 mg Oral Q8H MADAY Alatorre    heparin (porcine) 5,000 Units Subcutaneous [facility] Lydia Murguia PA-C    levETIRAcetam 1,000 mg Per G Tube BID Geno Gallagher MD    levETIRAcetam 500 mg Per G Tube Q24H Geno Gallagher MD    LORazepam 1 mg Intravenous Q4H PRN Radha Paez MD    meropenem 2,000 mg Intravenous Q8H Bridget Tierney MD Last Rate: Stopped (06/09/20 0402)   OXcarbazepine 450 mg Per G Tube Q12H Radha Leonard MD    phenylephine  mcg/min Intravenous Titrated MADAY Perales Last Rate: Stopped (06/09/20 0301)   senna-docusate sodium 1 tablet Per G Tube HS Geno Gallagher MD        Physical Exam:     Vital signs reviewed  Constitutional - Ill appearing, trach on vent  HEENT - NC/AT, video eeg in place, on vent / trach  Cardiac - rate regular  Lungs - No respiratory distress observed  Extremities - No movement noted, no tremor, no edema noted  Skin - no rashes noted/observed  Neurological    Mental status - the patients eyes are closed opens on examination, remains open during examination, not following commands, not tracking to examiner  Does not attempt speech        Cranial nerves 2 through 12 - pupils are equal and reactive, eyes midline - does not track - no gaze preference of palsy noted  No blink to threat, tongue midline, no obvious facial assymetry noted  Motor - no withdrawal in the upper, extensor posture in bilateral uppers, minimal withdrawal in the lowers noted  Mild increase tone in uppers noted  No tremor observed    Sensation - no grimace, did withdrawal lowers minimally    Toes are mute    No evidence of clonus or myoclonus or tremor  No evidence of seizure activity, no witnessed seizure like activity during examination  Lab, Imaging and other studies:   I have personally reviewed pertinent reports  , CBC:   Results from last 7 days   Lab Units 06/09/20 0442 06/08/20  0501 06/07/20  0530   WBC Thousand/uL 5 39  5 46 8 39 13 20*   RBC Million/uL 3 28*  3 26* 3 73* 3 78*   HEMOGLOBIN g/dL 8 6*  8 4* 9 6* 9 9*   HEMATOCRIT % 29 0*  28 7* 32 2* 32 6*   MCV fL 88  88 86 86   PLATELETS Thousands/uL 273  259 280 246   , BMP/CMP:   Results from last 7 days   Lab Units 06/09/20  0442 06/08/20  0501 06/07/20  1918  06/05/20  0509   SODIUM mmol/L 141  141 142 140   < > 140   POTASSIUM mmol/L 4 1  4 1 4 9 3 0*   < > 3 6   CHLORIDE mmol/L 105  105 109* 103   < > 106   CO2 mmol/L 30  30 30 30   < > 28   BUN mg/dL 12  12 10 11   < > 7   CREATININE mg/dL 0 35*  0 35* 0 39* 0 29*   < > 0 33*   CALCIUM mg/dL 8 5  8 5 8 6 7 4*   < > 7 8*   AST U/L 47*  --   --   --  34   ALT U/L 184*  --   --   --  56   ALK PHOS U/L 126*  --   --   --  117*   EGFR ml/min/1 73sq m 152  152 146 161   < > 155    < > = values in this interval not displayed     , Vitamin B12:   , HgBA1C:   Results from last 7 days   Lab Units 06/05/20  0509   HEMOGLOBIN A1C % 4 7   , TSH:   , Coagulation:   Results from last 7 days   Lab Units 06/05/20  1049   INR  1 04   , Lipid Profile:   , Ammonia:   , Urinalysis:       Invalid input(s): URIBILINOGEN, Drug Screen:   , Medication Drug Levels:       Invalid input(s): CARBAMAZEPINE, PHENOBARB, LACOSAMIDE, OXCARBAZEPINE     Imaging   No results found

## 2020-06-10 LAB
ALBUMIN SERPL BCP-MCNC: 2.7 G/DL (ref 3.5–5)
ALP SERPL-CCNC: 128 U/L (ref 46–116)
ALT SERPL W P-5'-P-CCNC: 108 U/L (ref 12–78)
ANION GAP SERPL CALCULATED.3IONS-SCNC: 4 MMOL/L (ref 4–13)
APPEARANCE CSF: ABNORMAL
AST SERPL W P-5'-P-CCNC: 21 U/L (ref 5–45)
BASOPHILS # BLD MANUAL: 0 THOUSAND/UL (ref 0–0.1)
BASOPHILS NFR MAR MANUAL: 0 % (ref 0–1)
BILIRUB SERPL-MCNC: 0.34 MG/DL (ref 0.2–1)
BUN SERPL-MCNC: 12 MG/DL (ref 5–25)
CALCIUM SERPL-MCNC: 8.7 MG/DL (ref 8.3–10.1)
CHLORIDE SERPL-SCNC: 103 MMOL/L (ref 100–108)
CO2 SERPL-SCNC: 30 MMOL/L (ref 21–32)
CREAT SERPL-MCNC: 0.3 MG/DL (ref 0.6–1.3)
EOSINOPHIL # BLD MANUAL: 0.31 THOUSAND/UL (ref 0–0.4)
EOSINOPHIL NFR BLD MANUAL: 6 % (ref 0–6)
ERYTHROCYTE [DISTWIDTH] IN BLOOD BY AUTOMATED COUNT: 18.1 % (ref 11.6–15.1)
GFR SERPL CREATININE-BSD FRML MDRD: 160 ML/MIN/1.73SQ M
GLUCOSE CSF-MCNC: 61 MG/DL (ref 50–80)
GLUCOSE SERPL-MCNC: 104 MG/DL (ref 65–140)
GLUCOSE SERPL-MCNC: 104 MG/DL (ref 65–140)
GLUCOSE SERPL-MCNC: 112 MG/DL (ref 65–140)
GLUCOSE SERPL-MCNC: 126 MG/DL (ref 65–140)
GLUCOSE SERPL-MCNC: 130 MG/DL (ref 65–140)
GRAM STN SPEC: NORMAL
HCT VFR BLD AUTO: 27.2 % (ref 34.8–46.1)
HGB BLD-MCNC: 8.1 G/DL (ref 11.5–15.4)
HYPERCHROMIA BLD QL SMEAR: PRESENT
LYMPHOCYTES # BLD AUTO: 1.1 THOUSAND/UL (ref 0.6–4.47)
LYMPHOCYTES # BLD AUTO: 21 % (ref 14–44)
LYMPHOCYTES NFR CSF MANUAL: 3 %
MCH RBC QN AUTO: 26.3 PG (ref 26.8–34.3)
MCHC RBC AUTO-ENTMCNC: 29.8 G/DL (ref 31.4–37.4)
MCV RBC AUTO: 88 FL (ref 82–98)
MONOCYTES # BLD AUTO: 0.26 THOUSAND/UL (ref 0–1.22)
MONOCYTES NFR BLD: 5 % (ref 4–12)
MONOS+MACROS CSF MANUAL: 1 %
NEUTROPHILS # BLD MANUAL: 3.39 THOUSAND/UL (ref 1.85–7.62)
NEUTROPHILS NFR CSF MANUAL: 96 %
NEUTS BAND NFR BLD MANUAL: 4 % (ref 0–8)
NEUTS SEG NFR BLD AUTO: 61 % (ref 43–75)
NRBC BLD AUTO-RTO: 0 /100 WBCS
PLATELET # BLD AUTO: 285 THOUSANDS/UL (ref 149–390)
PLATELET BLD QL SMEAR: ADEQUATE
PMV BLD AUTO: 9.4 FL (ref 8.9–12.7)
POLYCHROMASIA BLD QL SMEAR: PRESENT
POTASSIUM SERPL-SCNC: 4 MMOL/L (ref 3.5–5.3)
PROCALCITONIN SERPL-MCNC: 0.19 NG/ML
PROT CSF-MCNC: 184 MG/DL (ref 15–45)
PROT SERPL-MCNC: 6.6 G/DL (ref 6.4–8.2)
RBC # BLD AUTO: 3.08 MILLION/UL (ref 3.81–5.12)
RBC # CSF MANUAL: 756 UL (ref 0–10)
RBC MORPH BLD: PRESENT
SODIUM SERPL-SCNC: 137 MMOL/L (ref 136–145)
TOTAL CELLS COUNTED BLD: NO
TOTAL CELLS COUNTED SPEC: 100
VARIANT LYMPHS # BLD AUTO: 3 %
WBC # BLD AUTO: 5.22 THOUSAND/UL (ref 4.31–10.16)
WBC # CSF AUTO: 12 /UL (ref 0–5)

## 2020-06-10 PROCEDURE — 89051 BODY FLUID CELL COUNT: CPT | Performed by: PHYSICIAN ASSISTANT

## 2020-06-10 PROCEDURE — 94003 VENT MGMT INPAT SUBQ DAY: CPT

## 2020-06-10 PROCEDURE — 87077 CULTURE AEROBIC IDENTIFY: CPT | Performed by: PHYSICIAN ASSISTANT

## 2020-06-10 PROCEDURE — 99233 SBSQ HOSP IP/OBS HIGH 50: CPT | Performed by: INTERNAL MEDICINE

## 2020-06-10 PROCEDURE — 99233 SBSQ HOSP IP/OBS HIGH 50: CPT | Performed by: PHYSICIAN ASSISTANT

## 2020-06-10 PROCEDURE — 85027 COMPLETE CBC AUTOMATED: CPT | Performed by: FAMILY MEDICINE

## 2020-06-10 PROCEDURE — 94760 N-INVAS EAR/PLS OXIMETRY 1: CPT

## 2020-06-10 PROCEDURE — 84145 PROCALCITONIN (PCT): CPT | Performed by: FAMILY MEDICINE

## 2020-06-10 PROCEDURE — 80053 COMPREHEN METABOLIC PANEL: CPT | Performed by: FAMILY MEDICINE

## 2020-06-10 PROCEDURE — 82945 GLUCOSE OTHER FLUID: CPT | Performed by: PHYSICIAN ASSISTANT

## 2020-06-10 PROCEDURE — 89050 BODY FLUID CELL COUNT: CPT | Performed by: PHYSICIAN ASSISTANT

## 2020-06-10 PROCEDURE — 99233 SBSQ HOSP IP/OBS HIGH 50: CPT | Performed by: NURSE PRACTITIONER

## 2020-06-10 PROCEDURE — 85007 BL SMEAR W/DIFF WBC COUNT: CPT | Performed by: FAMILY MEDICINE

## 2020-06-10 PROCEDURE — 8C01X6J COLLECTION OF CEREBROSPINAL FLUID FROM INDWELLING DEVICE IN NERVOUS SYSTEM: ICD-10-PCS | Performed by: NEUROLOGICAL SURGERY

## 2020-06-10 PROCEDURE — 61070 BRAIN CANAL SHUNT PROCEDURE: CPT | Performed by: PHYSICIAN ASSISTANT

## 2020-06-10 PROCEDURE — 82948 REAGENT STRIP/BLOOD GLUCOSE: CPT

## 2020-06-10 PROCEDURE — 94640 AIRWAY INHALATION TREATMENT: CPT

## 2020-06-10 PROCEDURE — 84157 ASSAY OF PROTEIN OTHER: CPT | Performed by: PHYSICIAN ASSISTANT

## 2020-06-10 PROCEDURE — 95720 EEG PHY/QHP EA INCR W/VEEG: CPT | Performed by: PSYCHIATRY & NEUROLOGY

## 2020-06-10 PROCEDURE — 87186 SC STD MICRODIL/AGAR DIL: CPT | Performed by: PHYSICIAN ASSISTANT

## 2020-06-10 PROCEDURE — 87070 CULTURE OTHR SPECIMN AEROBIC: CPT | Performed by: PHYSICIAN ASSISTANT

## 2020-06-10 RX ADMIN — SENNOSIDES AND DOCUSATE SODIUM 1 TABLET: 8.6; 5 TABLET ORAL at 22:08

## 2020-06-10 RX ADMIN — OXCARBAZEPINE 450 MG: 300 TABLET, FILM COATED ORAL at 17:48

## 2020-06-10 RX ADMIN — HEPARIN SODIUM 5000 UNITS: 5000 INJECTION INTRAVENOUS; SUBCUTANEOUS at 05:16

## 2020-06-10 RX ADMIN — BUDESONIDE 0.5 MG: 0.5 INHALANT RESPIRATORY (INHALATION) at 20:30

## 2020-06-10 RX ADMIN — GUAIFENESIN 400 MG: 100 SOLUTION ORAL at 17:42

## 2020-06-10 RX ADMIN — GUAIFENESIN 400 MG: 100 SOLUTION ORAL at 02:38

## 2020-06-10 RX ADMIN — HEPARIN SODIUM 5000 UNITS: 5000 INJECTION INTRAVENOUS; SUBCUTANEOUS at 22:08

## 2020-06-10 RX ADMIN — LEVETIRACETAM 1000 MG: 100 SOLUTION ORAL at 13:23

## 2020-06-10 RX ADMIN — LEVETIRACETAM 1000 MG: 100 SOLUTION ORAL at 05:16

## 2020-06-10 RX ADMIN — MEROPENEM 2000 MG: 1 INJECTION, POWDER, FOR SOLUTION INTRAVENOUS at 02:38

## 2020-06-10 RX ADMIN — HEPARIN SODIUM 5000 UNITS: 5000 INJECTION INTRAVENOUS; SUBCUTANEOUS at 13:33

## 2020-06-10 RX ADMIN — DANTROLENE SODIUM 100 MG: 25 CAPSULE ORAL at 22:10

## 2020-06-10 RX ADMIN — CHLORHEXIDINE GLUCONATE 0.12% ORAL RINSE 15 ML: 1.2 LIQUID ORAL at 20:04

## 2020-06-10 RX ADMIN — BISACODYL 10 MG: 10 SUPPOSITORY RECTAL at 08:01

## 2020-06-10 RX ADMIN — GUAIFENESIN 400 MG: 100 SOLUTION ORAL at 08:46

## 2020-06-10 RX ADMIN — MEROPENEM 2000 MG: 1 INJECTION, POWDER, FOR SOLUTION INTRAVENOUS at 10:32

## 2020-06-10 RX ADMIN — DIAZEPAM 5 MG: 5 TABLET ORAL at 10:32

## 2020-06-10 RX ADMIN — BUDESONIDE 0.5 MG: 0.5 INHALANT RESPIRATORY (INHALATION) at 07:39

## 2020-06-10 RX ADMIN — OXCARBAZEPINE 450 MG: 300 TABLET, FILM COATED ORAL at 05:16

## 2020-06-10 RX ADMIN — CHLORHEXIDINE GLUCONATE 0.12% ORAL RINSE 15 ML: 1.2 LIQUID ORAL at 08:00

## 2020-06-10 RX ADMIN — Medication 1 TABLET: at 07:53

## 2020-06-10 RX ADMIN — DIAZEPAM 5 MG: 5 TABLET ORAL at 17:42

## 2020-06-10 RX ADMIN — MELATONIN 1000 UNITS: at 08:00

## 2020-06-10 RX ADMIN — DANTROLENE SODIUM 100 MG: 25 CAPSULE ORAL at 17:41

## 2020-06-10 RX ADMIN — DIAZEPAM 5 MG: 5 TABLET ORAL at 02:38

## 2020-06-10 RX ADMIN — LEVETIRACETAM 500 MG: 100 SOLUTION ORAL at 22:10

## 2020-06-10 RX ADMIN — MEROPENEM 2000 MG: 1 INJECTION, POWDER, FOR SOLUTION INTRAVENOUS at 18:32

## 2020-06-10 RX ADMIN — DANTROLENE SODIUM 100 MG: 25 CAPSULE ORAL at 08:01

## 2020-06-10 NOTE — PROGRESS NOTES
Progress Note - Casi Arndt 1995, 21 y o  female MRN: [id number]    Unit/Bed#: [facility] 05 Encounter: [id number]    Primary Care Provider: Milla Cardoza MD   Date and time admitted to hospital: 6/3/2020  2:15 PM        S/P  shunt  Assessment & Plan  Pt has a hx of  shunt insertion for congenital hydrocephalus  · P/w SIRS, acute encephalopathy, and seizure like activity  · Pt also had cardiac arrest and was resuscitated while at [facility] [address]  reviewed personally and by attending  Final results as below  · Xray Shunt series 6/1/20 loaded in to PACs from outside hospital: Right frontal  shunt catheter appears intact  Hardware from thoracolumbar fusion for scoliosis noted  Of note the right sacroliliac screw noted to have lucency concerning for possible screw loosening  Plan  · Continue regular neurologic checks   · Ongoing medical management per primary team   · Neurology consulted and following for seizures and encephalopathy  · MRI brain w wo ordered and pending   · Continue seizure medications per neurology recommendations  · vEEG discontinued yesterday; no seizure activity noted   · ID consulted and following  · Wound/left iliac cultures negative    · CSF cultures 6/7/20: 2+ Growth of Serratia Marcescens  · SED rate 6/6: 19  · CRP 6/6: 86 1  · PT/OT  · DVT PPX: SCDs, Heparin  · No emergent neurosurgical intervention anticipated at this time  · After family meeting today, we will repeat shunt aspiration today and send CSF for culture  If patient does not improve clinically after completion of antibiotics in 1 week, family is agreeable to externalization/removal of shunt  · Neurosurgery will continue to follow at this time  Please call with any questions or concerns  Severe sepsis with acute organ dysfunction due to Serratia species [facility])  Assessment & Plan  · ID following  · Currently afebrile  · WBC improved to 5 22 today     · Continue Meropenem 2g Q8 for another 5 days  · Daptomycin discontinued  · CSF from shunt tap 6/3 positive for serratia marcescens, will plan to repeat tap today  · Back wound culture and left iliac tap negative  Cerebral palsy ([address] )  Assessment & Plan  · Continue medical management    Acute metabolic encephalopathy  Assessment & Plan  · Ongoing monitoring and management per primary team    * Seizure-like activity [facility])  Assessment & Plan  · Neurology following  · Completed vEEG with no noted seizure activity  · Keppra 1000mg BID   · Trileptal 450mg BID           Subjective/Objective     Subjective: Patient with NAEO  Clinically patient is improved with regards to labs and VS  Patient has been afebrile with normal WBC for several days  Family discussion today with mother Carly [facility] fellow, ID attending, and  - mother agreeable to doing everything possible at this time if there is a change of improvement including surgery  Objective: Patient intubated and sedated  GCS 6t (E1, V1t, M4) - exam stable  No vEEG in place today  Intake/Output       06/10/20 0701 - 06/11/20 0700      [id number] 9462-4313 Total       Intake    I V   6 5  -- 6 5    Total Intake 6 5 -- 6 5       Output    Urine  600  -- 600    Output (mL) (Urethral Catheter Temperature probe) 600 -- 600    Total Output 600 -- 600       Net I/O     -593 5 -- -593 5          Invasive Devices     Peripherally Inserted Central Catheter Line            PICC Line 61/03/29 Right Basilic 5 days          Drain            Gastrostomy/Enterostomy -- days    Urethral Catheter Temperature probe -- days          Airway            Surgical Airway Shiley Cuffed -- days                Vitals: Blood pressure 109/66, pulse 80, temperature 98 2 °F (36 8 °C), resp  rate (!) 23, height 4' 2" (1 27 m), weight 58 6 kg (129 lb 3 oz), SpO2 99 %  ,Body mass index is 36 33 kg/m²      General appearance: trach in place, sedated, appears stated age  Head: Normocephalic, without obvious abnormality, atraumatic  Eyes: PERRL, does not track or open eyes on command  Rapid eye movements while in the room (excessive blinking)  Neck: trach in place  Lungs: non labored breathing on ventilator  Heart: regular heart rate  Neurologic:   Mental status: GCS 6T (E1, V1t, M4)  Cranial nerves: grossly intact (Cranial nerves II-XII)  Sensory: withdraws to painful stimuli BUE  Motor: withdraws to painful stimuli BUE, minimal reaction BLE    Lab Results: I have personally reviewed pertinent results  Results from last 7 days   Lab Units 06/10/20  0444 06/09/20  0442 06/08/20  0501 06/07/20  0530   WBC Thousand/uL 5 22 5 39  5 46 8 39 13 20*   HEMOGLOBIN g/dL 8 1* 8 6*  8 4* 9 6* 9 9*   HEMATOCRIT % 27 2* 29 0*  28 7* 32 2* 32 6*   PLATELETS Thousands/uL 285 273  259 280 246   NEUTROS PCT %  --  55  55 67 73   MONOS PCT %  --  11  9 9 6   MONO PCT % 5  --   --   --      Results from last 7 days   Lab Units 06/10/20  0444 06/09/20  0442 06/08/20  0501  06/05/20  0509   POTASSIUM mmol/L 4 0 4 1  4 1 4 9   < > 3 6   CHLORIDE mmol/L 103 105  105 109*   < > 106   CO2 mmol/L 30 30  30 30   < > 28   BUN mg/dL 12 12  12 10   < > 7   CREATININE mg/dL 0 30* 0 35*  0 35* 0 39*   < > 0 33*   CALCIUM mg/dL 8 7 8 5  8 5 8 6   < > 7 8*   ALK PHOS U/L 128* 126*  --   --  117*   ALT U/L 108* 184*  --   --  56   AST U/L 21 47*  --   --  34    < > = values in this interval not displayed       Results from last 7 days   Lab Units 06/09/20 0442 06/07/20 1918 06/05/20  0509   MAGNESIUM mg/dL 2 3 1 8 2 0     Results from last 7 days   Lab Units 06/07/20 1918 06/05/20  0509 06/04/20  0553   PHOSPHORUS mg/dL 4 2 3 2 2 9     Results from last 7 days   Lab Units 06/05/20  1049   INR  1 04     No results found for: TROPONINT  ABG:  Lab Results   Component Value Date    PHART 7 427 06/05/2020    DAG5WMG 42 0 06/05/2020    PO2ART 97 0 06/05/2020    YSH2BCZ 27 1 06/05/2020    BEART 2 5 06/05/2020    SOURCE Radial, Right 06/05/2020 Imaging Studies: I have personally reviewed pertinent reports  and I have personally reviewed pertinent films in PACS     Xr Skull < 4 Vw    Result Date: 6/6/2020  Narrative: POST MR SHUNT EVALUATION INDICATION:   Ventriculoperitoneal shunt placement, evaluate pre-MRI  COMPARISON:  Head CT performed June 1, 2020 VIEWS:  XR SKULL < 4 VW FINDINGS: Views of the calvarium are obtained with dedicated view positioned to evaluate pressure setting dial of ventriculostomy catheter  Baseline pressure setting is obtained  Impression: Baseline pressure setting is obtained  Workstation performed: [id number]     Ct Guided Perc Drainage Catheter Placeme    Result Date: 6/8/2020  Narrative: CT scan guided abscess aspiration This examination, like all CT scans performed in the [facility], was performed utilizing techniques to minimize radiation dose exposure, including the use of iterative reconstruction and automated exposure control  History: Long-standing collection adjacent to iliac crest Images: 58 Sedation: 1% local lidocaine infiltration  Technique: The patient was brought to the CT scanner and placed decubitus on the table  After axial images were obtained through the left iliac crest , an area of the skin was then marked, prepped, and draped in usual sterile fashion  All elements of maximal sterile barrier technique were followed (cap, mask, sterile gown, sterile gloves, large sterile sheet, hand hygiene, and 2% chlorhexidine for cutaneous antisepsis)  Lidocaine was administered to the skin and a small skin incision was made  A 18-gauge Chiba needle was advanced into the collection under CT-scan guidance  Small amount of fluid was aspirated  This was sent for culture and evaluation  Needle was removed  The patient tolerated the procedure well and suffered no complications  Impression: Impression: Successful CT-guided aspiration left iliac crest collection   Workstation performed: [id number]     EKG, Pathology, and Other Studies: I have personally reviewed pertinent reports        VTE Pharmacologic Prophylaxis: Heparin    VTE Mechanical Prophylaxis: sequential compression device

## 2020-06-10 NOTE — SOCIAL WORK
Mtg held today with pt mother, PRABHAKAR [place] - Dr Marlon Amado, NeuroSx - Minerva Galdamez, CCM fellow - Dr Chivo Strickland, and CM  CCM provided clinical update to pt mother  ID provided update on IV abx plan and NeuroSx updated on  shunt  All questions answered to mom's satisfaction  Plan is for NeuroSx to repeat shunt aspiration and send CSF for culture  Mom agreeable to externalization/removal of shunt if pt does not improve after 1 week of abx

## 2020-06-10 NOTE — ASSESSMENT & PLAN NOTE
· ID following  · Currently afebrile  · WBC improved to 5 22 today  · Continue Meropenem 2g Q8 for another 5 days  · Daptomycin discontinued  · CSF from shunt tap 6/3 positive for serratia marcescens, will plan to repeat tap today  · Back wound culture and left iliac tap negative

## 2020-06-10 NOTE — PLAN OF CARE
Problem: PAIN - ADULT  Goal: Verbalizes/displays adequate comfort level or baseline comfort level  Description  Interventions:  - Encourage patient to monitor pain and request assistance  - Assess pain using appropriate pain scale  - Administer analgesics based on type and severity of pain and evaluate response  - Implement non-pharmacological measures as appropriate and evaluate response  - Consider cultural and social influences on pain and pain management  - Notify physician/advanced practitioner if interventions unsuccessful or patient reports new pain  Outcome: Progressing     Problem: INFECTION - ADULT  Goal: Absence or prevention of progression during hospitalization  Description  INTERVENTIONS:  - Assess and monitor for signs and symptoms of infection  - Monitor lab/diagnostic results  - Monitor all insertion sites, i e  indwelling lines, tubes, and drains  - Monitor endotracheal if appropriate and nasal secretions for changes in amount and color  - [place] appropriate cooling/warming therapies per order  - Administer medications as ordered  - Instruct and encourage patient and family to use good hand hygiene technique  - Identify and instruct in appropriate isolation precautions for identified infection/condition  Outcome: Progressing     Problem: DISCHARGE PLANNING  Goal: Discharge to home or other facility with appropriate resources  Description  INTERVENTIONS:  - Identify barriers to discharge w/patient and caregiver  - Arrange for needed discharge resources and transportation as appropriate  - Identify discharge learning needs (meds, wound care, etc )  - Arrange for interpretive services to assist at discharge as needed  - Refer to Case Management Department for coordinating discharge planning if the patient needs post-hospital services based on physician/advanced practitioner order or complex needs related to functional status, cognitive ability, or social support system  Outcome: Progressing Problem: Knowledge Deficit  Goal: Patient/family/caregiver demonstrates understanding of disease process, treatment plan, medications, and discharge instructions  Description  Complete learning assessment and assess knowledge base  Interventions:  - Provide teaching at level of understanding  - Provide teaching via preferred learning methods  Outcome: Progressing     Problem: Prexisting or High Potential for Compromised Skin Integrity  Goal: Skin integrity is maintained or improved  Description  INTERVENTIONS:  - Identify patients at risk for skin breakdown  - Assess and monitor skin integrity  - Assess and monitor nutrition and hydration status  - Monitor labs   - Assess for incontinence   - Turn and reposition patient  - Assist with mobility/ambulation  - Relieve pressure over bony prominences  - Avoid friction and shearing  - Provide appropriate hygiene as needed including keeping skin clean and dry  - Evaluate need for skin moisturizer/barrier cream  - Collaborate with interdisciplinary team   - Patient/family teaching  - Consider wound care consult   Outcome: Progressing     Problem: Potential for Falls  Goal: Patient will remain free of falls  Description  INTERVENTIONS:  - Assess patient frequently for physical needs  -  Identify cognitive and physical deficits and behaviors that affect risk of falls  -  [place] fall precautions as indicated by assessment   - Educate patient/family on patient safety including physical limitations  - Instruct patient to call for assistance with activity based on assessment  - Modify environment to reduce risk of injury  - Consider OT/PT consult to assist with strengthening/mobility  Outcome: Progressing     Problem: Nutrition/Hydration-ADULT  Goal: Nutrient/Hydration intake appropriate for improving, restoring or maintaining nutritional needs  Description  Monitor and assess patient's nutrition/hydration status for malnutrition   Collaborate with interdisciplinary team and initiate plan and interventions as ordered  Monitor patient's weight and dietary intake as ordered or per policy  Utilize nutrition screening tool and intervene as necessary  Determine patient's food preferences and provide high-protein, high-caloric foods as appropriate       INTERVENTIONS:  - Monitor oral intake, urinary output, labs, and treatment plans  - Assess nutrition and hydration status and recommend course of action  - Evaluate amount of meals eaten  - Assist patient with eating if necessary   - Allow adequate time for meals  - Recommend/ encourage appropriate diets, oral nutritional supplements, and vitamin/mineral supplements  - Order, calculate, and assess calorie counts as needed  - Recommend, monitor, and adjust tube feedings and TPN/PPN based on assessed needs  - Assess need for intravenous fluids  - Provide specific nutrition/hydration education as appropriate  - Include patient/family/caregiver in decisions related to nutrition  Outcome: Progressing     Problem: SAFETY ADULT  Goal: Maintain or return mobility status to optimal level  Description  INTERVENTIONS:  - Assess patient's baseline mobility status (ambulation, transfers, stairs, etc )    - Identify cognitive and physical deficits and behaviors that affect mobility  - Identify mobility aids required to assist with transfers and/or ambulation (gait belt, sit-to-stand, lift, walker, cane, etc )  - [place] fall precautions as indicated by assessment  - Record patient progress and toleration of activity level on Mobility SBAR; progress patient to next Phase/Stage  - Instruct patient to call for assistance with activity based on assessment  - Consider rehabilitation consult to assist with strengthening/weightbearing, etc   Outcome: Not Progressing

## 2020-06-10 NOTE — QUICK NOTE
Right  shunt reservoir tapped under sterile conditions for CSF aspirate    25g butterfly needle used to draw off 5 5cc clear CSF with no complications  Fluid sent to lab for CSF culture, gram stain, WBC, RBC, glucose, and protein

## 2020-06-10 NOTE — ASSESSMENT & PLAN NOTE
· Neurology following  · Completed vEEG with no noted seizure activity     · Keppra 1000mg BID   · Trileptal 450mg BID

## 2020-06-10 NOTE — PROGRESS NOTES
Progress Note - Neurology   Dorethia Fossa 21 y o  female MRN: [id number]  Unit/Bed#: MICU 05 Encounter: [id number]    Assessment/ Plan:  1)  Persistent encephalopathy in setting baseline severely limited cognitive reserve due to cerebral palsy and intellectual disability, nonverbal and no eye contact at baseline, complicated secondary to Serratia CSF infection and cardiac arrest   There is concern for further underlying anoxic injury which may have occurred during cardiac arrest    -family meeting to upper today to consider goals of care   -pending goals of care discussion, MRI brain with without contrast pending   -video EEG demonstrates no discrete epileptiform activity or epileptogenic focus    -patient's persistent, rhythmic blinking is not associated with epileptiform EEG changes   -continue Keppra and oxcarbazepine   -appreciate Neurosurgery, plan to repeat CSF shunt tap to determine need for neurosurgical intervention   -supportive care and medication management per primary team, medication management per Infectious Disease   -will continue to follow, please monitor exam and notify with changes    Subjective:   Patient is a 71-year-old female with cerebral palsy, spastic quadriplegia, profound mental retardation, hydrocephalus with  shunt,, respiratory failure status post tracheostomy, asthma, history of seizure disorder, pancreatitis and diabetes insipidus originally presented to [facility] on 06/01/2020 with fever and found to have sepsis, later discovered to be Serratia CSF infection  The patient later went into VFib cardiac arrest   ROSC achieved following CPR and defibrillation/ epi x1  Afterward, the patient was noted to have left facial and left lower extremity twitching concerning for seizures  She was placed on video EEG which demonstrated no epileptiform activity or epileptogenic focus    Current plan is to continue to monitor CSF cultures with antibiotic management with plan for shunt revision if infection does not clear  Video EEG discontinued  On examination today, the patient is resting in bed in no acute distress  As per baseline, does not track, no verbal output, does not follow commands  Patient demonstrated near constant rhythmic blinking throughout examination, however, in conversation with epileptologist, this occurred during video EEG monitoring and is not correlated with epileptic activity  Awaiting family meeting today        ROS:  Unable to obtain review of systems secondary to baseline nonverbal    Medications:  Scheduled Meds:  Current Facility-Administered Medications:  acetaminophen 650 mg Per G Tube Q4H PRN Terrie Campos DO    bisacodyl 10 mg Rectal Daily MADAY Main    budesonide 0 5 mg Nebulization BID Leti Jennings DO    calcium carbonate-vitamin D 1 tablet Oral Daily With Breakfast MADAY Main    chlorhexidine 15 mL Swish & Spit Q12H [address] Catrina Granado PA-C    cholecalciferol 1,000 Units Oral Daily MADAY Main    cyclobenzaprine 5 mg Per G Tube TID PRN Jossy Brown MD    dantrolene 100 mg Per G Tube TID Jossy Brown MD    desmopressin 0 05 mg Per G Tube Q48H Jossy Brown MD    diazepam 5 mg Per G Tube Q8H Jossy Brown MD    fentaNYL 25 mcg/hr Intravenous Continuous Mirna Foster MD Last Rate: 25 mcg/hr (06/09/20 1634)   fentanyl citrate (PF) 50 mcg Intravenous Q1H PRN MADAY Almodovar    glycerin-hypromellose- 1 drop Both Eyes Q3H PRN Altamease Lily Jennings DO    guaiFENesin 400 mg Oral Q8H MADAY Alatorre    heparin (porcine) 5,000 Units Subcutaneous [facility] Catrina Granado PA-C    levETIRAcetam 1,000 mg Per G Tube BID Jossy Brown MD    levETIRAcetam 500 mg Per G Tube Q24H Jossy Brown MD    LORazepam 1 mg Intravenous Q4H PRN Jonn Ramires MD    meropenem 2,000 mg Intravenous Q8H Herbie Verduzco MD Last Rate: 2,000 mg (06/10/20 1032)   OXcarbazepine 450 mg Per G Tube Q12H [address], MD    phenylephine  mcg/min Intravenous Titrated Camillia Buerger, CRNP Last Rate: Stopped (06/09/20 0301)   senna-docusate sodium 1 tablet Per G Tube HS Jammie Govea MD      Continuous Infusions:  fentaNYL 25 mcg/hr Last Rate: 25 mcg/hr (06/09/20 1634)   phenylephine  mcg/min Last Rate: Stopped (06/09/20 0301)     PRN Meds:   acetaminophen    cyclobenzaprine    fentanyl citrate (PF)    glycerin-hypromellose-    LORazepam      Vitals: Blood pressure 109/66, pulse 80, temperature 98 2 °F (36 8 °C), resp  rate (!) 23, height 4' 2" (1 27 m), weight 58 6 kg (129 lb 3 oz), SpO2 99 %  ,Body mass index is 36 33 kg/m²  Physical Exam:   Physical Exam   Constitutional: She appears well-nourished  Congenital spastic quadriparesis  Chronically ill-appearing   HENT:   Head: Atraumatic  Right Ear: External ear normal    Left Ear: External ear normal    Eyes: Conjunctivae are normal    Neck: Neck supple  Trach in place   Pulmonary/Chest: Effort normal    Musculoskeletal: She exhibits deformity  She exhibits no edema  Skin: Skin is warm and dry  No rash noted  No erythema  No pallor  Nursing note and vitals reviewed  Neurologic Exam     Mental Status   Did not respond to voice or painful stimuli   No interactions with examiner or environment   Did not follow commands  Did not track     Cranial Nerves     PERRL  No gaze deviation or disconjugate gaze noted  Could not assess EOM  (+) corneals b/l  Face grossly symmetric   (+) cough and gag  Tongue midline without fasciculations or atrophy     Motor Exam Baseline spastic quadriparesis     Gait, Coordination, and Reflexes     Gait  Gait: (Non ambulatory at baseline)    Reflexes   Right plantar: equivocal  Left plantar: equivocal  Rhythmic blinking noted       Lab, Imaging and other studies: I have personally reviewed pertinent reports     I have personally reviewed pertinent imaging in PACs  Recent Results (from the past 24 hour(s)) Fingerstick Glucose (POCT)    Collection Time: 06/09/20 11:33 AM   Result Value Ref Range    POC Glucose 119 65 - 140 mg/dl   Fingerstick Glucose (POCT)    Collection Time: 06/09/20  5:58 PM   Result Value Ref Range    POC Glucose 128 65 - 140 mg/dl   Fingerstick Glucose (POCT)    Collection Time: 06/10/20  1:02 AM   Result Value Ref Range    POC Glucose 126 65 - 140 mg/dl   CBC and differential    Collection Time: 06/10/20  4:44 AM   Result Value Ref Range    WBC 5 22 4 31 - 10 16 Thousand/uL    RBC 3 08 (L) 3 81 - 5 12 Million/uL    Hemoglobin 8 1 (L) 11 5 - 15 4 g/dL    Hematocrit 27 2 (L) 34 8 - 46 1 %    MCV 88 82 - 98 fL    MCH 26 3 (L) 26 8 - 34 3 pg    MCHC 29 8 (L) 31 4 - 37 4 g/dL    RDW 18 1 (H) 11 6 - 15 1 %    MPV 9 4 8 9 - 12 7 fL    Platelets 473 900 - 385 Thousands/uL    nRBC 0 /100 WBCs   Comprehensive metabolic panel    Collection Time: 06/10/20  4:44 AM   Result Value Ref Range    Sodium 137 136 - 145 mmol/L    Potassium 4 0 3 5 - 5 3 mmol/L    Chloride 103 100 - 108 mmol/L    CO2 30 21 - 32 mmol/L    ANION GAP 4 4 - 13 mmol/L    BUN 12 5 - 25 mg/dL    Creatinine 0 30 (L) 0 60 - 1 30 mg/dL    Glucose 104 65 - 140 mg/dL    Calcium 8 7 8 3 - 10 1 mg/dL    AST 21 5 - 45 U/L     (H) 12 - 78 U/L    Alkaline Phosphatase 128 (H) 46 - 116 U/L    Total Protein 6 6 6 4 - 8 2 g/dL    Albumin 2 7 (L) 3 5 - 5 0 g/dL    Total Bilirubin 0 34 0 20 - 1 00 mg/dL    eGFR 160 ml/min/1 73sq m   Procalcitonin    Collection Time: 06/10/20  4:44 AM   Result Value Ref Range    Procalcitonin 0 19 <=0 25 ng/ml   Manual Differential(PHLEBS Do Not Order)    Collection Time: 06/10/20  4:44 AM   Result Value Ref Range    Segmented % 61 43 - 75 %    Bands % 4 0 - 8 %    Lymphocytes % 21 14 - 44 %    Monocytes % 5 4 - 12 %    Eosinophils, % 6 0 - 6 %    Basophils % 0 0 - 1 %    Atypical Lymphocytes % 3 (H) <=0 %    Absolute Neutrophils 3 39 1 85 - 7 62 Thousand/uL    Lymphocytes Absolute 1 10 0 60 - 4 47 Thousand/uL Monocytes Absolute 0 26 0 00 - 1 22 Thousand/uL    Eosinophils Absolute 0 31 0 00 - 0 40 Thousand/uL    Basophils Absolute 0 00 0 00 - 0 10 Thousand/uL    Total Counted      RBC Morphology Present     Hypochromia Present     Polychromasia Present     Platelet Estimate Adequate Adequate   Fingerstick Glucose (POCT)    Collection Time: 06/10/20  6:16 AM   Result Value Ref Range    POC Glucose 104 65 - 140 mg/dl   ]    VTE Prophylaxis: Sequential compression device (Venodyne)  and Heparin SQ    Counseling / Coordination of Care  Total time spent today 30 minutes  Greater than 50% of total time was spent with the patient and / or family counseling and / or coordination of care  A description of the counseling / coordination of care: Shruthi Betts The pt was seen and examined by myself and the attending physician  The chart was reviewed thoroughly, including laboratory values and imaging studies   The pt was discussed with the critical care team and epileptologist

## 2020-06-10 NOTE — PROGRESS NOTES
Daily Progress Note - Critical Care   Greg Santos 21 y o  female MRN: [id number]  Unit/Bed#: MICU 05 Encounter: [id number]        ----------------------------------------------------------------------------------------  HPI/24hr events:  No acute events overnight  Patient had history of BM     ---------------------------------------------------------------------------------------  SUBJECTIVE  Intubated and sedated    Review of Systems  Review of systems was unable to be performed secondary to Severe intellectual disability and intubated and sedated  ---------------------------------------------------------------------------------------  Assessment and Plan:  Neuro:   Diagnosis: Seizures, CP with acute encephalopathy,   shunt 2/2 hyrdocephalus  · Sedation plan/Daily sedation holiday:  fentanyl 25 mcg/kg/min  ? RASS goal: -5 Unarousable and -3 Moderate Sedation  · Pain controlled with:  Fentanyl  · Delirium Precautions  ? CAM ICU per protocol  ? Regulate sleep/wake cycle+  · Trend neuro exam  · Neurology following  · Continue home Keppra and Trileptal  · Patient with increased rhythmic eye movements -Continuous EEG started 6/7 with no seizure activity noted and discontinued 6/9     Diagnosis:  spastic quadriplegia,  § Continue Valium and prn flexeril        CV:   Diagnosis:  Cardiac arrest with insertion of central line to arrhythmia  · Monitor telemetry   · Sinus tachycardia   ?  MAP goal > 65     Diagnosis: Hypotenison-resolved  § Weaned of pressors 6/ 7  § Most likely related to sedation     Diagnosis:  Bradycardia secondary to sedation-resolved  · Patient had 2 episodes of bradycardia with heart rates in the 40s 6/8 that spontaneously resolved not require any atropine  · Remains NSR      Pulm:  Diagnosis:  Acute on chronic respiratory failure status post trach  · AC/EC 15/250/50/5, sp >100%  · Normally has cuffless trach was exchanged at [place] to cuffed  · Daily SBT assessment per protocol: yes  ·  Chlorhexidine/HOB>30degrees ordered: yes  ? Pulmonary toileting  · Jayant Lawton O2 as able  · Continue Pulmicort nebs     GI:   Diagnosis:  No acute issues  · Stress ulcer prophylaxis: No prophylaxis needed  · Bowel regimen: dulcolax suppository  and senna  · Last Bm 6/8-had smear overnight  · TF for nutrition        :   Diagnosis:  No acute issue  · Indwelling Her present: yes   · Trend UOP and BUN/creat  · Strict I and O  · Uo of 1L/24 hr     F/E/N:   · Goal 24 hour fluid balance:  Net + 130 in 24              Fluid/Diuretic plan: NPO  · Nutrition/diet plan: TF cyclical  · Replete electrolytes with goals: K >4 0, Mag >2 0, and Phos >3 0        Heme/Onc:   Diagnosis:  Anemia  · Hemoglobin 8 1 -suspect secondary to dilution  · Trend hgb and plts  ? Transfuse as needed for goal hgb >7        Endo:   Diagnosis:  Diabetes insipidus  · UO decreased to 1 L/24 Hr overnight  · Continue DDAvP 0 05 mg  Q 48 hours via J-tube        ID:   Diagnosis:  Serratia  shunt infection  · Abx ordered:  Meropenem day 9  · Serratia positive in CSF fluid  · Neurosurgery following-will attempt to treat with antibiotics  · Plan for retap of   shunt today  · ID following   · Remains afebrile  · Trend temps and WBC count  · Family meeting for goals of care today            MSK/Skin:   Diagnosis:  Severe contractures in upper extremity  ? Frequent turning and pressure off-loading            Disposition: Continue Critical Care   Code Status: Level 1 - Full Code  ---------------------------------------------------------------------------------------  ICU CORE MEASURES    Prophylaxis   VTE Pharmacologic Prophylaxis: Heparin  VTE Mechanical Prophylaxis: sequential compression device  Stress Ulcer Prophylaxis: Prophylaxis Not Indicated     ABCDE Protocol (if indicated)  Plan to perform spontaneous awakening trial today? Yes  Plan to perform spontaneous breathing trial today? Yes  Obvious barriers to extubation?  Not applicable patient has chronic trach  CAM-ICU:  Patient with severe intellectual disability    Invasive Devices Review  Invasive Devices     Peripherally Inserted Central Catheter Line            PICC Line 92/98/65 Right Basilic 5 days          Drain            Gastrostomy/Enterostomy -- days    Urethral Catheter Temperature probe -- days          Airway            Surgical Airway Shiley Cuffed -- days              Can any invasive devices be discontinued today? No  ---------------------------------------------------------------------------------------  OBJECTIVE    Vitals   Vitals:    06/10/20 0300 06/10/20 0400 06/10/20 0416 06/10/20 0500   BP: 101/54 (!) 92/46  102/59   BP Location:  Left arm     Pulse: 74 66  72   Resp: 18 16  19   Temp: 98 2 °F (36 8 °C) 98 2 °F (36 8 °C)  98 2 °F (36 8 °C)   TempSrc:  Bladder     SpO2: 100% 99% 100% 100%   Weight:   58 6 kg (129 lb 3 oz)    Height:         Temp (24hrs), Av 4 °F (36 9 °C), Min:98 2 °F (36 8 °C), Max:99 3 °F (37 4 °C)  Current: Temperature: 98 2 °F (36 8 °C)  HR: 77  BP: 103/70  RR: 20  SpO2: 100 %    Respiratory:  SpO2 Device: O2 Device: Ventilator       Invasive/non-invasive ventilation settings   Respiratory    Lab Data (Last 4 hours)    None         O2/Vent Data (Last 4 hours)      06/10 0416           Vent Mode AC/VC       Resp Rate (BPM) (BPM) 15       Vt (mL) (mL) 250       FIO2 (%) (%) 50       PEEP (cmH2O) (cmH2O) 5       MV 4 91                   Physical Exam   Constitutional: She is sedated and intubated  Chronically ill appearing in no respiratory distress   HENT:   Mouth/Throat: Oropharynx is clear and moist    Eyes: Pupils are equal, round, and reactive to light  Conjunctivae and lids are normal    Continues with rhythmic eye movement   Neck: Normal range of motion  Neck supple  Trach intact and connected to ventilator  Dressing D/I   Cardiovascular: Normal rate, regular rhythm, S1 normal, S2 normal and intact distal pulses     No murmur heard   Pulmonary/Chest: No accessory muscle usage  She is intubated  No respiratory distress  She has decreased breath sounds in the right lower field and the left lower field  Abdominal: Soft  Bowel sounds are normal  There is no tenderness  Genitourinary:   Genitourinary Comments: Chronic indwelling viera patent clear yellow urine   Musculoskeletal: Normal range of motion  Neurological: GCS eye subscore is 1  GCS verbal subscore is 1  GCS motor subscore is 4  Patient with severe intellectual disability   Skin: Skin is warm, dry and intact  Capillary refill takes 2 to 3 seconds           Laboratory and Diagnostics:  Results from last 7 days   Lab Units 06/10/20  0444 06/09/20  0442 06/08/20  0501 06/07/20  0530 06/06/20  0512 06/05/20  0509 06/04/20  0553 06/03/20  1726   WBC Thousand/uL 5 22 5 39  5 46 8 39 13 20* 10 37* 8 47 6 93 11 24*   HEMOGLOBIN g/dL 8 1* 8 6*  8 4* 9 6* 9 9* 9 3* 8 8* 7 6* 10 0*   HEMATOCRIT % 27 2* 29 0*  28 7* 32 2* 32 6* 31 7* 28 3* 25 1* 33 2*   PLATELETS Thousands/uL 285 273  259 280 246 203 175 126* 154   NEUTROS PCT %  --  55  55 67 73 67 59 63 61   MONOS PCT %  --  11  9 9 6 8 9 10 9     Results from last 7 days   Lab Units 06/10/20  0444 06/09/20  0442 06/08/20  0501 06/07/20  1918 06/07/20  0530 06/06/20  0512 06/05/20  2107 06/05/20  0509   SODIUM mmol/L 137 141  141 142 140 134* 136 139 140   POTASSIUM mmol/L 4 0 4 1  4 1 4 9 3 0* 3 8 4 2 4 3 3 6   CHLORIDE mmol/L 103 105  105 109* 103 101 102 104 106   CO2 mmol/L 30 30  30 30 30 28 29 30 28   ANION GAP mmol/L 4 6  6 3* 7 5 5 5 6   BUN mg/dL 12 12  12 10 11 13 12 10 7   CREATININE mg/dL 0 30* 0 35*  0 35* 0 39* 0 29* 0 38* 0 30* 0 36* 0 33*   CALCIUM mg/dL 8 7 8 5  8 5 8 6 7 4* 8 3 8 1* 8 3 7 8*   GLUCOSE RANDOM mg/dL 104 109  109 104 107 116 106 100 105   ALT U/L 108* 184*  --   --   --   --   --  56   AST U/L 21 47*  --   --   --   --   --  34   ALK PHOS U/L 128* 126*  --   --   --   --   --  117* ALBUMIN g/dL 2 7* 2 7*  --   --   --   --   --  2 7*   TOTAL BILIRUBIN mg/dL 0 34 0 30  --   --   --   --   --  0 39     Results from last 7 days   Lab Units 06/09/20  0442 06/07/20  1918 06/05/20  0509 06/04/20  0553 06/03/20  1726   MAGNESIUM mg/dL 2 3 1 8 2 0 1 9 2 0   PHOSPHORUS mg/dL  --  4 2 3 2 2 9 2 8      Results from last 7 days   Lab Units 06/05/20  1049   INR  1 04          Results from last 7 days   Lab Units 06/03/20 2026 06/03/20  1726   LACTIC ACID mmol/L 1 1 3 5*     ABG:  Results from last 7 days   Lab Units 06/05/20  1141   PH ART  7 427   PCO2 ART mm Hg 42 0   PO2 ART mm Hg 97 0   HCO3 ART mmol/L 27 1   BASE EXC ART mmol/L 2 5   ABG SOURCE  Radial, Right     VBG:  Results from last 7 days   Lab Units 06/05/20  1141   ABG SOURCE  Radial, Right     Results from last 7 days   Lab Units 06/10/20  0444 06/09/20  0611 06/09/20  0442 06/06/20  0514 06/05/20  0509 06/04/20  0554 06/03/20  1726   PROCALCITONIN ng/ml 0 19 0 36* 0 36* 1 00* 1 77* 3 91* 5 08*       Micro  Results from last 7 days   Lab Units 06/05/20  1418 06/05/20  0507 06/03/20 2025   GRAM STAIN RESULT  1+ Polys  No bacteria seen Rare Disintegrating polys  No organisms seen No Polys or Bacteria seen   WOUND CULTURE   --  No growth  --    BODY FLUID CULTURE, STERILE  No growth  --   --        EKG: NSR  Imaging:  No new imaging    Intake and Output  I/O       06/08 0701 - 06/09 0700 06/09 0701 - 06/10 0700    I V  (mL/kg) 244 (4 3) 118 2 (2)    NG/ 60    IV Piggyback 200 300    Feedings 797 718    Total Intake(mL/kg) 1446 (25 2) 1196 2 (20 4)    Urine (mL/kg/hr) 1830 (1 3) 1065 (0 8)    Stool 0     Total Output 1830 1065    Net -384 +131 2          Unmeasured Stool Occurrence 1 x         UOP: 44 ml/hr     Height and Weights   Height: 4' 2" (127 cm)  IBW: 22 5 kg  Body mass index is 36 33 kg/m²    Weight (last 2 days)     Date/Time   Weight    06/10/20 0416   58 6 (129 19)    06/09/20 0438   57 4 (126 54)    06/08/20 0551   58 9 (179 85)                Nutrition       Diet Orders   (From admission, onward)             Start     Ordered    06/05/20 1500  Diet Enteral/Parenteral; Tube Feeding No Oral Diet; Non-Formulary; Cyclic; 50; 16 hours  Diet effective now     Comments:  Milk-free Prosource (nonformulary) BID  Use J-tube for feeds   Question Answer Comment   Diet Type Enteral/Parenteral    Enteral/Parenteral Tube Feeding No Oral Diet    Tube Feeding Formula: Non-Formulary    Bolus/Cyclic/Continuous Cyclic    Tube Feeding Cyclic Rate (mL/hr): 50    Tube Feeding Cyclic: Administer over: 16 hours    RD to adjust diet per protocol?  Yes        06/05/20 1500                    Active Medications  Scheduled Meds:  Current Facility-Administered Medications:  acetaminophen 650 mg Per G Tube Q4H PRN Aretha Campos DO    bisacodyl 10 mg Rectal Daily Meribeth MADAY Martinez    budesonide 0 5 mg Nebulization BID Leti Jennings DO    calcium carbonate-vitamin D 1 tablet Oral Daily With Breakfast MADAY Isabel    chlorhexidine 15 mL Swish & Spit Q12H [address] Karin Liu PA-C    cholecalciferol 1,000 Units Oral Daily Meribeth MADAY Martinez    cyclobenzaprine 5 mg Per G Tube TID PRN Razia Garcia MD    dantrolene 100 mg Per G Tube TID Razia Garcia MD    desmopressin 0 05 mg Per G Tube Q48H Razia Garcia MD    diazepam 5 mg Per G Tube Q8H Razia Garcia MD    fentaNYL 25 mcg/hr Intravenous Continuous Tesfaye Peres MD Last Rate: 25 mcg/hr (06/09/20 1634)   fentanyl citrate (PF) 50 mcg Intravenous Q1H PRN MADAY Almodovar    glycerin-hypromellose- 1 drop Both Eyes Q3H PRN Jose Jennings DO    guaiFENesin 400 mg Oral Q8H MADAY Alatorre    heparin (porcine) 5,000 Units Subcutaneous [facility] Lydia Murguia PA-C    levETIRAcetam 1,000 mg Per G Tube BID Razia Garcia MD    levETIRAcetam 500 mg Per G Tube Q24H Razia Garcia MD    LORazepam 1 mg Intravenous Q4H PRN Cherelle Ellington MD    meropenem 2,000 mg Intravenous Q8H Nicola Barreto MD Last Rate: Stopped (06/10/20 0433)   OXcarbazepine 450 mg Per G Tube Q12H Radha Leonard MD    phenylephine  mcg/min Intravenous Titrated MADAY Coelho Last Rate: Stopped (06/09/20 0301)   senna-docusate sodium 1 tablet Per G Tube HS Fabian Valdovinos MD      Continuous Infusions:    fentaNYL 25 mcg/hr Last Rate: 25 mcg/hr (06/09/20 1634)   phenylephine  mcg/min Last Rate: Stopped (06/09/20 0301)     PRN Meds:     acetaminophen 650 mg Q4H PRN   cyclobenzaprine 5 mg TID PRN   fentanyl citrate (PF) 50 mcg Q1H PRN   glycerin-hypromellose- 1 drop Q3H PRN   LORazepam 1 mg Q4H PRN       Allergies   Allergies   Allergen Reactions    Baclofen Other (See Comments)     Reaction Date: 22Nov2011;   apena      Cefepime Hives    Cephalosporins      Other reaction(s): Unknown Reaction  Other reaction(s): Unknown Reaction  Other reaction(s): Unknown Reaction      Clonidine      Other reaction(s): Respiratory Distress, Respiratory Distress    Lactase     Milk-Related Compounds     Penicillins Hives     Reaction Date: [id number];       Phenobarbital Other (See Comments)     Reaction Date: 22Nov2011; Other reaction(s): Other (See Comments)  apnea  Other reaction(s): Other (See Comments)  apnea  apnea      Vancomycin     Zolpidem Other (See Comments)     Other reaction(s): Other (see comments)  Reaction Date: [id number];   apnea  apnea  apnea      Latex Rash     ---------------------------------------------------------------------------------------  Advance Directive and Living Will:      Power of :    POLST:    ---------------------------------------------------------------------------------------  Care Time Delivered:   No Critical Care time spent     MADAY Horner      Portions of the record may have been created with voice recognition software    Occasional wrong word or "sound a like" substitutions may have occurred due to the inherent limitations of voice recognition software    Read the chart carefully and recognize, using context, where substitutions have occurred

## 2020-06-10 NOTE — PLAN OF CARE
Problem: PAIN - ADULT  Goal: Verbalizes/displays adequate comfort level or baseline comfort level  Description  Interventions:  - Encourage patient to monitor pain and request assistance  - Assess pain using appropriate pain scale  - Administer analgesics based on type and severity of pain and evaluate response  - Implement non-pharmacological measures as appropriate and evaluate response  - Consider cultural and social influences on pain and pain management  - Notify physician/advanced practitioner if interventions unsuccessful or patient reports new pain  Outcome: Progressing     Problem: INFECTION - ADULT  Goal: Absence or prevention of progression during hospitalization  Description  INTERVENTIONS:  - Assess and monitor for signs and symptoms of infection  - Monitor lab/diagnostic results  - Monitor all insertion sites, i e  indwelling lines, tubes, and drains  - Monitor endotracheal if appropriate and nasal secretions for changes in amount and color  - [place] appropriate cooling/warming therapies per order  - Administer medications as ordered  - Instruct and encourage patient and family to use good hand hygiene technique  - Identify and instruct in appropriate isolation precautions for identified infection/condition  Outcome: Progressing     Problem: SAFETY ADULT  Goal: Maintain or return mobility status to optimal level  Description  INTERVENTIONS:  - Assess patient's baseline mobility status (ambulation, transfers, stairs, etc )    - Identify cognitive and physical deficits and behaviors that affect mobility  - Identify mobility aids required to assist with transfers and/or ambulation (gait belt, sit-to-stand, lift, walker, cane, etc )  - [place] fall precautions as indicated by assessment  - Record patient progress and toleration of activity level on Mobility SBAR; progress patient to next Phase/Stage  - Instruct patient to call for assistance with activity based on assessment  - Consider rehabilitation consult to assist with strengthening/weightbearing, etc   Outcome: Progressing     Problem: DISCHARGE PLANNING  Goal: Discharge to home or other facility with appropriate resources  Description  INTERVENTIONS:  - Identify barriers to discharge w/patient and caregiver  - Arrange for needed discharge resources and transportation as appropriate  - Identify discharge learning needs (meds, wound care, etc )  - Arrange for interpretive services to assist at discharge as needed  - Refer to Case Management Department for coordinating discharge planning if the patient needs post-hospital services based on physician/advanced practitioner order or complex needs related to functional status, cognitive ability, or social support system  Outcome: Progressing     Problem: Knowledge Deficit  Goal: Patient/family/caregiver demonstrates understanding of disease process, treatment plan, medications, and discharge instructions  Description  Complete learning assessment and assess knowledge base    Interventions:  - Provide teaching at level of understanding  - Provide teaching via preferred learning methods  Outcome: Progressing     Problem: Prexisting or High Potential for Compromised Skin Integrity  Goal: Skin integrity is maintained or improved  Description  INTERVENTIONS:  - Identify patients at risk for skin breakdown  - Assess and monitor skin integrity  - Assess and monitor nutrition and hydration status  - Monitor labs   - Assess for incontinence   - Turn and reposition patient  - Assist with mobility/ambulation  - Relieve pressure over bony prominences  - Avoid friction and shearing  - Provide appropriate hygiene as needed including keeping skin clean and dry  - Evaluate need for skin moisturizer/barrier cream  - Collaborate with interdisciplinary team   - Patient/family teaching  - Consider wound care consult   Outcome: Progressing     Problem: Potential for Falls  Goal: Patient will remain free of falls  Description  INTERVENTIONS:  - Assess patient frequently for physical needs  -  Identify cognitive and physical deficits and behaviors that affect risk of falls  -  [place] fall precautions as indicated by assessment   - Educate patient/family on patient safety including physical limitations  - Instruct patient to call for assistance with activity based on assessment  - Modify environment to reduce risk of injury  - Consider OT/PT consult to assist with strengthening/mobility  Outcome: Progressing     Problem: Nutrition/Hydration-ADULT  Goal: Nutrient/Hydration intake appropriate for improving, restoring or maintaining nutritional needs  Description  Monitor and assess patient's nutrition/hydration status for malnutrition  Collaborate with interdisciplinary team and initiate plan and interventions as ordered  Monitor patient's weight and dietary intake as ordered or per policy  Utilize nutrition screening tool and intervene as necessary  Determine patient's food preferences and provide high-protein, high-caloric foods as appropriate       INTERVENTIONS:  - Monitor oral intake, urinary output, labs, and treatment plans  - Assess nutrition and hydration status and recommend course of action  - Evaluate amount of meals eaten  - Assist patient with eating if necessary   - Allow adequate time for meals  - Recommend/ encourage appropriate diets, oral nutritional supplements, and vitamin/mineral supplements  - Order, calculate, and assess calorie counts as needed  - Recommend, monitor, and adjust tube feedings and TPN/PPN based on assessed needs  - Assess need for intravenous fluids  - Provide specific nutrition/hydration education as appropriate  - Include patient/family/caregiver in decisions related to nutrition  Outcome: Progressing

## 2020-06-10 NOTE — RESPIRATORY THERAPY NOTE
RT Ventilator Management Note  Romana Fly 21 y o  female MRN: [id number]  [facility]: [id number]      Daily Screen       6/9/2020  0747 6/10/2020  0741          Patient safety screen outcome[de-identified]  Passed  Passed      Spont breathing trial % for 30 min:                  Physical Exam:   Assessment Type: (P) Pre-treatment  General Appearance: (P) Awake  Respiratory Pattern: (P) Assisted  Chest Assessment: (P) Chest expansion symmetrical  Bilateral Breath Sounds: (P) Diminished  Suction: (P) Trach      Resp Comments: (P) Pt  placed on ps wean as tolerated  Pt  resting comfortably  Minimal secretions noted

## 2020-06-10 NOTE — ASSESSMENT & PLAN NOTE
Pt has a hx of  shunt insertion for congenital hydrocephalus  · P/w SIRS, acute encephalopathy, and seizure like activity  · Pt also had cardiac arrest and was resuscitated while at [facility]  reviewed personally and by attending  Final results as below  · Xray Shunt series 6/1/20 loaded in to PACs from outside hospital: Right frontal  shunt catheter appears intact  Hardware from thoracolumbar fusion for scoliosis noted  Of note the right sacroliliac screw noted to have lucency concerning for possible screw loosening  Plan  · Continue regular neurologic checks   · Ongoing medical management per primary team   · Neurology consulted and following for seizures and encephalopathy  · MRI brain w wo ordered and pending   · Continue seizure medications per neurology recommendations  · vEEG discontinued yesterday; no seizure activity noted   · ID consulted and following  · Wound/left iliac cultures negative    · CSF cultures 6/7/20: 2+ Growth of Serratia Marcescens  · SED rate 6/6: 19  · CRP 6/6: 86 1  · PT/OT  · DVT PPX: SCDs, Heparin  · No emergent neurosurgical intervention anticipated at this time  · After family meeting today, we will repeat shunt aspiration today and send CSF for culture  If patient does not improve clinically after completion of antibiotics in 1 week, family is agreeable to externalization/removal of shunt  · Neurosurgery will continue to follow at this time  Please call with any questions or concerns

## 2020-06-10 NOTE — PROGRESS NOTES
Progress Note - Infectious Disease   Meda Curling 21 y o  female MRN: [id number]  Unit/Bed#: [facility] 05 Encounter: [id number]      Impression/Recommendations:  1  Sepsis  Presumably due to #2  Consider aspiration given reported respiratory distress  No other clear source appreciated   Blood cultures at [employer] negative     Rec:  ? Continue antibiotics as below  ? Follow temperatures closely     2  Serratia  shunt infection  Unclear etiology  Consider translocation from GI tract although no reported intra-abdominal fluid collection  Consider contaminant  Minimal fluid aspirated with no cell counts or chemistries performed  Very limited antibiotic options due to numerous allergies, organism resistance pattern, andactive seizure disorder  Rec:  ? Continue meropenem for now  ? Repeat CSF analysis today per neuro surgery  ? If cultures remain positive, will need externalization of shunt  ? If cultures negative, reasonable to complete antibiotic course and observe closely  If infection recurs would need consideration of shunt externalization at that time      3  Acute on chronic hypoxic respiratory failure  Unclear etiology   No overt pneumonia on chest imaging at [facility], [place]   Consider role of aspiration versus encephalopathy  Patient on minimal vent requirements  Rec:  ? Antibiotics above would treat any concomitant pneumonia  ? Follow respiratory status closely  ? Supportive care as per the primary service     4  Seizure disorder  Initial concern for seizures at [facility], [place] but vEEG negative for seizure activity  Rhythmic blinking does not correlate to EEG findings  Rec:  ? Continue anti epileptics per Neurology with close follow-up ongoing     5  Retroperitoneal collection  Reportedly chronic, seen on imaging done at [place] [address] significance   Status post aspiration of the collection with negative cultures  Rec:  ? No additional workup or treatment from an ID perspective     6  CP    With spastic quadriplegia, profound MR, hydrocephalus status post  shunt     7  Chronic respiratory failure  Status post trach      8  Recent VFib arrest   Consider anoxic encephalopathy      The above plan was discussed in detail with Dr Loc Toledo and the neurosurgery service  Antibiotics:  Meropenem #9    Subjective:  Patient seen on AM rounds  Unable to provider ROS  24 Hour Events:  No documented fevers, chills, sweats, nausea, vomiting, or diarrhea  Family meeting held today with decision to repeat CSF analysis and continue antibiotics  Removing VPS would be high risk procedure  Objective:  Vitals:  Temp:  [98 2 °F (36 8 °C)-99 3 °F (37 4 °C)] 99 °F (37 2 °C)  HR:  [66-88] 74  Resp:  [16-47] 22  BP: ()/(45-85) 96/50  SpO2:  [97 %-100 %] 98 %  Temp (24hrs), Av 4 °F (36 9 °C), Min:98 2 °F (36 8 °C), Max:99 3 °F (37 4 °C)  Current: Temperature: 99 °F (37 2 °C)    Physical Exam:   General:  No acute distress  Eyes:  Eyes closed, normal lids  ENT:  Normal external ears and nose  Neck:  Neck symmetric with midline trachea  Pulmonary:  Normal respiratory effort without accessory muscle use  Cardiovascular:  Regular rate and rhythm; no peripheral edema  Gastrointestinal:  No tenderness or distention  Musculoskeletal:  No digital clubbing or cyanosis  Skin:  No visible rashes; No palpable nodules  Neurologic:  Sensation grossly intact to light touch  Psychiatric:  Sleeping, does not follow commands    Lab Results:  I have personally reviewed pertinent labs    Results from last 7 days   Lab Units 06/10/20  0444 20  0442 20  0501  20  0509   POTASSIUM mmol/L 4 0 4 1  4 1 4 9   < > 3 6   CHLORIDE mmol/L 103 105  105 109*   < > 106   CO2 mmol/L 30 30  30 30   < > 28   BUN mg/dL 12 12  12 10   < > 7   CREATININE mg/dL 0 30* 0 35*  0 35* 0 39*   < > 0 33*   EGFR ml/min/1 73sq m 160 152  152 146   < > 155   CALCIUM mg/dL 8 7 8 5  8 5 8 6   < > 7 8*   AST U/L 21 47*  --   --  34 ALT U/L 108* 184*  --   --  56   ALK PHOS U/L 128* 126*  --   --  117*    < > = values in this interval not displayed  Results from last 7 days   Lab Units 06/10/20  0444 06/09/20  0442 06/08/20  0501   WBC Thousand/uL 5 22 5 39  5 46 8 39   HEMOGLOBIN g/dL 8 1* 8 6*  8 4* 9 6*   PLATELETS Thousands/uL 285 273  259 280     Results from last 7 days   Lab Units 06/05/20  1418 06/05/20  0507 06/03/20 2025   GRAM STAIN RESULT  1+ Polys  No bacteria seen Rare Disintegrating polys  No organisms seen No Polys or Bacteria seen   WOUND CULTURE   --  No growth  --    BODY FLUID CULTURE, STERILE  No growth  --   --        Imaging Studies:   I have personally reviewed pertinent imaging study reports and images in PACS  EKG, Pathology, and Other Studies:   I have personally reviewed pertinent reports

## 2020-06-10 NOTE — RESPIRATORY THERAPY NOTE
RT Ventilator Management Note  Corry Sharma 21 y o  female MRN: [id number]  [facility] [id number]      Daily Screen       6/8/2020  0758 6/9/2020  0747          Patient safety screen outcome[de-identified]  Failed  Passed      Not Ready for Weaning due to[de-identified]  Poor inspiratory effort        Spont breathing trial % for 30 min:                  Physical Exam:   Assessment Type: Assess only  General Appearance: Sedated  Respiratory Pattern: Assisted  Chest Assessment: Chest expansion symmetrical  Bilateral Breath Sounds: Diminished, Coarse  O2 Device: vent      Resp Comments: Pt has been stable on vent through the night  No respiratory distress noted at this time  Will continue to moniter pt per protocol

## 2020-06-10 NOTE — RESPIRATORY THERAPY NOTE
resp care      06/10/20 1144   Respiratory Assessment   Resp Comments Pt  tolerating cpap/ps well  Will continue to wean as tolerated  Surgical Airway Shiley Cuffed   No Placement Date or Time found  Tube Size: 6 mm  Previously placed by?: Present on admission  Type: Tracheostomy  Brand: Ta  Style: Cuffed   Status Secured   Site Assessment Clean;Dry   Equipment at bedside BVM; Wall Suction setup; Additional inner cannula   Additional Assessments   SpO2 99 %

## 2020-06-10 NOTE — PROCEDURES
Lumbar Puncture Procedure Note    Pre-operative Diagnosis: Serratia  shunt infection    Post-operative Diagnosis: Serratia  shunt infection    Indications: Infection     Procedure Details     Consent: Informed consent was obtained via telephone by her mother Fatuma Solano  Risks of the procedure were discussed including: infection, bleeding, pain and headache  Patient identity was confirmed via armband  The patient was positioned under sterile conditions  Chlorhexidine solution was used to for skin sterilization  A 25g butterfly needle was used to aspirate the right frontal shunt reservoir  Spinal fluid was obtained and sent to the laboratory  Findings  5mL of clear spinal fluid was obtained  Complications:  None; patient tolerated the procedure well            Condition: stable

## 2020-06-11 LAB
ALBUMIN SERPL BCP-MCNC: 2.9 G/DL (ref 3.5–5)
ALP SERPL-CCNC: 162 U/L (ref 46–116)
ALT SERPL W P-5'-P-CCNC: 84 U/L (ref 12–78)
ANION GAP SERPL CALCULATED.3IONS-SCNC: 5 MMOL/L (ref 4–13)
AST SERPL W P-5'-P-CCNC: 27 U/L (ref 5–45)
BASOPHILS # BLD AUTO: 0.02 THOUSANDS/ΜL (ref 0–0.1)
BASOPHILS NFR BLD AUTO: 0 % (ref 0–1)
BILIRUB SERPL-MCNC: 0.4 MG/DL (ref 0.2–1)
BUN SERPL-MCNC: 14 MG/DL (ref 5–25)
CALCIUM SERPL-MCNC: 9.1 MG/DL (ref 8.3–10.1)
CHLORIDE SERPL-SCNC: 106 MMOL/L (ref 100–108)
CO2 SERPL-SCNC: 29 MMOL/L (ref 21–32)
CREAT SERPL-MCNC: 0.41 MG/DL (ref 0.6–1.3)
EOSINOPHIL # BLD AUTO: 0.18 THOUSAND/ΜL (ref 0–0.61)
EOSINOPHIL NFR BLD AUTO: 3 % (ref 0–6)
ERYTHROCYTE [DISTWIDTH] IN BLOOD BY AUTOMATED COUNT: 17.8 % (ref 11.6–15.1)
GFR SERPL CREATININE-BSD FRML MDRD: 144 ML/MIN/1.73SQ M
GLUCOSE SERPL-MCNC: 103 MG/DL (ref 65–140)
GLUCOSE SERPL-MCNC: 113 MG/DL (ref 65–140)
GLUCOSE SERPL-MCNC: 117 MG/DL (ref 65–140)
GLUCOSE SERPL-MCNC: 118 MG/DL (ref 65–140)
GLUCOSE SERPL-MCNC: 93 MG/DL (ref 65–140)
HCT VFR BLD AUTO: 28.7 % (ref 34.8–46.1)
HGB BLD-MCNC: 8.6 G/DL (ref 11.5–15.4)
IMM GRANULOCYTES # BLD AUTO: 0.02 THOUSAND/UL (ref 0–0.2)
IMM GRANULOCYTES NFR BLD AUTO: 0 % (ref 0–2)
LYMPHOCYTES # BLD AUTO: 1.9 THOUSANDS/ΜL (ref 0.6–4.47)
LYMPHOCYTES NFR BLD AUTO: 29 % (ref 14–44)
MCH RBC QN AUTO: 26 PG (ref 26.8–34.3)
MCHC RBC AUTO-ENTMCNC: 30 G/DL (ref 31.4–37.4)
MCV RBC AUTO: 87 FL (ref 82–98)
MONOCYTES # BLD AUTO: 0.57 THOUSAND/ΜL (ref 0.17–1.22)
MONOCYTES NFR BLD AUTO: 9 % (ref 4–12)
NEUTROPHILS # BLD AUTO: 3.87 THOUSANDS/ΜL (ref 1.85–7.62)
NEUTS SEG NFR BLD AUTO: 59 % (ref 43–75)
NRBC BLD AUTO-RTO: 0 /100 WBCS
PLATELET # BLD AUTO: 306 THOUSANDS/UL (ref 149–390)
PMV BLD AUTO: 9.4 FL (ref 8.9–12.7)
POTASSIUM SERPL-SCNC: 4 MMOL/L (ref 3.5–5.3)
PROT SERPL-MCNC: 7.3 G/DL (ref 6.4–8.2)
RBC # BLD AUTO: 3.31 MILLION/UL (ref 3.81–5.12)
SODIUM SERPL-SCNC: 140 MMOL/L (ref 136–145)
WBC # BLD AUTO: 6.56 THOUSAND/UL (ref 4.31–10.16)

## 2020-06-11 PROCEDURE — 80053 COMPREHEN METABOLIC PANEL: CPT | Performed by: FAMILY MEDICINE

## 2020-06-11 PROCEDURE — 94003 VENT MGMT INPAT SUBQ DAY: CPT

## 2020-06-11 PROCEDURE — 99233 SBSQ HOSP IP/OBS HIGH 50: CPT | Performed by: PHYSICIAN ASSISTANT

## 2020-06-11 PROCEDURE — 82948 REAGENT STRIP/BLOOD GLUCOSE: CPT

## 2020-06-11 PROCEDURE — 99233 SBSQ HOSP IP/OBS HIGH 50: CPT | Performed by: INTERNAL MEDICINE

## 2020-06-11 PROCEDURE — 85025 COMPLETE CBC W/AUTO DIFF WBC: CPT | Performed by: FAMILY MEDICINE

## 2020-06-11 PROCEDURE — 94760 N-INVAS EAR/PLS OXIMETRY 1: CPT

## 2020-06-11 PROCEDURE — 94640 AIRWAY INHALATION TREATMENT: CPT

## 2020-06-11 RX ORDER — SODIUM CHLORIDE, SODIUM GLUCONATE, SODIUM ACETATE, POTASSIUM CHLORIDE, MAGNESIUM CHLORIDE, SODIUM PHOSPHATE, DIBASIC, AND POTASSIUM PHOSPHATE .53; .5; .37; .037; .03; .012; .00082 G/100ML; G/100ML; G/100ML; G/100ML; G/100ML; G/100ML; G/100ML
250 INJECTION, SOLUTION INTRAVENOUS ONCE
Status: COMPLETED | OUTPATIENT
Start: 2020-06-11 | End: 2020-06-11

## 2020-06-11 RX ORDER — DESMOPRESSIN ACETATE 0.1 MG/1
0.05 TABLET ORAL DAILY
Status: DISCONTINUED | OUTPATIENT
Start: 2020-06-12 | End: 2020-06-30 | Stop reason: HOSPADM

## 2020-06-11 RX ADMIN — MELATONIN 1000 UNITS: at 08:05

## 2020-06-11 RX ADMIN — DANTROLENE SODIUM 100 MG: 25 CAPSULE ORAL at 08:06

## 2020-06-11 RX ADMIN — LEVETIRACETAM 500 MG: 100 SOLUTION ORAL at 21:53

## 2020-06-11 RX ADMIN — HEPARIN SODIUM 5000 UNITS: 5000 INJECTION INTRAVENOUS; SUBCUTANEOUS at 21:51

## 2020-06-11 RX ADMIN — BUDESONIDE 0.5 MG: 0.5 INHALANT RESPIRATORY (INHALATION) at 07:36

## 2020-06-11 RX ADMIN — SENNOSIDES AND DOCUSATE SODIUM 1 TABLET: 8.6; 5 TABLET ORAL at 21:51

## 2020-06-11 RX ADMIN — CHLORHEXIDINE GLUCONATE 0.12% ORAL RINSE 15 ML: 1.2 LIQUID ORAL at 08:05

## 2020-06-11 RX ADMIN — DIAZEPAM 5 MG: 5 TABLET ORAL at 18:09

## 2020-06-11 RX ADMIN — LEVETIRACETAM 1000 MG: 100 SOLUTION ORAL at 06:00

## 2020-06-11 RX ADMIN — DESMOPRESSIN ACETATE 0.05 MG: 0.1 TABLET ORAL at 06:01

## 2020-06-11 RX ADMIN — GUAIFENESIN 400 MG: 100 SOLUTION ORAL at 02:26

## 2020-06-11 RX ADMIN — SODIUM CHLORIDE, SODIUM GLUCONATE, SODIUM ACETATE, POTASSIUM CHLORIDE, MAGNESIUM CHLORIDE, SODIUM PHOSPHATE, DIBASIC, AND POTASSIUM PHOSPHATE 250 ML: .53; .5; .37; .037; .03; .012; .00082 INJECTION, SOLUTION INTRAVENOUS at 15:28

## 2020-06-11 RX ADMIN — MEROPENEM 2000 MG: 1 INJECTION, POWDER, FOR SOLUTION INTRAVENOUS at 02:26

## 2020-06-11 RX ADMIN — LEVETIRACETAM 1000 MG: 100 SOLUTION ORAL at 14:16

## 2020-06-11 RX ADMIN — HEPARIN SODIUM 5000 UNITS: 5000 INJECTION INTRAVENOUS; SUBCUTANEOUS at 06:00

## 2020-06-11 RX ADMIN — CHLORHEXIDINE GLUCONATE 0.12% ORAL RINSE 15 ML: 1.2 LIQUID ORAL at 21:51

## 2020-06-11 RX ADMIN — GUAIFENESIN 400 MG: 100 SOLUTION ORAL at 08:36

## 2020-06-11 RX ADMIN — GUAIFENESIN 400 MG: 100 SOLUTION ORAL at 18:09

## 2020-06-11 RX ADMIN — Medication 1 TABLET: at 08:07

## 2020-06-11 RX ADMIN — OXCARBAZEPINE 450 MG: 300 TABLET, FILM COATED ORAL at 18:12

## 2020-06-11 RX ADMIN — BISACODYL 10 MG: 10 SUPPOSITORY RECTAL at 08:06

## 2020-06-11 RX ADMIN — DIAZEPAM 5 MG: 5 TABLET ORAL at 02:26

## 2020-06-11 RX ADMIN — BUDESONIDE 0.5 MG: 0.5 INHALANT RESPIRATORY (INHALATION) at 19:42

## 2020-06-11 RX ADMIN — OXCARBAZEPINE 450 MG: 300 TABLET, FILM COATED ORAL at 06:00

## 2020-06-11 RX ADMIN — DIAZEPAM 5 MG: 5 TABLET ORAL at 10:49

## 2020-06-11 RX ADMIN — MEROPENEM 2000 MG: 1 INJECTION, POWDER, FOR SOLUTION INTRAVENOUS at 18:09

## 2020-06-11 RX ADMIN — HEPARIN SODIUM 5000 UNITS: 5000 INJECTION INTRAVENOUS; SUBCUTANEOUS at 14:16

## 2020-06-11 RX ADMIN — MEROPENEM 2000 MG: 1 INJECTION, POWDER, FOR SOLUTION INTRAVENOUS at 10:49

## 2020-06-11 NOTE — PROGRESS NOTES
Progress Note - Infectious Disease   Kylah Welch 21 y o  female MRN: [id number]  Unit/Bed#: [facility] 05 Encounter: [id number]      Impression/Recommendations:  1  Sepsis POA   Fever, tachycardia, tachypnea  Consider due to #2   Consider aspiration given reported respiratory distress upon presentation to [place]  No other clear source appreciated   Blood cultures at [facility], [place], operated by [facility] negative     Rec:  ? Continue antibiotics as below  ? Follow temperatures closely     2  Possible Serratia  shunt infection   Positive CSF cultures in setting of limited tap of shunt  Consider translocation from GI tract although no reported intra-abdominal fluid collection   Consider contaminant  Minimal fluid aspirated with no cell counts or chemistries performed  Very limited antibiotic options due to numerous allergies, organism resistance pattern, andactive seizure disorder  Externalization of shunt would be morbid procedure per Neurosurgery  Repeat tap shows low WBC, negative Gram stain  Cultures pending  Rec:  ? Continue meropenem for now  ? Repeat repeat CSF cultures  ? If cultures remain positive, will need externalization of shunt  ? If cultures negative, reasonable to complete 14 day antibiotic course and observe closely  If infection recurs would need consideration of shunt externalization at that time      3  Acute on chronic hypoxic respiratory failure   Unclear etiology   No overt pneumonia on chest imaging at [address] [facility] role of aspiration versus encephalopathy  Patient on minimal vent requirements  Rec:  ? Antibiotics above would treat any concomitant pneumonia  ? Follow respiratory status closely  ? Supportive care as per the primary service     4  Seizure disorder   Initial concern for seizures at [facility], [place], operated by [facility] but vEEG negative for seizure activity   Rhythmic blinking does not correlate to EEG findings per Neurology  Rec:  ? Continue anti epileptics per Neurology with close follow-up ongoing     5   Retroperitoneal collection   Reportedly chronic, seen on imaging done at [facility] significance   Status post aspiration of the collection with negative cultures  Rec:  ? No additional workup or treatment from an ID perspective     6   CP   With spastic quadriplegia, profound MR, hydrocephalus status post  shunt     7   Chronic respiratory failure   Status post trach      8   Recent VFib arrest   Consider anoxic encephalopathy      The above plan was discussed in detail with Dr Santosh Patino      Antibiotics:  Meropenem #10    Subjective:  Patient seen on AM rounds  Unable to provide ROS due to nonverbal state  No documented fevers, chills, sweats, nausea, vomiting, or diarrhea  24 Hour Events: Had repeat tap of VPS which showed bland WBC, normal glucose, elevated protein  Objective:  Vitals:  Temp:  [98 6 °F (37 °C)-100 °F (37 8 °C)] 98 6 °F (37 °C)  HR:  [74-92] 84  Resp:  [15-25] 19  BP: ()/(49-84) 111/64  SpO2:  [98 %-100 %] 100 %  Temp (24hrs), Av 2 °F (37 3 °C), Min:98 6 °F (37 °C), Max:100 °F (37 8 °C)  Current: Temperature: 98 6 °F (37 °C)    Physical Exam:   General:  No acute distress  Eyes:  Normal lids, eyes closed  ENT:  Normal external ears and nose  Neck:  Neck symmetric with midline trachea  Pulmonary:  Ventilated respiratory effort without accessory muscle use  Cardiovascular:  Regular rate and rhythm; no peripheral edema  Gastrointestinal:  No tenderness or distention  Musculoskeletal:  No digital clubbing or cyanosis  Skin:  No visible rashes; No palpable nodules  Neurologic:  Does not respond to voice of follow commands  Psychiatric:  Lethargic, unresponsive    Lab Results:  I have personally reviewed pertinent labs    Results from last 7 days   Lab Units 20  0433 06/10/20  0444 20  0442   POTASSIUM mmol/L 4 0 4 0 4 1  4 1   CHLORIDE mmol/L 106 103 105  105   CO2 mmol/L 29 30 30  30   BUN mg/dL 14 12 12  12   CREATININE mg/dL 0 41* 0 30* 0 35*  0 35* EGFR ml/min/1 73sq m 144 160 152  152   CALCIUM mg/dL 9 1 8 7 8 5  8 5   AST U/L 27 21 47*   ALT U/L 84* 108* 184*   ALK PHOS U/L 162* 128* 126*     Results from last 7 days   Lab Units 06/11/20  0433 06/10/20  0444 06/09/20  0442   WBC Thousand/uL 6 56 5 22 5 39  5 46   HEMOGLOBIN g/dL 8 6* 8 1* 8 6*  8 4*   PLATELETS Thousands/uL 306 285 273  259     Results from last 7 days   Lab Units 06/10/20  1331 06/05/20  1418 06/05/20  0507   GRAM STAIN RESULT  No Polys or Bacteria seen 1+ Polys  No bacteria seen Rare Disintegrating polys  No organisms seen   WOUND CULTURE   --   --  No growth   BODY FLUID CULTURE, STERILE   --  No growth  --        Imaging Studies:   I have personally reviewed pertinent imaging study reports and images in PACS  EKG, Pathology, and Other Studies:   I have personally reviewed pertinent reports

## 2020-06-11 NOTE — PLAN OF CARE
Problem: PAIN - ADULT  Goal: Verbalizes/displays adequate comfort level or baseline comfort level  Description  Interventions:  - Encourage patient to monitor pain and request assistance  - Assess pain using appropriate pain scale  - Administer analgesics based on type and severity of pain and evaluate response  - Implement non-pharmacological measures as appropriate and evaluate response  - Consider cultural and social influences on pain and pain management  - Notify physician/advanced practitioner if interventions unsuccessful or patient reports new pain  Outcome: Progressing     Problem: INFECTION - ADULT  Goal: Absence or prevention of progression during hospitalization  Description  INTERVENTIONS:  - Assess and monitor for signs and symptoms of infection  - Monitor lab/diagnostic results  - Monitor all insertion sites, i e  indwelling lines, tubes, and drains  - Monitor endotracheal if appropriate and nasal secretions for changes in amount and color  - [place] appropriate cooling/warming therapies per order  - Administer medications as ordered  - Instruct and encourage patient and family to use good hand hygiene technique  - Identify and instruct in appropriate isolation precautions for identified infection/condition  Outcome: Progressing     Problem: SAFETY ADULT  Goal: Maintain or return mobility status to optimal level  Description  INTERVENTIONS:  - Assess patient's baseline mobility status (ambulation, transfers, stairs, etc )    - Identify cognitive and physical deficits and behaviors that affect mobility  - Identify mobility aids required to assist with transfers and/or ambulation (gait belt, sit-to-stand, lift, walker, cane, etc )  - [place] fall precautions as indicated by assessment  - Record patient progress and toleration of activity level on Mobility SBAR; progress patient to next Phase/Stage  - Instruct patient to call for assistance with activity based on assessment  - Consider rehabilitation consult to assist with strengthening/weightbearing, etc   Outcome: Progressing     Problem: DISCHARGE PLANNING  Goal: Discharge to home or other facility with appropriate resources  Description  INTERVENTIONS:  - Identify barriers to discharge w/patient and caregiver  - Arrange for needed discharge resources and transportation as appropriate  - Identify discharge learning needs (meds, wound care, etc )  - Arrange for interpretive services to assist at discharge as needed  - Refer to Case Management Department for coordinating discharge planning if the patient needs post-hospital services based on physician/advanced practitioner order or complex needs related to functional status, cognitive ability, or social support system  Outcome: Progressing     Problem: Knowledge Deficit  Goal: Patient/family/caregiver demonstrates understanding of disease process, treatment plan, medications, and discharge instructions  Description  Complete learning assessment and assess knowledge base    Interventions:  - Provide teaching at level of understanding  - Provide teaching via preferred learning methods  Outcome: Progressing     Problem: Prexisting or High Potential for Compromised Skin Integrity  Goal: Skin integrity is maintained or improved  Description  INTERVENTIONS:  - Identify patients at risk for skin breakdown  - Assess and monitor skin integrity  - Assess and monitor nutrition and hydration status  - Monitor labs   - Assess for incontinence   - Turn and reposition patient  - Assist with mobility/ambulation  - Relieve pressure over bony prominences  - Avoid friction and shearing  - Provide appropriate hygiene as needed including keeping skin clean and dry  - Evaluate need for skin moisturizer/barrier cream  - Collaborate with interdisciplinary team   - Patient/family teaching  - Consider wound care consult   Outcome: Progressing     Problem: Potential for Falls  Goal: Patient will remain free of falls  Description  INTERVENTIONS:  - Assess patient frequently for physical needs  -  Identify cognitive and physical deficits and behaviors that affect risk of falls  -  [place] fall precautions as indicated by assessment   - Educate patient/family on patient safety including physical limitations  - Instruct patient to call for assistance with activity based on assessment  - Modify environment to reduce risk of injury  - Consider OT/PT consult to assist with strengthening/mobility  Outcome: Progressing     Problem: Nutrition/Hydration-ADULT  Goal: Nutrient/Hydration intake appropriate for improving, restoring or maintaining nutritional needs  Description  Monitor and assess patient's nutrition/hydration status for malnutrition  Collaborate with interdisciplinary team and initiate plan and interventions as ordered  Monitor patient's weight and dietary intake as ordered or per policy  Utilize nutrition screening tool and intervene as necessary  Determine patient's food preferences and provide high-protein, high-caloric foods as appropriate       INTERVENTIONS:  - Monitor oral intake, urinary output, labs, and treatment plans  - Assess nutrition and hydration status and recommend course of action  - Evaluate amount of meals eaten  - Assist patient with eating if necessary   - Allow adequate time for meals  - Recommend/ encourage appropriate diets, oral nutritional supplements, and vitamin/mineral supplements  - Order, calculate, and assess calorie counts as needed  - Recommend, monitor, and adjust tube feedings and TPN/PPN based on assessed needs  - Assess need for intravenous fluids  - Provide specific nutrition/hydration education as appropriate  - Include patient/family/caregiver in decisions related to nutrition  Outcome: Progressing

## 2020-06-11 NOTE — ASSESSMENT & PLAN NOTE
Pt has a hx of  shunt insertion for congenital hydrocephalus  · P/w SIRS, acute encephalopathy, and seizure like activity  · Pt also had cardiac arrest and was resuscitated while at [facility] [address]  [facility]  reviewed personally and by attending  Final results as below  · Xray Shunt series 6/1/20 loaded in to PACs from outside hospital: Right frontal  shunt catheter appears intact  Hardware from thoracolumbar fusion for scoliosis noted  Of note the right sacroliliac screw noted to have lucency concerning for possible screw loosening  Plan  · Continue regular neurologic checks   · Ongoing medical management per primary team   · Neurology consulted and following for seizures and encephalopathy  · MRI brain w wo ordered and pending   · Continue seizure medications per neurology recommendations  · vEEG discontinued 6/9; no seizure activity noted over 48 hours  · ID consulted and following  · Wound/left iliac cultures negative    · CSF cultures 6/7/20: 2+ Growth of Serratia Marcescens  · SED rate 6/6: 19  · CRP 6/6: 86 1  · Repeat shunt tap on 6/11   · STAT gram stain - negative  · Culture pending  · PT/OT  · DVT PPX: SCDs, Heparin  · No emergent neurosurgical intervention anticipated at this time  · After family meeting yesterday, shunt re-aspirated on 6/10/2020  If patient does not improve clinically after completion of antibiotics in 1 week, family is agreeable to externalization/removal of shunt    Neurosurgery will continue to follow from the periphery until CSF cultures are finalized  Please call with any questions or concerns

## 2020-06-11 NOTE — PLAN OF CARE
Problem: PAIN - ADULT  Goal: Verbalizes/displays adequate comfort level or baseline comfort level  Description  Interventions:  - Encourage patient to monitor pain and request assistance  - Assess pain using appropriate pain scale  - Administer analgesics based on type and severity of pain and evaluate response  - Implement non-pharmacological measures as appropriate and evaluate response  - Consider cultural and social influences on pain and pain management  - Notify physician/advanced practitioner if interventions unsuccessful or patient reports new pain  Outcome: Progressing     Problem: INFECTION - ADULT  Goal: Absence or prevention of progression during hospitalization  Description  INTERVENTIONS:  - Assess and monitor for signs and symptoms of infection  - Monitor lab/diagnostic results  - Monitor all insertion sites, i e  indwelling lines, tubes, and drains  - Monitor endotracheal if appropriate and nasal secretions for changes in amount and color  - [place] appropriate cooling/warming therapies per order  - Administer medications as ordered  - Instruct and encourage patient and family to use good hand hygiene technique  - Identify and instruct in appropriate isolation precautions for identified infection/condition  Outcome: Progressing     Problem: DISCHARGE PLANNING  Goal: Discharge to home or other facility with appropriate resources  Description  INTERVENTIONS:  - Identify barriers to discharge w/patient and caregiver  - Arrange for needed discharge resources and transportation as appropriate  - Identify discharge learning needs (meds, wound care, etc )  - Arrange for interpretive services to assist at discharge as needed  - Refer to Case Management Department for coordinating discharge planning if the patient needs post-hospital services based on physician/advanced practitioner order or complex needs related to functional status, cognitive ability, or social support system  Outcome: Progressing Problem: Knowledge Deficit  Goal: Patient/family/caregiver demonstrates understanding of disease process, treatment plan, medications, and discharge instructions  Description  Complete learning assessment and assess knowledge base  Interventions:  - Provide teaching at level of understanding  - Provide teaching via preferred learning methods  Outcome: Progressing     Problem: Prexisting or High Potential for Compromised Skin Integrity  Goal: Skin integrity is maintained or improved  Description  INTERVENTIONS:  - Identify patients at risk for skin breakdown  - Assess and monitor skin integrity  - Assess and monitor nutrition and hydration status  - Monitor labs   - Assess for incontinence   - Turn and reposition patient  - Assist with mobility/ambulation  - Relieve pressure over bony prominences  - Avoid friction and shearing  - Provide appropriate hygiene as needed including keeping skin clean and dry  - Evaluate need for skin moisturizer/barrier cream  - Collaborate with interdisciplinary team   - Patient/family teaching  - Consider wound care consult   Outcome: Progressing     Problem: Potential for Falls  Goal: Patient will remain free of falls  Description  INTERVENTIONS:  - Assess patient frequently for physical needs  -  Identify cognitive and physical deficits and behaviors that affect risk of falls  -  [place] fall precautions as indicated by assessment   - Educate patient/family on patient safety including physical limitations  - Instruct patient to call for assistance with activity based on assessment  - Modify environment to reduce risk of injury  - Consider OT/PT consult to assist with strengthening/mobility  Outcome: Progressing     Problem: Nutrition/Hydration-ADULT  Goal: Nutrient/Hydration intake appropriate for improving, restoring or maintaining nutritional needs  Description  Monitor and assess patient's nutrition/hydration status for malnutrition   Collaborate with interdisciplinary team and initiate plan and interventions as ordered  Monitor patient's weight and dietary intake as ordered or per policy  Utilize nutrition screening tool and intervene as necessary  Determine patient's food preferences and provide high-protein, high-caloric foods as appropriate       INTERVENTIONS:  - Monitor oral intake, urinary output, labs, and treatment plans  - Assess nutrition and hydration status and recommend course of action  - Evaluate amount of meals eaten  - Assist patient with eating if necessary   - Allow adequate time for meals  - Recommend/ encourage appropriate diets, oral nutritional supplements, and vitamin/mineral supplements  - Order, calculate, and assess calorie counts as needed  - Recommend, monitor, and adjust tube feedings and TPN/PPN based on assessed needs  - Assess need for intravenous fluids  - Provide specific nutrition/hydration education as appropriate  - Include patient/family/caregiver in decisions related to nutrition  Outcome: Progressing     Problem: SAFETY ADULT  Goal: Maintain or return mobility status to optimal level  Description  INTERVENTIONS:  - Assess patient's baseline mobility status (ambulation, transfers, stairs, etc )    - Identify cognitive and physical deficits and behaviors that affect mobility  - Identify mobility aids required to assist with transfers and/or ambulation (gait belt, sit-to-stand, lift, walker, cane, etc )  - [place] fall precautions as indicated by assessment  - Record patient progress and toleration of activity level on Mobility SBAR; progress patient to next Phase/Stage  - Instruct patient to call for assistance with activity based on assessment  - Consider rehabilitation consult to assist with strengthening/weightbearing, etc   Outcome: Not Progressing

## 2020-06-11 NOTE — PROGRESS NOTES
Progress Note - Dottie No 1995, 21 y o  female MRN: [id number]    Unit/Bed#: MICU 05 Encounter: [id number]    Primary Care Provider: Isaura Yo MD   Date and time admitted to hospital: 6/3/2020  2:15 PM        S/P  shunt  Assessment & Plan  Pt has a hx of  shunt insertion for congenital hydrocephalus  · P/w SIRS, acute encephalopathy, and seizure like activity  · Pt also had cardiac arrest and was [facility] [address]  [facility]  reviewed personally and by attending  Final results as below  · Xray Shunt series 6/1/20 loaded in to PACs from outside hospital: Right frontal  shunt catheter appears intact  Hardware from thoracolumbar fusion for scoliosis noted  Of note the right sacroliliac screw noted to have lucency concerning for possible screw loosening  Plan  · Continue regular neurologic checks   · Ongoing medical management per primary team   · Neurology consulted and following for seizures and encephalopathy  · MRI brain w wo ordered and pending   · Continue seizure medications per neurology recommendations  · vEEG discontinued 6/9; no seizure activity noted over 48 hours  · ID consulted and following  · Wound/left iliac cultures negative    · CSF cultures 6/7/20: 2+ Growth of Serratia Marcescens  · SED rate 6/6: 19  · CRP 6/6: 86 1  · Repeat shunt tap on 6/11   · STAT gram stain - negative  · Culture pending  · PT/OT  · DVT PPX: SCDs, Heparin  · No emergent neurosurgical intervention anticipated at this time  · After family meeting yesterday, shunt re-aspirated on 6/10/2020  If patient does not improve clinically after completion of antibiotics in 1 week, family is agreeable to externalization/removal of shunt    Neurosurgery will continue to follow from the periphery until CSF cultures are finalized  Please call with any questions or concerns       Severe sepsis with acute organ dysfunction due to Serratia species [facility])  Assessment & Plan  · ID following  · Currently afebrile  · WBC within normal limits  · Continue Meropenem 2g Q8 for total of 14 days  · Daptomycin discontinued  · CSF from shunt tap 6/3 positive for serratia marcescens  · Repeat culture pending  · Back wound culture and left iliac tap negative  Acute metabolic encephalopathy  Assessment & Plan  · Ongoing monitoring and management per primary team    * Seizure-like activity [facility])  Assessment & Plan  · Neurology following  · Completed vEEG with no noted seizure activity  · Keppra 1000mg BID   · Trileptal 450mg BID         Subjective/Objective     Subjective: Patient with NAEO  Objective: Patient intubated, not sedated  Exam slightly improved today  VSS  Intake/Output       06/11/20 0701 - 06/12/20 0700      [id number] 5052-4875 Total       Intake    P O   0  -- 0    NG/GT  210  -- 210    Total Intake 210 -- 210       Output    Urine  360  -- 360    Output (mL) (Urethral Catheter Temperature probe) 360 -- 360    Total Output 360 -- 360       Net I/O     -150 -- -150          Invasive Devices     Peripherally Inserted Central Catheter Line            PICC Line 99/42/21 Right Basilic 6 days          Drain            Gastrostomy/Enterostomy -- days    Urethral Catheter Temperature probe -- days          Airway            Surgical Airway Shiley Cuffed -- days                Vitals: Blood pressure 93/51, pulse 72, temperature 98 2 °F (36 8 °C), resp  rate 19, height 4' 2" (1 27 m), weight 58 4 kg (128 lb 12 oz), SpO2 100 %  ,Body mass index is 36 21 kg/m²  General appearance: intubated and minimally responsive  Head: Normocephalic, right frontal shunt reservoir easily compressible with brisk refill  No drainage or surrounding erythema  Eyes: does not open eyes to voice or PS   PERRL, dilated bilaterally  Neck: trach in place  Lungs: non labored breathing, occasionally tachypneic  Heart: regular heart rate  Neurologic:   Mental status: GCS 6T (E1, V1t, M4)  Cranial nerves: grossly intact (Cranial nerves II-XII)  Motor: responsive to PS in all 4 extremities - equally today  Reflexes: 2+ and symmetric    Lab Results: I have personally reviewed pertinent results  Results from last 7 days   Lab Units 06/11/20  0433 06/10/20  0444 06/09/20  0442 06/08/20  0501   WBC Thousand/uL 6 56 5 22 5 39  5 46 8 39   HEMOGLOBIN g/dL 8 6* 8 1* 8 6*  8 4* 9 6*   HEMATOCRIT % 28 7* 27 2* 29 0*  28 7* 32 2*   PLATELETS Thousands/uL 306 285 273  259 280   NEUTROS PCT % 59  --  55  55 67   MONOS PCT % 9  --  11  9 9   MONO PCT %  --  5  --   --      Results from last 7 days   Lab Units 06/11/20  0433 06/10/20  0444 06/09/20  0442   POTASSIUM mmol/L 4 0 4 0 4 1  4 1   CHLORIDE mmol/L 106 103 105  105   CO2 mmol/L 29 30 30  30   BUN mg/dL 14 12 12  12   CREATININE mg/dL 0 41* 0 30* 0 35*  0 35*   CALCIUM mg/dL 9 1 8 7 8 5  8 5   ALK PHOS U/L 162* 128* 126*   ALT U/L 84* 108* 184*   AST U/L 27 21 47*     Results from last 7 days   Lab Units 06/09/20 0442 06/07/20 1918 06/05/20  0509   MAGNESIUM mg/dL 2 3 1 8 2 0     Results from last 7 days   Lab Units 06/07/20 1918 06/05/20  0509   PHOSPHORUS mg/dL 4 2 3 2     Results from last 7 days   Lab Units 06/05/20  1049   INR  1 04     No results found for: TROPONINT  ABG:  Lab Results   Component Value Date    PHART 7 427 06/05/2020    RMX4PMP 42 0 06/05/2020    PO2ART 97 0 06/05/2020    BAW5XRC 27 1 06/05/2020    BEART 2 5 06/05/2020    SOURCE Radial, Right 06/05/2020       Imaging Studies: I have personally reviewed pertinent reports  and I have personally reviewed pertinent films in PACS     Xr Skull < 4 Vw    Result Date: 6/6/2020  Narrative: POST MR SHUNT EVALUATION INDICATION:   Ventriculoperitoneal shunt placement, evaluate pre-MRI  COMPARISON:  Head CT performed June 1, 2020 VIEWS:  XR SKULL < 4 VW FINDINGS: Views of the calvarium are obtained with dedicated view positioned to evaluate pressure setting dial of ventriculostomy catheter    Baseline pressure setting is obtained  Impression: Baseline pressure setting is obtained  Workstation performed: [id number]     Ct Head W Wo Contrast    Result Date: 6/3/2020  Narrative: 1 2 840 [id number]  9 921 3 [id number]    Ct Guided Perc Drainage Catheter Placeme    Result Date: 6/8/2020  Narrative: CT scan guided abscess aspiration This examination, like all CT scans performed in the [facility], was performed utilizing techniques to minimize radiation dose exposure, including the use of iterative reconstruction and automated exposure control  History: Long-standing collection adjacent to iliac crest Images: 58 Sedation: 1% local lidocaine infiltration  Technique: The patient was brought to the CT scanner and placed decubitus on the table  After axial images were obtained through the left iliac crest , an area of the skin was then marked, prepped, and draped in usual sterile fashion  All elements of maximal sterile barrier technique were followed (cap, mask, sterile gown, sterile gloves, large sterile sheet, hand hygiene, and 2% chlorhexidine for cutaneous antisepsis)  Lidocaine was administered to the skin and a small skin incision was made  A 18-gauge Chiba needle was advanced into the collection under CT-scan guidance  Small amount of fluid was aspirated  This was sent for culture and evaluation  Needle was removed  The patient tolerated the procedure well and suffered no complications  Impression: Impression: Successful CT-guided aspiration left iliac crest collection  Workstation performed: [id number]     Ir Outside Images    Result Date: 6/3/2020  Narrative: 1 2 840 [id number]  8 613 [id number]    Ir Outside Images    Result Date: 6/3/2020  Narrative: 1 2 840 635537  3 771 [id number]    Xr Outside Images    Result Date: 6/3/2020  Narrative: 1 2 840 385391  8 444 [id number]    Xr Outside Images    Result Date: 6/3/2020  Narrative: 6 1 263 141166  6 912 [id number]    Xr Outside Images    Result Date: 6/3/2020  Narrative: 1 2 840 209321  6 254 [id number]    Xr Outside Images    Result Date: 6/3/2020  Narrative: 1 2 840 831957  8 187 [id number]    Us Outside Images    Result Date: 6/3/2020  Narrative: 1 3 12 2 1107 5 8 9 [id number]    Ct Outside Images    Result Date: 6/3/2020  Narrative: 1 2 840 901027  8 375 [id number]    Ct Outside Images    Result Date: 6/3/2020  Narrative: 1 2 840 041715  4 052 2 [id number]  581    EKG, Pathology, and Other Studies: I have personally reviewed pertinent reports        VTE Pharmacologic Prophylaxis: Heparin    VTE Mechanical Prophylaxis: sequential compression device

## 2020-06-11 NOTE — ASSESSMENT & PLAN NOTE
· ID following  · Currently afebrile  · WBC within normal limits  · Continue Meropenem 2g Q8 for total of 14 days  · Daptomycin discontinued  · CSF from shunt tap 6/3 positive for serratia marcescens  · Repeat culture pending  · Back wound culture and left iliac tap negative

## 2020-06-11 NOTE — PROGRESS NOTES
Daily Progress Note - Critical Care   Renato Mcintosh 21 y o  female MRN: [id number]  Unit/Bed#: MICU 05 Encounter: [id number]        ----------------------------------------------------------------------------------------  HPI/24hr events: No overnight events   shunt tapped yesterday, stat gram stain without bacteria, culture pending      ---------------------------------------------------------------------------------------  SUBJECTIVE  encephalopathty    Review of Systems  Review of systems was unable to be performed secondary to encephalopathy  ---------------------------------------------------------------------------------------  Assessment and Plan:    Neuro:   · Diagnosis: Concern for seizure like activity at outside hospital with seizure disorder hx  ? Plan:   § No seizure activity noted on 48 hrs of cEEG while in ICU and then repeated again from 6/7 to 6/9 without evidence of activity  Has been discontinued  § Continue home keppra and trileptal  § Neurology following   · Diagnosis: Spastic quadriplegia  ? Plan:   § Continue Valium and PRN flexeril  · Diagnosis: Profound intellectual disability and cerebral palsy now with acute encephalopathy  ? Plan:   § Likely secondary to serratia CSF infection  § Unable to obtain LP due to physical barriers, shunt previously tapped and is without growth  § MRI pending  · Diagnosis: PAD  ? Plan:   § Continue Fentanyl gtt          CV:   · Diagnosis: Cardiac arrest during insertion of central line likely arrythmic mediated  ? Plan:   § Monitor on telemetry   Pulm:  · Diagnosis: Chronic respiratory failure s/p trach  ?  Plan:   § Currently AC/VC, SBT today, tolerate RR <30 per facility RR 20-30 normally, event patient came to hospital with was RR>60, normally TC 24/7  § Normally has cuff-less trach was exchanged at [facility] to cuffed  § Difficult to wean given mental status, scoliosis and neurologic atypical breathing pattern  § Continue home pulmicort        GI:   ? No active issues      :   · Diagnosis: No active issues  ? Plan: Consider d/c maximiliano        F/E/N:   · Plan: Continue cyclic TF        Heme/Onc:   · Diagnosis: Anemia  ? Plan: Stable, continue to monitor         Endo:   · Diagnosis: Hx of DI  ? Plan:   § Continue DDAVP     ID:   · Diagnosis: Serratia CSF infection from unclear source  ? Plan:   § Day #10 meropenem   § ID following   §  shunt culture pending- stat gram stain without bacteria      MSK/Skin:   ? Turn q2  ? Local wound care as needed    Disposition: Continue Critical Care   Code Status: Level 1 - Full Code  ---------------------------------------------------------------------------------------  ICU CORE MEASURES    Prophylaxis   VTE Pharmacologic Prophylaxis: Heparin  VTE Mechanical Prophylaxis: sequential compression device  Stress Ulcer Prophylaxis: Prophylaxis Not Indicated     ABCDE Protocol (if indicated)  Plan to perform spontaneous awakening trial today? Yes  Plan to perform spontaneous breathing trial today? Yes  Obvious barriers to extubation? Yes    Invasive Devices Review  Invasive Devices     Peripherally Inserted Central Catheter Line            PICC Line / Right Basilic 6 days          Drain            Gastrostomy/Enterostomy -- days    Urethral Catheter Temperature probe -- days          Airway            Surgical Airway Shiley Cuffed -- days              Can any invasive devices be discontinued today?  No  ---------------------------------------------------------------------------------------  OBJECTIVE    Vitals   Vitals:    20 0420 20 0500 20 0600 20 0736   BP:  107/69 112/69    BP Location:       Pulse:  86 84    Resp:  21 (!) 23    Temp:  99 3 °F (37 4 °C) 99 3 °F (37 4 °C)    TempSrc:       SpO2:  100% 100% 100%   Weight: 58 4 kg (128 lb 12 oz)      Height:         Temp (24hrs), Av 2 °F (37 3 °C), Min:98 2 °F (36 8 °C), Max:100 °F (37 8 °C)  Current: Temperature: 99 3 °F (37 4 °C)      Respiratory:  SpO2: SpO2: 100 %       Invasive/non-invasive ventilation settings   Respiratory    Lab Data (Last 4 hours)    None         O2/Vent Data (Last 4 hours)      06/11 0736           Vent Mode CPAP/PS Spont       Patient safety screen outcome: Passed       FIO2 (%) (%) 40       PEEP (cmH2O) (cmH2O) 5       Pressure Support (cmH2O) (cmH20) 12       MV (Obs) 4 31       RSBI 70                   Physical Exam   Constitutional: No distress  Eyes:   Rhythmic eye movements noted     Neck: No JVD present  Cardiovascular: Normal rate and regular rhythm  Pulmonary/Chest: No respiratory distress  She has no wheezes  She has no rales  She exhibits no tenderness  Tachypneic    Abdominal: Soft  She exhibits no distension  There is no tenderness  Musculoskeletal: She exhibits no edema  Contracted     Neurological:   No response to verbal or noxious stimuli  Spontaneous mouth movements noted   Skin: Skin is warm and dry           Laboratory and Diagnostics:  Results from last 7 days   Lab Units 06/11/20  0433 06/10/20  0444 06/09/20  0442 06/08/20  0501 06/07/20  0530 06/06/20  0512 06/05/20  0509   WBC Thousand/uL 6 56 5 22 5 39  5 46 8 39 13 20* 10 37* 8 47   HEMOGLOBIN g/dL 8 6* 8 1* 8 6*  8 4* 9 6* 9 9* 9 3* 8 8*   HEMATOCRIT % 28 7* 27 2* 29 0*  28 7* 32 2* 32 6* 31 7* 28 3*   PLATELETS Thousands/uL 306 285 273  259 280 246 203 175   NEUTROS PCT % 59  --  55  55 67 73 67 59   BANDS PCT %  --  4  --   --   --   --   --    MONOS PCT % 9  --  11  9 9 6 8 9   MONO PCT %  --  5  --   --   --   --   --      Results from last 7 days   Lab Units 06/11/20  0433 06/10/20  0444 06/09/20  0442 06/08/20  0501 06/07/20  1918 06/07/20  0530 06/06/20  0512  06/05/20  0509   SODIUM mmol/L 140 137 141  141 142 140 134* 136   < > 140   POTASSIUM mmol/L 4 0 4 0 4 1  4 1 4 9 3 0* 3 8 4 2   < > 3 6   CHLORIDE mmol/L 106 103 105  105 109* 103 101 102   < > 106   CO2 mmol/L 29 30 30  30 30 30 28 29   < > 28   ANION GAP mmol/L 5 4 6  6 3* 7 5 5   < > 6   BUN mg/dL 14 12 12  12 10 11 13 12   < > 7   CREATININE mg/dL 0 41* 0 30* 0 35*  0 35* 0 39* 0 29* 0 38* 0 30*   < > 0 33*   CALCIUM mg/dL 9 1 8 7 8 5  8 5 8 6 7 4* 8 3 8 1*   < > 7 8*   GLUCOSE RANDOM mg/dL 93 104 109  109 104 107 116 106   < > 105   ALT U/L 84* 108* 184*  --   --   --   --   --  56   AST U/L 27 21 47*  --   --   --   --   --  34   ALK PHOS U/L 162* 128* 126*  --   --   --   --   --  117*   ALBUMIN g/dL 2 9* 2 7* 2 7*  --   --   --   --   --  2 7*   TOTAL BILIRUBIN mg/dL 0 40 0 34 0 30  --   --   --   --   --  0 39    < > = values in this interval not displayed       Results from last 7 days   Lab Units 06/09/20  0442 06/07/20  1918 06/05/20  0509   MAGNESIUM mg/dL 2 3 1 8 2 0   PHOSPHORUS mg/dL  --  4 2 3 2      Results from last 7 days   Lab Units 06/05/20  1049   INR  1 04              ABG:  Results from last 7 days   Lab Units 06/05/20  1141   PH ART  7 427   PCO2 ART mm Hg 42 0   PO2 ART mm Hg 97 0   HCO3 ART mmol/L 27 1   BASE EXC ART mmol/L 2 5   ABG SOURCE  Radial, Right     VBG:  Results from last 7 days   Lab Units 06/05/20  1141   ABG SOURCE  Radial, Right     Results from last 7 days   Lab Units 06/10/20  0444 06/09/20  0611 06/09/20  0442 06/06/20  0514 06/05/20  0509   PROCALCITONIN ng/ml 0 19 0 36* 0 36* 1 00* 1 77*       Micro  Results from last 7 days   Lab Units 06/10/20  1331 06/05/20  1418 06/05/20  0507   GRAM STAIN RESULT  No Polys or Bacteria seen 1+ Polys  No bacteria seen Rare Disintegrating polys  No organisms seen   WOUND CULTURE   --   --  No growth   BODY FLUID CULTURE, STERILE   --  No growth  --        EKG: no new  Imaging: no new     Intake and Output  I/O       06/09 0701 - 06/10 0700 06/10 0701 - 06/11 0700    I V  (mL/kg) 118 2 (2) 109 8 (1 9)    NG/GT 60 150    IV Piggyback 300 311 7    Feedings 718 719    Total Intake(mL/kg) 1196 2 (20 4) 1290 4 (22 1)    Urine (mL/kg/hr) 1065 (0 8) 3040 (2 2)    Stool  0 Total Output 1065 3040    Net +131 2 -1749 6          Unmeasured Stool Occurrence  2 x        UOP: 1 9cc/kg/hr     Height and Weights   Height: 4' 2" (127 cm)  IBW: 22 5 kg  Body mass index is 36 21 kg/m²  Weight (last 2 days)     Date/Time   Weight    06/11/20 0420   58 4 (128 75)    06/10/20 0416   58 6 (129 19)    06/09/20 0438   57 4 (126 54)                Nutrition       Diet Orders   (From admission, onward)             Start     Ordered    06/11/20 0000  Diet NPO  Diet effective now     Question Answer Comment   Diet Type NPO    RD to adjust diet per protocol?  Yes        06/10/20 1747              Active Medications  Scheduled Meds:    Current Facility-Administered Medications:  acetaminophen 650 mg Per G Tube Q4H PRN Dagmar Campos,     bisacodyl 10 mg Rectal Daily MADAY Samayoa    budesonide 0 5 mg Nebulization BID Leti Jennings DO    calcium carbonate-vitamin D 1 tablet Oral Daily With Breakfast MADAY Samayoa    chlorhexidine 15 mL Swish & Spit Q12H [facility] Merritt Diamond PA-C    cholecalciferol 1,000 Units Oral Daily MADAY Samayoa    cyclobenzaprine 5 mg Per G Tube TID PRN Augustina Carter MD    dantrolene 100 mg Per G Tube TID Augustina Carter MD    desmopressin 0 05 mg Per G Tube Q48H Augustina Carter MD    diazepam 5 mg Per G Tube Q8H Augustina Carter MD    fentanyl citrate (PF) 50 mcg Intravenous Q1H PRN MADAY Varghese    glycerin-hypromellose- 1 drop Both Eyes Q3H PRN Ric Jennings DO    guaiFENesin 400 mg Oral Q8H MADAY Alatorre    heparin (porcine) 5,000 Units Subcutaneous [facility] Merritt Diamond PA-C    levETIRAcetam 1,000 mg Per G Tube BID Augustina Carter MD    levETIRAcetam 500 mg Per G Tube Q24H Augustina Carter MD    LORazepam 1 mg Intravenous Q4H PRN John Robertson MD    meropenem 2,000 mg Intravenous Q8H Ciscosierra Borrero, MD Last Rate: Stopped (06/11/20 0406)   OXcarbazepine 450 mg Per G Tube Q12H [facility] Augustina Carter MD senna-docusate sodium 1 tablet Per G Tube HS Bobby Callaway MD      Continuous Infusions:     PRN Meds:     acetaminophen 650 mg Q4H PRN   cyclobenzaprine 5 mg TID PRN   fentanyl citrate (PF) 50 mcg Q1H PRN   glycerin-hypromellose- 1 drop Q3H PRN   LORazepam 1 mg Q4H PRN       Allergies   Allergies   Allergen Reactions    Baclofen Other (See Comments)     Reaction Date: 22Nov2011;   apena      Cefepime Hives    Cephalosporins      Other reaction(s): Unknown Reaction  Other reaction(s): Unknown Reaction  Other reaction(s): Unknown Reaction      Clonidine      Other reaction(s): Respiratory Distress, Respiratory Distress    Lactase     Milk-Related Compounds     Penicillins Hives     Reaction Date: [id number];       Phenobarbital Other (See Comments)     Reaction Date: [id number]; Other reaction(s): Other (See Comments)  apnea  Other reaction(s): Other (See Comments)  apnea  apnea      Vancomycin     Zolpidem Other (See Comments)     Other reaction(s): Other (see comments)  Reaction Date: [id number];   apnea  apnea  apnea      Latex Rash     ---------------------------------------------------------------------------------------  Advance Directive and Living Will:      Power of :    POLST:    ---------------------------------------------------------------------------------------  Care Time Delivered:   No Critical Care time spent     Edward Colvin PA-C      Portions of the record may have been created with voice recognition software  Occasional wrong word or "sound a like" substitutions may have occurred due to the inherent limitations of voice recognition software    Read the chart carefully and recognize, using context, where substitutions have occurred

## 2020-06-12 PROBLEM — R00.1 BRADYCARDIA: Status: RESOLVED | Noted: 2020-06-08 | Resolved: 2020-06-12

## 2020-06-12 LAB
GLUCOSE SERPL-MCNC: 100 MG/DL (ref 65–140)
GLUCOSE SERPL-MCNC: 103 MG/DL (ref 65–140)
GLUCOSE SERPL-MCNC: 123 MG/DL (ref 65–140)

## 2020-06-12 PROCEDURE — 94640 AIRWAY INHALATION TREATMENT: CPT

## 2020-06-12 PROCEDURE — 99233 SBSQ HOSP IP/OBS HIGH 50: CPT | Performed by: INTERNAL MEDICINE

## 2020-06-12 PROCEDURE — NC001 PR NO CHARGE: Performed by: PHYSICIAN ASSISTANT

## 2020-06-12 PROCEDURE — 82948 REAGENT STRIP/BLOOD GLUCOSE: CPT

## 2020-06-12 PROCEDURE — 99233 SBSQ HOSP IP/OBS HIGH 50: CPT | Performed by: PHYSICIAN ASSISTANT

## 2020-06-12 PROCEDURE — 99232 SBSQ HOSP IP/OBS MODERATE 35: CPT | Performed by: PSYCHIATRY & NEUROLOGY

## 2020-06-12 PROCEDURE — 94760 N-INVAS EAR/PLS OXIMETRY 1: CPT

## 2020-06-12 PROCEDURE — 94003 VENT MGMT INPAT SUBQ DAY: CPT

## 2020-06-12 RX ORDER — SODIUM CHLORIDE, SODIUM GLUCONATE, SODIUM ACETATE, POTASSIUM CHLORIDE, MAGNESIUM CHLORIDE, SODIUM PHOSPHATE, DIBASIC, AND POTASSIUM PHOSPHATE .53; .5; .37; .037; .03; .012; .00082 G/100ML; G/100ML; G/100ML; G/100ML; G/100ML; G/100ML; G/100ML
75 INJECTION, SOLUTION INTRAVENOUS CONTINUOUS
Status: DISCONTINUED | OUTPATIENT
Start: 2020-06-13 | End: 2020-06-13

## 2020-06-12 RX ADMIN — DESMOPRESSIN ACETATE 0.05 MG: 0.1 TABLET ORAL at 10:31

## 2020-06-12 RX ADMIN — GUAIFENESIN 400 MG: 100 SOLUTION ORAL at 01:57

## 2020-06-12 RX ADMIN — MEROPENEM 2000 MG: 1 INJECTION, POWDER, FOR SOLUTION INTRAVENOUS at 02:00

## 2020-06-12 RX ADMIN — LEVETIRACETAM 1000 MG: 100 SOLUTION ORAL at 14:35

## 2020-06-12 RX ADMIN — LEVETIRACETAM 500 MG: 100 SOLUTION ORAL at 21:50

## 2020-06-12 RX ADMIN — HEPARIN SODIUM 5000 UNITS: 5000 INJECTION INTRAVENOUS; SUBCUTANEOUS at 06:12

## 2020-06-12 RX ADMIN — BISACODYL 10 MG: 10 SUPPOSITORY RECTAL at 10:25

## 2020-06-12 RX ADMIN — DIAZEPAM 5 MG: 5 TABLET ORAL at 18:48

## 2020-06-12 RX ADMIN — HEPARIN SODIUM 5000 UNITS: 5000 INJECTION INTRAVENOUS; SUBCUTANEOUS at 21:49

## 2020-06-12 RX ADMIN — Medication 1 TABLET: at 10:31

## 2020-06-12 RX ADMIN — DIAZEPAM 5 MG: 5 TABLET ORAL at 01:57

## 2020-06-12 RX ADMIN — SENNOSIDES AND DOCUSATE SODIUM 1 TABLET: 8.6; 5 TABLET ORAL at 21:50

## 2020-06-12 RX ADMIN — BUDESONIDE 0.5 MG: 0.5 INHALANT RESPIRATORY (INHALATION) at 07:30

## 2020-06-12 RX ADMIN — OXCARBAZEPINE 450 MG: 300 TABLET, FILM COATED ORAL at 06:12

## 2020-06-12 RX ADMIN — LEVETIRACETAM 1000 MG: 100 SOLUTION ORAL at 06:12

## 2020-06-12 RX ADMIN — CHLORHEXIDINE GLUCONATE 0.12% ORAL RINSE 15 ML: 1.2 LIQUID ORAL at 10:31

## 2020-06-12 RX ADMIN — OXCARBAZEPINE 450 MG: 300 TABLET, FILM COATED ORAL at 18:47

## 2020-06-12 RX ADMIN — BUDESONIDE 0.5 MG: 0.5 INHALANT RESPIRATORY (INHALATION) at 19:21

## 2020-06-12 RX ADMIN — MEROPENEM 2000 MG: 1 INJECTION, POWDER, FOR SOLUTION INTRAVENOUS at 22:04

## 2020-06-12 RX ADMIN — GUAIFENESIN 400 MG: 100 SOLUTION ORAL at 18:47

## 2020-06-12 RX ADMIN — GUAIFENESIN 400 MG: 100 SOLUTION ORAL at 10:31

## 2020-06-12 RX ADMIN — DIAZEPAM 5 MG: 5 TABLET ORAL at 10:30

## 2020-06-12 RX ADMIN — MEROPENEM 2000 MG: 1 INJECTION, POWDER, FOR SOLUTION INTRAVENOUS at 10:32

## 2020-06-12 RX ADMIN — MELATONIN 1000 UNITS: at 10:31

## 2020-06-12 RX ADMIN — HEPARIN SODIUM 5000 UNITS: 5000 INJECTION INTRAVENOUS; SUBCUTANEOUS at 14:35

## 2020-06-12 RX ADMIN — CHLORHEXIDINE GLUCONATE 0.12% ORAL RINSE 15 ML: 1.2 LIQUID ORAL at 20:32

## 2020-06-12 NOTE — PROGRESS NOTES
Daily Progress Note - Critical Care   Renato Mcintosh 21 y o  female MRN: [id number]  Unit/Bed#: MICU 05 Encounter: [id number]        ----------------------------------------------------------------------------------------  HPI/24hr events: No overnight events  Tolerated PS 12/5 for the majority of the day    ---------------------------------------------------------------------------------------  SUBJECTIVE  N/A    Review of Systems  Review of systems was unable to be performed secondary to mental status  ---------------------------------------------------------------------------------------  Assessment and Plan:    Neuro:   · Diagnosis: Concern for seizure like activity at outside hospital with seizure disorder hx  ? Plan:   § No seizure activity noted on 48 hrs of cEEG while in ICU and then repeated again from 6/7 to 6/9 without evidence of activity  Has been discontinued  § Continue home keppra and trileptal  § Neurology following   · Diagnosis: Spastic quadriplegia  ? Plan:   § Continue Valium and PRN flexeril  · Diagnosis: Profound intellectual disability and cerebral palsy now with acute encephalopathy  ? Plan:   § Likely secondary to serratia CSF infection  § Unable to obtain LP due to physical barriers, shunt previously tapped and is without growth  § MRI pending  · Diagnosis: PAD  ? Plan:   § Continue Fentanyl gtt           CV:   · Diagnosis: Cardiac arrest during insertion of central line likely arrythmic mediated  ? Plan:   § Monitor on telemetry   Pulm:  · Diagnosis: Chronic respiratory failure s/p trach  ?  Plan:   § Currently AC/VC, SBT today, tolerate RR <30 per facility RR 20-30 normally, event patient came to hospital with was RR>60, normally TC 24/7  § Normally has cuff-less trach was exchanged at [facility] to cuffed  § Difficult to wean given mental status, scoliosis and neurologic atypical breathing pattern  § Continue home pulmicort        GI:   ? No active issues      :   · Diagnosis: No active issues  ? Plan: Consider d/c maximiliano        F/E/N:   · Plan: Continue cyclic TF        Heme/Onc:   · Diagnosis: Anemia  ? Plan:Lab holiday today         Endo:   · Diagnosis: Hx of DI  ? Plan:   § Continue DDAVP QD     ID:   · Diagnosis: Serratia CSF infection from unclear source  ? Plan:   § Day #11 meropenem   § ID following   §  shunt culture pending- stat gram stain without bacteria      MSK/Skin:   ? Turn q2  ? Local wound care as needed       Disposition: Continue Critical Care   Code Status: Level 1 - Full Code  ---------------------------------------------------------------------------------------  ICU CORE MEASURES    Prophylaxis   VTE Pharmacologic Prophylaxis: Heparin  VTE Mechanical Prophylaxis: sequential compression device  Stress Ulcer Prophylaxis: Prophylaxis Not Indicated     ABCDE Protocol (if indicated)  Plan to perform spontaneous awakening trial today? Yes  Plan to perform spontaneous breathing trial today? Yes  Obvious barriers to extubation? Yes  CAM-ICU: Positive    Invasive Devices Review  Invasive Devices     Peripherally Inserted Central Catheter Line            PICC Line  Right Basilic 7 days          Drain            Gastrostomy/Enterostomy -- days    External Urinary Catheter less than 1 day          Airway            Surgical Airway Shiley Cuffed -- days              Can any invasive devices be discontinued today?  No  ---------------------------------------------------------------------------------------  OBJECTIVE    Vitals   Vitals:    20 0352 20 0515 20 0600 20 0730   BP:   103/59    BP Location:       Pulse: 86  82    Resp: (!) 33  21    Temp: 98 5 °F (36 9 °C)      TempSrc: Oral      SpO2: 100% 100% 99% 100%   Weight:   57 2 kg (126 lb 1 7 oz)    Height:         Temp (24hrs), Av 7 °F (37 1 °C), Min:98 2 °F (36 8 °C), Max:99 3 °F (37 4 °C)  Current: Temperature: 98 5 °F (36 9 °C)    Respiratory:  SpO2: SpO2: 100 % Invasive/non-invasive ventilation settings   Respiratory    Lab Data (Last 4 hours)    None         O2/Vent Data (Last 4 hours)      06/12 0515 06/12 0730         Vent Mode AC/VC CPAP/PS Spont      Resp Rate (BPM) (BPM) 15       Vt (mL) (mL) 250       FIO2 (%) (%) 40       PEEP (cmH2O) (cmH2O) 5       Patient safety screen outcome:  Passed      MV 4 9       FIO2 (%) (%)  30      PEEP (cmH2O) (cmH2O)  5      Pressure Support (cmH2O) (cmH20)  12      MV (Obs)  6 5      RSBI  97                  Physical Exam   Constitutional: No distress  HENT:   Dry mucous membranes   Eyes: Pupils are equal, round, and reactive to light  Neck: No JVD present  Cardiovascular: Normal rate, regular rhythm, normal heart sounds and intact distal pulses  Exam reveals no gallop and no friction rub  No murmur heard  Pulmonary/Chest: Effort normal and breath sounds normal  No respiratory distress  She has no wheezes  She has no rales  She exhibits no tenderness  Abdominal: Soft  She exhibits no distension  There is no tenderness  Neurological:   Eyes open this AM  Makes lip movements that occasionally appear to be a smile, does not respond to verbal stimuli, postures vs withdraws to noxious stimuli    Skin: Skin is warm and dry           Laboratory and Diagnostics:  Results from last 7 days   Lab Units 06/11/20  0433 06/10/20  0444 06/09/20  0442 06/08/20  0501 06/07/20  0530 06/06/20  0512   WBC Thousand/uL 6 56 5 22 5 39  5 46 8 39 13 20* 10 37*   HEMOGLOBIN g/dL 8 6* 8 1* 8 6*  8 4* 9 6* 9 9* 9 3*   HEMATOCRIT % 28 7* 27 2* 29 0*  28 7* 32 2* 32 6* 31 7*   PLATELETS Thousands/uL 306 285 273  259 280 246 203   NEUTROS PCT % 59  --  55  55 67 73 67   BANDS PCT %  --  4  --   --   --   --    MONOS PCT % 9  --  11  9 9 6 8   MONO PCT %  --  5  --   --   --   --      Results from last 7 days   Lab Units 06/11/20  0433 06/10/20  0444 06/09/20  0442 06/08/20  0501 06/07/20  1918 06/07/20  0530 06/06/20  0512   SODIUM mmol/L 140 137 141  141 142 140 134* 136   POTASSIUM mmol/L 4 0 4 0 4 1  4 1 4 9 3 0* 3 8 4 2   CHLORIDE mmol/L 106 103 105  105 109* 103 101 102   CO2 mmol/L 29 30 30  30 30 30 28 29   ANION GAP mmol/L 5 4 6  6 3* 7 5 5   BUN mg/dL 14 12 12  12 10 11 13 12   CREATININE mg/dL 0 41* 0 30* 0 35*  0 35* 0 39* 0 29* 0 38* 0 30*   CALCIUM mg/dL 9 1 8 7 8 5  8 5 8 6 7 4* 8 3 8 1*   GLUCOSE RANDOM mg/dL 93 104 109  109 104 107 116 106   ALT U/L 84* 108* 184*  --   --   --   --    AST U/L 27 21 47*  --   --   --   --    ALK PHOS U/L 162* 128* 126*  --   --   --   --    ALBUMIN g/dL 2 9* 2 7* 2 7*  --   --   --   --    TOTAL BILIRUBIN mg/dL 0 40 0 34 0 30  --   --   --   --      Results from last 7 days   Lab Units 06/09/20  0442 06/07/20  1918   MAGNESIUM mg/dL 2 3 1 8   PHOSPHORUS mg/dL  --  4 2      Results from last 7 days   Lab Units 06/05/20  1049   INR  1 04              ABG:  Results from last 7 days   Lab Units 06/05/20  1141   PH ART  7 427   PCO2 ART mm Hg 42 0   PO2 ART mm Hg 97 0   HCO3 ART mmol/L 27 1   BASE EXC ART mmol/L 2 5   ABG SOURCE  Radial, Right     VBG:  Results from last 7 days   Lab Units 06/05/20  1141   ABG SOURCE  Radial, Right     Results from last 7 days   Lab Units 06/10/20  0444 06/09/20  0611 06/09/20  0442 06/06/20  0514   PROCALCITONIN ng/ml 0 19 0 36* 0 36* 1 00*       Micro  Results from last 7 days   Lab Units 06/10/20  1331 06/05/20  1418   GRAM STAIN RESULT  No Polys or Bacteria seen 1+ Polys  No bacteria seen   BODY FLUID CULTURE, STERILE   --  No growth       EKG: no new  Imaging: no new     Intake and Output  I/O       06/10 0701 - 06/11 0700 06/11 0701 - 06/12 0700 06/12 0701 - 06/13 0700    P  O   0     I V  (mL/kg) 109 8 (1 9) 340 (5 9)     NG/ 350     IV Piggyback 311 7 300     Feedings 719 682     Total Intake(mL/kg) 1290 4 (22 1) 1672 (29 2)     Urine (mL/kg/hr) 3040 (2 2) 990 (0 7)     Stool 0 0     Total Output 3040 990     Net -1749 6 +682 Unmeasured Stool Occurrence 2 x 1 x         UOP: 0 7cc/kg/hr     Height and Weights   Height: 4' 2" (127 cm)  IBW: 22 5 kg  Body mass index is 35 46 kg/m²  Weight (last 2 days)     Date/Time   Weight    06/12/20 0600   57 2 (126 1)    06/11/20 0420   58 4 (128 75)    06/10/20 0416   58 6 (129 19)                Nutrition       Diet Orders   (From admission, onward)             Start     Ordered    06/11/20 1517  Diet Enteral/Parenteral; Tube Feeding No Oral Diet; Non-Formulary; Cyclic; 50; 16 hours; Prosource Protein Liquid - Two Packets  Diet effective now     Question Answer Comment   Diet Type Enteral/Parenteral    Enteral/Parenteral Tube Feeding No Oral Diet    Tube Feeding Formula: Non-Formulary    Bolus/Cyclic/Continuous Cyclic    Tube Feeding Cyclic Rate (mL/hr): 50    Tube Feeding Cyclic: Administer over: 16 hours    Prosource Protein Liquid - No Carb Prosource Protein Liquid - Two Packets    RD to adjust diet per protocol?  Yes        06/11/20 1518                Active Medications  Scheduled Meds:  Current Facility-Administered Medications:  acetaminophen 650 mg Per G Tube Q4H PRN Obdulio Campos DO    bisacodyl 10 mg Rectal Daily MADAY Ramos    budesonide 0 5 mg Nebulization BID Leti Jennings DO    calcium carbonate-vitamin D 1 tablet Oral Daily With Breakfast MADAY Ramos    chlorhexidine 15 mL Swish & Spit Q12H [address] Napoleon Hawkins PA-C    cholecalciferol 1,000 Units Oral Daily MADAY Ramos    cyclobenzaprine 5 mg Per G Tube TID PRN Shawn Brito MD    desmopressin 0 05 mg Per G Tube Daily Sara Riggs PA-C    diazepam 5 mg Per G Tube Q8H Shawn Brito MD    fentanyl citrate (PF) 50 mcg Intravenous Q1H PRN MADAY Almodovar    glycerin-hypromellose- 1 drop Both Eyes Q3H PRN Vinita Jennings DO    guaiFENesin 400 mg Oral Q8H MADAY Alatorre    heparin (porcine) 5,000 Units Subcutaneous [facility] Sharri Rizzo PA-C levETIRAcetam 1,000 mg Per G Tube BID Shawn Brito MD    levETIRAcetam 500 mg Per G Tube Q24H Shawn Brito MD    LORazepam 1 mg Intravenous Q4H PRN Jl Esparza MD    meropenem 2,000 mg Intravenous Q8H Jim Velasco MD Last Rate: Stopped (06/12/20 0358)   OXcarbazepine 450 mg Per G Tube Q12H [address] Shawn Brito MD    senna-docusate sodium 1 tablet Per G Tube HS Shawn Brito MD      Continuous Infusions:     PRN Meds:     acetaminophen 650 mg Q4H PRN   cyclobenzaprine 5 mg TID PRN   fentanyl citrate (PF) 50 mcg Q1H PRN   glycerin-hypromellose- 1 drop Q3H PRN   LORazepam 1 mg Q4H PRN       Allergies   Allergies   Allergen Reactions    Baclofen Other (See Comments)     Reaction Date: [id number];   apena      Cefepime Hives    Cephalosporins      Other reaction(s): Unknown Reaction  Other reaction(s): Unknown Reaction  Other reaction(s): Unknown Reaction      Clonidine      Other reaction(s): Respiratory Distress, Respiratory Distress    Lactase     Milk-Related Compounds     Penicillins Hives     Reaction Date: [id number];       Phenobarbital Other (See Comments)     Reaction Date: 22Nov2011; Other reaction(s): Other (See Comments)  apnea  Other reaction(s): Other (See Comments)  apnea  apnea      Vancomycin     Zolpidem Other (See Comments)     Other reaction(s): Other (see comments)  Reaction Date: [id number];   apnea  apnea  apnea      Latex Rash     ---------------------------------------------------------------------------------------  Advance Directive and Living Will:      Power of :    POLST:    ---------------------------------------------------------------------------------------  Care Time Delivered:   No Critical Care time spent     aSra Riggs PA-C      Portions of the record may have been created with voice recognition software  Occasional wrong word or "sound a like" substitutions may have occurred due to the inherent limitations of voice recognition software    Read the chart carefully and recognize, using context, where substitutions have occurred

## 2020-06-12 NOTE — PLAN OF CARE
Problem: PAIN - ADULT  Goal: Verbalizes/displays adequate comfort level or baseline comfort level  Description  Interventions:  - Encourage patient to monitor pain and request assistance  - Assess pain using appropriate pain scale  - Administer analgesics based on type and severity of pain and evaluate response  - Implement non-pharmacological measures as appropriate and evaluate response  - Consider cultural and social influences on pain and pain management  - Notify physician/advanced practitioner if interventions unsuccessful or patient reports new pain  Outcome: Progressing     Problem: INFECTION - ADULT  Goal: Absence or prevention of progression during hospitalization  Description  INTERVENTIONS:  - Assess and monitor for signs and symptoms of infection  - Monitor lab/diagnostic results  - Monitor all insertion sites, i e  indwelling lines, tubes, and drains  - Monitor endotracheal if appropriate and nasal secretions for changes in amount and color  - [place] appropriate cooling/warming therapies per order  - Administer medications as ordered  - Instruct and encourage patient and family to use good hand hygiene technique  - Identify and instruct in appropriate isolation precautions for identified infection/condition  Outcome: Progressing     Problem: SAFETY ADULT  Goal: Maintain or return mobility status to optimal level  Description  INTERVENTIONS:  - Assess patient's baseline mobility status (ambulation, transfers, stairs, etc )    - Identify cognitive and physical deficits and behaviors that affect mobility  - Identify mobility aids required to assist with transfers and/or ambulation (gait belt, sit-to-stand, lift, walker, cane, etc )  - [place] fall precautions as indicated by assessment  - Record patient progress and toleration of activity level on Mobility SBAR; progress patient to next Phase/Stage  - Instruct patient to call for assistance with activity based on assessment  - Consider rehabilitation consult to assist with strengthening/weightbearing, etc   Outcome: Progressing     Problem: DISCHARGE PLANNING  Goal: Discharge to home or other facility with appropriate resources  Description  INTERVENTIONS:  - Identify barriers to discharge w/patient and caregiver  - Arrange for needed discharge resources and transportation as appropriate  - Identify discharge learning needs (meds, wound care, etc )  - Arrange for interpretive services to assist at discharge as needed  - Refer to Case Management Department for coordinating discharge planning if the patient needs post-hospital services based on physician/advanced practitioner order or complex needs related to functional status, cognitive ability, or social support system  Outcome: Progressing     Problem: Knowledge Deficit  Goal: Patient/family/caregiver demonstrates understanding of disease process, treatment plan, medications, and discharge instructions  Description  Complete learning assessment and assess knowledge base    Interventions:  - Provide teaching at level of understanding  - Provide teaching via preferred learning methods  Outcome: Progressing     Problem: Prexisting or High Potential for Compromised Skin Integrity  Goal: Skin integrity is maintained or improved  Description  INTERVENTIONS:  - Identify patients at risk for skin breakdown  - Assess and monitor skin integrity  - Assess and monitor nutrition and hydration status  - Monitor labs   - Assess for incontinence   - Turn and reposition patient  - Assist with mobility/ambulation  - Relieve pressure over bony prominences  - Avoid friction and shearing  - Provide appropriate hygiene as needed including keeping skin clean and dry  - Evaluate need for skin moisturizer/barrier cream  - Collaborate with interdisciplinary team   - Patient/family teaching  - Consider wound care consult   Outcome: Progressing     Problem: Potential for Falls  Goal: Patient will remain free of falls  Description  INTERVENTIONS:  - Assess patient frequently for physical needs  -  Identify cognitive and physical deficits and behaviors that affect risk of falls  -  [place] fall precautions as indicated by assessment   - Educate patient/family on patient safety including physical limitations  - Instruct patient to call for assistance with activity based on assessment  - Modify environment to reduce risk of injury  - Consider OT/PT consult to assist with strengthening/mobility  Outcome: Progressing     Problem: Nutrition/Hydration-ADULT  Goal: Nutrient/Hydration intake appropriate for improving, restoring or maintaining nutritional needs  Description  Monitor and assess patient's nutrition/hydration status for malnutrition  Collaborate with interdisciplinary team and initiate plan and interventions as ordered  Monitor patient's weight and dietary intake as ordered or per policy  Utilize nutrition screening tool and intervene as necessary  Determine patient's food preferences and provide high-protein, high-caloric foods as appropriate       INTERVENTIONS:  - Monitor oral intake, urinary output, labs, and treatment plans  - Assess nutrition and hydration status and recommend course of action  - Evaluate amount of meals eaten  - Assist patient with eating if necessary   - Allow adequate time for meals  - Recommend/ encourage appropriate diets, oral nutritional supplements, and vitamin/mineral supplements  - Order, calculate, and assess calorie counts as needed  - Recommend, monitor, and adjust tube feedings and TPN/PPN based on assessed needs  - Assess need for intravenous fluids  - Provide specific nutrition/hydration education as appropriate  - Include patient/family/caregiver in decisions related to nutrition  Outcome: Progressing

## 2020-06-12 NOTE — UTILIZATION REVIEW
Continued Stay Review    Date: 6/12/20                         Current Patient Class: IP    Current Level of Care: critical care MICU    HPI:24 y o  female initially admitted on 6/3/20 from [facility] with Severe MR/CP with known seizure disorder who presented with fever, tachypnea and hypoxemia with cardiac arrest   Shunt infection with Serratia on IV antibiotics  Had video EEG monitoring but did not show ane epilepsy  Assessment/Plan:  Remains in critical care  She was on vent  Over night and it tolerating pressure support today  She has persistent encephalopathy but Nursing is noting some increase and improvement in spontaneous eye opening today  More vigorous withdrawal from pain, occasional tongue thrusting  MRI Brain still pending  Family meeting today to discuss goals of care  Will continue with Keppra and Oxcarbazepine  Remains on Fentanyl drip, viera in but may come out  She is on day #11 of Meropenem        Pertinent Labs/Diagnostic Results:       Results from last 7 days   Lab Units 06/11/20  0433 06/10/20  0444 06/09/20 0442 06/08/20  0501 06/07/20  0530   WBC Thousand/uL 6 56 5 22 5 39  5 46 8 39 13 20*   HEMOGLOBIN g/dL 8 6* 8 1* 8 6*  8 4* 9 6* 9 9*   HEMATOCRIT % 28 7* 27 2* 29 0*  28 7* 32 2* 32 6*   PLATELETS Thousands/uL 306 285 273  259 280 246   NEUTROS ABS Thousands/µL 3 87  --  2 99  3 00 5 62 9 77*   BANDS PCT %  --  4  --   --   --          Results from last 7 days   Lab Units 06/11/20  0433 06/10/20  0444 06/09/20  0442 06/08/20  0501 06/07/20  1918   SODIUM mmol/L 140 137 141  141 142 140   POTASSIUM mmol/L 4 0 4 0 4 1  4 1 4 9 3 0*   CHLORIDE mmol/L 106 103 105  105 109* 103   CO2 mmol/L 29 30 30  30 30 30   ANION GAP mmol/L 5 4 6  6 3* 7   BUN mg/dL 14 12 12  12 10 11   CREATININE mg/dL 0 41* 0 30* 0 35*  0 35* 0 39* 0 29*   EGFR ml/min/1 73sq m 144 160 152  152 146 161   CALCIUM mg/dL 9 1 8 7 8 5  8 5 8 6 7 4*   MAGNESIUM mg/dL  --   --  2 3  --  1 8 PHOSPHORUS mg/dL  --   --   --   --  4 2     Results from last 7 days   Lab Units 06/11/20  0433 06/10/20  0444 06/09/20  0442   AST U/L 27 21 47*   ALT U/L 84* 108* 184*   ALK PHOS U/L 162* 128* 126*   TOTAL PROTEIN g/dL 7 3 6 6 6 8   ALBUMIN g/dL 2 9* 2 7* 2 7*   TOTAL BILIRUBIN mg/dL 0 40 0 34 0 30     Results from last 7 days   Lab Units 06/12/20  1208 06/12/20  0610 06/11/20  2350 06/11/20  1818 06/11/20  1105 06/11/20  0558 06/11/20  0051 06/10/20  1753 06/10/20  1132 06/10/20  0616 06/10/20  0102 06/09/20  1758   POC GLUCOSE mg/dl 103 100 123 117 113 103 118 112 130 104 126 128     Results from last 7 days   Lab Units 06/11/20  0433 06/10/20  0444 06/09/20  0442 06/08/20  0501 06/07/20  1918 06/07/20  0530 06/06/20  0512 06/05/20  2107   GLUCOSE RANDOM mg/dL 93 104 109  109 104 107 116 106 100             No results found for: BETA-HYDROXYBUTYRATE                                   Results from last 7 days   Lab Units 06/10/20  0444 06/09/20  0611 06/09/20  0442 06/06/20  0514   PROCALCITONIN ng/ml 0 19 0 36* 0 36* 1 00*       Results from last 7 days   Lab Units 06/06/20  0512   CRP mg/L 86 1*   SED RATE mm/hour 19     Results from last 7 days   Lab Units 06/10/20  1331   GRAM STAIN RESULT  No Polys or Bacteria seen     Results from last 7 days   Lab Units 06/10/20  1331   TOTAL COUNTED  100     Results from last 7 days   Lab Units 06/10/20  1333 06/10/20  1331   APPEARANCE CSF   --  Slightly Cloudy   WBC CSF /uL  --  12*   XANTHOCHROMIA   --  No   NEUTROPHILS % (CSF) %  --  96   LYMPHS % (CSF) %  --  3   MONOCYTES % (CSF) %  --  1   GLUCOSE CSF mg/dL  --  61   PROTEIN CSF mg/dL  --  184*   RBC CSF uL  --  756*   CSF CULTURE  Few Colonies of Gram Negative Calin*  --      Vital Signs:   06/12/20 1200  99 °F (37 2 °C)  80  17  107/63  77  97 %      Lying   06/12/20 1113            98 %  30  Ventilator     06/12/20 1000    80  17  110/66  78  98 %         06/12/20 0800  98 9 °F (37 2 °C)  82  20 103/63  77  98 %         06/12/20 0730            100 %         06/12/20 0600    82  21  103/59  73  99 %         06/12/20 0515            100 %         06/12/20 0352  98 5 °F (36 9 °C)  86  33Abnormal       100 %    Ventilator      O2 Device: trach at 06/12/20 0352   06/12/20 0300    80  26Abnormal   100/57  69  98 %         06/12/20 0200    74  25Abnormal   98/59  70  98 %         06/12/20 0100    78  23Abnormal   96/57  68  98 %         06/12/20 0005            100 %         06/12/20 0000  98 3 °F (36 8 °C)  74  22  98/66  77  100 %    Ventilator   Lying   O2 Device: trach at 06/12/20 0000   06/11/20 2300    68  23Abnormal   91/47Abnormal   59  100 %         06/11/20 2200    64  20  90/53  63  100 %         06/11/20 2100    74  19  96/54  69  100 %         06/11/20 2000    78  33Abnormal   103/61  72  100 %    Ventilator     06/11/20 1942            100 %         06/11/20 1800    68  22  94/56  68  100 %         06/11/20 1700    70  19  97/59  70  100 %         06/11/20 1600  98 8 °F (37 1 °C)  76  27Abnormal   112/72  84  100 %  40  Ventilator     06/11/20 1540            100 %         06/11/20 1500  98 6 °F (37 °C)  82  23Abnormal   104/62  73  100 %         06/11/20 1400  98 2 °F (36 8 °C)  72  19  93/51  60  100 %         06/11/20 1300  98 2 °F (36 8 °C)  78  15  84/45Abnormal   56  100 %         06/11/20 1200  98 6 °F (37 °C)  84  19  111/64  82  100 %         06/11/20 1135            100 %         06/11/20 1100  99 °F (37 2 °C)  88  19  110/69  83  99 %         06/11/20 1000  99 °F (37 2 °C)  88  20  119/63  91  100 %         06/11/20 0900  99 °F (37 2 °C)  90  17  121/84  98  99 %         06/11/20 0800  99 3 °F (37 4 °C)  90  18  112/72  87  100 %    Ventilator  Lying   06/11/20 0736            100 %         06/11/20 0600  99 3 °F (37 4 °C)  84  23Abnormal   112/69  88  100 %        06/11/20 0500  99 3 °F (37 4 °C)  86  21  107/69  85  100 %         06/11/20 0400  99 3 °F (37 4 °C)  82  24Abnormal   107/65  81  100 %  50  Ventilator  Lying   06/11/20 0300  99 7 °F (37 6 °C)  84  20  112/67  81  100 %         06/11/20 0221            99 %         06/11/20 0200  99 7 °F (37 6 °C)  88  22  103/60  76  100 %         06/11/20 0100  100 °F (37 8 °C)  92  24Abnormal   105/59  77  99 %  50  Ventilator     06/11/20 0000  99 7 °F (37 6 °C)  88  24Abnormal   110/72  84  98 %  50  Ventilator  Lying   06/10/20 2300  99 3 °F (37 4 °C)  80  23Abnormal   110/65  80  99 %         06/10/20 2200  99 °F (37 2 °C)  78  18      99 %         06/10/20 2100  99 °F (37 2 °C)  82  22  104/55  70  99 %         06/10/20 2033    77  21      99 %         06/10/20 2000  99 °F (37 2 °C)  74  15  103/58  75  100 %  50  Ventilator  Lying   06/10/20 1900  99 3 °F (37 4 °C)  82  21  100/63  73  99 %         06/10/20 1800  99 7 °F (37 6 °C)  88  19  106/67  79  99 %         06/10/20 1700  99 3 °F (37 4 °C)  76  22  96/53  65  99 %         06/10/20 1600  99 °F (37 2 °C)  74  20  97/49Abnormal   64  99 %  50   Ventilator     FiO2 (%): PS 12/5 at 06/10/20 1600   06/10/20 1518            99 %         06/10/20 1500  99 3 °F (37 4 °C)  82  25Abnormal   103/62  76  99 %         06/10/20 1400  99 °F (37 2 °C)  74  22  96/50  62  98 %         06/10/20 1300  98 6 °F (37 °C)  80  18  100/63  73  98 %         06/10/20 1200  98 2 °F (36 8 °C)  88  25Abnormal   102/62  74  99 %         06/10/20 1144            99 %         06/10/20 0900  98 2 °F (36 8 °C)  80  23Abnormal   109/66  83  99 %         06/10/20 0800  98 6 °F (37 °C)  82  16  111/68  81  99 %  50   Ventilator     FiO2 (%): PS 12/5 at 06/10/20 0800   06/10/20 0741            100 %         06/10/20 0600  98 2 °F (36 8 °C)  78  20  103/70  78  100 %         06/10/20 0500  98 2 °F (36 8 °C)  72  19 102/59  72  100 %         06/10/20 0416            100 %         06/10/20 0400  98 2 °F (36 8 °C)  66  16  92/46Abnormal   57  99 %  50  Ventilator  Lying   06/10/20 0300  98 2 °F (36 8 °C)  74  18  101/54  70  100 %         06/10/20 0200  98 2 °F (36 8 °C)  76  16  87/48Abnormal   64  99 %         06/10/20 0100  98 2 °F (36 8 °C)  78  19  93/46Abnormal   64  99 %         06/10/20 0000  98 6 °F (37 °C)  88  22  112/61  77  99 %  50  Ventilator  Lying        Medications:   Scheduled Medications:    Medications:  bisacodyl 10 mg Rectal Daily   budesonide 0 5 mg Nebulization BID   calcium carbonate-vitamin D 1 tablet Oral Daily With Breakfast   chlorhexidine 15 mL Swish & Spit Q12H [address]   cholecalciferol 1,000 Units Oral Daily   desmopressin 0 05 mg Per G Tube Daily   diazepam 5 mg Per G Tube Q8H   guaiFENesin 400 mg Oral Q8H   heparin (porcine) 5,000 Units Subcutaneous Q8H [address]   levETIRAcetam 1,000 mg Per G Tube BID   levETIRAcetam 500 mg Per G Tube Q24H   meropenem 2,000 mg Intravenous Q8H   OXcarbazepine 450 mg Per G Tube Q12H [address]   senna-docusate sodium 1 tablet Per G Tube HS     Continuous IV Infusions:     PRN Meds:    acetaminophen 650 mg Per G Tube Q4H PRN   cyclobenzaprine 5 mg Per G Tube TID PRN   fentanyl citrate (PF) 50 mcg Intravenous Q1H PRN   glycerin-hypromellose- 1 drop Both Eyes Q3H PRN   LORazepam 1 mg Intravenous Q4H PRN       Discharge Plan: TBD    Network Utilization Review Department  [email or web address]  ATTENTION: Please call with any questions or concerns to [phone] and carefully listen to the prompts so that you are directed to the right person  All voicemails are confidential   Harlene Pair all requests for admission clinical reviews, approved or denied determinations and any other requests to dedicated fax number below belonging to the campus where the patient is receiving treatment   List of dedicated fax numbers for the Facilities:  Ebony Acharya NAME UR FAX NUMBER   ADMISSION DENIALS (Administrative/Medical Necessity) [phone]   PARENT CHILD HEALTH (Maternity/NICU/Pediatrics) [phone]     Glo Looney [phone]   Elfego Brunner [phone]   [address] [place] [address] [phone]   [address] [phone]   [facility] [phone]   [address] [facility]   [address] [phone]

## 2020-06-12 NOTE — TREATMENT PLAN
Patient not examined today in person    Per chart review, CSF culture growing gram negative rods indicating antibiotic failure  Recommendation per ID and family request is externalization vs removal of shunt      We will discuss during morning rounds on Monday as a team and follow up with recommendations and plan  Until then, recommend continuing antibiotics through the weekend

## 2020-06-12 NOTE — PROGRESS NOTES
Progress Note - Neurology   Mariama Stearns 21 y o  female MRN: [id number]  Unit/Bed#: MICU 05 Encounter: [id number]    Assessment/ Plan:  1)  Persistent encephalopathy in setting baseline severely limited cognitive reserve due to cerebral palsy and intellectual disability, nonverbal and no eye contact at baseline, complicated secondary to Serratia CSF infection and cardiac arrest   There is concern for further underlying anoxic injury which may have occurred during cardiac arrest    -MRI brain is pending   -family meeting to upper today to consider goals of care   -pending goals of care discussion, MRI brain with without contrast pending   -video EEG demonstrates no discrete epileptiform activity or epileptogenic focus    -patient's persistent, rhythmic blinking is not associated with epileptiform EEG changes   -continue Keppra and oxcarbazepine   -appreciate Neurosurgery, no current plan to externalized shunt   -supportive care and medication management per primary team, antibiotic management per Infectious Disease   -will continue to follow remotely  Please monitor exam and notify with changes    Subjective:   Patient is a 26-year-old female with cerebral palsy, spastic quadriplegia, profound mental retardation, hydrocephalus with  shunt,, respiratory failure status post tracheostomy, asthma, history of seizure disorder, pancreatitis and diabetes insipidus originally presented to [facility] on 06/01/2020 with fever and found to have sepsis, later discovered to be Serratia CSF infection  The patient later went into VFib cardiac arrest   ROSC achieved following CPR and defibrillation/ epi x1  Afterward, the patient was noted to have left facial and left lower extremity twitching concerning for seizures  She was placed on video EEG which demonstrated no epileptiform activity or epileptogenic focus    Current plan is to continue to monitor CSF cultures with antibiotic management with plan for shunt revision if infection does not clear  Video EEG discontinued  On examination today, the patient is resting in bed in no acute distress  As per baseline, does not track, no verbal output, does not follow commands  Patient demonstrated near constant rhythmic blinking throughout examination, however, in conversation with epileptologist, this occurred during video EEG monitoring and did not correlated with epileptic activity  No additional events overnight  On exam today, the patient is lying in bed  She does open eyes intermittently  Does not follow commands, does not track, more awake than on previous examinations  Remainder of neurological exam is detailed below        ROS:  Unable to obtain review of systems secondary to baseline nonverbal    Medications:  Scheduled Meds:    Current Facility-Administered Medications:  acetaminophen 650 mg Per G Tube Q4H PRN Rolando Campos DO    bisacodyl 10 mg Rectal Daily MADAY Brody    budesonide 0 5 mg Nebulization BID Liat Jennings DO    calcium carbonate-vitamin D 1 tablet Oral Daily With Breakfast MADAY Brody    chlorhexidine 15 mL Swish & Spit Q12H [address] Napoleon Hawkins PA-C    cholecalciferol 1,000 Units Oral Daily MADAY Brody    cyclobenzaprine 5 mg Per G Tube TID PRN Manuela Boyer MD    desmopressin 0 05 mg Per G Tube Daily Venkat Cifuentes PA-C    diazepam 5 mg Per G Tube Q8H Manuela Boyer MD    fentanyl citrate (PF) 50 mcg Intravenous Q1H PRN MADAY Almodovar    glycerin-hypromellose- 1 drop Both Eyes Q3H PRN Liat Jennings DO    guaiFENesin 400 mg Oral Q8H MADAY Alatorre    heparin (porcine) 5,000 Units Subcutaneous [facility] Cecily Ba PA-C    levETIRAcetam 1,000 mg Per G Tube BID Manuela Boyer MD    levETIRAcetam 500 mg Per G Tube Q24H Manuela Boyer MD    LORazepam 1 mg Intravenous Q4H PRN Leslie Nava MD    meropenem 2,000 mg Intravenous Q8H Mojgan Cueto MD Last Rate: Stopped (06/12/20 0358)   OXcarbazepine 450 mg Per G Tube Q12H Radha Leonard MD    senna-docusate sodium 1 tablet Per G Tube HS Alma Weaver MD      Continuous Infusions:   PRN Meds:   acetaminophen    cyclobenzaprine    fentanyl citrate (PF)    glycerin-hypromellose-    LORazepam      Vitals: Blood pressure 103/63, pulse 82, temperature 98 9 °F (37 2 °C), temperature source Oral, resp  rate 20, height 4' 2" (1 27 m), weight 57 2 kg (126 lb 1 7 oz), SpO2 98 %  ,Body mass index is 35 46 kg/m²  Physical Exam:   Physical Exam   Constitutional: No distress  Chronically ill appearing    HENT:   Head: Atraumatic  Eyes: Conjunctivae are normal  Right eye exhibits no discharge  Left eye exhibits no discharge  No scleral icterus  Neck: Neck supple  Chronic trach in place    Pulmonary/Chest:   Ventilated    Musculoskeletal: She exhibits no edema  Skin: Skin is warm and dry  No rash noted  She is not diaphoretic  No erythema  No pallor  Nursing note and vitals reviewed  Neurologic Exam     Mental Status     Patient opens eyes with verbal and tactile stimulation  Does not track  Does not follow commands  Nonverbal at baseline     Cranial Nerves   CN 2 through 12 grossly intact     Motor Exam Spastic quadriparesis at baseline  Patient does withdraw x4 to mild noxious stimuli     Sensory Exam   Light touch normal      Gait, Coordination, and Reflexes     Gait  Gait: (Non ambulatory baseline)    Tremor   Resting tremor: absent      Lab, Imaging and other studies: I have personally reviewed pertinent reports     I have personally reviewed pertinent imaging in PACs  Recent Results (from the past 24 hour(s))   Fingerstick Glucose (POCT)    Collection Time: 06/11/20 11:05 AM   Result Value Ref Range    POC Glucose 113 65 - 140 mg/dl   Fingerstick Glucose (POCT)    Collection Time: 06/11/20  6:18 PM   Result Value Ref Range    POC Glucose 117 65 - 140 mg/dl   Fingerstick Glucose (POCT)    Collection Time: 06/11/20 11:50 PM   Result Value Ref Range    POC Glucose 123 65 - 140 mg/dl   Fingerstick Glucose (POCT)    Collection Time: 06/12/20  6:10 AM   Result Value Ref Range    POC Glucose 100 65 - 140 mg/dl   ]    VTE Prophylaxis: Sequential compression device (Venodyne)  and Heparin SQ    Counseling / Coordination of Care  Total time spent today 25 minutes  Greater than 50% of total time was spent with the patient and / or family counseling and / or coordination of care  A description of the counseling / coordination of care: Alfred Ludwig The pt was seen and examined by myself and the attending physician  The chart was reviewed thoroughly, including laboratory values and imaging studies  The pt was discussed with CC medicine

## 2020-06-12 NOTE — RESPIRATORY THERAPY NOTE
RT Ventilator Management Note  Corry Sharma 21 y o  female MRN: [id number]  Unit/Bed#: MICU 05 Encounter: [id number]      Daily Screen       6/11/2020  0736 6/12/2020  0730          Patient safety screen outcome[de-identified]  Passed  Passed      Spont breathing trial % for 30 min:  No  No              Physical Exam:   Assessment Type: (P) During-treatment  General Appearance: (P) Awake  Respiratory Pattern: (P) Assisted, Spontaneous  Chest Assessment: (P) Chest expansion symmetrical  Bilateral Breath Sounds: (P) Diminished  Cough: (P) Productive  Suction: (P) Trach  O2 Device: (P) vent 840  Subjective Data: (P) trach      Resp Comments: (P) Pt placed on PS 12/5/30% wean and tolerating well  Will continue to monitor

## 2020-06-12 NOTE — RESPIRATORY THERAPY NOTE
RT Ventilator Management Note  Cici Duarte 21 y o  female MRN: [id number]  Unit/Bed#: MICU 05 Encounter: [id number]      Daily Screen       6/10/2020  0741 6/11/2020  0736          Patient safety screen outcome[de-identified]  Passed  Passed      Spont breathing trial % for 30 min:    No              Physical Exam:   Assessment Type: (P) Assess only  General Appearance: (P) Awake  Respiratory Pattern: (P) Normal, Assisted  Chest Assessment: (P) Chest expansion symmetrical  Bilateral Breath Sounds: (P) Coarse      Resp Comments: (P) Pt remained on a/c settings throughout the night without incident

## 2020-06-12 NOTE — PROGRESS NOTES
Progress Note - Infectious Disease   Kylah Sherwood 21 y o  female MRN: [id number]  Unit/Bed#: MICU 05 Encounter: [id number]      Impression/Recommendations:  1  Sepsis POA   Fever, tachycardia, tachypnea    likely due to #2   Consider also aspiration given reported respiratory distress upon presentation to [facility] other clear source appreciated   Blood cultures at 5 [employer] negative     Rec:  ? Continue antibiotics as below  ? Follow temperatures closely     2  Serratia  shunt infection   Consider translocation from GI tract although no reported intra-abdominal fluid collection   Repeat CSF cultures positive for GNR despite 10 days IV antibiotics represents antibiotic failure  Rec:  ? Continue meropenem for now through the weekend  ? Would need shunt removal for cure     3  Acute on chronic hypoxic respiratory failure   No overt pneumonia on chest imaging from [place]   Consider role of aspiration versus encephalopathy   Patient on minimal vent requirements  Rec:  ? Antibiotics above would treat any concomitant pneumonia  ? Follow respiratory status closely  ? Supportive care as per the primary service     4  Seizure disorder   Initial concern for seizures at 5 [employer] but vEEG here negative for seizure activity   Rhythmic blinking does not correlate to EEG findings per Neurology  Rec:  ? Continue antiepileptics per Neurology with close follow-up ongoing     5   Retroperitoneal collection   Reportedly chronic, seen on imaging done at [place] [id number] significance   Status post aspiration of the collection with negative cultures  Rec:  ? No additional workup or treatment from an ID perspective     6   CP   With spastic quadriplegia, profound MR, hydrocephalus status post  shunt     7   Chronic respiratory failure   Status post trach      8   Recent VFib arrest   Consider anoxic encephalopathy      The above plan was discussed in detail with Dr Ruben Cardoza     I will reassess the patient on Monday 6/15   Please call in the interim with new questions      Antibiotics:  Meropenem #11    Subjective:  Patient seen on AM rounds  Unable to provide ROS due to nonverbal state  24 Hour Events:  No documented fevers, chills, sweats, nausea, vomiting, or diarrhea  Remains stable on vent  Objective:  Vitals:  Temp:  [98 2 °F (36 8 °C)-98 9 °F (37 2 °C)] 98 9 °F (37 2 °C)  HR:  [64-86] 80  Resp:  [15-33] 17  BP: ()/(45-72) 110/66  SpO2:  [98 %-100 %] 98 %  Temp (24hrs), Av 5 °F (36 9 °C), Min:98 2 °F (36 8 °C), Max:98 9 °F (37 2 °C)  Current: Temperature: 98 9 °F (37 2 °C)    Physical Exam:   General:  No acute distress  Eyes:  Normal lids and conjunctivae  ENT:  Normal external ears and nose  Neck:  Neck symmetric with midline trachea  Pulmonary:  Normal respiratory effort without accessory muscle use  Cardiovascular:  Regular rate and rhythm on monitor; no peripheral edema  Gastrointestinal:  No tenderness or distention  Musculoskeletal:  No digital clubbing or cyanosis  Skin:  No visible rashes; No palpable nodules  Neurologic:  Not moving extremities due to sleeping  Psychiatric:  Sleeping, unresponsive    Lab Results:  I have personally reviewed pertinent labs    Results from last 7 days   Lab Units 20  0433 06/10/20  0444 20  0442   POTASSIUM mmol/L 4 0 4 0 4 1  4 1   CHLORIDE mmol/L 106 103 105  105   CO2 mmol/L 29 30 30  30   BUN mg/dL 14 12 12  12   CREATININE mg/dL 0 41* 0 30* 0 35*  0 35*   EGFR ml/min/1 73sq m 144 160 152  152   CALCIUM mg/dL 9 1 8 7 8 5  8 5   AST U/L 27 21 47*   ALT U/L 84* 108* 184*   ALK PHOS U/L 162* 128* 126*     Results from last 7 days   Lab Units 20  0433 06/10/20  0444 20  0442   WBC Thousand/uL 6 56 5 22 5 39  5 46   HEMOGLOBIN g/dL 8 6* 8 1* 8 6*  8 4*   PLATELETS Thousands/uL 306 285 273  259     Results from last 7 days   Lab Units 06/10/20  1331 20  1418   GRAM STAIN RESULT  No Polys or Bacteria seen 1+ Polys  No bacteria seen   BODY FLUID CULTURE, STERILE   --  No growth       Imaging Studies:   I have personally reviewed pertinent imaging study reports and images in PACS  CT chest from [place] reviewed personally no infiltrates    EKG, Pathology, and Other Studies:   I have personally reviewed pertinent reports

## 2020-06-12 NOTE — NUTRITION
Patient will continue to tolerate current TF Rx goal rate of peptamen AF 1 2 @ 50 ml/hr X 16 hours/day with PROSOURCE TF BID

## 2020-06-13 LAB
ANION GAP SERPL CALCULATED.3IONS-SCNC: 7 MMOL/L (ref 4–13)
APPEARANCE CSF: ABNORMAL
BACTERIA CSF CULT: ABNORMAL
BASOPHILS # BLD AUTO: 0.02 THOUSANDS/ΜL (ref 0–0.1)
BASOPHILS NFR BLD AUTO: 0 % (ref 0–1)
BUN SERPL-MCNC: 15 MG/DL (ref 5–25)
CALCIUM SERPL-MCNC: 8.4 MG/DL (ref 8.3–10.1)
CHLORIDE SERPL-SCNC: 103 MMOL/L (ref 100–108)
CO2 SERPL-SCNC: 26 MMOL/L (ref 21–32)
CREAT SERPL-MCNC: 0.3 MG/DL (ref 0.6–1.3)
EOSINOPHIL # BLD AUTO: 0.17 THOUSAND/ΜL (ref 0–0.61)
EOSINOPHIL NFR BLD AUTO: 3 % (ref 0–6)
ERYTHROCYTE [DISTWIDTH] IN BLOOD BY AUTOMATED COUNT: 17.3 % (ref 11.6–15.1)
GFR SERPL CREATININE-BSD FRML MDRD: 160 ML/MIN/1.73SQ M
GLUCOSE CSF-MCNC: 54 MG/DL (ref 50–80)
GLUCOSE SERPL-MCNC: 116 MG/DL (ref 65–140)
GLUCOSE SERPL-MCNC: 123 MG/DL (ref 65–140)
GLUCOSE SERPL-MCNC: 79 MG/DL (ref 65–140)
GLUCOSE SERPL-MCNC: 99 MG/DL (ref 65–140)
GRAM STN SPEC: NORMAL
GRAM STN SPEC: NORMAL
HCT VFR BLD AUTO: 27.9 % (ref 34.8–46.1)
HGB BLD-MCNC: 8.7 G/DL (ref 11.5–15.4)
IMM GRANULOCYTES # BLD AUTO: 0.02 THOUSAND/UL (ref 0–0.2)
IMM GRANULOCYTES NFR BLD AUTO: 0 % (ref 0–2)
LYMPHOCYTES # BLD AUTO: 1.34 THOUSANDS/ΜL (ref 0.6–4.47)
LYMPHOCYTES NFR BLD AUTO: 26 % (ref 14–44)
MCH RBC QN AUTO: 26.1 PG (ref 26.8–34.3)
MCHC RBC AUTO-ENTMCNC: 31.2 G/DL (ref 31.4–37.4)
MCV RBC AUTO: 84 FL (ref 82–98)
MONOCYTES # BLD AUTO: 0.37 THOUSAND/ΜL (ref 0.17–1.22)
MONOCYTES NFR BLD AUTO: 7 % (ref 4–12)
NEUTROPHILS # BLD AUTO: 3.24 THOUSANDS/ΜL (ref 1.85–7.62)
NEUTS SEG NFR BLD AUTO: 64 % (ref 43–75)
NRBC BLD AUTO-RTO: 0 /100 WBCS
PLATELET # BLD AUTO: 329 THOUSANDS/UL (ref 149–390)
PMV BLD AUTO: 9.7 FL (ref 8.9–12.7)
POTASSIUM SERPL-SCNC: 3.9 MMOL/L (ref 3.5–5.3)
PROT CSF-MCNC: 151 MG/DL (ref 15–45)
RBC # BLD AUTO: 3.33 MILLION/UL (ref 3.81–5.12)
SODIUM SERPL-SCNC: 136 MMOL/L (ref 136–145)
TOTAL CELLS COUNTED BLD: NO
TUBE # CSF: 1
WBC # BLD AUTO: 5.16 THOUSAND/UL (ref 4.31–10.16)
WBC # CSF AUTO: 28 /UL (ref 0–5)

## 2020-06-13 PROCEDURE — 85025 COMPLETE CBC W/AUTO DIFF WBC: CPT | Performed by: PHYSICIAN ASSISTANT

## 2020-06-13 PROCEDURE — 94003 VENT MGMT INPAT SUBQ DAY: CPT

## 2020-06-13 PROCEDURE — 94760 N-INVAS EAR/PLS OXIMETRY 1: CPT

## 2020-06-13 PROCEDURE — 94640 AIRWAY INHALATION TREATMENT: CPT

## 2020-06-13 PROCEDURE — 0HB5XZZ EXCISION OF CHEST SKIN, EXTERNAL APPROACH: ICD-10-PCS | Performed by: NEUROLOGICAL SURGERY

## 2020-06-13 PROCEDURE — 82948 REAGENT STRIP/BLOOD GLUCOSE: CPT

## 2020-06-13 PROCEDURE — 99024 POSTOP FOLLOW-UP VISIT: CPT | Performed by: NEUROLOGICAL SURGERY

## 2020-06-13 PROCEDURE — 87070 CULTURE OTHR SPECIMN AEROBIC: CPT | Performed by: NURSE PRACTITIONER

## 2020-06-13 PROCEDURE — 82945 GLUCOSE OTHER FLUID: CPT | Performed by: NURSE PRACTITIONER

## 2020-06-13 PROCEDURE — 80048 BASIC METABOLIC PNL TOTAL CA: CPT | Performed by: PHYSICIAN ASSISTANT

## 2020-06-13 PROCEDURE — 89051 BODY FLUID CELL COUNT: CPT | Performed by: NURSE PRACTITIONER

## 2020-06-13 PROCEDURE — 99291 CRITICAL CARE FIRST HOUR: CPT | Performed by: ANESTHESIOLOGY

## 2020-06-13 PROCEDURE — 84157 ASSAY OF PROTEIN OTHER: CPT | Performed by: NURSE PRACTITIONER

## 2020-06-13 PROCEDURE — 62230 REPLACE/REVISE BRAIN SHUNT: CPT | Performed by: NEUROLOGICAL SURGERY

## 2020-06-13 RX ORDER — LIDOCAINE HYDROCHLORIDE 10 MG/ML
INJECTION, SOLUTION EPIDURAL; INFILTRATION; INTRACAUDAL; PERINEURAL
Status: COMPLETED
Start: 2020-06-13 | End: 2020-06-13

## 2020-06-13 RX ORDER — POTASSIUM CHLORIDE 14.9 MG/ML
20 INJECTION INTRAVENOUS ONCE
Status: COMPLETED | OUTPATIENT
Start: 2020-06-13 | End: 2020-06-13

## 2020-06-13 RX ORDER — MAGNESIUM SULFATE HEPTAHYDRATE 40 MG/ML
2 INJECTION, SOLUTION INTRAVENOUS ONCE
Status: COMPLETED | OUTPATIENT
Start: 2020-06-13 | End: 2020-06-13

## 2020-06-13 RX ADMIN — LEVETIRACETAM 1000 MG: 100 SOLUTION ORAL at 05:18

## 2020-06-13 RX ADMIN — OXCARBAZEPINE 450 MG: 300 TABLET, FILM COATED ORAL at 18:28

## 2020-06-13 RX ADMIN — HEPARIN SODIUM 5000 UNITS: 5000 INJECTION INTRAVENOUS; SUBCUTANEOUS at 14:11

## 2020-06-13 RX ADMIN — BUDESONIDE 0.5 MG: 0.5 INHALANT RESPIRATORY (INHALATION) at 19:10

## 2020-06-13 RX ADMIN — SENNOSIDES AND DOCUSATE SODIUM 1 TABLET: 8.6; 5 TABLET ORAL at 22:11

## 2020-06-13 RX ADMIN — SODIUM CHLORIDE, SODIUM GLUCONATE, SODIUM ACETATE, POTASSIUM CHLORIDE, MAGNESIUM CHLORIDE, SODIUM PHOSPHATE, DIBASIC, AND POTASSIUM PHOSPHATE 75 ML/HR: .53; .5; .37; .037; .03; .012; .00082 INJECTION, SOLUTION INTRAVENOUS at 00:01

## 2020-06-13 RX ADMIN — BISACODYL 10 MG: 10 SUPPOSITORY RECTAL at 09:59

## 2020-06-13 RX ADMIN — GUAIFENESIN 400 MG: 100 SOLUTION ORAL at 09:59

## 2020-06-13 RX ADMIN — HEPARIN SODIUM 5000 UNITS: 5000 INJECTION INTRAVENOUS; SUBCUTANEOUS at 05:30

## 2020-06-13 RX ADMIN — LIDOCAINE HYDROCHLORIDE 300 MG: 10 INJECTION, SOLUTION EPIDURAL; INFILTRATION; INTRACAUDAL; PERINEURAL at 08:30

## 2020-06-13 RX ADMIN — DIAZEPAM 5 MG: 5 TABLET ORAL at 02:18

## 2020-06-13 RX ADMIN — MEROPENEM 2000 MG: 1 INJECTION, POWDER, FOR SOLUTION INTRAVENOUS at 03:59

## 2020-06-13 RX ADMIN — DIAZEPAM 5 MG: 5 TABLET ORAL at 09:59

## 2020-06-13 RX ADMIN — LEVETIRACETAM 500 MG: 100 SOLUTION ORAL at 22:12

## 2020-06-13 RX ADMIN — OXCARBAZEPINE 450 MG: 300 TABLET, FILM COATED ORAL at 05:19

## 2020-06-13 RX ADMIN — POTASSIUM CHLORIDE 20 MEQ: 14.9 INJECTION, SOLUTION INTRAVENOUS at 09:56

## 2020-06-13 RX ADMIN — LEVETIRACETAM 1000 MG: 100 SOLUTION ORAL at 14:11

## 2020-06-13 RX ADMIN — CHLORHEXIDINE GLUCONATE 0.12% ORAL RINSE 15 ML: 1.2 LIQUID ORAL at 21:01

## 2020-06-13 RX ADMIN — DIAZEPAM 5 MG: 5 TABLET ORAL at 18:26

## 2020-06-13 RX ADMIN — CHLORHEXIDINE GLUCONATE 0.12% ORAL RINSE 15 ML: 1.2 LIQUID ORAL at 09:55

## 2020-06-13 RX ADMIN — DESMOPRESSIN ACETATE 0.05 MG: 0.1 TABLET ORAL at 10:01

## 2020-06-13 RX ADMIN — HEPARIN SODIUM 5000 UNITS: 5000 INJECTION INTRAVENOUS; SUBCUTANEOUS at 22:11

## 2020-06-13 RX ADMIN — MEROPENEM 2000 MG: 1 INJECTION, POWDER, FOR SOLUTION INTRAVENOUS at 18:26

## 2020-06-13 RX ADMIN — MEROPENEM 2000 MG: 1 INJECTION, POWDER, FOR SOLUTION INTRAVENOUS at 13:04

## 2020-06-13 RX ADMIN — GUAIFENESIN 400 MG: 100 SOLUTION ORAL at 18:26

## 2020-06-13 RX ADMIN — GUAIFENESIN 400 MG: 100 SOLUTION ORAL at 02:18

## 2020-06-13 RX ADMIN — MELATONIN 1000 UNITS: at 09:59

## 2020-06-13 RX ADMIN — MAGNESIUM SULFATE HEPTAHYDRATE 2 G: 40 INJECTION, SOLUTION INTRAVENOUS at 11:59

## 2020-06-13 RX ADMIN — BUDESONIDE 0.5 MG: 0.5 INHALANT RESPIRATORY (INHALATION) at 07:25

## 2020-06-13 NOTE — OP NOTE
OPERATIVE REPORT  PATIENT NAME: Keith Carrillo    :  1995  MRN: [id number]  Pt Location: * No surgery found *    SURGERY DATE: * No surgery found *    * Surgery not found *    Preop Diagnosis:  Shunt infection    Post op Diagnosis  Same    Procedure:  Shunt distal catheter externalization and connection to an external drainage system    Specimen(s):  CSF    Estimated Blood Loss:   * No surgery found *    Drains:  Gastrostomy/Enterostomy (Active)   Surrounding Skin Unable to view 2020  8:00 PM   Drain Status Tube feed infusing 2020  8:00 PM   Drainage Appearance None 2020  8:00 PM   Site Description Unable to view 2020  6:01 PM   Dressing Status Clean;Dry; Intact 2020  8:00 PM   Dressing Intervention Dressing changed 2020 12:40 AM   Dressing Type Split gauze 2020  8:00 PM   Intake (mL) 25 mL 2020  2:35 PM   Number of days:        External Urinary Catheter (Active)   Collection Container Canister and suction tubing (For Female) 2020  8:00 PM   Suction Pressure (mmHg) 100 mmHg 2020  8:00 PM   Interventions Pericare performed 2020  8:00 PM   Output (mL) 200 mL 2020  4:00 AM   Number of days: 2       [REMOVED] Urethral Catheter Temperature probe (Removed)   Reasons to continue Urinary Catheter  Accurate I&O assessment in critically ill patients (48 hr  max) 2020  8:00 AM   Goal for Removal Remove after 48 hrs of I/O monitoring 6/10/2020  8:15 PM   Site Assessment Clean;Skin intact 2020  4:00 PM   Collection Container Standard drainage bag 2020  4:00 PM   Securement Method Securing device (Describe) 2020  4:00 PM   Output (mL) 75 mL 2020  4:00 PM   Number of days:        Anesthesia Type:   Local    Operative Indications:  Shunt infection    Operative Findings:  See dictated note    Complications:   None    Procedure and Technique: The family had given consent prior to performing the externalization procedure   She has consistent CSF showing gram negative rods  Her right clavicular area was prepped and draped  Local lidocaine was infused in the skin above 15cc  I then used a 10Blade and bluntly dissected to the catheter  There was a hard calcified layer surrounding the catheter  I was able to dissect some of it off  The distal cathter was fairly stuck  I was only able to pull a small amount distally  I then pulled as much distal cathter and cut the cathter  I connected a ventricular hub to the distal end and connected this to a drainage system  I noted CSF out of the proximal cut end  I then sutured and tied the cut end of the distal cathter to the skin  I then closed the skin with 3-0 Nylon and placed clean dressing above       I was present for the entire procedure    Patient Disposition:  Critical Care Unit    SIGNATURE: Giselle William MD  DATE: June 13, 2020  TIME: 8:55 AM

## 2020-06-13 NOTE — PROGRESS NOTES
Progress Note - Critical Care   Afshin Santacruz 21 y o  female MRN: [id number]  Unit/Bed#: ICU 01 Encounter: [id number]    Assessment:   Principal Problem:    Seizure-like activity ([facility] )  Active Problems:    Abnormal LFTs    Acute metabolic encephalopathy    Cerebral palsy ([facility] )    Cardiac arrest ([facility] [address] )    Severe sepsis with acute organ dysfunction due to Serratia species ([facility] )    S/P  shunt  Resolved Problems:    Bradycardia    Plan:   Neuro:   · Altered mental status/Acute encephalopathy secondary to Gram-negative rods/Serratia infection  ? Continue meropenem  ? Shunt externalization today  · Diagnosis: Concern for seizure like activity at outside hospital with seizure disorder hx  ? Plan:   § No seizure activity noted on 48 hrs of cEEG while in ICU and then repeated again from 6/7 to 6/9 without evidence of activity   Has been discontinued  § Continue home keppra and trileptal  § Neurology following   · Diagnosis: Spastic quadriplegia  ? Plan:   § Continue Valium and PRN flexeril  · Diagnosis: Profound intellectual disability and cerebral palsy now with acute encephalopathy  ? Plan:   § Likely secondary to serratia CSF infection  § Unable to obtain LP due to physical barriers, shunt previously tapped and is without growth  § MRI pending  · Diagnosis: PAD  ? Plan:   § Continue Fentanyl gtt           CV:   · Diagnosis: Cardiac arrest during insertion of central line likely arrythmic mediated  ? Plan:   § Monitor on telemetry   Pulm:  · Diagnosis: Chronic respiratory failure s/p trach  ?  Plan:   § On pressure support 12/5 with FiO2 of 30%  § Yesterday AC/VC, SBT, tolerate RR <30 per facility RR 20-30 normally, event patient came to hospital with was RR>60, normally TC 24/7  § Normally has cuff-less trach was exchanged at [place] to cuffed  § Difficult to wean given mental status, scoliosis and neurologic atypical breathing pattern  § Continue home pulmicort     GI:   ? No active issues      :   · Diagnosis: No active issues  ? Plan: Consider d/c maximiliano     F/E/N:   · Plan: Continue cyclic TF        Heme/Onc:   · Diagnosis: Anemia  ? Transfuse for hemoglobin less than 7          Endo:   · Diagnosis: Hx of DI  ? Plan:   § Continue DDAVP QD     ID:   · Diagnosis: Serratia CSF infection from unclear source  ? Plan:   § Day #11 meropenem (first dose was 6/3)  § ID following   §  shunt culture pending- stat gram stain without bacteria but did show Gram-negative rods that are likely the same Serratia that was growing on 6/3  § Retroperitoneal collection drained by IR on 6/5     MSK/Skin:   ? Turn q2  ? Local wound care as needed  · Disposition: Continue critical care;       ______________________________________________________________________  VTE Pharmacologic Prophylaxis: Heparin    VTE Mechanical Prophylaxis: sequential compression device    Invasive lines and devices: Invasive Devices     Peripherally Inserted Central Catheter Line            PICC Line 86/41/87 Right Basilic 8 days          Drain            Gastrostomy/Enterostomy -- days    External Urinary Catheter 1 day          Airway            Surgical Airway Shiley Cuffed -- days                   Code Status: Level 1 - Full Code    ______________________________________________________________________    HPI/24hr events:  No acute events overnight  Patient was transferred from MICU to neuro ICU for shunt externalization  Lines PICC L    Infusions 75 isolyte  ______________________________________________________________________  Physical Exam:  [place] Agitation Sedation Scale (RASS): Deep sedation  Physical Exam   Constitutional: No distress  HENT:   Shunt in place   Eyes: Pupils are equal, round, and reactive to light  Neck: Normal range of motion  Cardiovascular: Normal heart sounds  Pulmonary/Chest: No respiratory distress  Abdominal: Soft  She exhibits no distension  There is no tenderness  Musculoskeletal: She exhibits no tenderness  Neurological:   Withdrawals to pain in upper extremities, eyes not tracking, but open, diminished response to no response to pain in lower extremity   Skin: Skin is warm      ______________________________________________________________________  Temperature:   Temp (24hrs), Av 6 °F (37 °C), Min:98 °F (36 7 °C), Max:99 2 °F (37 3 °C)    Current Temperature: 99 °F (37 2 °C)    Vitals:    20 0500 20 0600 20 0700 20 0725   BP: 107/69 112/67 114/68    Pulse: 78 76 76    Resp: 22 22 21    Temp:       TempSrc:       SpO2: 98% 99% 98% 98%   Weight:       Height:                 Weights:   IBW: 22 5 kg    Body mass index is 35 46 kg/m²  Weight (last 2 days)     Date/Time   Weight    20 0600   57 2 (126 1)    20 0420   58 4 (128 75)            Height: 4' 2" (127 cm)  Hemodynamic Monitoring:  N/A       Ventilator Settings:   Vent Mode: CPAP/PS Spont              FiO2 (%): 30       No results found for: PHART, RKE2HAM, PO2ART, NAH7YHZ, X1DZHRUL, BEART, SOURCE    Intake and Outputs:  I/O       701 -  07 -  07 07 -  0700    P  O  0      I V  (mL/kg) 340 (5 9) 448 8 (7 8)     NG/ 215     IV Piggyback 300 325     Feedings 682 685     Total Intake(mL/kg) 1672 (29 2) 1673 8 (29 3)     Urine (mL/kg/hr) 990 (0 7) 1500 (1 1)     Stool 0      Total Output 990 1500     Net +682 +173 8            Unmeasured Stool Occurrence 1 x          Nutrition:        Diet Orders   (From admission, onward)             Start     Ordered    20 0001  Diet NPO  Diet effective midnight     Question Answer Comment   Diet Type NPO    RD to adjust diet per protocol?  Yes        20 1638                Labs:   Results from last 7 days   Lab Units 20  0518 20  0433 06/10/20  0444 20  0442   WBC Thousand/uL 5 16 6 56 5 22 5 39  5 46   HEMOGLOBIN g/dL 8 7* 8 6* 8 1* 8 6*  8 4*   HEMATOCRIT % 27 9* 28 7* 27 2* 29 0*  28 7*   PLATELETS Thousands/uL 329 306 285 273  259   NEUTROS PCT % 64 59  --  55  55   MONOS PCT % 7 9  --  11  9   MONO PCT %  --   --  5  --       Results from last 7 days   Lab Units 06/13/20  0518 06/11/20  0433 06/10/20  0444 06/09/20  0442   POTASSIUM mmol/L 3 9 4 0 4 0 4 1  4 1   CHLORIDE mmol/L 103 106 103 105  105   CO2 mmol/L 26 29 30 30  30   BUN mg/dL 15 14 12 12  12   CREATININE mg/dL 0 30* 0 41* 0 30* 0 35*  0 35*   CALCIUM mg/dL 8 4 9 1 8 7 8 5  8 5   ALK PHOS U/L  --  162* 128* 126*   ALT U/L  --  84* 108* 184*   AST U/L  --  27 21 47*     Results from last 7 days   Lab Units 06/09/20  0442 06/07/20  1918   MAGNESIUM mg/dL 2 3 1 8     Results from last 7 days   Lab Units 06/07/20  1918   PHOSPHORUS mg/dL 4 2              No results found for: TROPONINI  Imaging:  I have personally reviewed pertinent reports  Micro:  Blood Culture: No results found for: BLOODCX  Urine Culture: No results found for: URINECX  Sputum Culture: No components found for: SPUTUMCX  Wound Culure:   Lab Results   Component Value Date    WOUNDCULT No growth 06/05/2020     CSF Culure: No components found for: CSFCULT    Lab Results   Component Value Date    WOUNDCULT No growth 06/05/2020     Allergies: Allergies   Allergen Reactions    Baclofen Other (See Comments)     Reaction Date: [id number];   apena      Cefepime Hives    Cephalosporins      Other reaction(s): Unknown Reaction  Other reaction(s): Unknown Reaction  Other reaction(s): Unknown Reaction      Clonidine      Other reaction(s): Respiratory Distress, Respiratory Distress    Lactase     Milk-Related Compounds     Penicillins Hives     Reaction Date: [id number];       Phenobarbital Other (See Comments)     Reaction Date: [id number]; Other reaction(s): Other (See Comments)  apnea  Other reaction(s): Other (See Comments)  apnea  apnea      Vancomycin     Zolpidem Other (See Comments)     Other reaction(s):  Other (see comments)  Reaction Date: 22Nov2011; apnea  apnea  apnea      Latex Rash     Medications:   Scheduled Meds:    Current Facility-Administered Medications:  lidocaine (PF)        acetaminophen 650 mg Per G Tube Q4H PRN Katie Campos DO    bisacodyl 10 mg Rectal Daily MADAY Mei    budesonide 0 5 mg Nebulization BID Herbie Jennings DO    calcium carbonate-vitamin D 1 tablet Oral Daily With Breakfast MADAY Mei    chlorhexidine 15 mL Swish & Spit Q12H 2150 Napoleon Hawkins PA-C    cholecalciferol 1,000 Units Oral Daily MADAY Mei    cyclobenzaprine 5 mg Per G Tube TID PRN Cyrus Henderson MD    desmopressin 0 05 mg Per G Tube Daily Nara Paez PA-C    diazepam 5 mg Per G Tube Q8H Cyrus Henderson MD    fentanyl citrate (PF) 50 mcg Intravenous Q1H PRN MADAY Almodovar    glycerin-hypromellose- 1 drop Both Eyes Q3H PRN Herbie Jennings DO    guaiFENesin 400 mg Oral Q8H MADAY Alatorre    heparin (porcine) 5,000 Units Subcutaneous [facility] René Salmon PA-C    levETIRAcetam 1,000 mg Per G Tube BID Cyrus Henderson MD    levETIRAcetam 500 mg Per G Tube Q24H Cyrus Henderson MD    LORazepam 1 mg Intravenous Q4H PRN Jennifer Georges MD    magnesium sulfate 2 g Intravenous Once Jennifer Georges MD    meropenem 2,000 mg Intravenous Q8H Noreen Landa MD Last Rate: Stopped (06/13/20 0459)   multi-electrolyte 75 mL/hr Intravenous Continuous Elvis Campos DO Last Rate: 75 mL/hr (06/13/20 0001)   OXcarbazepine 450 mg Per G Tube Q12H Radha Leonard MD    potassium chloride 20 mEq Intravenous Once Jennifer Georges MD    senna-docusate sodium 1 tablet Per G Tube HS Cyrus Henderson MD      Continuous Infusions:    multi-electrolyte 75 mL/hr Last Rate: 75 mL/hr (06/13/20 0001)     PRN Meds:    acetaminophen 650 mg Q4H PRN   cyclobenzaprine 5 mg TID PRN   fentanyl citrate (PF) 50 mcg Q1H PRN   glycerin-hypromellose- 1 drop Q3H PRN   LORazepam 1 mg Q4H PRN       Portions of the record may have been created with voice recognition software  Occasional wrong word or "sound a like" substitutions may have occurred due to the inherent limitations of voice recognition software  Read the chart carefully and recognize, using context, where substitutions have occurred      Gautam Henderson MD

## 2020-06-13 NOTE — RESPIRATORY THERAPY NOTE
RT Ventilator Management Note  Arminda Lo 21 y o  female MRN: [id number]  Unit/Bed#: ICU 01 Encounter: [id number]      Daily Screen       6/11/2020  0736 6/12/2020  0730          Patient safety screen outcome[de-identified]  Passed  Passed      Spont breathing trial % for 30 min:  No  No             06/13/20 0359   Vent Information   Vent type     Vent Mode AC/VC   $ Vent Daily Charge-Subsequent Yes   $ Pulse Oximetry Spot Check Charge Completed   SpO2 98 %   AC/VC Settings   Resp Rate (BPM) 15 BPM   Vt (mL) 250 mL   FIO2 (%) 30 %   PEEP (cmH2O) 5 cmH2O   Flow Pattern (LPM) 45 L/min   Trigger Sensitivity Flow (lpm) 3 %   Humidification Heater   Heater Temperature (Set) 98 6 °F (37 °C)   AC/VC Actuals   Resp Rate (BPM) 25 BPM   VT (mL) 286   MV 6 37   MAP (cmH2O) 9 7 cmH2O   Peak Pressure (cmH2O) 19 cmH2O   I/E Ratio (Obs) 1:4 9   Heater Temperature (Obs) 98 8 °F (37 1 °C)   AC/VC Alarms   High Peak Pressure (cmH2O) 40   High Resp Rate (BPM) 45 BPM   High MV (L/min) 22 L/min   Low MV (L/min) 4 L/min   Vt High (mL) 700 mL   Vt Low (mL) 150 mL   AC/VC Apnea Settings   Resp Rate (BPM) 15 BPM   VT (mL) 250 mL   FIO2 (%) 100 %   Apnea Time (s) 20 S   Apnea Flow (L/min) 45 L/min   Surgical Airway Shiley Cuffed   No Placement Date or Time found  Tube Size: 6 mm  Previously placed by?: Present on admission  Type: Tracheostomy  Brand: Ta  Style: Cuffed   Status Secured   Site Assessment Clean;Dry   Ties Assessment Dry; Intact   Equipment at bedside BVM; Wall Suction setup; Additional complete same size trach tube; Additional complete one size smaller trach tube; Obturator;Sterile saline; Additional inner cannula       Physical Exam:   Assessment Type: During-treatment  General Appearance: Awake  Respiratory Pattern: Assisted, Symmetrical, Spontaneous  Chest Assessment: Chest expansion symmetrical, Trachea midline  Bilateral Breath Sounds: Diminished, Coarse  Cough: Productive  Suction: Trach  O2 Device:       No distress through the night  Pt remains comfortable on AC/VC  Will be placed back on PS in the morning

## 2020-06-13 NOTE — RESPIRATORY THERAPY NOTE
RT Ventilator Management Note  Romana Fly 21 y o  female MRN: [id number]  Unit/Bed#: ICU 01 Encounter: [id number]      Daily Screen       6/12/2020  0730 6/13/2020  0725          Patient safety screen outcome[de-identified]  Passed  Passed      Spont breathing trial % for 30 min:  No                Physical Exam:   Assessment Type: Pre-treatment  General Appearance: Awake  Respiratory Pattern: Assisted, Spontaneous  Chest Assessment: Chest expansion symmetrical  Bilateral Breath Sounds: Clear, Diminished  O2 Device: vent      Resp Comments: Patient was received on AC/VC  Patient is awake  She was placed back on PSV 12/5, 30% as she was on previously  Patient appears comfortable with non-labored respirations on PSV at this time  Will continue to monitor  Patient is pending for OR transport today         06/13/20 0725   Vent Information   Vent ID [id number]   Vent type     Vent Mode CPAP/PS Spont   $ Pulse Oximetry Spot Check Charge Completed   SpO2 98 %   CPAP/PS Spont Settings   FIO2 (%) 30 %   PEEP (cmH2O) 5 cmH2O   Pressure Support (cmH2O) 12 cmH20   Trigger Sensitivity Flow (lpm) 3 LPM   Rise Time (%) 60 %   Esens % 25 %   Humidification Heater   Heater Temp 98 6 °F (37 °C)   CPAP/PS Spont Actuals   Resp Rate (BPM) 12 BPM   VT (mL) 442 mL   MV (Obs) 5 3   MAP (cmH2O) 7 4 cmH2O   Peak Pressure (cmH2O) 18 cmH2O   I/E Ratio (Obs) 1:2   RSBI 27   Heater Temperature (Obs) 98 4 °F (36 9 °C)   CPAP/PS Spont Alarms   High Peak Pressure (cmH20) 40 cmH2O   High Resp Rate (BPM) 40 BPM   High MV (L/min) 22 L/min   Low MV (L/min) 4 L/min   High Juan Jose VTE (mL) 1100 mL   Low Juan Jose VTE (mL) 200 mL   High SPONT VTE (mL) 1100 mL   Low Spont VTE (mL) 200 mL   CPAP/PS Spont Apnea Settings   Resp Rate (BPM) 15 BPM   VT (mL) 250 mL   FIO2 (%) 100 %   Apnea Time (s) 20 S   Apnea Flow (LPM) 45 LPM   Maintenance   Alarm (pink) cable attached Yes   Resuscitation bag with peep valve at bedside Yes   Water bag changed No   Circuit changed No   Daily Screen   Patient safety screen outcome: Passed   IHI Ventilator Associated Pneumonia Bundle   Daily Assessment of Readiness to Extubate Yes   Head of Bed Elevated HOB 30   Surgical Airway Shiley Cuffed   No Placement Date or Time found  Tube Size: 6 mm  Previously placed by?: Present on admission  Type: Tracheostomy  Brand: Shiley  Style: Cuffed   Status Secured   Ties Assessment Dry;Secure; Intact; Clean   Equipment at bedside BVM; Wall Suction setup; Additional complete same size trach tube; Obturator; Additional inner cannula; Additional complete one size smaller trach tube;Sterile saline

## 2020-06-13 NOTE — PROGRESS NOTES
Progress Note - Neurosurgery   Vethiago Speaks 21 y o  female MRN: [id number]  Unit/Bed#: ICU 01 Encounter: [id number]    Assessment:  Shunt infection    Plan:  -Externalization  -CSF sampling  -Abx per ID      Subjective/Objective   Chief Complaint: Shunt infection    Subjective: No acute issues overnight    Objective:   Eyes open spontaneously  Trached  Moves spontaneously x4 ext      Invasive Devices     Peripherally Inserted Central Catheter Line            PICC Line 56/30/84 Right Basilic 8 days          Drain            Gastrostomy/Enterostomy -- days    External Urinary Catheter 1 day          Airway            Surgical Airway Shiley Cuffed -- days                Physical Exam:     Vitals: Blood pressure 114/68, pulse 76, temperature 99 °F (37 2 °C), temperature source Oral, resp  rate 21, height 4' 2" (1 27 m), weight 57 2 kg (126 lb 1 7 oz), SpO2 98 %  ,Body mass index is 35 46 kg/m²  Hemodynamic Monitoring: MAP:      Lab Results: I have personally reviewed pertinent results  Imaging Studies: I have personally reviewed pertinent reports  EKG, Pathology, and Other Studies: I have personally reviewed pertinent reports

## 2020-06-14 ENCOUNTER — APPOINTMENT (INPATIENT)
Dept: RADIOLOGY | Facility: HOSPITAL | Age: 25
DRG: 711 | End: 2020-06-14
Payer: COMMERCIAL

## 2020-06-14 LAB
ANION GAP SERPL CALCULATED.3IONS-SCNC: 6 MMOL/L (ref 4–13)
BUN SERPL-MCNC: 13 MG/DL (ref 5–25)
CALCIUM SERPL-MCNC: 8.1 MG/DL (ref 8.3–10.1)
CHLORIDE SERPL-SCNC: 106 MMOL/L (ref 100–108)
CO2 SERPL-SCNC: 28 MMOL/L (ref 21–32)
CREAT SERPL-MCNC: 0.26 MG/DL (ref 0.6–1.3)
ERYTHROCYTE [DISTWIDTH] IN BLOOD BY AUTOMATED COUNT: 16.9 % (ref 11.6–15.1)
GFR SERPL CREATININE-BSD FRML MDRD: 167 ML/MIN/1.73SQ M
GLUCOSE SERPL-MCNC: 77 MG/DL (ref 65–140)
GLUCOSE SERPL-MCNC: 82 MG/DL (ref 65–140)
GLUCOSE SERPL-MCNC: 94 MG/DL (ref 65–140)
GLUCOSE SERPL-MCNC: 96 MG/DL (ref 65–140)
HCT VFR BLD AUTO: 24.7 % (ref 34.8–46.1)
HGB BLD-MCNC: 7.6 G/DL (ref 11.5–15.4)
MAGNESIUM SERPL-MCNC: 2.8 MG/DL (ref 1.6–2.6)
MCH RBC QN AUTO: 26 PG (ref 26.8–34.3)
MCHC RBC AUTO-ENTMCNC: 30.8 G/DL (ref 31.4–37.4)
MCV RBC AUTO: 85 FL (ref 82–98)
PHOSPHATE SERPL-MCNC: 3 MG/DL (ref 2.7–4.5)
PLATELET # BLD AUTO: 286 THOUSANDS/UL (ref 149–390)
PMV BLD AUTO: 9.8 FL (ref 8.9–12.7)
POTASSIUM SERPL-SCNC: 4 MMOL/L (ref 3.5–5.3)
RBC # BLD AUTO: 2.92 MILLION/UL (ref 3.81–5.12)
SODIUM SERPL-SCNC: 140 MMOL/L (ref 136–145)
WBC # BLD AUTO: 4.41 THOUSAND/UL (ref 4.31–10.16)

## 2020-06-14 PROCEDURE — 94640 AIRWAY INHALATION TREATMENT: CPT

## 2020-06-14 PROCEDURE — 83735 ASSAY OF MAGNESIUM: CPT | Performed by: EMERGENCY MEDICINE

## 2020-06-14 PROCEDURE — 82948 REAGENT STRIP/BLOOD GLUCOSE: CPT

## 2020-06-14 PROCEDURE — 94003 VENT MGMT INPAT SUBQ DAY: CPT

## 2020-06-14 PROCEDURE — 99024 POSTOP FOLLOW-UP VISIT: CPT | Performed by: NEUROLOGICAL SURGERY

## 2020-06-14 PROCEDURE — 70450 CT HEAD/BRAIN W/O DYE: CPT

## 2020-06-14 PROCEDURE — 99291 CRITICAL CARE FIRST HOUR: CPT | Performed by: ANESTHESIOLOGY

## 2020-06-14 PROCEDURE — 94760 N-INVAS EAR/PLS OXIMETRY 1: CPT

## 2020-06-14 PROCEDURE — 80048 BASIC METABOLIC PNL TOTAL CA: CPT | Performed by: EMERGENCY MEDICINE

## 2020-06-14 PROCEDURE — 84100 ASSAY OF PHOSPHORUS: CPT | Performed by: EMERGENCY MEDICINE

## 2020-06-14 PROCEDURE — 71045 X-RAY EXAM CHEST 1 VIEW: CPT

## 2020-06-14 PROCEDURE — 85027 COMPLETE CBC AUTOMATED: CPT | Performed by: EMERGENCY MEDICINE

## 2020-06-14 RX ORDER — SODIUM CHLORIDE, SODIUM GLUCONATE, SODIUM ACETATE, POTASSIUM CHLORIDE, MAGNESIUM CHLORIDE, SODIUM PHOSPHATE, DIBASIC, AND POTASSIUM PHOSPHATE .53; .5; .37; .037; .03; .012; .00082 G/100ML; G/100ML; G/100ML; G/100ML; G/100ML; G/100ML; G/100ML
500 INJECTION, SOLUTION INTRAVENOUS ONCE
Status: COMPLETED | OUTPATIENT
Start: 2020-06-14 | End: 2020-06-14

## 2020-06-14 RX ORDER — ALBUMIN, HUMAN INJ 5% 5 %
25 SOLUTION INTRAVENOUS ONCE
Status: COMPLETED | OUTPATIENT
Start: 2020-06-14 | End: 2020-06-14

## 2020-06-14 RX ADMIN — MEROPENEM 2000 MG: 1 INJECTION, POWDER, FOR SOLUTION INTRAVENOUS at 18:10

## 2020-06-14 RX ADMIN — LEVETIRACETAM 1000 MG: 100 SOLUTION ORAL at 05:14

## 2020-06-14 RX ADMIN — SENNOSIDES AND DOCUSATE SODIUM 1 TABLET: 8.6; 5 TABLET ORAL at 21:42

## 2020-06-14 RX ADMIN — MEROPENEM 2000 MG: 1 INJECTION, POWDER, FOR SOLUTION INTRAVENOUS at 02:54

## 2020-06-14 RX ADMIN — MEROPENEM 2000 MG: 1 INJECTION, POWDER, FOR SOLUTION INTRAVENOUS at 11:49

## 2020-06-14 RX ADMIN — BUDESONIDE 0.5 MG: 0.5 INHALANT RESPIRATORY (INHALATION) at 19:32

## 2020-06-14 RX ADMIN — SODIUM CHLORIDE, SODIUM GLUCONATE, SODIUM ACETATE, POTASSIUM CHLORIDE, MAGNESIUM CHLORIDE, SODIUM PHOSPHATE, DIBASIC, AND POTASSIUM PHOSPHATE 500 ML: .53; .5; .37; .037; .03; .012; .00082 INJECTION, SOLUTION INTRAVENOUS at 01:57

## 2020-06-14 RX ADMIN — ALBUMIN (HUMAN) 25 G: 12.5 INJECTION, SOLUTION INTRAVENOUS at 03:57

## 2020-06-14 RX ADMIN — OXCARBAZEPINE 450 MG: 300 TABLET, FILM COATED ORAL at 05:15

## 2020-06-14 RX ADMIN — MELATONIN 1000 UNITS: at 08:12

## 2020-06-14 RX ADMIN — LEVETIRACETAM 500 MG: 100 SOLUTION ORAL at 21:49

## 2020-06-14 RX ADMIN — GUAIFENESIN 400 MG: 100 SOLUTION ORAL at 08:31

## 2020-06-14 RX ADMIN — Medication 1 TABLET: at 08:13

## 2020-06-14 RX ADMIN — BUDESONIDE 0.5 MG: 0.5 INHALANT RESPIRATORY (INHALATION) at 08:00

## 2020-06-14 RX ADMIN — HEPARIN SODIUM 5000 UNITS: 5000 INJECTION INTRAVENOUS; SUBCUTANEOUS at 21:42

## 2020-06-14 RX ADMIN — HEPARIN SODIUM 5000 UNITS: 5000 INJECTION INTRAVENOUS; SUBCUTANEOUS at 05:14

## 2020-06-14 RX ADMIN — GUAIFENESIN 400 MG: 100 SOLUTION ORAL at 01:56

## 2020-06-14 RX ADMIN — DIAZEPAM 5 MG: 5 TABLET ORAL at 11:54

## 2020-06-14 RX ADMIN — LEVETIRACETAM 1000 MG: 100 SOLUTION ORAL at 13:57

## 2020-06-14 RX ADMIN — GUAIFENESIN 400 MG: 100 SOLUTION ORAL at 17:34

## 2020-06-14 RX ADMIN — HEPARIN SODIUM 5000 UNITS: 5000 INJECTION INTRAVENOUS; SUBCUTANEOUS at 13:57

## 2020-06-14 RX ADMIN — DIAZEPAM 5 MG: 5 TABLET ORAL at 05:44

## 2020-06-14 RX ADMIN — CHLORHEXIDINE GLUCONATE 0.12% ORAL RINSE 15 ML: 1.2 LIQUID ORAL at 20:18

## 2020-06-14 RX ADMIN — DESMOPRESSIN ACETATE 0.05 MG: 0.1 TABLET ORAL at 08:13

## 2020-06-14 RX ADMIN — DIAZEPAM 5 MG: 5 TABLET ORAL at 17:34

## 2020-06-14 RX ADMIN — CHLORHEXIDINE GLUCONATE 0.12% ORAL RINSE 15 ML: 1.2 LIQUID ORAL at 08:12

## 2020-06-14 RX ADMIN — OXCARBAZEPINE 450 MG: 300 TABLET, FILM COATED ORAL at 17:38

## 2020-06-14 NOTE — RESPIRATORY THERAPY NOTE
RT Ventilator Management Note  Matt Jimenez 21 y o  female MRN: [id number]  Unit/Bed#: ICU 01 Encounter: [id number]      Daily Screen       6/12/2020 0730 6/13/2020  0725          Patient safety screen outcome[de-identified]  Passed  Passed      Spont breathing trial % for 30 min:  No                Physical Exam:   Assessment Type: (P) Assess only  General Appearance: (P) Sleeping  Respiratory Pattern: (P) Assisted  Chest Assessment: (P) Chest expansion symmetrical  Bilateral Breath Sounds: (P) Coarse, Diminished      Resp Comments: (P) Pt remains on current vent settings AC/VC  Will continue to monitor and assess per protocol

## 2020-06-14 NOTE — PROGRESS NOTES
Daily Progress Note - Critical Care   Keith Carrillo 21 y o  female MRN: [id number]  Unit/Bed#: ICU 01 Encounter: [id number]      This is a 21year old female transfer from [facility] for  shunt infection status post  shunt removal and currently on antibiotics meropenem   shunt externalization on June 13    ----------------------------------------------------------------------------------------  HPI/24hr events:     Overnight patient had systolic blood pressure within 70-80  Patient received total of 500 mL isolate bolus and albumin  Her blood pressure eventually improved to 98/64 this morning     ---------------------------------------------------------------------------------------  SUBJECTIVE  Patient is nonverbal at baseline  Does not follow any commands  Withdrawals to reflexes in all 4 extremities  Also intermittently shrug her shoulder and yawns  Review of Systems  Review of systems was reviewed and negative unless stated above in HPI/24-hour events   ---------------------------------------------------------------------------------------  Assessment and Plan:    Neuro:    Diagnosis:  Altered mental status/acute encephalopathy secondary to Gram-negative/duration CNS infection   Plan:    Status post removal of  shunt on June 13   Currently on meropenem antibiotic day 12   Follow up on CSF Gram stain and culture from June 13   Will narrow antibiotics based on culture sensitivity    CAM-ICU, delirium precautions        Diagnosis:  Concern for seizure, s/p EEG- showed no seizure activity  o Plan:  Continue Keppra and Trileptal      · Diagnosis:  History of cerebral palsy, completely quadriplegic               -    Plan:  Continue Flexeril and Valium      CV:    Diagnosis:  Hypotension                   - plan:  Received 500 mL fluid bolus overnight  Blood pressure now 98/64  Continue to monitor          Pulm:   Diagnosis:  Chronic respiratory failure status post trach  o Plan:  Patient usually on trach collar  Currently on volume control ventilation  o Continue home Pulmicort  Currently difficult to wean off ventilation  GI:    Diagnosis:  No active issues   Continue Tube feeds        :    Diagnosis:  No active issues        F/E/N:    Plan:  Continue cyclic TF      Heme/Onc:    Diagnosis:  Anemia  o Plan:  Transfuse for hemoglobin less than 7        Endo:    Diagnosis:  Previous history of DI  o Plan:  Continue DDAVP q d  ID:    Diagnosis:  Serratia CSF infection  o Plan:  On day 12 of meropenem  o CSF culture and Gram stain send on 6/13  o Narrow antibiotics based on culture sensitivity  o Retroperitoneal collection drained by IR on 06/05        MSK/Skin:    Diagnosis:  Local wound care as needed      Disposition: Continue Critical Care   Code Status: Level 1 - Full Code  ---------------------------------------------------------------------------------------  ICU CORE MEASURES    Prophylaxis   VTE Pharmacologic Prophylaxis: Heparin  VTE Mechanical Prophylaxis: sequential compression device  Stress Ulcer Prophylaxis: Prophylaxis Not Indicated     ABCDE Protocol (if indicated)  Plan to perform spontaneous awakening trial today? Not applicable  Plan to perform spontaneous breathing trial today? Not applicable  Obvious barriers to extubation? Not applicable  CAM-ICU: Negative    Invasive Devices Review  Invasive Devices     Peripherally Inserted Central Catheter Line            PICC Line 87/09/64 Right Basilic 9 days          Drain            Gastrostomy/Enterostomy -- days    External Urinary Catheter 2 days    Open Drain Anterior;Right Neck less than 1 day          Airway            Surgical Airway Shiley Cuffed -- days              Can any invasive devices be discontinued today?  Not applicable  ---------------------------------------------------------------------------------------  OBJECTIVE    Vitals   Vitals:    06/14/20 0314 06/14/20 0400 20 0500 20 0600   BP:  (!) 87/48 (!) 87/49 98/64   Pulse:  68 64 78   Resp:  19 16 (!) 23   Temp:  98 8 °F (37 1 °C)     TempSrc:  Oral     SpO2: 100% 98% 97% 99%   Weight:       Height:         Temp (24hrs), Av °F (36 7 °C), Min:97 6 °F (36 4 °C), Max:98 8 °F (37 1 °C)  Current: Temperature: 98 8 °F (37 1 °C)      Respiratory:  SpO2: SpO2: 99 %  O2 Flow Rate (L/min): 30 L/min    Invasive/non-invasive ventilation settings   Respiratory    Lab Data (Last 4 hours)    None         O2/Vent Data (Last 4 hours)       0314           Vent Mode AC/VC       Resp Rate (BPM) (BPM) 15       Vt (mL) (mL) 250       FIO2 (%) (%) 30       PEEP (cmH2O) (cmH2O) 5       MV 7 61                   Physical Exam   Constitutional: She appears well-developed and well-nourished  No distress  HENT:   Head: Normocephalic and atraumatic  Nose: Nose normal    Mouth/Throat: No oropharyngeal exudate  Eyes: Conjunctivae are normal  No scleral icterus  Neck: Neck supple  No JVD present  No tracheal deviation present  No thyromegaly present  Cardiovascular: Normal rate, regular rhythm, normal heart sounds and intact distal pulses  Exam reveals no gallop and no friction rub  No murmur heard  Pulmonary/Chest: Effort normal and breath sounds normal  No stridor  No respiratory distress  She has no wheezes  She has no rales  She exhibits no tenderness  Abdominal: Soft  Bowel sounds are normal  She exhibits no distension and no mass  There is no tenderness  There is no rebound and no guarding  Musculoskeletal: Normal range of motion  She exhibits no tenderness  Neurological:   Neuro exam limited secondary to history of cerebral palsy  Patient nonverbal at baseline  Withdraws to pain in all 4 extremities  Intermittently shrugs her shoulder  Skin: Skin is warm  No rash noted  She is not diaphoretic  No erythema  Psychiatric: She has a normal mood and affect   Her behavior is normal  Judgment and thought content normal    Vitals reviewed          Laboratory and Diagnostics:  Results from last 7 days   Lab Units 06/14/20  0452 06/13/20  0518 06/11/20  0433 06/10/20  0444 06/09/20  0442 06/08/20  0501   WBC Thousand/uL 4 41 5 16 6 56 5 22 5 39  5 46 8 39   HEMOGLOBIN g/dL 7 6* 8 7* 8 6* 8 1* 8 6*  8 4* 9 6*   HEMATOCRIT % 24 7* 27 9* 28 7* 27 2* 29 0*  28 7* 32 2*   PLATELETS Thousands/uL 286 329 306 285 273  259 280   NEUTROS PCT %  --  64 59  --  55  55 67   BANDS PCT %  --   --   --  4  --   --    MONOS PCT %  --  7 9  --  11  9 9   MONO PCT %  --   --   --  5  --   --      Results from last 7 days   Lab Units 06/14/20  0452 06/13/20  0518 06/11/20  0433 06/10/20  0444 06/09/20  0442 06/08/20  0501 06/07/20  1918   SODIUM mmol/L 140 136 140 137 141  141 142 140   POTASSIUM mmol/L 4 0 3 9 4 0 4 0 4 1  4 1 4 9 3 0*   CHLORIDE mmol/L 106 103 106 103 105  105 109* 103   CO2 mmol/L 28 26 29 30 30  30 30 30   ANION GAP mmol/L 6 7 5 4 6  6 3* 7   BUN mg/dL 13 15 14 12 12  12 10 11   CREATININE mg/dL 0 26* 0 30* 0 41* 0 30* 0 35*  0 35* 0 39* 0 29*   CALCIUM mg/dL 8 1* 8 4 9 1 8 7 8 5  8 5 8 6 7 4*   GLUCOSE RANDOM mg/dL 77 99 93 104 109  109 104 107   ALT U/L  --   --  84* 108* 184*  --   --    AST U/L  --   --  27 21 47*  --   --    ALK PHOS U/L  --   --  162* 128* 126*  --   --    ALBUMIN g/dL  --   --  2 9* 2 7* 2 7*  --   --    TOTAL BILIRUBIN mg/dL  --   --  0 40 0 34 0 30  --   --      Results from last 7 days   Lab Units 06/14/20 0452 06/09/20 0442 06/07/20  1918   MAGNESIUM mg/dL 2 8* 2 3 1 8   PHOSPHORUS mg/dL 3 0  --  4 2                   ABG:    VBG:    Results from last 7 days   Lab Units 06/10/20  0444 06/09/20  0611 06/09/20  0442   PROCALCITONIN ng/ml 0 19 0 36* 0 36*       Micro  Results from last 7 days   Lab Units 06/13/20  1946 06/10/20  1331   GRAM STAIN RESULT  No bacteria seen  No polys seen No Polys or Bacteria seen       EKG: none  Imaging:  I have personally reviewed pertinent reports  Intake and Output  I/O       06/12 0701 - 06/13 0700 06/13 0701 - 06/14 0700    P  O   0    I V  (mL/kg) 448 8 (7 8) 1270 (22 2)    NG/ 435    IV Piggyback 325 950    Feedings 685 600    Total Intake(mL/kg) 1673 8 (29 3) 3255 (56 9)    Urine (mL/kg/hr) 1500 (1 1) 1950 (1 4)    Emesis/NG output  0    Drains  115    Stool  0    Total Output 1500 2065    Net +173 8 +1190          Unmeasured Urine Occurrence  1 x    Unmeasured Stool Occurrence  1 x            Height and Weights   Height: 4' 2" (127 cm)  IBW: 22 5 kg  Body mass index is 35 46 kg/m²  Weight (last 2 days)     Date/Time   Weight    06/12/20 0600   57 2 (126 1)                Nutrition       Diet Orders   (From admission, onward)             Start     Ordered    06/13/20 1402  Diet Enteral/Parenteral; Tube Feeding No Oral Diet; Non-Formulary; Cyclic; 50; 16 hours  Diet effective now     Comments:  Use peptamen 1 2 from home   Question Answer Comment   Diet Type Enteral/Parenteral    Enteral/Parenteral Tube Feeding No Oral Diet    Tube Feeding Formula: Non-Formulary    Bolus/Cyclic/Continuous Cyclic    Tube Feeding Cyclic Rate (mL/hr): 50    Tube Feeding Cyclic: Administer over: 16 hours    RD to adjust diet per protocol?  Yes        06/13/20 1403                    Active Medications  Scheduled Meds:  Current Facility-Administered Medications:  acetaminophen 650 mg Per G Tube Q4H PRN Jose Campos,     bisacodyl 10 mg Rectal Daily MADAY Morales    budesonide 0 5 mg Nebulization BID Leti Jennings,     calcium carbonate-vitamin D 1 tablet Oral Daily With Breakfast MADAY Morales    chlorhexidine 15 mL Swish & Spit Q12H [address] Bianca Oneill PA-C    cholecalciferol 1,000 Units Oral Daily MADAY Morales    cyclobenzaprine 5 mg Per G Tube TID PRN Susan Hamm MD    desmopressin 0 05 mg Per G Tube Daily Edel Shea PA-C    diazepam 5 mg Per G Tube Q8H Susan Hamm MD    fentanyl citrate (PF) 50 mcg Intravenous Q1H PRN MADAY Huertas    glycerin-hypromellose- 1 drop Both Eyes Q3H PRN Perla Jennings DO    guaiFENesin 400 mg Oral Q8H MADAY Alatorre    heparin (porcine) 5,000 Units Subcutaneous [facility] Nilson Garcia PA-C    levETIRAcetam 1,000 mg Per G Tube BID Geno Gallagher MD    levETIRAcetam 500 mg Per G Tube Q24H Geno Gallagher MD    LORazepam 1 mg Intravenous Q4H PRN Radha Paez MD    meropenem 2,000 mg Intravenous Q8H Bridget Tierney MD Last Rate: Stopped (06/14/20 0354)   OXcarbazepine 450 mg Per G Tube Q12H [address] Geno Gallagher MD    senna-docusate sodium 1 tablet Per G Tube HS Geno Gallagher MD      Continuous Infusions:     PRN Meds:     acetaminophen 650 mg Q4H PRN   cyclobenzaprine 5 mg TID PRN   fentanyl citrate (PF) 50 mcg Q1H PRN   glycerin-hypromellose- 1 drop Q3H PRN   LORazepam 1 mg Q4H PRN       Allergies   Allergies   Allergen Reactions    Baclofen Other (See Comments)     Reaction Date: [id number];   apena      Cefepime Hives    Cephalosporins      Other reaction(s): Unknown Reaction  Other reaction(s): Unknown Reaction  Other reaction(s): Unknown Reaction      Clonidine      Other reaction(s): Respiratory Distress, Respiratory Distress    Lactase     Milk-Related Compounds     Penicillins Hives     Reaction Date: [id number];       Phenobarbital Other (See Comments)     Reaction Date: [id number]; Other reaction(s): Other (See Comments)  apnea  Other reaction(s): Other (See Comments)  apnea  apnea      Vancomycin     Zolpidem Other (See Comments)     Other reaction(s):  Other (see comments)  Reaction Date: [id number];   apnea  apnea  apnea      Latex Rash     ---------------------------------------------------------------------------------------  Advance Directive and Living Will:      Power of :    POLST:    ---------------------------------------------------------------------------------------  Care Time Delivered:   No Critical Care time spent     Augustina Muller DO      Portions of the record may have been created with voice recognition software  Occasional wrong word or "sound a like" substitutions may have occurred due to the inherent limitations of voice recognition software    Read the chart carefully and recognize, using context, where substitutions have occurred

## 2020-06-14 NOTE — RESPIRATORY THERAPY NOTE
RT Ventilator Management Note  Afshin Santacruz 21 y o  female MRN: [id number]  Unit/Bed#: ICU 01 Encounter: [id number]      Daily Screen       6/13/2020  0725 6/14/2020  0800          Patient safety screen outcome[de-identified]  Passed  Passed              Physical Exam:   Assessment Type: Assess only  General Appearance: Awake  Respiratory Pattern: Assisted, Spontaneous  Chest Assessment: Chest expansion symmetrical  Bilateral Breath Sounds: Diminished, Clear  Suction: Trach  O2 Device: vent      Resp Comments: Patient was received on AC/VC settings  Patient was switched to PSV 12/5, 30% for daytime weaning  Patient appears comfortable at this time  06/14/20 0800   Vent Information   Vent ID [id number]   Vent type     Vent Mode CPAP/PS Spont   $ Pulse Oximetry Spot Check Charge Completed   SpO2 99 %   CPAP/PS Spont Settings   FIO2 (%) 30 %   PEEP (cmH2O) 5 cmH2O   Pressure Support (cmH2O) 12 cmH20   Trigger Sensitivity Flow (lpm) 3 LPM   Rise Time (%) 60 %   Esens % 25 %   Humidification Heater   Heater Temp 98 6 °F (37 °C)   CPAP/PS Spont Actuals   Resp Rate (BPM) 24 BPM   VT (mL) 284 mL   MV (Obs) 6 8   MAP (cmH2O) 8 7 cmH2O   Peak Pressure (cmH2O) 17 cmH2O   I/E Ratio (Obs) 1:2 2   RSBI 84   Heater Temperature (Obs) 98 6 °F (37 °C)   CPAP/PS Spont Alarms   High Peak Pressure (cmH20) 40 cmH2O   High Resp Rate (BPM) 45 BPM   High MV (L/min) 22 L/min   Low MV (L/min) 3 L/min   High Juan Jose VTE (mL) 1100 mL   Low Juan Jose VTE (mL) 200 mL   High SPONT VTE (mL) 1100 mL   Low Spont VTE (mL) 200 mL   CPAP/PS Spont Apnea Settings   Resp Rate (BPM) 15 BPM   VT (mL) 250 mL   FIO2 (%) 100 %   Apnea Time (s) 20 S   Apnea Flow (LPM) 45 LPM   Maintenance   Alarm (pink) cable attached Yes   Resuscitation bag with peep valve at bedside Yes   Water bag changed No   Circuit changed No   Daily Screen   Patient safety screen outcome: Passed   Surgical Airway Shiley Cuffed   No Placement Date or Time found     Tube Size: 6 mm  Previously placed by?: Present on admission  Type: Tracheostomy  Brand: Ta  Style: Cuffed   Status Secured   Ties Assessment Dry; Intact; Secure   Equipment at bedside BVM; Wall Suction setup; Additional complete same size trach tube; Additional complete one size smaller trach tube; Obturator;Sterile saline; Additional inner cannula

## 2020-06-14 NOTE — PROGRESS NOTES
Progress Note - Neurosurgery   Greg Santos 21 y o  female MRN: [id number]  Unit/Bed#: ICU 01 Encounter: [id number]    Assessment:  Shunt infection  PPD1 shunt externalization    Plan:  -Neuro unchanged  -Abx per ID, pending recs, output 115 overnight  -Will d/w team to consider clamping trials to see if patient requires shunting still      Subjective/Objective   Chief Complaint: Shunt infection    Subjective: No acute issues overnight    Objective:   Eyes open spontaneously  Trached  Moves spontaneously x4 ext  Externalized drain in place clear fluid    Invasive Devices     Peripherally Inserted Central Catheter Line            PICC Line 74/58/48 Right Basilic 9 days          Drain            Gastrostomy/Enterostomy -- days    External Urinary Catheter 2 days    Open Drain Anterior;Right Neck 1 day          Airway            Surgical Airway Shiley Cuffed -- days                Physical Exam:     Vitals: Blood pressure 100/62, pulse 78, temperature 98 8 °F (37 1 °C), temperature source Oral, resp  rate 19, height 4' 2" (1 27 m), weight 57 2 kg (126 lb 1 7 oz), SpO2 99 %  ,Body mass index is 35 46 kg/m²  Hemodynamic Monitoring: MAP:      Lab Results: I have personally reviewed pertinent results  Imaging Studies: I have personally reviewed pertinent reports  EKG, Pathology, and Other Studies: I have personally reviewed pertinent reports

## 2020-06-15 ENCOUNTER — APPOINTMENT (INPATIENT)
Dept: RADIOLOGY | Facility: HOSPITAL | Age: 25
DRG: 711 | End: 2020-06-15
Payer: COMMERCIAL

## 2020-06-15 LAB
ANION GAP SERPL CALCULATED.3IONS-SCNC: 2 MMOL/L (ref 4–13)
ARTERIAL PATENCY WRIST A: YES
BASE EX.OXY STD BLDV CALC-SCNC: 85.4 % (ref 60–80)
BASE EXCESS BLDV CALC-SCNC: 6.7 MMOL/L
BASOPHILS # BLD AUTO: 0.02 THOUSANDS/ΜL (ref 0–0.1)
BASOPHILS NFR BLD AUTO: 0 % (ref 0–1)
BUN SERPL-MCNC: 15 MG/DL (ref 5–25)
CALCIUM SERPL-MCNC: 9 MG/DL (ref 8.3–10.1)
CHLORIDE SERPL-SCNC: 109 MMOL/L (ref 100–108)
CO2 SERPL-SCNC: 29 MMOL/L (ref 21–32)
CREAT SERPL-MCNC: 0.37 MG/DL (ref 0.6–1.3)
EOSINOPHIL # BLD AUTO: 0.16 THOUSAND/ΜL (ref 0–0.61)
EOSINOPHIL NFR BLD AUTO: 3 % (ref 0–6)
ERYTHROCYTE [DISTWIDTH] IN BLOOD BY AUTOMATED COUNT: 17.1 % (ref 11.6–15.1)
GFR SERPL CREATININE-BSD FRML MDRD: 149 ML/MIN/1.73SQ M
GLUCOSE SERPL-MCNC: 104 MG/DL (ref 65–140)
GLUCOSE SERPL-MCNC: 115 MG/DL (ref 65–140)
GLUCOSE SERPL-MCNC: 120 MG/DL (ref 65–140)
GLUCOSE SERPL-MCNC: 144 MG/DL (ref 65–140)
GLUCOSE SERPL-MCNC: 92 MG/DL (ref 65–140)
HCO3 BLDV-SCNC: 31.1 MMOL/L (ref 24–30)
HCT VFR BLD AUTO: 29.4 % (ref 34.8–46.1)
HGB BLD-MCNC: 9 G/DL (ref 11.5–15.4)
HOROWITZ INDEX BLDA+IHG-RTO: 30 MM[HG]
IMM GRANULOCYTES # BLD AUTO: 0.01 THOUSAND/UL (ref 0–0.2)
IMM GRANULOCYTES NFR BLD AUTO: 0 % (ref 0–2)
LYMPHOCYTES # BLD AUTO: 1.22 THOUSANDS/ΜL (ref 0.6–4.47)
LYMPHOCYTES NFR BLD AUTO: 25 % (ref 14–44)
LYMPHOCYTES NFR CSF MANUAL: 24 %
MAGNESIUM SERPL-MCNC: 2.7 MG/DL (ref 1.6–2.6)
MCH RBC QN AUTO: 25.8 PG (ref 26.8–34.3)
MCHC RBC AUTO-ENTMCNC: 30.6 G/DL (ref 31.4–37.4)
MCV RBC AUTO: 84 FL (ref 82–98)
MONOCYTES # BLD AUTO: 0.36 THOUSAND/ΜL (ref 0.17–1.22)
MONOCYTES NFR BLD AUTO: 7 % (ref 4–12)
MONOS+MACROS CSF MANUAL: 43 %
NEUTROPHILS # BLD AUTO: 3.12 THOUSANDS/ΜL (ref 1.85–7.62)
NEUTROPHILS NFR CSF MANUAL: 33 %
NEUTS SEG NFR BLD AUTO: 65 % (ref 43–75)
NRBC BLD AUTO-RTO: 0 /100 WBCS
O2 CT BLDV-SCNC: 12.8 ML/DL
PATHOLOGIST INTERPRETATION: NORMAL
PATHOLOGY REVIEW: YES
PCO2 BLDV: 43.9 MM HG (ref 42–50)
PEEP RESPIRATORY: 5 CM[H2O]
PH BLDV: 7.47 [PH] (ref 7.3–7.4)
PHOSPHATE SERPL-MCNC: 2.4 MG/DL (ref 2.7–4.5)
PLATELET # BLD AUTO: 281 THOUSANDS/UL (ref 149–390)
PMV BLD AUTO: 9.8 FL (ref 8.9–12.7)
PO2 BLDV: 54.7 MM HG (ref 35–45)
POTASSIUM SERPL-SCNC: 4.1 MMOL/L (ref 3.5–5.3)
RBC # BLD AUTO: 3.49 MILLION/UL (ref 3.81–5.12)
SODIUM SERPL-SCNC: 140 MMOL/L (ref 136–145)
TOTAL CELLS COUNTED SPEC: 100
VENT AC: 15
VENT- AC: AC
VT SETTING VENT: 250 ML
WBC # BLD AUTO: 4.89 THOUSAND/UL (ref 4.31–10.16)

## 2020-06-15 PROCEDURE — 85025 COMPLETE CBC W/AUTO DIFF WBC: CPT | Performed by: STUDENT IN AN ORGANIZED HEALTH CARE EDUCATION/TRAINING PROGRAM

## 2020-06-15 PROCEDURE — 82948 REAGENT STRIP/BLOOD GLUCOSE: CPT

## 2020-06-15 PROCEDURE — 94003 VENT MGMT INPAT SUBQ DAY: CPT

## 2020-06-15 PROCEDURE — 84100 ASSAY OF PHOSPHORUS: CPT | Performed by: STUDENT IN AN ORGANIZED HEALTH CARE EDUCATION/TRAINING PROGRAM

## 2020-06-15 PROCEDURE — 82805 BLOOD GASES W/O2 SATURATION: CPT | Performed by: EMERGENCY MEDICINE

## 2020-06-15 PROCEDURE — 71045 X-RAY EXAM CHEST 1 VIEW: CPT

## 2020-06-15 PROCEDURE — 94640 AIRWAY INHALATION TREATMENT: CPT

## 2020-06-15 PROCEDURE — 99233 SBSQ HOSP IP/OBS HIGH 50: CPT | Performed by: INTERNAL MEDICINE

## 2020-06-15 PROCEDURE — 99291 CRITICAL CARE FIRST HOUR: CPT | Performed by: ANESTHESIOLOGY

## 2020-06-15 PROCEDURE — 99024 POSTOP FOLLOW-UP VISIT: CPT | Performed by: PHYSICIAN ASSISTANT

## 2020-06-15 PROCEDURE — 83735 ASSAY OF MAGNESIUM: CPT | Performed by: STUDENT IN AN ORGANIZED HEALTH CARE EDUCATION/TRAINING PROGRAM

## 2020-06-15 PROCEDURE — 80048 BASIC METABOLIC PNL TOTAL CA: CPT | Performed by: STUDENT IN AN ORGANIZED HEALTH CARE EDUCATION/TRAINING PROGRAM

## 2020-06-15 PROCEDURE — 94760 N-INVAS EAR/PLS OXIMETRY 1: CPT

## 2020-06-15 RX ADMIN — Medication 1 TABLET: at 08:16

## 2020-06-15 RX ADMIN — MEROPENEM 2000 MG: 1 INJECTION, POWDER, FOR SOLUTION INTRAVENOUS at 18:00

## 2020-06-15 RX ADMIN — ALTEPLASE 2 MG: 2.2 INJECTION, POWDER, LYOPHILIZED, FOR SOLUTION INTRAVENOUS at 11:30

## 2020-06-15 RX ADMIN — HEPARIN SODIUM 5000 UNITS: 5000 INJECTION INTRAVENOUS; SUBCUTANEOUS at 21:53

## 2020-06-15 RX ADMIN — BISACODYL 10 MG: 10 SUPPOSITORY RECTAL at 08:56

## 2020-06-15 RX ADMIN — BUDESONIDE 0.5 MG: 0.5 INHALANT RESPIRATORY (INHALATION) at 19:47

## 2020-06-15 RX ADMIN — OXCARBAZEPINE 450 MG: 300 TABLET, FILM COATED ORAL at 05:55

## 2020-06-15 RX ADMIN — BUDESONIDE 0.5 MG: 0.5 INHALANT RESPIRATORY (INHALATION) at 08:08

## 2020-06-15 RX ADMIN — LEVETIRACETAM 500 MG: 100 SOLUTION ORAL at 21:53

## 2020-06-15 RX ADMIN — HEPARIN SODIUM 5000 UNITS: 5000 INJECTION INTRAVENOUS; SUBCUTANEOUS at 13:28

## 2020-06-15 RX ADMIN — MELATONIN 1000 UNITS: at 08:56

## 2020-06-15 RX ADMIN — HEPARIN SODIUM 5000 UNITS: 5000 INJECTION INTRAVENOUS; SUBCUTANEOUS at 05:52

## 2020-06-15 RX ADMIN — CHLORHEXIDINE GLUCONATE 0.12% ORAL RINSE 15 ML: 1.2 LIQUID ORAL at 08:56

## 2020-06-15 RX ADMIN — FENTANYL CITRATE 50 MCG: 50 INJECTION, SOLUTION INTRAMUSCULAR; INTRAVENOUS at 22:09

## 2020-06-15 RX ADMIN — DESMOPRESSIN ACETATE 0.05 MG: 0.1 TABLET ORAL at 08:57

## 2020-06-15 RX ADMIN — LEVETIRACETAM 1000 MG: 100 SOLUTION ORAL at 05:53

## 2020-06-15 RX ADMIN — ALTEPLASE 2 MG: 2.2 INJECTION, POWDER, LYOPHILIZED, FOR SOLUTION INTRAVENOUS at 11:29

## 2020-06-15 RX ADMIN — ACETAMINOPHEN 650 MG: 325 TABLET ORAL at 17:58

## 2020-06-15 RX ADMIN — MEROPENEM 2000 MG: 1 INJECTION, POWDER, FOR SOLUTION INTRAVENOUS at 03:10

## 2020-06-15 RX ADMIN — GUAIFENESIN 400 MG: 100 SOLUTION ORAL at 08:57

## 2020-06-15 RX ADMIN — OXCARBAZEPINE 450 MG: 300 TABLET, FILM COATED ORAL at 18:00

## 2020-06-15 RX ADMIN — DIAZEPAM 5 MG: 5 TABLET ORAL at 03:10

## 2020-06-15 RX ADMIN — MEROPENEM 2000 MG: 1 INJECTION, POWDER, FOR SOLUTION INTRAVENOUS at 10:04

## 2020-06-15 RX ADMIN — GUAIFENESIN 400 MG: 100 SOLUTION ORAL at 17:59

## 2020-06-15 RX ADMIN — GUAIFENESIN 400 MG: 100 SOLUTION ORAL at 03:10

## 2020-06-15 RX ADMIN — DIAZEPAM 5 MG: 5 TABLET ORAL at 10:05

## 2020-06-15 RX ADMIN — SENNOSIDES AND DOCUSATE SODIUM 1 TABLET: 8.6; 5 TABLET ORAL at 21:53

## 2020-06-15 RX ADMIN — LEVETIRACETAM 1000 MG: 100 SOLUTION ORAL at 13:29

## 2020-06-15 RX ADMIN — DIAZEPAM 5 MG: 5 TABLET ORAL at 17:59

## 2020-06-15 RX ADMIN — CHLORHEXIDINE GLUCONATE 0.12% ORAL RINSE 15 ML: 1.2 LIQUID ORAL at 21:53

## 2020-06-15 NOTE — PROGRESS NOTES
Progress Note - Infectious Disease   Kylah Davis 21 y o  female MRN: [id number]  Unit/Bed#: ICU 01 Encounter: [id number]      Impression/Recommendations:  1  Sepsis POA   Fever, tachycardia, tachypnea  Likely due to #2   Consider also aspiration given reported respiratory distress upon presentation to [facility] other clear source appreciated   Blood cultures at [facility] negative     Rec:  ? Continue antibiotics as below  ? Follow temperatures closely     2   Serratia  shunt infection   Consider translocation from GI tract although no reported intra-abdominal fluid collection   Repeat CSF cultures positive for Serratia despite 10 days IV antibiotics represents antibiotic failure  Now status post VPS externalization with retained central portion of catheter  Rec:  ? Continue meropenem for 7 days postop  ? Follow up final CSF from OR  ? Monitor CSF output  ? May be at risk for recurrent infection if central portion of shunt retained     3  Acute on chronic hypoxic respiratory failure   No overt pneumonia on chest imaging from [place]   Consider role of aspiration versus encephalopathy   Patient on minimal vent requirements  Rec:  ? Antibiotics above would treat any concomitant pneumonia  ? Follow respiratory status closely  ? Supportive care as per the primary service     4  Seizure disorder   Initial concern for seizures at [facility] but vEEG here negative for seizure activity   Rhythmic blinking does not correlate to EEG findings per Neurology  Rec:  ? Continue antiepileptics per Neurology with close follow-up ongoing     5   Retroperitoneal collection   Reportedly chronic, seen on imaging done at [place] [address] significance   Status post aspiration of the collection with negative cultures  Rec:  ?  No additional workup or treatment from an ID perspective     6   CP   With spastic quadriplegia, profound MR, hydrocephalus status post  shunt     7   Chronic respiratory failure   Status post trach      8   Recent VFib arrest   Consider anoxic encephalopathy      The above plan was discussed in detail with the neurosurgery team     Antibiotics:  Meropenem #14  POD #2    Subjective:  Patient seen on AM rounds  Cannot provider ROS due to nonverbal state  24 Hour Events:  Went to the OR on Saturday for externalization of of her  shunt  It was noted to be hard and calcified  The ventricular catheter was left in place  CSF cultures from OR are no growth  No documented fevers, chills, sweats, nausea, vomiting, or diarrhea  Objective:  Vitals:  Temp:  [96 7 °F (35 9 °C)-98 7 °F (37 1 °C)] 97 6 °F (36 4 °C)  HR:  [64-96] 92  Resp:  [0-32] 20  BP: ()/(44-75) 102/59  SpO2:  [91 %-100 %] 99 %  Temp (24hrs), Av 9 °F (36 6 °C), Min:96 7 °F (35 9 °C), Max:98 7 °F (37 1 °C)  Current: Temperature: 97 6 °F (36 4 °C)    Physical Exam:   General:  No acute distress  Eyes:  Normal lids, eyes closed  ENT:  Normal external ears and nose  Neck:  Neck symmetric with midline trachea  Pulmonary:  Normal respiratory effort without accessory muscle use, on ventilator  Cardiovascular:  Regular rate and rhythm on monitory; no peripheral edema  Gastrointestinal:  No distention, mildly obese  Musculoskeletal:  No digital clubbing or cyanosis  Skin:  No visible rashes or nodules  Neurologic:  Not moving extremities due to lethargy  Psychiatric:  Obtunded, non responsive     Lab Results:  I have personally reviewed pertinent labs    Results from last 7 days   Lab Units 06/15/20  0510 20  0452 20  0518 20  0433 06/10/20  0444 20  0442   POTASSIUM mmol/L 4 1 4 0 3 9 4 0 4 0 4 1  4 1   CHLORIDE mmol/L 109* 106 103 106 103 105  105   CO2 mmol/L 29 28 26 29 30 30  30   BUN mg/dL 15 13 15 14 12 12  12   CREATININE mg/dL 0 37* 0 26* 0 30* 0 41* 0 30* 0 35*  0 35*   EGFR ml/min/1 73sq m 149 167 160 144 160 152  152   CALCIUM mg/dL 9 0 8 1* 8 4 9 1 8 7 8 5  8 5   AST U/L  --   --   --  27  47*   ALT U/L  --   --   --  84* 108* 184*   ALK PHOS U/L  --   --   --  162* 128* 126*     Results from last 7 days   Lab Units 06/15/20  0510 06/14/20  0452 06/13/20  0518   WBC Thousand/uL 4 89 4 41 5 16   HEMOGLOBIN g/dL 9 0* 7 6* 8 7*   PLATELETS Thousands/uL 281 286 329     Results from last 7 days   Lab Units 06/13/20  1946 06/10/20  1331   GRAM STAIN RESULT  No bacteria seen  No polys seen No Polys or Bacteria seen       Imaging Studies:   I have personally reviewed pertinent imaging study reports and images in PACS  CT head reviewed personally chronic ventriculomegaly, retained VPS above clavicle traversing into ventricle  EKG, Pathology, and Other Studies:   I have personally reviewed pertinent reports

## 2020-06-15 NOTE — PROGRESS NOTES
Progress Note - Nikki Jimenezdonald 1995, 21 y o  female MRN: [id number]    Unit/Bed#: ICU 01 Encounter: [id number]    Primary Care Provider: Chapo Chappell MD   Date and time admitted to hospital: 6/3/2020  2:15 PM        S/P  shunt  Assessment & Plan  PPD#2 shunt externalization  Pt has a hx of  shunt insertion for congenital hydrocephalus  · P/w SIRS, acute encephalopathy, and seizure like activity  · Pt also had cardiac arrest and was resuscitated while at [facility] [address]  reviewed personally and by attending  Final results as below  · CT head wo contrast 6/15/2020:  Stable ventricular shunt catheter position and degree of ventriculomegaly when comparing to the prior study of June 1, 2020  Redemonstrated bilateral cerebral gliosis and encephalomalacia  Interval development of fluid in the right mastoid air cells and inflammatory mucosal thickening in the right sphenoid sinus with scant sphenoid sinus frothy secretions  Correlate for mastoiditis and sinusitis  I concur with the preliminary interpretation reported by the [facility] radiologist   Additional findings include inflammatory paranasal sinus disease  Plan  · Continue regular neurologic checks   · Ongoing medical management per primary team   · ID and neurology following  · PT/OT  · DVT PPX: SCDs, Heparin  · No emergent neurosurgical intervention anticipated at this time  · Shunt clamped today with plan for repeat CT head wo contrast tomorrow - at that time, will discuss possible removal of shunt if patient does not appear to be dependent on this  Neurosurgery will continue to follow  Please call with questions or concerns      Severe sepsis with acute organ dysfunction due to Serratia species [facility])  Assessment & Plan  ID following  · Currently afebrile  · WBC within normal limits  · Continue Meropenem 2g Q8 for total of 14 days  · Daptomycin discontinued  · CSF from shunt tap 6/3 and 6/10 positive for serratia marcescens  · Repeat CSF cultures without growth to date, prelim report  · Back wound culture and left iliac tap negative  Cerebral palsy ([address] )  Assessment & Plan  · Continue medical management    Acute metabolic encephalopathy  Assessment & Plan  · Ongoing monitoring and management per primary team    Abnormal LFTs  Assessment & Plan  · Management per primary team     * Seizure-like activity [facility])  Assessment & Plan  · Neurology following, AED per their recommendations  · Completed vEEG with no noted seizure activity  Subjective/Objective   Chief Complaint: follow up shunt externalization    Subjective:  Nonverbal at baseline  No acute overnight events noted  Objective:  Laying in bed, NAD    I/O       06/13 0701 - 06/14 0700 06/14 0701 - 06/15 0700 06/15 0701 - 06/16 0700    P  O  0 0     I V  (mL/kg) 1270 (22 2) 190 (3 3)     NG/ 280 80    IV Piggyback 950 300     Feedings 600 850     Total Intake(mL/kg) 3255 (56 9) 1620 (28 3) 80 (1 4)    Urine (mL/kg/hr) 1950 (1 4) 2400 (1 7)     Emesis/NG output 0 0     Drains 115 48     Stool 0      Total Output 2065 2448     Net +1190 -828 +80           Unmeasured Urine Occurrence 1 x 2 x     Unmeasured Stool Occurrence 1 x            Invasive Devices     Peripherally Inserted Central Catheter Line            PICC Line 26/78/74 Right Basilic 10 days          Drain            Gastrostomy/Enterostomy -- days    External Urinary Catheter 3 days    Open Drain Anterior;Right Neck 2 days          Airway            Surgical Airway Shiley Cuffed -- days                Physical Exam:  Vitals: Blood pressure 104/65, pulse 90, temperature 97 6 °F (36 4 °C), temperature source Oral, resp  rate (!) 34, height 4' 2" (1 27 m), weight 57 2 kg (126 lb 1 7 oz), SpO2 100 %  ,Body mass index is 35 46 kg/m²        General appearance: alert, appears stated age, no distress  Head: Normocephalic, without obvious abnormality, atraumatic  Eyes: upward deviated gaze at baseline  Neck: supple, symmetrical, trachea midline  Lungs: trach in place  Heart: regular heart rate  Neurologic:   Mental status: GCS 8-9 (E4, V1, M3-4), does not follow commands, flexion / withdrawal to pain in BUE and LLE, no movement noted in LLE to pain, nonverbal at baseline  Cranial nerves: grossly intact (Cranial nerves II-XII)  Sensory: intact to crude touch      Lab Results:  Results from last 7 days   Lab Units 06/15/20  0510 06/14/20  0452 06/13/20 0518 06/11/20  0433   WBC Thousand/uL 4 89 4 41 5 16 6 56   HEMOGLOBIN g/dL 9 0* 7 6* 8 7* 8 6*   HEMATOCRIT % 29 4* 24 7* 27 9* 28 7*   PLATELETS Thousands/uL 281 286 329 306   NEUTROS PCT % 65  --  64 59   MONOS PCT % 7  --  7 9     Results from last 7 days   Lab Units 06/15/20  0510 06/14/20  0452 06/13/20 0518 06/11/20 0433 06/10/20  0444 06/09/20  0442   POTASSIUM mmol/L 4 1 4 0 3 9 4 0 4 0 4 1  4 1   CHLORIDE mmol/L 109* 106 103 106 103 105  105   CO2 mmol/L 29 28 26 29 30 30  30   BUN mg/dL 15 13 15 14 12 12  12   CREATININE mg/dL 0 37* 0 26* 0 30* 0 41* 0 30* 0 35*  0 35*   CALCIUM mg/dL 9 0 8 1* 8 4 9 1 8 7 8 5  8 5   ALK PHOS U/L  --   --   --  162* 128* 126*   ALT U/L  --   --   --  84* 108* 184*   AST U/L  --   --   --  27 21 47*     Results from last 7 days   Lab Units 06/15/20  0510 06/14/20  0452 06/09/20  0442   MAGNESIUM mg/dL 2 7* 2 8* 2 3     Results from last 7 days   Lab Units 06/15/20  0510 06/14/20  0452   PHOSPHORUS mg/dL 2 4* 3 0     ABG:  Lab Results   Component Value Date    PHART 7 427 06/05/2020    EVV9VWF 42 0 06/05/2020    PO2ART 97 0 06/05/2020    OCI1JML 27 1 06/05/2020    BEART 2 5 06/05/2020    SOURCE Radial, Right 06/05/2020       Imaging Studies: I have personally reviewed pertinent reports  and I have personally reviewed pertinent films in PACS    Xr Skull < 4 Vw    Result Date: 6/6/2020  Impression: Baseline pressure setting is obtained   Workstation performed: [id number]     Ct Guided Perc Drainage Catheter Placeme    Result Date: 6/8/2020  Impression: Impression: Successful CT-guided aspiration left iliac crest collection  Workstation performed: [id number]       EKG, Pathology, and Other Studies: I have personally reviewed pertinent reports        VTE Pharmacologic Prophylaxis: Heparin    VTE Mechanical Prophylaxis: sequential compression device

## 2020-06-15 NOTE — ASSESSMENT & PLAN NOTE
ID following  · Currently afebrile  · WBC within normal limits  · Continue Meropenem 2g Q8 for total of 14 days  · Daptomycin discontinued  · CSF from shunt tap 6/3 and 6/10 positive for serratia marcescens  · Repeat CSF cultures without growth to date, prelim report  · Back wound culture and left iliac tap negative

## 2020-06-15 NOTE — ASSESSMENT & PLAN NOTE
· Neurology following, AED per their recommendations  · Completed vEEG with no noted seizure activity

## 2020-06-15 NOTE — RESPIRATORY THERAPY NOTE
RT Ventilator Management Note  Yancy Favre 21 y o  female MRN: [id number]  Unit/Bed#: ICU 01 Encounter: [id number]      Daily Screen       6/13/2020  0725 6/14/2020  0800          Patient safety screen outcome[de-identified]  Passed  Passed              Physical Exam:   Assessment Type: Assess only  General Appearance: Sleeping  Respiratory Pattern: Assisted  Chest Assessment: Chest expansion symmetrical  Bilateral Breath Sounds: Diminished  Cough: None  Suction: Trach  O2 Device: vent      Resp Comments: pt remained on vent, AC/VC mode/settings all night   No vent changes made

## 2020-06-15 NOTE — PROGRESS NOTES
Progress Note - Critical Care   Matt Speaks 21 y o  female MRN: [id number]  Unit/Bed#: ICU 01 Encounter: [id number]    Assessment: 21year old female with PMH significant for cerebral palsy with spastic quadriplegia, bedbound and non-verbal at baseline, asthma, GERD, kidney stones, seizure disorder, pancreatitis, DI  Hx of hydrocephalus with indwelling  shunt and chronic respiratory failure with tracheostomy  Presented to [facility] with fever, AMS, and respiratory failure  Placed on mechanical ventilation at that time  Loaded with AEDs  Couldn't obtain CSF sample to r/o infection  Transferred from [facility] 6/3  Fluid from  shunt grew Serratia, currently on Meropenem per ID   shunt externalization on 6/13      Plan:     Neuro: Diagnoses: AMS/acute 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; seizure disorder; Cerebral palsy with quadriplegia  - s/p removal of  shunt 6/13  - Meropenem day 13, ID following  - repeat CSF cultures 6/10 show few colonies of Serratia  - Further Abx deescalation per ID  - CAM-ICU, delirium precautions  - Continue Keppra and Trileptal  - Continue Flexeril and Valium     CV: Hypotension  - softer pressures  - no fluid bolus in last 24 hours  - monitor closely     Lung: Chronic respiratory failure status post trach  - Continue trach collar oxygenation  - Currently ACVC 15/250/5/30%  - Continue pulmicort  - Wean vent as able, has been difficult so far     GI: No acute issues     FEN:   Fluids: none  Lytes: Stable, replete as required  Nutrition: TF, cyclic at 50 cc per hour for 16 hours per day     : No acute issues     ID: Serratia CSF infection  - Meropenem Day 13  - repeat culture continues to show serratia growth in few colonies  - ID following, recs appreciated  - retroperitoneal fluid collection drained by IR 6/5     Heme: Anemia  - Transfuse as needed to maintain Hbg > 7 0  - Hbg 9 0 this AM     Endo: History of DI  - DDAVP qDaily                Msk/Skin: Local wound care  LDA:  - R PICC   - G tube  - external drain   - Shiley cuffed     Disposition: Continue critical care    Chief Complaint: Fever, AMS, resp failure    HPI/24hr events: External shunt noted to have minimal drainage  Was clamped yesterday for 2 hours, without drainage since unclamped  CTH obtained to check position of drain, read pending  Physical Exam:     Vitals:    06/15/20 0337 06/15/20 0400 06/15/20 0500 06/15/20 0600   BP:  114/65 111/61 108/52   Pulse:  90 88 94   Resp:  (!) 27 (!) 24 (!) 26   Temp:       TempSrc:       SpO2: 91% 91% 94% 99%   Weight:       Height:         Physical Exam   Constitutional: She appears well-developed and well-nourished  No distress  HENT:   Head: Normocephalic  Drain in place   Eyes: Pupils are equal, round, and reactive to light  No scleral icterus  Neck: Neck supple  No tracheal deviation present  No thyromegaly present  Cardiovascular: Normal rate, regular rhythm, normal heart sounds and intact distal pulses  Exam reveals no gallop and no friction rub  No murmur heard  Pulmonary/Chest: Effort normal  No respiratory distress  She has no wheezes  Ventilator associated breath sounds  Abdominal: Soft  Bowel sounds are normal  She exhibits no distension and no mass  There is no tenderness  There is no rebound and no guarding  Musculoskeletal: She exhibits no edema, tenderness or deformity  Lymphadenopathy:     She has no cervical adenopathy  Neurological:   Neuro exam limited secondary cerebral palsy  Patient nonverbal at baseline  Does not track  Withdraws to pain in all 4 extremities  Skin: Skin is warm and dry  Capillary refill takes less than 2 seconds  She is not diaphoretic  Nursing note and vitals reviewed  Temperature:   Temp (24hrs), Av 1 °F (36 7 °C), Min:96 7 °F (35 9 °C), Max:98 7 °F (37 1 °C)    Current: Temperature: 98 6 °F (37 °C)    Weights:   IBW: 22 5 kg    Body mass index is 35 46 kg/m²    Weight (last 2 days)     None          Hemodynamic Monitoring:  N/A     Non-Invasive/Invasive Ventilation Settings:  Respiratory    Lab Data (Last 4 hours)    None         O2/Vent Data (Last 4 hours)      06/15 0337           Vent Mode AC/VC       Resp Rate (BPM) (BPM) 15       Vt (mL) (mL) 250       FIO2 (%) (%) 30       PEEP (cmH2O) (cmH2O) 5       MV 7 24                 No results found for: PHART, NCJ4RVD, PO2ART, SAI0EXP, G5ZTSEGB, BEART, SOURCE      Intake and Outputs:  I/O       06/13 0701 - 06/14 0700 06/14 0701 - 06/15 0700    P  O  0 0    I V  (mL/kg) 1270 (22 2) 90 (1 6)    NG/ 280    IV Piggyback 950 300    Feedings 600 700    Total Intake(mL/kg) 3255 (56 9) 1370 (24)    Urine (mL/kg/hr) 1950 (1 4) 2400 (1 7)    Emesis/NG output 0 0    Drains 115 48    Stool 0     Total Output 2065 2448    Net +1190 -1078          Unmeasured Urine Occurrence 1 x 2 x    Unmeasured Stool Occurrence 1 x         UOP: +1 37, -2 448 over 24 hours   Nutrition:        Diet Orders   (From admission, onward)             Start     Ordered    06/13/20 1402  Diet Enteral/Parenteral; Tube Feeding No Oral Diet; Non-Formulary; Cyclic; 50; 16 hours  Diet effective now     Comments:  Use peptamen 1 2 from home   Question Answer Comment   Diet Type Enteral/Parenteral    Enteral/Parenteral Tube Feeding No Oral Diet    Tube Feeding Formula: Non-Formulary    Bolus/Cyclic/Continuous Cyclic    Tube Feeding Cyclic Rate (mL/hr): 50    Tube Feeding Cyclic: Administer over: 16 hours    RD to adjust diet per protocol? Yes        06/13/20 1403              TF currently running at 50/hour with a goal of 50cc per hour over 16 hours   Formula:non-formulary    Labs:   Results from last 7 days   Lab Units 06/15/20  0510 06/14/20  0452 06/13/20  0518 06/11/20  0433   WBC Thousand/uL 4 89 4 41 5 16 6 56   HEMOGLOBIN g/dL 9 0* 7 6* 8 7* 8 6*   HEMATOCRIT % 29 4* 24 7* 27 9* 28 7*   PLATELETS Thousands/uL 281 286 329 306   NEUTROS PCT % 65  --  64 59   MONOS PCT % 7  --  7 9    Results from last 7 days   Lab Units 06/15/20  0510 06/14/20  0452 06/13/20  0518 06/11/20  0433 06/10/20  0444 06/09/20  0442   SODIUM mmol/L 140 140 136 140 137 141  141   POTASSIUM mmol/L 4 1 4 0 3 9 4 0 4 0 4 1  4 1   CHLORIDE mmol/L 109* 106 103 106 103 105  105   CO2 mmol/L 29 28 26 29 30 30  30   BUN mg/dL 15 13 15 14 12 12  12   CREATININE mg/dL 0 37* 0 26* 0 30* 0 41* 0 30* 0 35*  0 35*   CALCIUM mg/dL 9 0 8 1* 8 4 9 1 8 7 8 5  8 5   ALK PHOS U/L  --   --   --  162* 128* 126*   ALT U/L  --   --   --  84* 108* 184*   AST U/L  --   --   --  27 21 47*     Results from last 7 days   Lab Units 06/15/20  0510 06/14/20  0452 06/09/20  0442   MAGNESIUM mg/dL 2 7* 2 8* 2 3     Results from last 7 days   Lab Units 06/15/20  0510 06/14/20  0452   PHOSPHORUS mg/dL 2 4* 3 0              No results found for: TROPONINI    Imaging:  I have personally reviewed pertinent reports  EKG: Reviewed    Micro:  Lab Results   Component Value Date    WOUNDCULT No growth 06/05/2020       Allergies: Allergies   Allergen Reactions    Baclofen Other (See Comments)     Reaction Date: [id number];   apena      Cefepime Hives    Cephalosporins      Other reaction(s): Unknown Reaction  Other reaction(s): Unknown Reaction  Other reaction(s): Unknown Reaction      Clonidine      Other reaction(s): Respiratory Distress, Respiratory Distress    Lactase     Milk-Related Compounds     Penicillins Hives     Reaction Date: [id number];       Phenobarbital Other (See Comments)     Reaction Date: [id number]; Other reaction(s): Other (See Comments)  apnea  Other reaction(s): Other (See Comments)  apnea  apnea      Vancomycin     Zolpidem Other (See Comments)     Other reaction(s):  Other (see comments)  Reaction Date: [id number];   apnea  apnea  apnea      Latex Rash       Medications:   Scheduled Meds:  Current Facility-Administered Medications:  acetaminophen 650 mg Per G Tube Q4H PRN Otto Perez DO bisacodyl 10 mg Rectal Daily MADAY Morales    budesonide 0 5 mg Nebulization BID Tolu Bio Axelband, DO    calcium carbonate-vitamin D 1 tablet Oral Daily With Breakfast MADAY Morales    chlorhexidine 15 mL Swish & Spit Q12H [address] Bianca Oneill PA-C    cholecalciferol 1,000 Units Oral Daily MADAY Morales    cyclobenzaprine 5 mg Per G Tube TID PRN Susan Hamm MD    desmopressin 0 05 mg Per G Tube Daily Edel Shea PA-C    diazepam 5 mg Per G Tube Q8H Susan Hamm MD    fentanyl citrate (PF) 50 mcg Intravenous Q1H PRN MADAY Dye    glycerin-hypromellose- 1 drop Both Eyes Q3H PRN Tolu Bio Axelband, DO    guaiFENesin 400 mg Oral Q8H MADAY Alatorre    heparin (porcine) 5,000 Units Subcutaneous [facility] Bianca Oneill PA-C    levETIRAcetam 1,000 mg Per G Tube BID Susan Hamm MD    levETIRAcetam 500 mg Per G Tube Q24H Susan Hamm MD    LORazepam 1 mg Intravenous Q4H PRN Jose Mak MD    meropenem 2,000 mg Intravenous Q8H Andrea Telles MD Last Rate: Stopped (06/15/20 0410)   OXcarbazepine 450 mg Per G Tube Q12H [address] Susan Hamm MD    senna-docusate sodium 1 tablet Per G Tube HS Susan Hamm MD      Continuous Infusions:   PRN Meds:    acetaminophen 650 mg Q4H PRN   cyclobenzaprine 5 mg TID PRN   fentanyl citrate (PF) 50 mcg Q1H PRN   glycerin-hypromellose- 1 drop Q3H PRN   LORazepam 1 mg Q4H PRN       VTE Pharmacologic Prophylaxis: Reason for no pharmacologic prophylaxis Anemia  VTE Mechanical Prophylaxis: sequential compression device    Invasive lines and devices:   Invasive Devices     Peripherally Inserted Central Catheter Line            PICC Line 92/45/77 Right Basilic 10 days          Drain            Gastrostomy/Enterostomy -- days    External Urinary Catheter 3 days    Open Drain Anterior;Right Neck 1 day          Airway            Surgical Airway Shiley Cuffed -- days                   Counseling / Coordination of Care  Total Critical Care time spent 45 minutes excluding procedures, teaching and family updates  Code Status: Level 1 - Full Code     Portions of the record may have been created with voice recognition software  Occasional wrong word or "sound a like" substitutions may have occurred due to the inherent limitations of voice recognition software  Read the chart carefully and recognize, using context, where substitutions have occurred       Reed Catalan DO

## 2020-06-15 NOTE — RESPIRATORY THERAPY NOTE
RT Ventilator Management Note  Nikki Ramos 21 y o  female MRN: [id number]  Unit/Bed#: ICU 01 Encounter: [id number]      Daily Screen       6/13/2020  0725 6/14/2020  0800          Patient safety screen outcome[de-identified]  Passed  Passed              Physical Exam:   Assessment Type: (P) Assess only  General Appearance: (P) Awake  Respiratory Pattern: (P) Assisted, Spontaneous  Chest Assessment: (P) Chest expansion symmetrical  Bilateral Breath Sounds: (P) Diminished  O2 Device: vent      Resp Comments: (P) pt placed on PSV settings at this time  Pt required 12 of PS to maintain Vt  No resp distress noted   Will continue to monitor per resp protocol

## 2020-06-15 NOTE — ASSESSMENT & PLAN NOTE
PPD#2 shunt externalization  Pt has a hx of  shunt insertion for congenital hydrocephalus  · P/w SIRS, acute encephalopathy, and seizure like activity  · Pt also had cardiac arrest and was resuscitated while at [facility]  reviewed personally and by attending  Final results as below  · CT head wo contrast 6/15/2020:  Stable ventricular shunt catheter position and degree of ventriculomegaly when comparing to the prior study of June 1, 2020  Redemonstrated bilateral cerebral gliosis and encephalomalacia  Interval development of fluid in the right mastoid air cells and inflammatory mucosal thickening in the right sphenoid sinus with scant sphenoid sinus frothy secretions  Correlate for mastoiditis and sinusitis  I concur with the preliminary interpretation reported by the [facility] radiologist   Additional findings include inflammatory paranasal sinus disease  Plan  · Continue regular neurologic checks   · Ongoing medical management per primary team   · ID and neurology following  · PT/OT  · DVT PPX: SCDs, Heparin  · No emergent neurosurgical intervention anticipated at this time  · Shunt clamped today with plan for repeat CT head wo contrast tomorrow - at that time, will discuss possible removal of shunt if patient does not appear to be dependent on this  Neurosurgery will continue to follow  Please call with questions or concerns

## 2020-06-15 NOTE — PROGRESS NOTES
Dr Cristian Jeong and Saran Davis notified of no drainage from external V-Pshunt,   Dr Cristian Jeong attempted aspiration, unsuccessful  Dr Justin Plaza notified, stat cat of head ordered and done  Continue to monitor closley

## 2020-06-16 ENCOUNTER — APPOINTMENT (INPATIENT)
Dept: RADIOLOGY | Facility: HOSPITAL | Age: 25
DRG: 711 | End: 2020-06-16
Payer: COMMERCIAL

## 2020-06-16 LAB
ANION GAP SERPL CALCULATED.3IONS-SCNC: 5 MMOL/L (ref 4–13)
BACTERIA CSF CULT: NO GROWTH
BASOPHILS # BLD AUTO: 0.01 THOUSANDS/ΜL (ref 0–0.1)
BASOPHILS NFR BLD AUTO: 0 % (ref 0–1)
BUN SERPL-MCNC: 16 MG/DL (ref 5–25)
CALCIUM SERPL-MCNC: 9 MG/DL (ref 8.3–10.1)
CHLORIDE SERPL-SCNC: 105 MMOL/L (ref 100–108)
CO2 SERPL-SCNC: 27 MMOL/L (ref 21–32)
CREAT SERPL-MCNC: 0.36 MG/DL (ref 0.6–1.3)
EOSINOPHIL # BLD AUTO: 0 THOUSAND/ΜL (ref 0–0.61)
EOSINOPHIL NFR BLD AUTO: 0 % (ref 0–6)
ERYTHROCYTE [DISTWIDTH] IN BLOOD BY AUTOMATED COUNT: 17.1 % (ref 11.6–15.1)
GFR SERPL CREATININE-BSD FRML MDRD: 150 ML/MIN/1.73SQ M
GLUCOSE SERPL-MCNC: 125 MG/DL (ref 65–140)
GLUCOSE SERPL-MCNC: 125 MG/DL (ref 65–140)
GLUCOSE SERPL-MCNC: 128 MG/DL (ref 65–140)
GLUCOSE SERPL-MCNC: 130 MG/DL (ref 65–140)
GLUCOSE SERPL-MCNC: 165 MG/DL (ref 65–140)
HCT VFR BLD AUTO: 31.1 % (ref 34.8–46.1)
HGB BLD-MCNC: 9.3 G/DL (ref 11.5–15.4)
IMM GRANULOCYTES # BLD AUTO: 0.04 THOUSAND/UL (ref 0–0.2)
IMM GRANULOCYTES NFR BLD AUTO: 0 % (ref 0–2)
LYMPHOCYTES # BLD AUTO: 0.69 THOUSANDS/ΜL (ref 0.6–4.47)
LYMPHOCYTES NFR BLD AUTO: 7 % (ref 14–44)
MAGNESIUM SERPL-MCNC: 2.8 MG/DL (ref 1.6–2.6)
MCH RBC QN AUTO: 25.2 PG (ref 26.8–34.3)
MCHC RBC AUTO-ENTMCNC: 29.9 G/DL (ref 31.4–37.4)
MCV RBC AUTO: 84 FL (ref 82–98)
MONOCYTES # BLD AUTO: 0.54 THOUSAND/ΜL (ref 0.17–1.22)
MONOCYTES NFR BLD AUTO: 6 % (ref 4–12)
NEUTROPHILS # BLD AUTO: 8.41 THOUSANDS/ΜL (ref 1.85–7.62)
NEUTS SEG NFR BLD AUTO: 87 % (ref 43–75)
NRBC BLD AUTO-RTO: 0 /100 WBCS
PHOSPHATE SERPL-MCNC: 2 MG/DL (ref 2.7–4.5)
PLATELET # BLD AUTO: 422 THOUSANDS/UL (ref 149–390)
PMV BLD AUTO: 9.3 FL (ref 8.9–12.7)
POTASSIUM SERPL-SCNC: 3.7 MMOL/L (ref 3.5–5.3)
RBC # BLD AUTO: 3.69 MILLION/UL (ref 3.81–5.12)
SODIUM SERPL-SCNC: 137 MMOL/L (ref 136–145)
WBC # BLD AUTO: 9.69 THOUSAND/UL (ref 4.31–10.16)

## 2020-06-16 PROCEDURE — 83735 ASSAY OF MAGNESIUM: CPT | Performed by: STUDENT IN AN ORGANIZED HEALTH CARE EDUCATION/TRAINING PROGRAM

## 2020-06-16 PROCEDURE — 84100 ASSAY OF PHOSPHORUS: CPT | Performed by: STUDENT IN AN ORGANIZED HEALTH CARE EDUCATION/TRAINING PROGRAM

## 2020-06-16 PROCEDURE — 80048 BASIC METABOLIC PNL TOTAL CA: CPT | Performed by: STUDENT IN AN ORGANIZED HEALTH CARE EDUCATION/TRAINING PROGRAM

## 2020-06-16 PROCEDURE — 99291 CRITICAL CARE FIRST HOUR: CPT | Performed by: ANESTHESIOLOGY

## 2020-06-16 PROCEDURE — 70450 CT HEAD/BRAIN W/O DYE: CPT

## 2020-06-16 PROCEDURE — 94760 N-INVAS EAR/PLS OXIMETRY 1: CPT

## 2020-06-16 PROCEDURE — 99024 POSTOP FOLLOW-UP VISIT: CPT | Performed by: PHYSICIAN ASSISTANT

## 2020-06-16 PROCEDURE — 94003 VENT MGMT INPAT SUBQ DAY: CPT

## 2020-06-16 PROCEDURE — 82948 REAGENT STRIP/BLOOD GLUCOSE: CPT

## 2020-06-16 PROCEDURE — 85025 COMPLETE CBC W/AUTO DIFF WBC: CPT | Performed by: STUDENT IN AN ORGANIZED HEALTH CARE EDUCATION/TRAINING PROGRAM

## 2020-06-16 PROCEDURE — 94640 AIRWAY INHALATION TREATMENT: CPT

## 2020-06-16 PROCEDURE — 99233 SBSQ HOSP IP/OBS HIGH 50: CPT | Performed by: INTERNAL MEDICINE

## 2020-06-16 RX ADMIN — BUDESONIDE 0.5 MG: 0.5 INHALANT RESPIRATORY (INHALATION) at 07:32

## 2020-06-16 RX ADMIN — LEVETIRACETAM 500 MG: 100 SOLUTION ORAL at 21:07

## 2020-06-16 RX ADMIN — BUDESONIDE 0.5 MG: 0.5 INHALANT RESPIRATORY (INHALATION) at 19:41

## 2020-06-16 RX ADMIN — HEPARIN SODIUM 5000 UNITS: 5000 INJECTION INTRAVENOUS; SUBCUTANEOUS at 21:06

## 2020-06-16 RX ADMIN — OXCARBAZEPINE 450 MG: 300 TABLET, FILM COATED ORAL at 17:39

## 2020-06-16 RX ADMIN — LEVETIRACETAM 1000 MG: 100 SOLUTION ORAL at 05:45

## 2020-06-16 RX ADMIN — OXCARBAZEPINE 450 MG: 300 TABLET, FILM COATED ORAL at 05:45

## 2020-06-16 RX ADMIN — GUAIFENESIN 400 MG: 100 SOLUTION ORAL at 17:39

## 2020-06-16 RX ADMIN — HEPARIN SODIUM 5000 UNITS: 5000 INJECTION INTRAVENOUS; SUBCUTANEOUS at 13:50

## 2020-06-16 RX ADMIN — CHLORHEXIDINE GLUCONATE 0.12% ORAL RINSE 15 ML: 1.2 LIQUID ORAL at 09:27

## 2020-06-16 RX ADMIN — DIAZEPAM 5 MG: 5 TABLET ORAL at 17:39

## 2020-06-16 RX ADMIN — MELATONIN 1000 UNITS: at 09:27

## 2020-06-16 RX ADMIN — MEROPENEM 2000 MG: 1 INJECTION, POWDER, FOR SOLUTION INTRAVENOUS at 18:41

## 2020-06-16 RX ADMIN — SENNOSIDES AND DOCUSATE SODIUM 1 TABLET: 8.6; 5 TABLET ORAL at 21:06

## 2020-06-16 RX ADMIN — Medication 1 TABLET: at 08:07

## 2020-06-16 RX ADMIN — LEVETIRACETAM 1000 MG: 100 SOLUTION ORAL at 13:51

## 2020-06-16 RX ADMIN — POTASSIUM & SODIUM PHOSPHATES POWDER PACK 280-160-250 MG 1 PACKET: 280-160-250 PACK at 09:26

## 2020-06-16 RX ADMIN — DIAZEPAM 5 MG: 5 TABLET ORAL at 02:15

## 2020-06-16 RX ADMIN — DIAZEPAM 5 MG: 5 TABLET ORAL at 10:41

## 2020-06-16 RX ADMIN — DESMOPRESSIN ACETATE 0.05 MG: 0.1 TABLET ORAL at 09:27

## 2020-06-16 RX ADMIN — MEROPENEM 2000 MG: 1 INJECTION, POWDER, FOR SOLUTION INTRAVENOUS at 11:46

## 2020-06-16 RX ADMIN — GUAIFENESIN 400 MG: 100 SOLUTION ORAL at 09:26

## 2020-06-16 RX ADMIN — GUAIFENESIN 400 MG: 100 SOLUTION ORAL at 02:15

## 2020-06-16 RX ADMIN — HEPARIN SODIUM 5000 UNITS: 5000 INJECTION INTRAVENOUS; SUBCUTANEOUS at 05:45

## 2020-06-16 RX ADMIN — CHLORHEXIDINE GLUCONATE 0.12% ORAL RINSE 15 ML: 1.2 LIQUID ORAL at 21:06

## 2020-06-16 RX ADMIN — MEROPENEM 2000 MG: 1 INJECTION, POWDER, FOR SOLUTION INTRAVENOUS at 02:15

## 2020-06-16 NOTE — ASSESSMENT & PLAN NOTE
ID following  · Currently afebrile  · WBC within normal limits  · Continue Meropenem 2g Q8 for total of 14 days, today is day 14  · Daptomycin discontinued  · CSF from shunt tap 6/3 and 6/10 positive for serratia marcescens  · Repeat CSF cultures without growth to date, prelim report  · Will plan for repeat CSF samples and culture tomorrow  · Back wound culture and left iliac tap negative

## 2020-06-16 NOTE — PROGRESS NOTES
Progress Note - Critical Care   Renea Boxer 21 y o  female MRN: [id number]  Unit/Bed#: ICU 01 Encounter: [id number]    Assessment: 21year old female with PMH significant for cerebral palsy with spastic quadriplegia, bedbound and non-verbal at baseline, asthma, GERD, kidney stones, seizure disorder, pancreatitis, DI  Hx of hydrocephalus with indwelling  shunt and chronic respiratory failure with tracheostomy  Presented to [facility] with fever, AMS, and respiratory failure  Placed on mechanical ventilation at that time  Loaded with AEDs  Couldn't obtain CSF sample to r/o infection  Transferred from [facility] 6/3  Fluid from  shunt grew Serratia, currently on Meropenem per ID   shunt externalization on 6/13      Plan:     Neuro: Diagnoses: AMS/acute 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; seizure disorder; Cerebral palsy with quadriplegia  - s/p removal of  shunt 6/13  - Meropenem day 1, ID following  - repeat CSF cultures 6/10 show few colonies of Serratia  - repeat CSF cultures 6/13 negative for growth  - Further Abx deescalation per ID  - CAM-ICU, delirium precautions  - Continue Keppra and Trileptal  - Continue Flexeril and Valium     CV: Hypotension  - softer pressures during hospitalization, but stable this AM  - no fluid bolus in last 24 hours  - monitor closely     Lung:  Chronic respiratory failure status post trach  - Continue trach collar oxygenation  - Currently PSV 10/5 at 30%  - Continue pulmicort  - Wean vent as able, has been difficult so far     GI: No acute issues     FEN:   Fluids: none  Lytes: Stable, replete as required  Nutrition: TF, cyclic at 50 cc per hour for 16 hours per day     : No acute issues, no Her in place     ID: Serratia CSF infection  - Meropenem Day 14  - most recent repeat culture shows no growth  - ID following, recs appreciated  - retroperitoneal fluid collection drained by IR 6/5     Heme: Anemia  - baseline Hgb around 11  - Transfuse as needed to maintain Hbg > 7 0  - Hbg 9 3 this AM     Endo: History of DI  - DDAVP qDaily                Msk/Skin:  Local wound care  LDA:  - R PICC   - G tube  - external drain   - Shiley cuffed     Disposition: Continue critical care    Chief Complaint: Fever, AMS, and resp failure    HPI/24hr events: EVD clamped yesterday  CTH this morning showed no change in ventriculomegaly from last scan  Physical Exam:     Vitals:    20 0514 20 0556 20 0600 20 0602   BP:    147/79   BP Location:       Pulse:   (!) 110 (!) 110   Resp:   22 20   Temp:       TempSrc:       SpO2: 99%  100% 100%   Weight:  61 2 kg (134 lb 14 7 oz)     Height:         Physical Exam   Constitutional: She appears well-developed and well-nourished  No distress  HENT:   Head: Normocephalic  Clamped drain in place   Eyes: Pupils are equal, round, and reactive to light  Conjunctivae are normal  No scleral icterus  Neck: Neck supple  No JVD present  Cardiovascular: Normal rate, regular rhythm, normal heart sounds and intact distal pulses  Exam reveals no gallop and no friction rub  No murmur heard  Pulmonary/Chest: Effort normal    Vent associated breath sounds  Abdominal: Soft  Bowel sounds are normal  She exhibits no distension and no mass  There is no tenderness  There is no rebound and no guarding  Musculoskeletal: She exhibits no edema, tenderness or deformity  Lymphadenopathy:     She has no cervical adenopathy  Neurological:   Neuro exam limited secondary cerebral palsy  Patient nonverbal  Eyes do not track  Withdraws to pain in all 4 extremities  Skin: Skin is warm and dry  Capillary refill takes less than 2 seconds  She is not diaphoretic  Nursing note and vitals reviewed  Temperature:   Temp (24hrs), Av 9 °F (37 2 °C), Min:97 6 °F (36 4 °C), Max:100 °F (37 8 °C)    Current: Temperature: 99 °F (37 2 °C)    Weights:   IBW: 22 5 kg    Body mass index is 37 94 kg/m²    Weight (last 2 days)     Date/Time   Weight    06/16/20 0556   61 2 (134 92)              Hemodynamic Monitoring:  N/A     Non-Invasive/Invasive Ventilation Settings:  Respiratory    Lab Data (Last 4 hours)    None         O2/Vent Data (Last 4 hours)      06/16 0514           Vent Mode CPAP/PS Spont       FIO2 (%) (%) 30       PEEP (cmH2O) (cmH2O) 5       Pressure Support (cmH2O) (cmH20) 10       MV (Obs) 6       RSBI 114                 No results found for: PHART, FFX2HIF, PO2ART, AYI8LEB, R9MLPTOZ, BEART, SOURCE      Intake and Outputs:  I/O       06/14 0701 - 06/15 0700 06/15 0701 - 06/16 0700    P  O  0     I V  (mL/kg) 190 (3 3) 20 (0 3)    NG/ 280    IV Piggyback 300 360    Feedings 850 682    Total Intake(mL/kg) 1620 (28 3) 1342 (21 9)    Urine (mL/kg/hr) 2400 (1 7) 1150 (0 8)    Emesis/NG output 0 0    Drains 48 0    Stool  0    Total Output 2448 1150    Net -828 +192          Unmeasured Urine Occurrence 2 x 1 x    Unmeasured Stool Occurrence  1 x        UOP: +1 3, -1 15 L over 24 hours   Nutrition:        Diet Orders   (From admission, onward)             Start     Ordered    06/13/20 1402  Diet Enteral/Parenteral; Tube Feeding No Oral Diet; Non-Formulary; Cyclic; 50; 16 hours  Diet effective now     Comments:  Use peptamen 1 2 from home   Question Answer Comment   Diet Type Enteral/Parenteral    Enteral/Parenteral Tube Feeding No Oral Diet    Tube Feeding Formula: Non-Formulary    Bolus/Cyclic/Continuous Cyclic    Tube Feeding Cyclic Rate (mL/hr): 50    Tube Feeding Cyclic: Administer over: 16 hours    RD to adjust diet per protocol? Yes        06/13/20 1403              TF currently running at 50/hour with a goal of 50cc per hour over 16 hours per day   Formula: non-formulary    Labs:   Results from last 7 days   Lab Units 06/16/20  0428 06/15/20  0510 06/14/20  0452 06/13/20  0518   WBC Thousand/uL 9 69 4 89 4 41 5 16   HEMOGLOBIN g/dL 9 3* 9 0* 7 6* 8 7*   HEMATOCRIT % 31 1* 29 4* 24 7* 27 9*   PLATELETS Thousands/uL 422* 281 286 329   NEUTROS PCT % 87* 65  --  64   MONOS PCT % 6 7  --  7    Results from last 7 days   Lab Units 06/16/20  0428 06/15/20  0510 06/14/20  0452  06/11/20  0433 06/10/20  0444   SODIUM mmol/L 137 140 140   < > 140 137   POTASSIUM mmol/L 3 7 4 1 4 0   < > 4 0 4 0   CHLORIDE mmol/L 105 109* 106   < > 106 103   CO2 mmol/L 27 29 28   < > 29 30   BUN mg/dL 16 15 13   < > 14 12   CREATININE mg/dL 0 36* 0 37* 0 26*   < > 0 41* 0 30*   CALCIUM mg/dL 9 0 9 0 8 1*   < > 9 1 8 7   ALK PHOS U/L  --   --   --   --  162* 128*   ALT U/L  --   --   --   --  84* 108*   AST U/L  --   --   --   --  27 21    < > = values in this interval not displayed  Results from last 7 days   Lab Units 06/16/20  0428 06/15/20  0510 06/14/20  0452   MAGNESIUM mg/dL 2 8* 2 7* 2 8*     Results from last 7 days   Lab Units 06/16/20  0428 06/15/20  0510 06/14/20  0452   PHOSPHORUS mg/dL 2 0* 2 4* 3 0              No results found for: TROPONINI    Imaging:  I have personally reviewed pertinent reports  EKG: Reviewed    Micro:  Lab Results   Component Value Date    WOUNDCULT No growth 06/05/2020       Allergies: Allergies   Allergen Reactions    Baclofen Other (See Comments)     Reaction Date: [id number];   apena      Cefepime Hives    Cephalosporins      Other reaction(s): Unknown Reaction  Other reaction(s): Unknown Reaction  Other reaction(s): Unknown Reaction      Clonidine      Other reaction(s): Respiratory Distress, Respiratory Distress    Lactase     Milk-Related Compounds     Penicillins Hives     Reaction Date: [id number];       Phenobarbital Other (See Comments)     Reaction Date: [id number]; Other reaction(s): Other (See Comments)  apnea  Other reaction(s): Other (See Comments)  apnea  apnea      Vancomycin     Zolpidem Other (See Comments)     Other reaction(s):  Other (see comments)  Reaction Date: [id number];   apnea  apnea  apnea      Latex Rash       Medications:   Scheduled Meds:  Current Facility-Administered Medications:  acetaminophen 650 mg Per G Tube Q4H PRN Bg Campos DO    bisacodyl 10 mg Rectal Daily MADAY Massey    budesonide 0 5 mg Nebulization BID Kyle Jennings DO    calcium carbonate-vitamin D 1 tablet Oral Daily With Breakfast MADAY Massey    chlorhexidine 15 mL Swish & Spit Q12H [facility] Wesley Rojas PA-C    cholecalciferol 1,000 Units Oral Daily EuMADAY Syed    cyclobenzaprine 5 mg Per G Tube TID PRN Manny Moritz, MD    desmopressin 0 05 mg Per G Tube Daily Gena Hollins PA-C    diazepam 5 mg Per G Tube Q8H Manny Moritz, MD    fentanyl citrate (PF) 50 mcg Intravenous Q1H PRN MADAY Almodovar    glycerin-hypromellose- 1 drop Both Eyes Q3H PRN [place] Jennings DO    guaiFENesin 400 mg Oral Q8H MADAY Alatorre    heparin (porcine) 5,000 Units Subcutaneous formerly [facility] Lydia Murguia PA-C    levETIRAcetam 1,000 mg Per G Tube BID Manny Moritz, MD    levETIRAcetam 500 mg Per G Tube Q24H Manny Moritz, MD    LORazepam 1 mg Intravenous Q4H PRN Javier Cole MD    meropenem 2,000 mg Intravenous Q8H Erlinda Hawkins MD Last Rate: Stopped (06/16/20 0315)   OXcarbazepine 450 mg Per G Tube Q12H [facility] Manny Moritz, MD    senna-docusate sodium 1 tablet Per G Tube HS Manny Moritz, MD      Continuous Infusions:   PRN Meds:    acetaminophen 650 mg Q4H PRN   cyclobenzaprine 5 mg TID PRN   fentanyl citrate (PF) 50 mcg Q1H PRN   glycerin-hypromellose- 1 drop Q3H PRN   LORazepam 1 mg Q4H PRN       VTE Pharmacologic Prophylaxis: Reason for no pharmacologic prophylaxis Anemic  VTE Mechanical Prophylaxis: sequential compression device    Invasive lines and devices:   Invasive Devices     Peripherally Inserted Central Catheter Line            PICC Line 99/07/23 Right Basilic 11 days          Drain            Gastrostomy/Enterostomy -- days    Open Drain Anterior;Right Neck 2 days    External Urinary Catheter less than 1 day Airway            Surgical Airway Shiley Cuffed -- days                   Counseling / Coordination of Care  Total Critical Care time spent 35 minutes excluding procedures, teaching and family updates  Code Status: Level 1 - Full Code     Portions of the record may have been created with voice recognition software  Occasional wrong word or "sound a like" substitutions may have occurred due to the inherent limitations of voice recognition software  Read the chart carefully and recognize, using context, where substitutions have occurred       Raoul Lovell,

## 2020-06-16 NOTE — PROGRESS NOTES
Progress Note - Infectious Disease   Kylah Haywood Salts 21 y o  female MRN: [id number]  Unit/Bed#: ICU 01 Encounter: [id number]      Impression/Recommendations:  1  Sepsis POA   Fever, tachycardia, tachypnea  Likely due to #2   Consider also aspiration given reported respiratory distress upon presentation to [facility] other clear source appreciated   Blood cultures at [facility] negative   Improving  Rec:  ? Continue antibiotics as below  ? Follow temperatures closely     2   Serratia  shunt infection   Consider translocation from GI tract although no reported intra-abdominal fluid collection   Repeat CSF cultures positive for Serratia despite 10 days IV antibiotics represents antibiotic failure  Now status post VPS externalization with retained central portion of catheter  OR CSF culture negative  Rec:  ? Continue meropenem for 7 days postop  ? Continue clamp trial per Neurosurgery  Check repeat CT in a [facility] ? If tolerates clamping trial possible further removal of VPS per neurosurgery  ? May be at risk for recurrent infection if central portion of shunt retained     3  Acute on chronic hypoxic respiratory failure   No overt pneumonia on chest imaging from [place]   Consider role of aspiration versus encephalopathy   Patient on minimal vent requirements  Rec:  ? Antibiotics above would treat any concomitant pneumonia  ? Follow respiratory status closely  ? Supportive care as per the primary service     4  Seizure disorder   Initial concern for seizures at [facility] but vEEG here negative for seizure activity   Rhythmic blinking does not correlate to EEG findings per Neurology  Rec:  ? Continue antiepileptics per Neurology with close follow-up ongoing     5   Retroperitoneal collection   Reportedly chronic, seen on imaging done at [place] [address] significance   Status post aspiration of the collection with negative cultures  Rec:  ?  No additional workup or treatment from an ID perspective     6   CP   With spastic quadriplegia, profound MR, hydrocephalus status post  shunt     7   Chronic respiratory failure   Status post trach      8   Recent VFib arrest   Consider anoxic encephalopathy      The above plan was discussed in detail with the neurosurgery team      Antibiotics:  Meropenem #15  POD #3    Subjective:  Patient seen on AM rounds  Unable to provide review systems due to nonverbal state  24 Hour Events:  Conor to clamped yesterday and repeat CT head shows stable ventriculomegaly  No events noted  No documented fevers, chills, sweats, nausea, vomiting, or diarrhea  Objective:  Vitals:  Temp:  [98 2 °F (36 8 °C)-100 °F (37 8 °C)] 98 4 °F (36 9 °C)  HR:  [] 96  Resp:  [14-40] 23  BP: (109-147)/(65-88) 124/81  SpO2:  [91 %-100 %] 97 %  Temp (24hrs), Av °F (37 2 °C), Min:98 2 °F (36 8 °C), Max:100 °F (37 8 °C)  Current: Temperature: 98 4 °F (36 9 °C)    Physical Exam:   General:  No acute distress  Eyes:  Normal lids, eyes closed  ENT:  Normal external ears and nose  Neck:  Neck symmetric with midline trachea  Pulmonary:  Normal respiratory effort without accessory muscle use  Cardiovascular:  Regular rate and rhythm; no peripheral edema  Gastrointestinal:  No tenderness or distention  Musculoskeletal:  No digital clubbing or cyanosis  Skin:  No visible rashes; No palpable nodules  Neurologic:  Positive gag reflex with manipulation of tracheostomy, CSF in collection bag clear  Psychiatric:  Responsive to painful stimulus, otherwise lethargic    Lab Results:  I have personally reviewed pertinent labs    Results from last 7 days   Lab Units 20  0428 06/15/20  0510 20  0452  20  0433 06/10/20  0444   POTASSIUM mmol/L 3 7 4 1 4 0   < > 4 0 4 0   CHLORIDE mmol/L 105 109* 106   < > 106 103   CO2 mmol/L 27 29 28   < > 29 30   BUN mg/dL 16 15 13   < > 14 12   CREATININE mg/dL 0 36* 0 37* 0 26*   < > 0 41* 0 30*   EGFR ml/min/1 73sq m 150 149 167   < > 144 160   CALCIUM mg/dL 9 0 9 0 8 1*   < > 9 1 8 7   AST U/L  --   --   --   --  27 21   ALT U/L  --   --   --   --  84* 108*   ALK PHOS U/L  --   --   --   --  162* 128*    < > = values in this interval not displayed  Results from last 7 days   Lab Units 06/16/20  0428 06/15/20  0510 06/14/20  0452   WBC Thousand/uL 9 69 4 89 4 41   HEMOGLOBIN g/dL 9 3* 9 0* 7 6*   PLATELETS Thousands/uL 422* 281 286     Results from last 7 days   Lab Units 06/13/20  1946 06/10/20  1331   GRAM STAIN RESULT  No bacteria seen  No polys seen No Polys or Bacteria seen       Imaging Studies:   I have personally reviewed pertinent imaging study reports and images in PACS  CT head reviewed personally stable ventriculomegaly    EKG, Pathology, and Other Studies:   I have personally reviewed pertinent reports

## 2020-06-16 NOTE — PLAN OF CARE
Problem: PAIN - ADULT  Goal: Verbalizes/displays adequate comfort level or baseline comfort level  Description  Interventions:  - Encourage patient to monitor pain and request assistance  - Assess pain using appropriate pain scale  - Administer analgesics based on type and severity of pain and evaluate response  - Implement non-pharmacological measures as appropriate and evaluate response  - Consider cultural and social influences on pain and pain management  - Notify physician/advanced practitioner if interventions unsuccessful or patient reports new pain  Outcome: Progressing     Problem: INFECTION - ADULT  Goal: Absence or prevention of progression during hospitalization  Description  INTERVENTIONS:  - Assess and monitor for signs and symptoms of infection  - Monitor lab/diagnostic results  - Monitor all insertion sites, i e  indwelling lines, tubes, and drains  - Monitor endotracheal if appropriate and nasal secretions for changes in amount and color  - [place] appropriate cooling/warming therapies per order  - Administer medications as ordered  - Instruct and encourage patient and family to use good hand hygiene technique  - Identify and instruct in appropriate isolation precautions for identified infection/condition  Outcome: Progressing     Problem: SAFETY ADULT  Goal: Maintain or return mobility status to optimal level  Description  INTERVENTIONS:  - Assess patient's baseline mobility status (ambulation, transfers, stairs, etc )    - Identify cognitive and physical deficits and behaviors that affect mobility  - Identify mobility aids required to assist with transfers and/or ambulation (gait belt, sit-to-stand, lift, walker, cane, etc )  - [place] fall precautions as indicated by assessment  - Record patient progress and toleration of activity level on Mobility SBAR; progress patient to next Phase/Stage  - Instruct patient to call for assistance with activity based on assessment  - Consider rehabilitation consult to assist with strengthening/weightbearing, etc   Outcome: Progressing     Problem: DISCHARGE PLANNING  Goal: Discharge to home or other facility with appropriate resources  Description  INTERVENTIONS:  - Identify barriers to discharge w/patient and caregiver  - Arrange for needed discharge resources and transportation as appropriate  - Identify discharge learning needs (meds, wound care, etc )  - Arrange for interpretive services to assist at discharge as needed  - Refer to Case Management Department for coordinating discharge planning if the patient needs post-hospital services based on physician/advanced practitioner order or complex needs related to functional status, cognitive ability, or social support system  Outcome: Progressing     Problem: Knowledge Deficit  Goal: Patient/family/caregiver demonstrates understanding of disease process, treatment plan, medications, and discharge instructions  Description  Complete learning assessment and assess knowledge base    Interventions:  - Provide teaching at level of understanding  - Provide teaching via preferred learning methods  Outcome: Progressing     Problem: Prexisting or High Potential for Compromised Skin Integrity  Goal: Skin integrity is maintained or improved  Description  INTERVENTIONS:  - Identify patients at risk for skin breakdown  - Assess and monitor skin integrity  - Assess and monitor nutrition and hydration status  - Monitor labs   - Assess for incontinence   - Turn and reposition patient  - Assist with mobility/ambulation  - Relieve pressure over bony prominences  - Avoid friction and shearing  - Provide appropriate hygiene as needed including keeping skin clean and dry  - Evaluate need for skin moisturizer/barrier cream  - Collaborate with interdisciplinary team   - Patient/family teaching  - Consider wound care consult   Outcome: Progressing     Problem: Potential for Falls  Goal: Patient will remain free of falls  Description  INTERVENTIONS:  - Assess patient frequently for physical needs  -  Identify cognitive and physical deficits and behaviors that affect risk of falls  -  [place] fall precautions as indicated by assessment   - Educate patient/family on patient safety including physical limitations  - Instruct patient to call for assistance with activity based on assessment  - Modify environment to reduce risk of injury  - Consider OT/PT consult to assist with strengthening/mobility  Outcome: Progressing     Problem: Nutrition/Hydration-ADULT  Goal: Nutrient/Hydration intake appropriate for improving, restoring or maintaining nutritional needs  Description  Monitor and assess patient's nutrition/hydration status for malnutrition  Collaborate with interdisciplinary team and initiate plan and interventions as ordered  Monitor patient's weight and dietary intake as ordered or per policy  Utilize nutrition screening tool and intervene as necessary  Determine patient's food preferences and provide high-protein, high-caloric foods as appropriate       INTERVENTIONS:  - Monitor oral intake, urinary output, labs, and treatment plans  - Assess nutrition and hydration status and recommend course of action  - Evaluate amount of meals eaten  - Assist patient with eating if necessary   - Allow adequate time for meals  - Recommend/ encourage appropriate diets, oral nutritional supplements, and vitamin/mineral supplements  - Order, calculate, and assess calorie counts as needed  - Recommend, monitor, and adjust tube feedings and TPN/PPN based on assessed needs  - Assess need for intravenous fluids  - Provide specific nutrition/hydration education as appropriate  - Include patient/family/caregiver in decisions related to nutrition  Outcome: Progressing

## 2020-06-16 NOTE — ASSESSMENT & PLAN NOTE
· Neurology following, AED per their recommendations  · Completed vEEG 6/8 with no noted seizure activity

## 2020-06-16 NOTE — ASSESSMENT & PLAN NOTE
PPD#3 shunt externalization  Pt has a hx of  shunt insertion for congenital hydrocephalus  · P/w SIRS, acute encephalopathy, and seizure like activity  · Pt also had cardiac arrest and was resuscitated while at [facility]  66  reviewed personally and by attending  Final results as below  · CT head wo contrast 6/16/2020:  Degree of ventriculomegaly when comparing to 6/14/2020 appears roughly unchanged  Ventricular shunt catheter is also unchanged in position  Otherwise no significant changes from 6/14/2020  Plan  · Keep shunt clamped at this time  Will collect CSF samples tomorrow  Repeat CT head wo contrast tomorrow morning  Ongoing discussion regarding removal of shunt  · Continue regular neurologic checksd  · Ongoing medical management per primary team   · ID and neurology following  · PT/OT  · DVT PPX: SCDs, Heparin    Neurosurgery will continue to follow  Please call with questions or concerns

## 2020-06-16 NOTE — PROGRESS NOTES
Progress Note - Critical Care   Xavi Other 21 y o  female MRN: [id number]  Unit/Bed#: ICU 01 Encounter: [id number]    Assessment: 21year old female with PMH significant for cerebral palsy with spastic quadriplegia, bedbound and non-verbal at baseline, asthma, GERD, kidney stones, seizure disorder, pancreatitis, DI  Hx of hydrocephalus with indwelling  shunt and chronic respiratory failure with tracheostomy  Presented to [facility] with fever, AMS, and respiratory failure  Placed on mechanical ventilation at that time  Loaded with AEDs  Couldn't obtain CSF sample to r/o infection  Transferred from [facility] 6/3  Fluid from  shunt grew Serratia, currently on Meropenem per ID   shunt externalization on 6/13      Plan:     Neuro: Diagnoses: AMS/acute 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; seizure disorder; Cerebral palsy with quadriplegia  - s/p removal of  shunt 6/13  - Meropenem day 15, ID following  - repeat CSF cultures 6/10 show few colonies of Serratia  - repeat CSF cultures 6/13 negative for growth  - Further Abx deescalation per ID  - CAM-ICU, delirium precautions  - Continue Keppra and Trileptal  - Continue Flexeril and Valium     CV: Hypotension  - softer pressures during hospitalization, but stable this AM  - no fluid bolus in last 24 hours   - monitor closely     Lung: Chronic respiratory failure status post trach  - Continue trach collar oxygenation  - Currently ACVC 15/250/5 PEEP/30 %  - Continue pulmicort  - Wean vent as able, has been difficult so far     GI: No acute issues     FEN:   Fluids: none  Lytes: Stable, replete as required  Nutrition: TF, cyclic at 50 cc per hour for 16 hours per day     : No acute issues, no Her in place     ID: Serratia CSF infection  - Meropenem Day 15  - most recent repeat culture shows no growth  - ID following, recs appreciated  - retroperitoneal fluid collection drained by IR 6/5     Heme: Anemia  - baseline Hgb around 11  - Transfuse as needed to maintain Hbg > 7 0  - Hbg 8 6 this AM     Endo: History of DI  - DDAVP qDaily                Msk/Skin:  Local wound care  LDA:  - R PICC 6/4  - G tube  - external drain 6/13  - Shiley cuffed      Disposition: Continue critical care    Chief Complaint: Fever, AMS, and resp failure    HPI/24hr events: No overnight events  CTH this morning stable from previous exams  Physical Exam:     Vitals:    06/16/20 1205 06/16/20 1400 06/16/20 1615 06/16/20 1656   BP: 146/89 122/75 128/89    BP Location: Left arm Left arm Left arm    Pulse: 98 80 100    Resp: 14 (!) 25 (!) 42    Temp:   97 5 °F (36 4 °C)    TempSrc:   Oral    SpO2: 99% 97% 97% 99%   Weight:       Height:         Physical Exam   Constitutional: She appears well-developed and well-nourished  No distress  HENT:   Head: Normocephalic  Drain in place, clamped  Eyes: Pupils are equal, round, and reactive to light  Conjunctivae are normal  No scleral icterus  Neck: Neck supple  No JVD present  Cardiovascular: Normal rate, regular rhythm, normal heart sounds and intact distal pulses  Exam reveals no gallop and no friction rub  No murmur heard  Pulmonary/Chest: Effort normal and breath sounds normal  No stridor  No respiratory distress  She has no wheezes  She has no rales  Ventilator associated breath sounds  Abdominal: Soft  Bowel sounds are normal  She exhibits no distension and no mass  There is no tenderness  There is no rebound and no guarding  Musculoskeletal: She exhibits no edema, tenderness or deformity  Lymphadenopathy:     She has no cervical adenopathy  Neurological:   Neuro exam limited secondary cerebral palsy  Patient nonverbal at baseline  Eyes do not track  Withdraws to pain in all 4 extremities  Stable from previous exams  Skin: Skin is warm and dry  Capillary refill takes less than 2 seconds  No rash noted  She is not diaphoretic  No erythema  No pallor  Nursing note and vitals reviewed  Temperature:   Temp (24hrs), Av 6 °F (37 °C), Min:97 5 °F (36 4 °C), Max:100 °F (37 8 °C)    Current: Temperature: 97 5 °F (36 4 °C)    Weights:   IBW: 22 5 kg    Body mass index is 37 94 kg/m²  Weight (last 2 days)     Date/Time   Weight    20 0556   61 2 (134 92)              Hemodynamic Monitoring:  N/A     Non-Invasive/Invasive Ventilation Settings:  Respiratory    Lab Data (Last 4 hours)    None         O2/Vent Data (Last 4 hours)       1656           Vent Mode CPAP/PS Spont       FIO2 (%) (%) 30       PEEP (cmH2O) (cmH2O) 5       Pressure Support (cmH2O) (cmH20) 10       MV (Obs) 7 83       RSBI 123                 No results found for: PHART, OTN2PRE, PO2ART, AGV1VUA, Q8FIWWLN, BEART, SOURCE      Intake and Outputs:  I/O        07 - 06/15 0700 06/15 0701 -  0700 701 -  0700    P  O  0      I V  (mL/kg) 190 (3 3) 20 (0 3) 30 (0 5)    NG/ 280 110    IV Piggyback 300 360 100    Feedings 850 682 250    Total Intake(mL/kg) 1620 (28 3) 1342 (21 9) 490 (8)    Urine (mL/kg/hr) 2400 (1 7) 1150 (0 8)     Emesis/NG output 0 0     Drains 48 0     Stool  0     Total Output 2448 1150     Net -828 +192 +490           Unmeasured Urine Occurrence 2 x 1 x 1 x    Unmeasured Stool Occurrence  1 x         UOP: +1 553, -1 207 over last 24 hours  Nutrition:        Diet Orders   (From admission, onward)             Start     Ordered    20 1402  Diet Enteral/Parenteral; Tube Feeding No Oral Diet; Non-Formulary; Cyclic; 50; 16 hours  Diet effective now     Comments:  Use peptamen 1 2 from home   Question Answer Comment   Diet Type Enteral/Parenteral    Enteral/Parenteral Tube Feeding No Oral Diet    Tube Feeding Formula: Non-Formulary    Bolus/Cyclic/Continuous Cyclic    Tube Feeding Cyclic Rate (mL/hr): 50    Tube Feeding Cyclic: Administer over: 16 hours    RD to adjust diet per protocol?  Yes        20 1403              TF currently running at 50 cc/hour with a goal of 50 cc per hour for 16 hours daily  Formula: non-formulary  Labs:   Results from last 7 days   Lab Units 06/16/20  0428 06/15/20  0510 06/14/20  0452 06/13/20  0518   WBC Thousand/uL 9 69 4 89 4 41 5 16   HEMOGLOBIN g/dL 9 3* 9 0* 7 6* 8 7*   HEMATOCRIT % 31 1* 29 4* 24 7* 27 9*   PLATELETS Thousands/uL 422* 281 286 329   NEUTROS PCT % 87* 65  --  64   MONOS PCT % 6 7  --  7    Results from last 7 days   Lab Units 06/16/20  0428 06/15/20  0510 06/14/20  0452  06/11/20  0433 06/10/20  0444   SODIUM mmol/L 137 140 140   < > 140 137   POTASSIUM mmol/L 3 7 4 1 4 0   < > 4 0 4 0   CHLORIDE mmol/L 105 109* 106   < > 106 103   CO2 mmol/L 27 29 28   < > 29 30   BUN mg/dL 16 15 13   < > 14 12   CREATININE mg/dL 0 36* 0 37* 0 26*   < > 0 41* 0 30*   CALCIUM mg/dL 9 0 9 0 8 1*   < > 9 1 8 7   ALK PHOS U/L  --   --   --   --  162* 128*   ALT U/L  --   --   --   --  84* 108*   AST U/L  --   --   --   --  27 21    < > = values in this interval not displayed  Results from last 7 days   Lab Units 06/16/20 0428 06/15/20  0510 06/14/20  0452   MAGNESIUM mg/dL 2 8* 2 7* 2 8*     Results from last 7 days   Lab Units 06/16/20  0428 06/15/20  0510 06/14/20  0452   PHOSPHORUS mg/dL 2 0* 2 4* 3 0              No results found for: TROPONINI    Imaging:  I have personally reviewed pertinent reports  EKG: Reviewed    Micro:  Lab Results   Component Value Date    WOUNDCULT No growth 06/05/2020       Allergies: Allergies   Allergen Reactions    Baclofen Other (See Comments)     Reaction Date: [id number];   apena      Cefepime Hives    Cephalosporins      Other reaction(s): Unknown Reaction  Other reaction(s): Unknown Reaction  Other reaction(s): Unknown Reaction      Clonidine      Other reaction(s): Respiratory Distress, Respiratory Distress    Lactase     Milk-Related Compounds     Penicillins Hives     Reaction Date: [id number];       Phenobarbital Other (See Comments)     Reaction Date: [id number];    Other reaction(s): Other (See Comments)  apnea  Other reaction(s): Other (See Comments)  apnea  apnea      Vancomycin     Zolpidem Other (See Comments)     Other reaction(s):  Other (see comments)  Reaction Date: [id number];   apnea  apnea  apnea      Latex Rash       Medications:   Scheduled Meds:  Current Facility-Administered Medications:  acetaminophen 650 mg Per G Tube Q4H PRN Main Campos DO    bisacodyl 10 mg Rectal Daily MADAY Main    budesonide 0 5 mg Nebulization BID Altamease Lily Axelband, DO    calcium carbonate-vitamin D 1 tablet Oral Daily With Breakfast MADAY Main    chlorhexidine 15 mL Swish & Spit Q12H 2150 Napoleon Hawkins PA-C    cholecalciferol 1,000 Units Oral Daily MADAY Main    cyclobenzaprine 5 mg Per G Tube TID PRN Jossy Brown MD    desmopressin 0 05 mg Per G Tube Daily Calvin Guallpa PA-C    diazepam 5 mg Per G Tube Q8H Jossy Brown MD    fentanyl citrate (PF) 50 mcg Intravenous Q1H PRN MADAY Almodovar    glycerin-hypromellose- 1 drop Both Eyes Q3H PRN Altamease Lily Axelband, DO    guaiFENesin 400 mg Oral Q8H MADAY Alatorre    heparin (porcine) 5,000 Units Subcutaneous [facility] Catrina Granado PA-C    levETIRAcetam 1,000 mg Per G Tube BID Jossy Brown MD    levETIRAcetam 500 mg Per G Tube Q24H Jossy Brown MD    LORazepam 1 mg Intravenous Q4H PRN Jonn Ramires MD    meropenem 2,000 mg Intravenous Q8H Herbie Vedruzco MD Last Rate: Stopped (06/16/20 1319)   OXcarbazepine 450 mg Per G Tube Q12H Radha Leonard MD    senna-docusate sodium 1 tablet Per G Tube HS Jossy Brown MD      Continuous Infusions:   PRN Meds:    acetaminophen 650 mg Q4H PRN   cyclobenzaprine 5 mg TID PRN   fentanyl citrate (PF) 50 mcg Q1H PRN   glycerin-hypromellose- 1 drop Q3H PRN   LORazepam 1 mg Q4H PRN       VTE Pharmacologic Prophylaxis: Reason for no pharmacologic prophylaxis Anemia  VTE Mechanical Prophylaxis: sequential compression device    Invasive lines and devices: Invasive Devices     Peripherally Inserted Central Catheter Line            PICC Line 75/15/34 Right Basilic 12 days          Drain            Gastrostomy/Enterostomy -- days    Open Drain Anterior;Right Neck 3 days    External Urinary Catheter less than 1 day          Airway            Surgical Airway Shiley Cuffed -- days                   Counseling / Coordination of Care  Total Critical Care time spent 30 minutes excluding procedures, teaching and family updates  Code Status: Level 1 - Full Code     Portions of the record may have been created with voice recognition software  Occasional wrong word or "sound a like" substitutions may have occurred due to the inherent limitations of voice recognition software  Read the chart carefully and recognize, using context, where substitutions have occurred       Zara Garcia,

## 2020-06-16 NOTE — PROGRESS NOTES
Progress Note - Leonie Pedersen 1995, 21 y o  female MRN: [id number]    Unit/Bed#: ICU 01 Encounter: [id number]    Primary Care Provider: Dg Bunn MD   Date and time admitted to hospital: 6/3/2020  2:15 PM        S/P  shunt  Assessment & Plan  PPD#3 shunt externalization  Pt has a hx of  shunt insertion for congenital hydrocephalus  · P/w SIRS, acute encephalopathy, and seizure like activity  · Pt also had cardiac arrest and was resuscitated while at [facility] [address]  reviewed personally and by attending  Final results as below  · CT head wo contrast 6/16/2020:  Degree of ventriculomegaly when comparing to 6/14/2020 appears roughly unchanged  Ventricular shunt catheter is also unchanged in position  Otherwise no significant changes from 6/14/2020  Plan  · Keep shunt clamped at this time  Will collect CSF samples tomorrow  Repeat CT head wo contrast tomorrow morning  Ongoing discussion regarding removal of shunt  · Continue regular neurologic checksd  · Ongoing medical management per primary team   · ID and neurology following  · PT/OT  · DVT PPX: SCDs, Heparin    Neurosurgery will continue to follow  Please call with questions or concerns  Severe sepsis with acute organ dysfunction due to Serratia species [facility])  Assessment & Plan  ID following  · Currently afebrile  · WBC within normal limits  · Continue Meropenem 2g Q8 for total of 14 days, today is day 14  · Daptomycin discontinued  · CSF from shunt tap 6/3 and 6/10 positive for serratia marcescens  · Repeat CSF cultures without growth to date, prelim report  · Will plan for repeat CSF samples and culture tomorrow  · Back wound culture and left iliac tap negative           Cerebral palsy ([facility] [address] )  Assessment & Plan  · Continue medical management    Acute metabolic encephalopathy  Assessment & Plan  · Ongoing monitoring and management per primary team    Abnormal LFTs  Assessment & Plan  · Management per primary team     * Seizure-like activity [facility])  Assessment & Plan  · Neurology following, AED per their recommendations  · Completed vEEG 6/8 with no noted seizure activity  Subjective/Objective   Chief Complaint: follow up externalized shunt    Subjective:  No overnight events noted  Objective:  Laying in bed, trach in place, NAD    I/O       06/14 0701 - 06/15 0700 06/15 0701 - 06/16 0700 06/16 0701 - 06/17 0700    P  O  0      I V  (mL/kg) 190 (3 3) 20 (0 3)     NG/ 280 40    IV Piggyback 300 360     Feedings 850 682     Total Intake(mL/kg) 1620 (28 3) 1342 (21 9) 40 (0 7)    Urine (mL/kg/hr) 2400 (1 7) 1150 (0 8)     Emesis/NG output 0 0     Drains 48 0     Stool  0     Total Output 2448 1150     Net -828 +192 +40           Unmeasured Urine Occurrence 2 x 1 x     Unmeasured Stool Occurrence  1 x           Invasive Devices     Peripherally Inserted Central Catheter Line            PICC Line 65/65/55 Right Basilic 11 days          Drain            Gastrostomy/Enterostomy -- days    Open Drain Anterior;Right Neck 3 days    External Urinary Catheter less than 1 day          Airway            Surgical Airway Shiley Cuffed -- days                Physical Exam:  Vitals: Blood pressure 124/81, pulse 96, temperature 98 4 °F (36 9 °C), temperature source Oral, resp  rate (!) 23, height 4' 2" (1 27 m), weight 61 2 kg (134 lb 14 7 oz), SpO2 99 %  ,Body mass index is 37 94 kg/m²        General appearance: alert, appears stated age, and no distress  Head: Normocephalic, without obvious abnormality, atraumatic  Eyes: upward deviated gaze appears baseline  Neck: supple, symmetrical, trachea midline, externalized shunt right clavicular region with dressing in place  Lungs: trach in place  Heart: regular heart rate  Neurologic:   Mental status: GCS 9t (E4, V1t, M4), trach in place, does not follow commands, upward deviated gaze appears baseline, some flexion to pain in BUE, no real movement to pain in BLE  Cranial nerves: grossly intact (Cranial nerves II-XII)  Sensory: intact to crude touch  Reflexes: no clonus      Lab Results:  Results from last 7 days   Lab Units 06/16/20  0428 06/15/20  0510 06/14/20  0452 06/13/20  0518   WBC Thousand/uL 9 69 4 89 4 41 5 16   HEMOGLOBIN g/dL 9 3* 9 0* 7 6* 8 7*   HEMATOCRIT % 31 1* 29 4* 24 7* 27 9*   PLATELETS Thousands/uL 422* 281 286 329   NEUTROS PCT % 87* 65  --  64   MONOS PCT % 6 7  --  7     Results from last 7 days   Lab Units 06/16/20  0428 06/15/20  0510 06/14/20  0452  06/11/20  0433 06/10/20  0444   POTASSIUM mmol/L 3 7 4 1 4 0   < > 4 0 4 0   CHLORIDE mmol/L 105 109* 106   < > 106 103   CO2 mmol/L 27 29 28   < > 29 30   BUN mg/dL 16 15 13   < > 14 12   CREATININE mg/dL 0 36* 0 37* 0 26*   < > 0 41* 0 30*   CALCIUM mg/dL 9 0 9 0 8 1*   < > 9 1 8 7   ALK PHOS U/L  --   --   --   --  162* 128*   ALT U/L  --   --   --   --  84* 108*   AST U/L  --   --   --   --  27 21    < > = values in this interval not displayed  Results from last 7 days   Lab Units 06/16/20 0428 06/15/20  0510 06/14/20  0452   MAGNESIUM mg/dL 2 8* 2 7* 2 8*     Results from last 7 days   Lab Units 06/16/20 0428 06/15/20  0510 06/14/20  0452   PHOSPHORUS mg/dL 2 0* 2 4* 3 0         No results found for: TROPONINT  ABG:  Lab Results   Component Value Date    PHART 7 427 06/05/2020    BUH1CUX 42 0 06/05/2020    PO2ART 97 0 06/05/2020    NVW6SMD 27 1 06/05/2020    BEART 2 5 06/05/2020    SOURCE Radial, Right 06/05/2020       Imaging Studies: I have personally reviewed pertinent reports  and I have personally reviewed pertinent films in PACS    Xr Skull < 4 Vw    Result Date: 6/6/2020  Impression: Baseline pressure setting is obtained  Workstation performed: [id number]     Ct Guided Perc Drainage Catheter Placeme    Result Date: 6/8/2020  Impression: Impression: Successful CT-guided aspiration left iliac crest collection   Workstation performed: [id number]       EKG, Pathology, and Other Studies: I have personally reviewed pertinent reports        VTE Pharmacologic Prophylaxis: Heparin    VTE Mechanical Prophylaxis: sequential compression device

## 2020-06-17 ENCOUNTER — APPOINTMENT (INPATIENT)
Dept: RADIOLOGY | Facility: HOSPITAL | Age: 25
DRG: 711 | End: 2020-06-17
Payer: COMMERCIAL

## 2020-06-17 LAB
ABO GROUP BLD: NORMAL
ABO GROUP BLD: NORMAL
ANION GAP SERPL CALCULATED.3IONS-SCNC: 5 MMOL/L (ref 4–13)
APPEARANCE CSF: ABNORMAL
BASE EX.OXY STD BLDV CALC-SCNC: 76.3 % (ref 60–80)
BASE EXCESS BLDV CALC-SCNC: 3.7 MMOL/L
BASOPHILS # BLD AUTO: 0.02 THOUSANDS/ΜL (ref 0–0.1)
BASOPHILS NFR BLD AUTO: 0 % (ref 0–1)
BLD GP AB SCN SERPL QL: NEGATIVE
BUN SERPL-MCNC: 15 MG/DL (ref 5–25)
CALCIUM SERPL-MCNC: 9 MG/DL (ref 8.3–10.1)
CHLORIDE SERPL-SCNC: 107 MMOL/L (ref 100–108)
CO2 SERPL-SCNC: 28 MMOL/L (ref 21–32)
CREAT SERPL-MCNC: 0.31 MG/DL (ref 0.6–1.3)
EOSINOPHIL # BLD AUTO: 0.07 THOUSAND/ΜL (ref 0–0.61)
EOSINOPHIL NFR BLD AUTO: 1 % (ref 0–6)
ERYTHROCYTE [DISTWIDTH] IN BLOOD BY AUTOMATED COUNT: 17.1 % (ref 11.6–15.1)
GFR SERPL CREATININE-BSD FRML MDRD: 158 ML/MIN/1.73SQ M
GLUCOSE CSF-MCNC: 30 MG/DL (ref 50–80)
GLUCOSE SERPL-MCNC: 113 MG/DL (ref 65–140)
GLUCOSE SERPL-MCNC: 117 MG/DL (ref 65–140)
GLUCOSE SERPL-MCNC: 121 MG/DL (ref 65–140)
GLUCOSE SERPL-MCNC: 121 MG/DL (ref 65–140)
GLUCOSE SERPL-MCNC: 127 MG/DL (ref 65–140)
GRAM STN SPEC: NORMAL
GRAM STN SPEC: NORMAL
HCO3 BLDV-SCNC: 28.4 MMOL/L (ref 24–30)
HCT VFR BLD AUTO: 29.4 % (ref 34.8–46.1)
HGB BLD-MCNC: 8.6 G/DL (ref 11.5–15.4)
IMM GRANULOCYTES # BLD AUTO: 0.04 THOUSAND/UL (ref 0–0.2)
IMM GRANULOCYTES NFR BLD AUTO: 1 % (ref 0–2)
LYMPHOCYTES # BLD AUTO: 1.25 THOUSANDS/ΜL (ref 0.6–4.47)
LYMPHOCYTES NFR BLD AUTO: 16 % (ref 14–44)
MAGNESIUM SERPL-MCNC: 2.6 MG/DL (ref 1.6–2.6)
MCH RBC QN AUTO: 25 PG (ref 26.8–34.3)
MCHC RBC AUTO-ENTMCNC: 29.3 G/DL (ref 31.4–37.4)
MCV RBC AUTO: 86 FL (ref 82–98)
MONOCYTES # BLD AUTO: 0.64 THOUSAND/ΜL (ref 0.17–1.22)
MONOCYTES NFR BLD AUTO: 8 % (ref 4–12)
NEUTROPHILS # BLD AUTO: 5.76 THOUSANDS/ΜL (ref 1.85–7.62)
NEUTS SEG NFR BLD AUTO: 74 % (ref 43–75)
NRBC BLD AUTO-RTO: 0 /100 WBCS
O2 CT BLDV-SCNC: 10.7 ML/DL
PCO2 BLDV: 43.5 MM HG (ref 42–50)
PH BLDV: 7.43 [PH] (ref 7.3–7.4)
PHOSPHATE SERPL-MCNC: 2.1 MG/DL (ref 2.7–4.5)
PLATELET # BLD AUTO: 421 THOUSANDS/UL (ref 149–390)
PMV BLD AUTO: 9.3 FL (ref 8.9–12.7)
PO2 BLDV: 45.3 MM HG (ref 35–45)
POTASSIUM SERPL-SCNC: 3.6 MMOL/L (ref 3.5–5.3)
PROT CSF-MCNC: 535 MG/DL (ref 15–45)
PS CM H2O: ABNORMAL
PS VENT FIO2: 30
PS VENT PEEP: 5
RBC # BLD AUTO: 3.44 MILLION/UL (ref 3.81–5.12)
RBC # CSF MANUAL: 46 UL (ref 0–10)
RH BLD: NEGATIVE
RH BLD: NEGATIVE
SARS-COV-2 RNA RESP QL NAA+PROBE: NEGATIVE
SODIUM SERPL-SCNC: 140 MMOL/L (ref 136–145)
SPECIMEN EXPIRATION DATE: NORMAL
TOTAL CELLS COUNTED BLD: YES
VENT - PS: ABNORMAL
WBC # BLD AUTO: 7.78 THOUSAND/UL (ref 4.31–10.16)
WBC # CSF AUTO: 221 /UL (ref 0–5)

## 2020-06-17 PROCEDURE — 89050 BODY FLUID CELL COUNT: CPT | Performed by: PHYSICIAN ASSISTANT

## 2020-06-17 PROCEDURE — 84100 ASSAY OF PHOSPHORUS: CPT | Performed by: STUDENT IN AN ORGANIZED HEALTH CARE EDUCATION/TRAINING PROGRAM

## 2020-06-17 PROCEDURE — 94640 AIRWAY INHALATION TREATMENT: CPT

## 2020-06-17 PROCEDURE — 80048 BASIC METABOLIC PNL TOTAL CA: CPT | Performed by: STUDENT IN AN ORGANIZED HEALTH CARE EDUCATION/TRAINING PROGRAM

## 2020-06-17 PROCEDURE — 82945 GLUCOSE OTHER FLUID: CPT | Performed by: PHYSICIAN ASSISTANT

## 2020-06-17 PROCEDURE — 89051 BODY FLUID CELL COUNT: CPT | Performed by: PHYSICIAN ASSISTANT

## 2020-06-17 PROCEDURE — 85025 COMPLETE CBC W/AUTO DIFF WBC: CPT | Performed by: STUDENT IN AN ORGANIZED HEALTH CARE EDUCATION/TRAINING PROGRAM

## 2020-06-17 PROCEDURE — 86900 BLOOD TYPING SEROLOGIC ABO: CPT | Performed by: PHYSICIAN ASSISTANT

## 2020-06-17 PROCEDURE — 94760 N-INVAS EAR/PLS OXIMETRY 1: CPT

## 2020-06-17 PROCEDURE — 82805 BLOOD GASES W/O2 SATURATION: CPT | Performed by: EMERGENCY MEDICINE

## 2020-06-17 PROCEDURE — 94003 VENT MGMT INPAT SUBQ DAY: CPT

## 2020-06-17 PROCEDURE — 83735 ASSAY OF MAGNESIUM: CPT | Performed by: STUDENT IN AN ORGANIZED HEALTH CARE EDUCATION/TRAINING PROGRAM

## 2020-06-17 PROCEDURE — 70450 CT HEAD/BRAIN W/O DYE: CPT

## 2020-06-17 PROCEDURE — U0003 INFECTIOUS AGENT DETECTION BY NUCLEIC ACID (DNA OR RNA); SEVERE ACUTE RESPIRATORY SYNDROME CORONAVIRUS 2 (SARS-COV-2) (CORONAVIRUS DISEASE [COVID-19]), AMPLIFIED PROBE TECHNIQUE, MAKING USE OF HIGH THROUGHPUT TECHNOLOGIES AS DESCRIBED BY CMS-2020-01-R: HCPCS | Performed by: NEUROLOGICAL SURGERY

## 2020-06-17 PROCEDURE — 86850 RBC ANTIBODY SCREEN: CPT | Performed by: PHYSICIAN ASSISTANT

## 2020-06-17 PROCEDURE — 99233 SBSQ HOSP IP/OBS HIGH 50: CPT | Performed by: INTERNAL MEDICINE

## 2020-06-17 PROCEDURE — 99291 CRITICAL CARE FIRST HOUR: CPT | Performed by: ANESTHESIOLOGY

## 2020-06-17 PROCEDURE — 87070 CULTURE OTHR SPECIMN AEROBIC: CPT | Performed by: PHYSICIAN ASSISTANT

## 2020-06-17 PROCEDURE — 82948 REAGENT STRIP/BLOOD GLUCOSE: CPT

## 2020-06-17 PROCEDURE — 84157 ASSAY OF PROTEIN OTHER: CPT | Performed by: PHYSICIAN ASSISTANT

## 2020-06-17 PROCEDURE — 86901 BLOOD TYPING SEROLOGIC RH(D): CPT | Performed by: PHYSICIAN ASSISTANT

## 2020-06-17 PROCEDURE — 99024 POSTOP FOLLOW-UP VISIT: CPT | Performed by: PHYSICIAN ASSISTANT

## 2020-06-17 RX ORDER — POTASSIUM CHLORIDE 20MEQ/15ML
40 LIQUID (ML) ORAL ONCE
Status: COMPLETED | OUTPATIENT
Start: 2020-06-17 | End: 2020-06-17

## 2020-06-17 RX ORDER — HEPARIN SODIUM 5000 [USP'U]/ML
5000 INJECTION, SOLUTION INTRAVENOUS; SUBCUTANEOUS EVERY 8 HOURS SCHEDULED
Status: DISCONTINUED | OUTPATIENT
Start: 2020-06-18 | End: 2020-06-28

## 2020-06-17 RX ADMIN — BUDESONIDE 0.5 MG: 0.5 INHALANT RESPIRATORY (INHALATION) at 07:50

## 2020-06-17 RX ADMIN — HEPARIN SODIUM 5000 UNITS: 5000 INJECTION INTRAVENOUS; SUBCUTANEOUS at 13:46

## 2020-06-17 RX ADMIN — BISACODYL 10 MG: 10 SUPPOSITORY RECTAL at 08:10

## 2020-06-17 RX ADMIN — BUDESONIDE 0.5 MG: 0.5 INHALANT RESPIRATORY (INHALATION) at 19:21

## 2020-06-17 RX ADMIN — GUAIFENESIN 400 MG: 100 SOLUTION ORAL at 08:31

## 2020-06-17 RX ADMIN — GUAIFENESIN 400 MG: 100 SOLUTION ORAL at 02:21

## 2020-06-17 RX ADMIN — POTASSIUM & SODIUM PHOSPHATES POWDER PACK 280-160-250 MG 1 PACKET: 280-160-250 PACK at 17:37

## 2020-06-17 RX ADMIN — MELATONIN 1000 UNITS: at 08:10

## 2020-06-17 RX ADMIN — CHLORHEXIDINE GLUCONATE 0.12% ORAL RINSE 15 ML: 1.2 LIQUID ORAL at 21:13

## 2020-06-17 RX ADMIN — DIAZEPAM 5 MG: 5 TABLET ORAL at 10:20

## 2020-06-17 RX ADMIN — OXCARBAZEPINE 450 MG: 300 TABLET, FILM COATED ORAL at 17:37

## 2020-06-17 RX ADMIN — MEROPENEM 2000 MG: 1 INJECTION, POWDER, FOR SOLUTION INTRAVENOUS at 02:21

## 2020-06-17 RX ADMIN — Medication 1 TABLET: at 08:10

## 2020-06-17 RX ADMIN — MEROPENEM 2000 MG: 1 INJECTION, POWDER, FOR SOLUTION INTRAVENOUS at 18:00

## 2020-06-17 RX ADMIN — OXCARBAZEPINE 450 MG: 300 TABLET, FILM COATED ORAL at 05:07

## 2020-06-17 RX ADMIN — POTASSIUM & SODIUM PHOSPHATES POWDER PACK 280-160-250 MG 1 PACKET: 280-160-250 PACK at 08:10

## 2020-06-17 RX ADMIN — HEPARIN SODIUM 5000 UNITS: 5000 INJECTION INTRAVENOUS; SUBCUTANEOUS at 21:13

## 2020-06-17 RX ADMIN — DIAZEPAM 5 MG: 5 TABLET ORAL at 17:37

## 2020-06-17 RX ADMIN — CHLORHEXIDINE GLUCONATE 0.12% ORAL RINSE 15 ML: 1.2 LIQUID ORAL at 08:10

## 2020-06-17 RX ADMIN — DESMOPRESSIN ACETATE 0.05 MG: 0.1 TABLET ORAL at 08:10

## 2020-06-17 RX ADMIN — MEROPENEM 2000 MG: 1 INJECTION, POWDER, FOR SOLUTION INTRAVENOUS at 10:25

## 2020-06-17 RX ADMIN — DIAZEPAM 5 MG: 5 TABLET ORAL at 02:21

## 2020-06-17 RX ADMIN — GUAIFENESIN 400 MG: 100 SOLUTION ORAL at 17:37

## 2020-06-17 RX ADMIN — POTASSIUM CHLORIDE 40 MEQ: 1.5 SOLUTION ORAL at 10:20

## 2020-06-17 RX ADMIN — SENNOSIDES AND DOCUSATE SODIUM 1 TABLET: 8.6; 5 TABLET ORAL at 21:13

## 2020-06-17 RX ADMIN — LEVETIRACETAM 500 MG: 100 SOLUTION ORAL at 21:13

## 2020-06-17 RX ADMIN — LEVETIRACETAM 1000 MG: 100 SOLUTION ORAL at 13:45

## 2020-06-17 RX ADMIN — LEVETIRACETAM 1000 MG: 100 SOLUTION ORAL at 05:07

## 2020-06-17 RX ADMIN — HEPARIN SODIUM 5000 UNITS: 5000 INJECTION INTRAVENOUS; SUBCUTANEOUS at 05:07

## 2020-06-17 NOTE — PLAN OF CARE

## 2020-06-17 NOTE — UTILIZATION REVIEW
Continued Stay Review    Date: 6-17-20           Current Patient Class: inpatient  Current Level of Care: critical care     HPI:24 y o  female initially admitted on 6-3-20  For seizure like activity, sepsis  Related to serratia  shunt, acute hypoxic respiratory failure     Assessment/Plan:  CT of head repeated today  CSF  Wbc has increased from 28 on 6-13 to 221 fluid is now cloudy  Plan to remove shunt  Continue iv meropenem   Continue neuro checks q4 hr  Gcs=7  Continues with tachycardia on telemetry  Chronic respiratory failure on s/p trach on mechanical ventilation  Requires suctioning periodically  NPO midnight for surgery 6-18    Case: [id number] Date/Time: 06/18/20 1345   Procedure: REMOVAL of right SHUNT VENTRICULAR PERITONEAL (Right Head)   Anesthesia type: General   Diagnosis:       Cerebral palsy ([address] ) [G80 9]      Fever [R50 9]      S/P  shunt [Z98 2]         Pertinent Labs/Diagnostic Results:     Ct head  6-17 s/p externalization shunt   No acute intracranial abnormality      Stable right frontal ventriculostomy shunt catheter position and degree of ventriculomegaly when compared to the prior exam   Redemonstrated bilateral cerebral gliosis and encephalomalacia      Overall, no significant interval change when compared to a CT brain dated June 16, 2020            Results from last 7 days   Lab Units 06/17/20  0458 06/16/20  0428 06/15/20  0510 06/14/20  0452 06/13/20  0518   WBC Thousand/uL 7 78 9 69 4 89 4 41 5 16   HEMOGLOBIN g/dL 8 6* 9 3* 9 0* 7 6* 8 7*   HEMATOCRIT % 29 4* 31 1* 29 4* 24 7* 27 9*   PLATELETS Thousands/uL 421* 422* 281 286 329   NEUTROS ABS Thousands/µL 5 76 8 41* 3 12  --  3 24         Results from last 7 days   Lab Units 06/17/20  0458 06/16/20  0428 06/15/20  0510 06/14/20  0452 06/13/20  0518   SODIUM mmol/L 140 137 140 140 136   POTASSIUM mmol/L 3 6 3 7 4 1 4 0 3 9   CHLORIDE mmol/L 107 105 109* 106 103   CO2 mmol/L 28 27 29 28 26   ANION GAP mmol/L 5 5 2* 6 7   BUN mg/dL 15 16 15 13 15   CREATININE mg/dL 0 31* 0 36* 0 37* 0 26* 0 30*   EGFR ml/min/1 73sq m 158 150 149 167 160   CALCIUM mg/dL 9 0 9 0 9 0 8 1* 8 4   MAGNESIUM mg/dL 2 6 2 8* 2 7* 2 8*  --    PHOSPHORUS mg/dL 2 1* 2 0* 2 4* 3 0  --      Results from last 7 days   Lab Units 06/11/20  0433   AST U/L 27   ALT U/L 84*   ALK PHOS U/L 162*   TOTAL PROTEIN g/dL 7 3   ALBUMIN g/dL 2 9*   TOTAL BILIRUBIN mg/dL 0 40     Results from last 7 days   Lab Units 06/17/20  1151 06/17/20  0805 06/16/20  2357 06/16/20  1754 06/16/20  1158 06/16/20  0557 06/15/20  2356 06/15/20  1812 06/15/20  1143 06/15/20  0052 06/14/20  1708 06/14/20  1131   POC GLUCOSE mg/dl 121 117 127 125 128 130 165* 144* 120 115 104 82     Results from last 7 days   Lab Units 06/17/20  0458 06/16/20  0428 06/15/20  0510 06/14/20  0452 06/13/20  0518 06/11/20  0433   GLUCOSE RANDOM mg/dL 113 125 92 77 99 93             No results found for: BETA-HYDROXYBUTYRATE       Results from last 7 days   Lab Units 06/15/20  2024   PH MERI  7 468*   PCO2 MERI mm Hg 43 9   PO2 MERI mm Hg 54 7*   HCO3 MERI mmol/L 31 1*   BASE EXC MERI mmol/L 6 7   O2 CONTENT MERI ml/dL 12 8   O2 HGB, VENOUS % 85 4*       Results from last 7 days   Lab Units 06/17/20  1040 06/13/20  1946   GRAM STAIN RESULT  2+ Polys  No bacteria seen No bacteria seen  No polys seen     Results from last 7 days   Lab Units 06/13/20  1706   TOTAL COUNTED  100     Results from last 7 days   Lab Units 06/17/20  1610 06/17/20  1254 06/17/20  0845 06/13/20  1707 06/13/20  1706   APPEARANCE CSF  CLOUDY  --   --   --  clear and colorless   TUBE NUM CSF   --   --   --   --  1   WBC CSF /uL 221*  --   --   --  28*   XANTHOCHROMIA  Yes*  --   --   --  No   NEUTROPHILS % (CSF) %  --   --   --   --  33   LYMPHS % (CSF) %  --   --   --   --  24   MONOCYTES % (CSF) %  --   --   --   --  43   GLUCOSE CSF mg/dL  --   --  30* 54  --    PROTEIN CSF mg/dL  --   --  535* 151*  --    RBC CSF uL  --  46*  --   --   --    CSF CULTURE --   --   --   --  No growth       Vital Signs:     Vitals:    06/17/20 1400 06/17/20 1500 06/17/20 1551 06/17/20 1600   BP:    (!) 133/105   BP Location:    Left arm   Pulse: 98 (!) 114 (!) 113    Resp: 13 (!) 31     Temp:    98 6 °F (37 °C)   TempSrc:    Oral   SpO2: 96% 99% 99%    Weight:       Height:         Date and Time Eye Opening Best Verbal Response Best Motor Response Blair Coma Scale Score   06/17/20 1600 2 1 4 7   06/17/20 1200 2 1 4 7   06/17/20 0800 2 1 4 7   06/17/20 0400 2 1 4 7   06/17/20 0000 2 1 4 7       Medications:   Scheduled Medications:    Medications:  bisacodyl 10 mg Rectal Daily   budesonide 0 5 mg Nebulization BID   calcium carbonate-vitamin D 1 tablet Oral Daily With Breakfast   chlorhexidine 15 mL Swish & Spit Q12H [address]   cholecalciferol 1,000 Units Oral Daily   desmopressin 0 05 mg Per G Tube Daily   diazepam 5 mg Per G Tube Q8H   guaiFENesin 400 mg Oral Q8H   heparin (porcine) 5,000 Units Subcutaneous Q8H [address]   [START ON 6/18/2020] heparin (porcine) 5,000 Units Subcutaneous Q8H [address]   levETIRAcetam 1,000 mg Per G Tube BID   levETIRAcetam 500 mg Per G Tube Q24H   meropenem 2,000 mg Intravenous Q8H   OXcarbazepine 450 mg Per G Tube Q12H [address]   potassium-sodium phosphates 1 packet Oral BID With Meals   senna-docusate sodium 1 tablet Per G Tube HS     Continuous IV Infusions:     PRN Meds:    acetaminophen 650 mg Per G Tube Q4H PRN   cyclobenzaprine 5 mg Per G Tube TID PRN   fentanyl citrate (PF) 50 mcg Intravenous Q1H PRN   glycerin-hypromellose- 1 drop Both Eyes Q3H PRN   LORazepam 1 mg Intravenous Q4H PRN       Discharge Plan: to be determined     Network Utilization Review Department  [email or web address]  ATTENTION: Please call with any questions or concerns to [phone] and carefully listen to the prompts so that you are directed to the right person   All voicemails are confidential   Ryan Guy all requests for admission clinical reviews, approved or denied determinations and any other requests to dedicated fax number below belonging to the campus where the patient is receiving treatment   List of dedicated fax numbers for the Facilities:  [address] DENIALS (Administrative/Medical Necessity) [phone]   [address]  (Maternity/NICU/Pediatrics) [phone]   Bao Bernal [phone]   Jeimy Epstein [phone]   Delorise Zi [phone]   [address]  [phone]   [address] [place] [address] [phone]   [facility]  [phone]   [address] [facility]  [address] [facility] [address] [facility] [phone]

## 2020-06-17 NOTE — ASSESSMENT & PLAN NOTE
PPD#4 shunt externalization  Pt has a hx of  shunt insertion for congenital hydrocephalus  · P/w SIRS, acute encephalopathy, and seizure like activity  · Pt also had cardiac arrest and was resuscitated while at [facility]  reviewed personally and by attending  Final results as below  · CT head wo contrast 6/17/2020:  No acute intracranial abnormality  Stable right frontal ventriculostomy shunt catheter position and degree of ventriculomegaly when compared to the prior exam   Redemonstrated bilateral cerebral gliosis and encephalomalacia  Overall, no significant interval change when compared to a CT brain dated June 16, 2020  Plan  · Keep shunt clamped at this time  New CSF samples drawn today and sent for culture  · Plan for tentative removal of  shunt tomorrow   · NPO after midnight  · Pre op ordered and additional labs ordered if warranted  · Heparin on hold after midnight  · Continue regular neurologic checksd  · Ongoing medical management per primary team   · ID and neurology following  · PT/OT  · DVT PPX: SCDs, Heparin    Neurosurgery will continue to follow  Please call with questions or concerns

## 2020-06-17 NOTE — PROGRESS NOTES
Progress Note - Infectious Disease   Kylah Voss 21 y o  female MRN: [id number]  Unit/Bed#: ICU 01 Encounter: [id number]      Impression/Recommendations:  1  Sepsis POA   Fever, tachycardia, tachypnea   Likely due to #2   Consider also aspiration given reported respiratory distress upon presentation to [facility] other clear source appreciated   Blood cultures at [facility] negative   Improving  Rec:  ? Continue antibiotics as below  ? Follow temperatures closely     2   Serratia  shunt infection   Consider translocation from GI tract although no reported intra-abdominal fluid collection   Repeat CSF cultures positive for Serratia despite 10 days IV antibiotics represents antibiotic failure   Now status post VPS externalization with retained central portion of catheter  OR CSF culture negative  Tolerating clamp trial   Rec:  ? Continue meropenem for now  ? Await VPS removal tomorrow which should afford definitive cure  ? Would plan on treating with antibiotics for 5-7 days postop     3  Acute on chronic hypoxic respiratory failure   No overt pneumonia on chest imaging from [place]   Consider role of aspiration versus encephalopathy   Patient on minimal vent requirements  Rec:  ? Antibiotics above would treat any concomitant pneumonia  ? Follow respiratory status closely  ? Supportive care as per the primary service     4  Seizure disorder   Initial concern for seizures at [facility] but vEEG here negative for seizure activity   Rhythmic blinking does not correlate to EEG findings per Neurology  Rec:  ? Continue antiepileptics per Neurology with close follow-up ongoing     5   Retroperitoneal collection   Reportedly chronic, seen on imaging done at [facility] significance   Status post aspiration of the collection with negative cultures  Rec:  ?  No additional workup or treatment from an ID perspective     6   CP   With spastic quadriplegia, profound MR, hydrocephalus status post  shunt     7   Chronic respiratory failure   Status post trach      8   Recent VFib arrest   Consider anoxic encephalopathy      The above plan was discussed in detail with the neurosurgery team      Antibiotics:  Meropenem #16  POD #4    Subjective:  Patient seen on AM rounds  Unable to provide ROS due to nonverbal state  24 Hour Events:  Repeat CT this morning shows stable ventriculomegaly and felt to be tolerating clamping of  shunt  No documented fevers, chills, sweats, nausea, vomiting, or diarrhea  Objective:  Vitals:  Temp:  [97 5 °F (36 4 °C)-98 9 °F (37 2 °C)] 98 6 °F (37 °C)  HR:  [] 106  Resp:  [14-42] 22  BP: (100-150)/(57-92) 134/73  SpO2:  [97 %-100 %] 98 %  Temp (24hrs), Av 4 °F (36 9 °C), Min:97 5 °F (36 4 °C), Max:98 9 °F (37 2 °C)  Current: Temperature: 98 6 °F (37 °C)    Physical Exam:   General:  No acute distress  Eyes:  Normal lids, eyes closed  ENT:  Normal external ears and nose  Neck:  Neck symmetric with midline trachea  Pulmonary:  Normal respiratory effort without accessory muscle use  Cardiovascular:  Regular rate and rhythm; no peripheral edema  Gastrointestinal:  No tenderness or distention  Musculoskeletal:  No digital clubbing or cyanosis  Skin:  No visible rashes; No palpable nodules  Neurologic:  Not moving extremities due to functional quadriplegia  Psychiatric:  Lethargic    Lab Results:  I have personally reviewed pertinent labs  Results from last 7 days   Lab Units 20  0458 20  0428 06/15/20  0510  20  0433   POTASSIUM mmol/L 3 6 3 7 4 1   < > 4 0   CHLORIDE mmol/L 107 105 109*   < > 106   CO2 mmol/L 28 27 29   < > 29   BUN mg/dL 15 16 15   < > 14   CREATININE mg/dL 0 31* 0 36* 0 37*   < > 0 41*   EGFR ml/min/1 73sq m 158 150 149   < > 144   CALCIUM mg/dL 9 0 9 0 9 0   < > 9 1   AST U/L  --   --   --   --  27   ALT U/L  --   --   --   --  84*   ALK PHOS U/L  --   --   --   --  162*    < > = values in this interval not displayed       Results from last 7 days   Lab Units 06/17/20  0458 06/16/20  0428 06/15/20  0510   WBC Thousand/uL 7 78 9 69 4 89   HEMOGLOBIN g/dL 8 6* 9 3* 9 0*   PLATELETS Thousands/uL 421* 422* 281     Results from last 7 days   Lab Units 06/17/20  1040 06/13/20  1946 06/10/20  1331   GRAM STAIN RESULT  2+ Polys  No bacteria seen No bacteria seen  No polys seen No Polys or Bacteria seen       Imaging Studies:   I have personally reviewed pertinent imaging study reports and images in PACS  CT head reviewed personally stable ventriculomegaly    EKG, Pathology, and Other Studies:   I have personally reviewed pertinent reports

## 2020-06-17 NOTE — ASSESSMENT & PLAN NOTE
ID following  · Currently afebrile  · WBC within normal limits  · Continue Meropenem 2g Q8, today is day 15  · Daptomycin discontinued  · CSF from shunt tap 6/3 and 6/10 positive for serratia marcescens  · CSF cultures from 6/13 without growth to date, prelim report  · Repeat CSF cultures taken 6/17  · Back wound culture and left iliac tap negative

## 2020-06-17 NOTE — PROGRESS NOTES
Progress Note - Susan Bartholomew 1995, 21 y o  female MRN: [id number]    Unit/Bed#: ICU 01 Encounter: [id number]    Primary Care Provider: Davin Lester MD   Date and time admitted to hospital: 6/3/2020  2:15 PM        S/P  shunt  Assessment & Plan  PPD#4 shunt externalization  Pt has a hx of  shunt insertion for congenital hydrocephalus  · P/w SIRS, acute encephalopathy, and seizure like activity  · Pt also had cardiac arrest and was resuscitated while at [facility]  reviewed personally and by attending  Final results as below  · CT head wo contrast 6/17/2020:  No acute intracranial abnormality  Stable right frontal ventriculostomy shunt catheter position and degree of ventriculomegaly when compared to the prior exam   Redemonstrated bilateral cerebral gliosis and encephalomalacia  Overall, no significant interval change when compared to a CT brain dated June 16, 2020  Plan  · Keep shunt clamped at this time  New CSF samples drawn today and sent for culture  · Plan for tentative removal of  shunt tomorrow   · NPO after midnight  · Pre op ordered and additional labs ordered if warranted  · Heparin on hold after midnight  · Continue regular neurologic checksd  · Ongoing medical management per primary team   · ID and neurology following  · PT/OT  · DVT PPX: SCDs, Heparin    Neurosurgery will continue to follow  Please call with questions or concerns  Severe sepsis with acute organ dysfunction due to Serratia species [facility])  Assessment & Plan  ID following  · Currently afebrile  · WBC within normal limits  · Continue Meropenem 2g Q8, today is day 15  · Daptomycin discontinued  · CSF from shunt tap 6/3 and 6/10 positive for serratia marcescens  · CSF cultures from 6/13 without growth to date, prelim report  · Repeat CSF cultures taken 6/17  · Back wound culture and left iliac tap negative           Cerebral palsy [facility])  Assessment & Plan  · Continue medical management    Acute metabolic encephalopathy  Assessment & Plan  · Ongoing monitoring and management per primary team    Abnormal LFTs  Assessment & Plan  · Management per primary team     * Seizure-like activity [facility])  Assessment & Plan  · Neurology following, AED per their recommendations  · Completed vEEG 6/8 with no noted seizure activity  Subjective/Objective   Chief Complaint:  Follow-up  shunt    Subjective:  No acute overnight events noted    Objective:  Lying in bed, trach in place, NAD    I/O       06/15 0701 - 06/16 0700 06/16 0701 - 06/17 0700 06/17 0701 - 06/18 0700    P  O    0    I V  (mL/kg) 20 (0 3) 50 (0 8) 20 (0 3)    NG/ 320 0    IV Piggyback 360 400     Feedings 682 783 0    Total Intake(mL/kg) 1342 (21 9) 1553 (25 4) 20 (0 3)    Urine (mL/kg/hr) 1150 (0 8) 1207 (0 8) 200 (2)    Emesis/NG output 0 0 0    Drains 0 0     Stool 0      Total Output 1150 1207 200    Net +192 +346 -180           Unmeasured Urine Occurrence 1 x 1 x     Unmeasured Stool Occurrence 1 x            Invasive Devices     Peripherally Inserted Central Catheter Line            PICC Line 77/96/60 Right Basilic 12 days          Drain            Gastrostomy/Enterostomy -- days    Open Drain Anterior;Right Neck 4 days    External Urinary Catheter less than 1 day          Airway            Surgical Airway Shiley Cuffed -- days                Physical Exam:  Vitals: Blood pressure 134/73, pulse (!) 110, temperature 98 6 °F (37 °C), temperature source Oral, resp  rate 22, height 4' 2" (1 27 m), weight 61 2 kg (134 lb 14 7 oz), SpO2 99 %  ,Body mass index is 37 94 kg/m²        General appearance: alert, appears stated age, cooperative and no distress  Head: Normocephalic, without obvious abnormality, atraumatic  Eyes:  Opens eyes to painful stimuli, upward deviated gaze at baseline  Neck: supple, symmetrical, trachea midline  Lungs:  Trach in place  Heart:  Tachycardia  Neurologic:   Mental status: GCS 7t (E2, V1t, M4), trach in place, patient does not follow commands at baseline, opens eyes to painful stimuli, flexion to painful stimuli bilateral upper extremities, minimal movement to painful stimuli bilateral lower extremities  Cranial nerves: grossly intact (Cranial nerves II-XII)  Sensory:  Intact to crude touch      Lab Results:  Results from last 7 days   Lab Units 06/17/20 0458 06/16/20  0428 06/15/20  0510   WBC Thousand/uL 7 78 9 69 4 89   HEMOGLOBIN g/dL 8 6* 9 3* 9 0*   HEMATOCRIT % 29 4* 31 1* 29 4*   PLATELETS Thousands/uL 421* 422* 281   NEUTROS PCT % 74 87* 65   MONOS PCT % 8 6 7     Results from last 7 days   Lab Units 06/17/20 0458 06/16/20  0428 06/15/20  0510  06/11/20  0433   POTASSIUM mmol/L 3 6 3 7 4 1   < > 4 0   CHLORIDE mmol/L 107 105 109*   < > 106   CO2 mmol/L 28 27 29   < > 29   BUN mg/dL 15 16 15   < > 14   CREATININE mg/dL 0 31* 0 36* 0 37*   < > 0 41*   CALCIUM mg/dL 9 0 9 0 9 0   < > 9 1   ALK PHOS U/L  --   --   --   --  162*   ALT U/L  --   --   --   --  84*   AST U/L  --   --   --   --  27    < > = values in this interval not displayed  Results from last 7 days   Lab Units 06/17/20 0458 06/16/20  0428 06/15/20  0510   MAGNESIUM mg/dL 2 6 2 8* 2 7*     Results from last 7 days   Lab Units 06/17/20 0458 06/16/20  0428 06/15/20  0510   PHOSPHORUS mg/dL 2 1* 2 0* 2 4*         No results found for: TROPONINT  ABG:  Lab Results   Component Value Date    PHART 7 427 06/05/2020    SJX8NTH 42 0 06/05/2020    PO2ART 97 0 06/05/2020    OWS1LSB 27 1 06/05/2020    BEART 2 5 06/05/2020    SOURCE Radial, Right 06/05/2020       Imaging Studies: I have personally reviewed pertinent reports  and I have personally reviewed pertinent films in PACS    Xr Skull < 4 Vw    Result Date: 6/6/2020  Impression: Baseline pressure setting is obtained   Workstation performed: [id number]     Ct Guided Perc Drainage Catheter Placeme    Result Date: 6/8/2020  Impression: Impression: Successful CT-guided aspiration left iliac crest collection  Workstation performed: [id number]       EKG, Pathology, and Other Studies: I have personally reviewed pertinent reports        VTE Pharmacologic Prophylaxis: Heparin    VTE Mechanical Prophylaxis: sequential compression device

## 2020-06-18 ENCOUNTER — ANESTHESIA EVENT (INPATIENT)
Dept: PERIOP | Facility: HOSPITAL | Age: 25
DRG: 711 | End: 2020-06-18
Payer: COMMERCIAL

## 2020-06-18 ENCOUNTER — ANESTHESIA (INPATIENT)
Dept: PERIOP | Facility: HOSPITAL | Age: 25
DRG: 711 | End: 2020-06-18
Payer: COMMERCIAL

## 2020-06-18 LAB
ANION GAP SERPL CALCULATED.3IONS-SCNC: 4 MMOL/L (ref 4–13)
BASE EX.OXY STD BLDV CALC-SCNC: 76.4 % (ref 60–80)
BASE EXCESS BLDV CALC-SCNC: 1.2 MMOL/L
BASOPHILS # BLD AUTO: 0.03 THOUSANDS/ΜL (ref 0–0.1)
BASOPHILS NFR BLD AUTO: 0 % (ref 0–1)
BUN SERPL-MCNC: 12 MG/DL (ref 5–25)
CALCIUM SERPL-MCNC: 9.6 MG/DL (ref 8.3–10.1)
CHLORIDE SERPL-SCNC: 108 MMOL/L (ref 100–108)
CO2 SERPL-SCNC: 27 MMOL/L (ref 21–32)
CREAT SERPL-MCNC: 0.38 MG/DL (ref 0.6–1.3)
EOSINOPHIL # BLD AUTO: 0.08 THOUSAND/ΜL (ref 0–0.61)
EOSINOPHIL NFR BLD AUTO: 1 % (ref 0–6)
ERYTHROCYTE [DISTWIDTH] IN BLOOD BY AUTOMATED COUNT: 16.7 % (ref 11.6–15.1)
GFR SERPL CREATININE-BSD FRML MDRD: 148 ML/MIN/1.73SQ M
GLUCOSE SERPL-MCNC: 100 MG/DL (ref 65–140)
GLUCOSE SERPL-MCNC: 112 MG/DL (ref 65–140)
GLUCOSE SERPL-MCNC: 117 MG/DL (ref 65–140)
HCO3 BLDV-SCNC: 26.2 MMOL/L (ref 24–30)
HCT VFR BLD AUTO: 31.3 % (ref 34.8–46.1)
HGB BLD-MCNC: 9.2 G/DL (ref 11.5–15.4)
HOROWITZ INDEX BLDA+IHG-RTO: 30 MM[HG]
IMM GRANULOCYTES # BLD AUTO: 0.06 THOUSAND/UL (ref 0–0.2)
IMM GRANULOCYTES NFR BLD AUTO: 1 % (ref 0–2)
LYMPHOCYTES # BLD AUTO: 1.47 THOUSANDS/ΜL (ref 0.6–4.47)
LYMPHOCYTES NFR BLD AUTO: 16 % (ref 14–44)
MAGNESIUM SERPL-MCNC: 2.4 MG/DL (ref 1.6–2.6)
MCH RBC QN AUTO: 24.9 PG (ref 26.8–34.3)
MCHC RBC AUTO-ENTMCNC: 29.4 G/DL (ref 31.4–37.4)
MCV RBC AUTO: 85 FL (ref 82–98)
MONOCYTES # BLD AUTO: 0.6 THOUSAND/ΜL (ref 0.17–1.22)
MONOCYTES NFR BLD AUTO: 6 % (ref 4–12)
NEUTROPHILS # BLD AUTO: 7.19 THOUSANDS/ΜL (ref 1.85–7.62)
NEUTS SEG NFR BLD AUTO: 76 % (ref 43–75)
NRBC BLD AUTO-RTO: 0 /100 WBCS
O2 CT BLDV-SCNC: 9.3 ML/DL
PCO2 BLDV: 43.6 MM HG (ref 42–50)
PEEP RESPIRATORY: 5 CM[H2O]
PH BLDV: 7.4 [PH] (ref 7.3–7.4)
PHOSPHATE SERPL-MCNC: 2 MG/DL (ref 2.7–4.5)
PLATELET # BLD AUTO: 441 THOUSANDS/UL (ref 149–390)
PMV BLD AUTO: 8.9 FL (ref 8.9–12.7)
PO2 BLDV: 46.6 MM HG (ref 35–45)
POTASSIUM SERPL-SCNC: 4 MMOL/L (ref 3.5–5.3)
RBC # BLD AUTO: 3.7 MILLION/UL (ref 3.81–5.12)
SODIUM SERPL-SCNC: 139 MMOL/L (ref 136–145)
VENT AC: 15
VENT- AC: AC
VT SETTING VENT: 250 ML
WBC # BLD AUTO: 9.43 THOUSAND/UL (ref 4.31–10.16)

## 2020-06-18 PROCEDURE — 85025 COMPLETE CBC W/AUTO DIFF WBC: CPT | Performed by: STUDENT IN AN ORGANIZED HEALTH CARE EDUCATION/TRAINING PROGRAM

## 2020-06-18 PROCEDURE — 94760 N-INVAS EAR/PLS OXIMETRY 1: CPT

## 2020-06-18 PROCEDURE — 87116 MYCOBACTERIA CULTURE: CPT | Performed by: NEUROLOGICAL SURGERY

## 2020-06-18 PROCEDURE — 99232 SBSQ HOSP IP/OBS MODERATE 35: CPT | Performed by: INTERNAL MEDICINE

## 2020-06-18 PROCEDURE — 87186 SC STD MICRODIL/AGAR DIL: CPT | Performed by: NEUROLOGICAL SURGERY

## 2020-06-18 PROCEDURE — 0WP63JZ REMOVAL OF SYNTHETIC SUBSTITUTE FROM NECK, PERCUTANEOUS APPROACH: ICD-10-PCS | Performed by: NEUROLOGICAL SURGERY

## 2020-06-18 PROCEDURE — 87206 SMEAR FLUORESCENT/ACID STAI: CPT | Performed by: NEUROLOGICAL SURGERY

## 2020-06-18 PROCEDURE — 87070 CULTURE OTHR SPECIMN AEROBIC: CPT | Performed by: NEUROLOGICAL SURGERY

## 2020-06-18 PROCEDURE — 87077 CULTURE AEROBIC IDENTIFY: CPT | Performed by: NEUROLOGICAL SURGERY

## 2020-06-18 PROCEDURE — 80048 BASIC METABOLIC PNL TOTAL CA: CPT | Performed by: STUDENT IN AN ORGANIZED HEALTH CARE EDUCATION/TRAINING PROGRAM

## 2020-06-18 PROCEDURE — 87205 SMEAR GRAM STAIN: CPT | Performed by: NEUROLOGICAL SURGERY

## 2020-06-18 PROCEDURE — 94003 VENT MGMT INPAT SUBQ DAY: CPT

## 2020-06-18 PROCEDURE — 0DPW3JZ REMOVAL OF SYNTHETIC SUBSTITUTE FROM PERITONEUM, PERCUTANEOUS APPROACH: ICD-10-PCS | Performed by: NEUROLOGICAL SURGERY

## 2020-06-18 PROCEDURE — 82948 REAGENT STRIP/BLOOD GLUCOSE: CPT

## 2020-06-18 PROCEDURE — 87075 CULTR BACTERIA EXCEPT BLOOD: CPT | Performed by: NEUROLOGICAL SURGERY

## 2020-06-18 PROCEDURE — 94640 AIRWAY INHALATION TREATMENT: CPT

## 2020-06-18 PROCEDURE — 87176 TISSUE HOMOGENIZATION CULTR: CPT | Performed by: NEUROLOGICAL SURGERY

## 2020-06-18 PROCEDURE — 00P63JZ REMOVAL OF SYNTHETIC SUBSTITUTE FROM CEREBRAL VENTRICLE, PERCUTANEOUS APPROACH: ICD-10-PCS | Performed by: NEUROLOGICAL SURGERY

## 2020-06-18 PROCEDURE — 82805 BLOOD GASES W/O2 SATURATION: CPT | Performed by: EMERGENCY MEDICINE

## 2020-06-18 PROCEDURE — 99291 CRITICAL CARE FIRST HOUR: CPT | Performed by: EMERGENCY MEDICINE

## 2020-06-18 PROCEDURE — 84100 ASSAY OF PHOSPHORUS: CPT | Performed by: STUDENT IN AN ORGANIZED HEALTH CARE EDUCATION/TRAINING PROGRAM

## 2020-06-18 PROCEDURE — 87102 FUNGUS ISOLATION CULTURE: CPT | Performed by: NEUROLOGICAL SURGERY

## 2020-06-18 PROCEDURE — 83735 ASSAY OF MAGNESIUM: CPT | Performed by: STUDENT IN AN ORGANIZED HEALTH CARE EDUCATION/TRAINING PROGRAM

## 2020-06-18 PROCEDURE — 62256 REMOVE BRAIN CAVITY SHUNT: CPT | Performed by: NEUROLOGICAL SURGERY

## 2020-06-18 RX ORDER — DEXAMETHASONE SODIUM PHOSPHATE 10 MG/ML
INJECTION, SOLUTION INTRAMUSCULAR; INTRAVENOUS AS NEEDED
Status: DISCONTINUED | OUTPATIENT
Start: 2020-06-18 | End: 2020-06-18 | Stop reason: SURG

## 2020-06-18 RX ORDER — FENTANYL CITRATE 50 UG/ML
INJECTION, SOLUTION INTRAMUSCULAR; INTRAVENOUS AS NEEDED
Status: DISCONTINUED | OUTPATIENT
Start: 2020-06-18 | End: 2020-06-18 | Stop reason: SURG

## 2020-06-18 RX ORDER — SODIUM CHLORIDE 9 MG/ML
100 INJECTION, SOLUTION INTRAVENOUS CONTINUOUS
Status: DISCONTINUED | OUTPATIENT
Start: 2020-06-18 | End: 2020-06-19

## 2020-06-18 RX ORDER — SODIUM CHLORIDE 9 MG/ML
INJECTION, SOLUTION INTRAVENOUS CONTINUOUS PRN
Status: DISCONTINUED | OUTPATIENT
Start: 2020-06-18 | End: 2020-06-18 | Stop reason: SURG

## 2020-06-18 RX ORDER — SODIUM CHLORIDE 9 MG/ML
125 INJECTION, SOLUTION INTRAVENOUS CONTINUOUS
Status: DISCONTINUED | OUTPATIENT
Start: 2020-06-18 | End: 2020-06-18

## 2020-06-18 RX ORDER — CLINDAMYCIN PHOSPHATE 900 MG/50ML
900 INJECTION INTRAVENOUS ONCE
Status: COMPLETED | OUTPATIENT
Start: 2020-06-18 | End: 2020-06-18

## 2020-06-18 RX ORDER — ONDANSETRON 2 MG/ML
INJECTION INTRAMUSCULAR; INTRAVENOUS AS NEEDED
Status: DISCONTINUED | OUTPATIENT
Start: 2020-06-18 | End: 2020-06-18 | Stop reason: SURG

## 2020-06-18 RX ORDER — CHLORHEXIDINE GLUCONATE 0.12 MG/ML
15 RINSE ORAL ONCE
Status: DISCONTINUED | OUTPATIENT
Start: 2020-06-18 | End: 2020-06-18 | Stop reason: HOSPADM

## 2020-06-18 RX ORDER — SODIUM CHLORIDE 9 MG/ML
100 INJECTION, SOLUTION INTRAVENOUS CONTINUOUS
Status: DISCONTINUED | OUTPATIENT
Start: 2020-06-18 | End: 2020-06-18

## 2020-06-18 RX ADMIN — DEXAMETHASONE SODIUM PHOSPHATE 10 MG: 10 INJECTION, SOLUTION INTRAMUSCULAR; INTRAVENOUS at 13:30

## 2020-06-18 RX ADMIN — GUAIFENESIN 400 MG: 100 SOLUTION ORAL at 18:03

## 2020-06-18 RX ADMIN — DIAZEPAM 5 MG: 5 TABLET ORAL at 10:31

## 2020-06-18 RX ADMIN — ONDANSETRON 4 MG: 2 INJECTION INTRAMUSCULAR; INTRAVENOUS at 13:30

## 2020-06-18 RX ADMIN — PHENYLEPHRINE HYDROCHLORIDE 100 MCG: 10 INJECTION INTRAVENOUS at 14:27

## 2020-06-18 RX ADMIN — HEPARIN SODIUM 5000 UNITS: 5000 INJECTION INTRAVENOUS; SUBCUTANEOUS at 21:45

## 2020-06-18 RX ADMIN — FENTANYL CITRATE 50 MCG: 50 INJECTION, SOLUTION INTRAMUSCULAR; INTRAVENOUS at 13:30

## 2020-06-18 RX ADMIN — LEVETIRACETAM 1000 MG: 100 SOLUTION ORAL at 16:21

## 2020-06-18 RX ADMIN — MEROPENEM 2000 MG: 1 INJECTION, POWDER, FOR SOLUTION INTRAVENOUS at 18:03

## 2020-06-18 RX ADMIN — POTASSIUM & SODIUM PHOSPHATES POWDER PACK 280-160-250 MG 2 PACKET: 280-160-250 PACK at 08:05

## 2020-06-18 RX ADMIN — OXCARBAZEPINE 450 MG: 300 TABLET, FILM COATED ORAL at 05:10

## 2020-06-18 RX ADMIN — MEROPENEM 2000 MG: 1 INJECTION, POWDER, FOR SOLUTION INTRAVENOUS at 10:31

## 2020-06-18 RX ADMIN — MEROPENEM 2000 MG: 1 INJECTION, POWDER, FOR SOLUTION INTRAVENOUS at 02:01

## 2020-06-18 RX ADMIN — SODIUM CHLORIDE 100 ML/HR: 0.9 INJECTION, SOLUTION INTRAVENOUS at 18:01

## 2020-06-18 RX ADMIN — POTASSIUM & SODIUM PHOSPHATES POWDER PACK 280-160-250 MG 2 PACKET: 280-160-250 PACK at 12:46

## 2020-06-18 RX ADMIN — BUDESONIDE 0.5 MG: 0.5 INHALANT RESPIRATORY (INHALATION) at 07:50

## 2020-06-18 RX ADMIN — CLINDAMYCIN PHOSPHATE 900 MG: 900 INJECTION, SOLUTION INTRAVENOUS at 13:30

## 2020-06-18 RX ADMIN — PHENYLEPHRINE HYDROCHLORIDE 100 MCG: 10 INJECTION INTRAVENOUS at 14:46

## 2020-06-18 RX ADMIN — CHLORHEXIDINE GLUCONATE 0.12% ORAL RINSE 15 ML: 1.2 LIQUID ORAL at 08:05

## 2020-06-18 RX ADMIN — DIAZEPAM 5 MG: 5 TABLET ORAL at 18:03

## 2020-06-18 RX ADMIN — GUAIFENESIN 400 MG: 100 SOLUTION ORAL at 08:31

## 2020-06-18 RX ADMIN — SENNOSIDES AND DOCUSATE SODIUM 1 TABLET: 8.6; 5 TABLET ORAL at 21:45

## 2020-06-18 RX ADMIN — SODIUM CHLORIDE: 0.9 INJECTION, SOLUTION INTRAVENOUS at 13:30

## 2020-06-18 RX ADMIN — Medication 1 TABLET: at 08:05

## 2020-06-18 RX ADMIN — BUDESONIDE 0.5 MG: 0.5 INHALANT RESPIRATORY (INHALATION) at 19:22

## 2020-06-18 RX ADMIN — PHENYLEPHRINE HYDROCHLORIDE 50 MCG: 10 INJECTION INTRAVENOUS at 14:00

## 2020-06-18 RX ADMIN — GUAIFENESIN 400 MG: 100 SOLUTION ORAL at 02:01

## 2020-06-18 RX ADMIN — MELATONIN 1000 UNITS: at 08:05

## 2020-06-18 RX ADMIN — SODIUM CHLORIDE 100 ML/HR: 0.9 INJECTION, SOLUTION INTRAVENOUS at 15:30

## 2020-06-18 RX ADMIN — SODIUM CHLORIDE 125 ML/HR: 0.9 INJECTION, SOLUTION INTRAVENOUS at 08:04

## 2020-06-18 RX ADMIN — CHLORHEXIDINE GLUCONATE 0.12% ORAL RINSE 15 ML: 1.2 LIQUID ORAL at 21:44

## 2020-06-18 RX ADMIN — LEVETIRACETAM 500 MG: 100 SOLUTION ORAL at 21:44

## 2020-06-18 RX ADMIN — DESMOPRESSIN ACETATE 0.05 MG: 0.1 TABLET ORAL at 08:05

## 2020-06-18 RX ADMIN — POTASSIUM & SODIUM PHOSPHATES POWDER PACK 280-160-250 MG 2 PACKET: 280-160-250 PACK at 21:45

## 2020-06-18 RX ADMIN — POTASSIUM & SODIUM PHOSPHATES POWDER PACK 280-160-250 MG 2 PACKET: 280-160-250 PACK at 16:21

## 2020-06-18 RX ADMIN — FENTANYL CITRATE 50 MCG: 50 INJECTION, SOLUTION INTRAMUSCULAR; INTRAVENOUS at 14:55

## 2020-06-18 RX ADMIN — PHENYLEPHRINE HYDROCHLORIDE 100 MCG: 10 INJECTION INTRAVENOUS at 14:16

## 2020-06-18 RX ADMIN — FENTANYL CITRATE 50 MCG: 50 INJECTION, SOLUTION INTRAMUSCULAR; INTRAVENOUS at 14:00

## 2020-06-18 RX ADMIN — OXCARBAZEPINE 450 MG: 300 TABLET, FILM COATED ORAL at 20:00

## 2020-06-18 RX ADMIN — LEVETIRACETAM 1000 MG: 100 SOLUTION ORAL at 05:10

## 2020-06-18 RX ADMIN — DIAZEPAM 5 MG: 5 TABLET ORAL at 02:01

## 2020-06-18 RX ADMIN — PHENYLEPHRINE HYDROCHLORIDE 100 MCG: 10 INJECTION INTRAVENOUS at 14:09

## 2020-06-18 NOTE — NUTRITION
When safe to resume enteral feeding, recommend Paptamen AF 1 2 @ 50 ml/hrX 16 hours/day with PROSOURCE TF BID  Replete Phosphorus

## 2020-06-18 NOTE — RESPIRATORY THERAPY NOTE
RT Ventilator Management Note  Luis Enrique Lopes 21 y o  female MRN: [id number]  Unit/Bed#: ICU 01 Encounter: [id number]      Daily Screen       6/14/2020  0800 6/17/2020  0750          Patient safety screen outcome[de-identified]  Passed  Passed      Spont breathing trial % for 30 min:    No              Physical Exam:   Assessment Type: (P) Assess only  General Appearance: (P) Sleeping  Respiratory Pattern: (P) Assisted, Spontaneous  Chest Assessment: (P) Chest expansion symmetrical  Bilateral Breath Sounds: (P) Clear  O2 Device: (P) vent      Resp Comments: (P) No changes made to the vent at this time  Pt is stable on current PS settings pt was placed on by MD Talat wilson to monitor pt overnight per protocol

## 2020-06-18 NOTE — RESPIRATORY THERAPY NOTE
RT Ventilator Management Note  Shima Carey 21 y o  female MRN: [id number]  Unit/Bed#: ICU 01 Encounter: [id number]      Daily Screen       6/14/2020  0800 6/17/2020  0750          Patient safety screen outcome[de-identified]  Passed  Passed      Spont breathing trial % for 30 min:    No              Physical Exam:   Assessment Type: (P) Assess only  General Appearance: (P) Sleeping  Respiratory Pattern: (P) Assisted  Chest Assessment: (P) Chest expansion symmetrical  Bilateral Breath Sounds: (P) Diminished, Clear  Cough: Strong, Productive  Suction: (P) Trach  O2 Device: vent      Resp Comments: (P) No weaning done today due to OR procedure today  Pt to cont  on [facility] settings overnight and possibly attempt SBT in the a m

## 2020-06-18 NOTE — PROGRESS NOTES
Progress Note - Infectious Disease   Kylah Page 21 y o  female MRN: [id number]  Unit/Bed#: ICU 01 Encounter: [id number]      Impression/Recommendations:  1  Sepsis POA   Fever, tachycardia, tachypnea   Likely due to #2   Consider also aspiration given reported respiratory distress upon presentation to [facility] other clear source appreciated   Blood cultures at [facility] negative   Improving  Rec:  ? Continue antibiotics as below  ? Follow temperatures closely     2   Serratia  shunt infection   Consider translocation from GI tract although no reported intra-abdominal fluid collection   Repeat CSF cultures positive for Serratia despite 10 days IV antibiotics represents antibiotic failure   Now status post VPS externalization with retained central portion of catheter   OR CSF culture negative but WBC, protein remain elevated  Tolerating clamp trial   Rec:  ? Continue meropenem for now  ? Await VPS removal today which should afford definitive cure  ? Would plan on treating with antibiotics for 5-7 days postop     3  Acute on chronic hypoxic respiratory failure   No overt pneumonia on chest imaging from [place]   Consider role of aspiration versus encephalopathy   Patient on minimal vent requirements  Rec:  ? Antibiotics above would treat any concomitant pneumonia  ? Follow respiratory status closely  ? Supportive care as per the primary service     4  Seizure disorder   Initial concern for seizures at [facility] but vEEG here negative for seizure activity   Rhythmic blinking does not correlate to EEG findings per Neurology  Rec:  ? Continue antiepileptics per Neurology with close follow-up ongoing     5   Retroperitoneal collection   Reportedly chronic, seen on imaging done at [facility] 30 significance   Status post aspiration of the collection with negative cultures  Rec:  ?  No additional workup or treatment from an ID perspective     6   CP   With spastic quadriplegia, profound MR, hydrocephalus status post  shunt     7   Chronic respiratory failure   Status post trach      8   Recent VFib arrest   Consider anoxic encephalopathy      Antibiotics:  Meropenem #17  POD #5    Subjective:  Patient seen on AM rounds  Unable to provide review systems due to nonverbal state    24 Hour Events:  Low-grade temp overnight  No documented fevers, chills, sweats, nausea, vomiting, or diarrhea  Objective:  Vitals:  Temp:  [97 5 °F (36 4 °C)-100 3 °F (37 9 °C)] 97 5 °F (36 4 °C)  HR:  [] 104  Resp:  [12-31] 22  BP: (113-174)/() 117/67  SpO2:  [95 %-100 %] 99 %  Temp (24hrs), Av °F (37 2 °C), Min:97 5 °F (36 4 °C), Max:100 3 °F (37 9 °C)  Current: Temperature: 97 5 °F (36 4 °C)    Physical Exam:   General:  No acute distress  Psychiatric:  Obtain undid  Pulmonary:  Ventilated respiratory excursion without accessory muscle use  Abdomen:  Soft, nontender  Extremities:  No edema  Skin:  No rashes    Lab Results:  I have personally reviewed pertinent labs  Results from last 7 days   Lab Units 20  0503 20  0458 20  0428   POTASSIUM mmol/L 4 0 3 6 3 7   CHLORIDE mmol/L 108 107 105   CO2 mmol/L 27 28 27   BUN mg/dL 12 15 16   CREATININE mg/dL 0 38* 0 31* 0 36*   EGFR ml/min/1 73sq m 148 158 150   CALCIUM mg/dL 9 6 9 0 9 0     Results from last 7 days   Lab Units 20  0503 20  0458 20  0428   WBC Thousand/uL 9 43 7 78 9 69   HEMOGLOBIN g/dL 9 2* 8 6* 9 3*   PLATELETS Thousands/uL 441* 421* 422*     Results from last 7 days   Lab Units 20  1040 20  1946   GRAM STAIN RESULT  2+ Polys  No bacteria seen No bacteria seen  No polys seen       Imaging Studies:   I have personally reviewed pertinent imaging study reports and images in PACS  EKG, Pathology, and Other Studies:   I have personally reviewed pertinent reports

## 2020-06-18 NOTE — OP NOTE
OPERATIVE REPORT  PATIENT NAME: Derrick Mack    :  1995  MRN: [id number]  Pt Location: BE OR ROOM 09    SURGERY DATE: 2020    Surgeon(s) and Role:     * Hoang Rsahid MD - Primary     * India Zhang PA-C - Assisting    Preop Diagnosis:  Cerebral palsy ([address] ) [G80 9]  Fever [R50 9]  S/P  shunt [Z98 2]    Post-Op Diagnosis Codes:     * Cerebral palsy ([address] ) [G80 9]     * Fever [R50 9]     * S/P  shunt [Z98 2]    Procedure(s) (LRB):  REMOVAL OF RIGHT SHUNT VENTRICULAR PERITONEAL (Right)    Specimen(s):  ID Type Source Tests Collected by Time Destination   A : ventricular catheter and valve for culture Tissue Other ANAEROBIC CULTURE AND GRAM STAIN, FUNGAL CULTURE, AFB CULTURE WITH STAIN Hoang Rashid MD 2020 1408    B : distal peritoneal catheter aspiration Tissue Other ANAEROBIC CULTURE AND GRAM STAIN, FUNGAL CULTURE, CULTURE, TISSUE AND GRAM STAIN Hoang Rashid MD 2020 1433        Estimated Blood Loss:   Minimal    Drains:  Open Drain Anterior;Right Neck (Active)   Site Description Unable to view 2020 12:00 PM   Dressing Status Clean;Dry; Intact 2020 12:00 PM   Drainage Appearance None 2020 12:00 PM   Status Clamped 2020 12:00 PM   Output (mL) 0 mL 2020 12:00 PM   Number of days: 5       Gastrostomy/Enterostomy (Active)   Surrounding Skin Intact 2020 12:00 PM   Drain Status Clamped 2020 12:00 PM   Drainage Appearance None 2020 12:00 PM   Catheter Position (cm marking) 4 cm 2020 12:00 PM   Site Description Healing 2020 12:00 PM   Dressing Status Open to air 2020 12:00 PM   Dressing Intervention Dressing changed 2020  8:00 AM   Dressing Type Open to air 2020 12:00 PM   Intake (mL) 30 mL 2020 12:00 PM   Output (mL) 0 mL 2020 12:00 PM   Number of days:        External Urinary Catheter (Active)   Collection Container Canister and suction tubing (For Female) 2020 12:00 PM   Securement Method for Male Tape 6/18/2020 12:00 PM   Suction Pressure (mmHg) 100 mmHg 6/18/2020 12:00 PM   Interventions Removed and skin assessed; Pericare performed;Device changed 6/18/2020 12:00 PM   Output (mL) 200 mL 6/18/2020 12:00 PM   Number of days: 2       Anesthesia Type:   General    Operative Indications:  Cerebral palsy ([address] ) [G80 9]  Fever [R50 9]  S/P  shunt [Z802]  10year-old female with cerebral palsy and chronic shunt placement presented with fevers chills as seizures  Shunt tap demonstrated Serratia and infection  Her shunt was externalized  After several days of clamping there was no change in her neurologic exam are radiographic findings of her ventricular system  As such we elected to proceed with removal of her ventricular peritoneal shunt without replacement at this time  Family understood the risks of this procedure including bleeding, infection, stroke, paralysis, need for delayed shunting, and death  They elected to proceed  Operative Findings:  Complete removal of proximal system and valve  Partial removal of distal system secondary to significant calcification    Complications:   None    Procedure and Technique:  After obtaining written informed consent patient was brought to the operating room  She was transitioned to general anesthesia  She was turned with a shoulder roll under her right shoulder  Her hair was clipped her head was prepped and draped in the usual sterile fashion  A previously externalized shunt straight out of surgical field with the exception of a small portion of distal catheter  A surgical time-out was performed  A 10 blade was then used to open the cranial incision along her prior incision  Monopolar cautery was then used to dissect the ventricular catheter as well as the distal shunt valve  Proximal catheter was disconnected from the valve and there was slow trickle egress of spinal fluid from the ventricular catheter    The ventricular catheter was slightly adherent and would not freely extricate  A bugee monopolar was then inserted into the ventricular catheter  With cautery proximal catheter freely removed  There was egress of clear spinal fluid without evidence of hemorrhage  Gelfoam was placed over the exit to decrease the egress of spinal fluid  The ventricular catheter was sent as specimen for culture  Valve was also subsequently sent as culture  The remainder of the shunt tubing was found to be significantly calcified in the neck  With significant dissection as able to remove this from the externalization incision  The distal catheter was unable to be removed at the time of externalization  I explored the distal incision and was able to localize portion of the distal catheter  This was then clamped and dissected  It was found to be significantly calcified making removal difficult  Ultimately I was able to dissect approximately 10 cm of distal catheter before it fractured  The wounds were copiously irrigated antibiotic irrigation  The galea and deep dermal layers were closed 2 0 Vicryl  Skin was closed with Monocryl  Glue was placed over this period    There are 2 uninterrupted and correct surgical counts  The patient was then transferred back to the ICU in stable neurologic condition  There was no qualified resident aid in this case  As such, due to its complex nature Dorie Jacobs was present and assisted for the duration of the case including exposure, removal, and closure        I was present for the entire procedure    Patient Disposition:  Critical Care Unit    SIGNATURE: Tre Au MD  DATE: June 18, 2020  TIME: 3:11 PM

## 2020-06-18 NOTE — RESPIRATORY THERAPY NOTE
RT Ventilator Management Note  Darryl Hernandez 21 y o  female MRN: [id number]  Unit/Bed#: ICU 01 Encounter: [id number]      Daily Screen       6/14/2020  0800 6/17/2020  0750          Patient safety screen outcome[de-identified]  Passed  Passed      Spont breathing trial % for 30 min:    No              Physical Exam:   Assessment Type: Assess only  General Appearance: Sleeping  Respiratory Pattern: Assisted  Chest Assessment: Chest expansion symmetrical  Bilateral Breath Sounds: Coarse, Scattered, Diminished  Cough: Strong, Productive  Suction: Trach  O2 Device: vent      Resp Comments: (P) Pt resting quietly in bed, on PSV settings  RN states frequent Apnea triggers, and with pt also due to go to OR today, Pt returned to full support  Pt TC at her residence  At this time, plan to continue this mode for now

## 2020-06-18 NOTE — PROGRESS NOTES
Progress Note - Critical Care   Casi Arndt 21 y o  female MRN: [id number]  Unit/Bed#: ICU 01 Encounter: [id number]    Assessment: 21year old female with PMH significant for cerebral palsy with spastic quadriplegia, bedbound and non-verbal at baseline, asthma, GERD, kidney stones, seizure disorder, pancreatitis, DI  Hx of hydrocephalus with indwelling  shunt and chronic respiratory failure with tracheostomy  Presented to [facility] with fever, AMS, and respiratory failure  Placed on mechanical ventilation at that time  Loaded with AEDs  Couldn't obtain CSF sample to r/o infection  Transferred from [facility] 6/3  Fluid from  shunt grew Serratia, currently on Meropenem per ID   shunt externalization on 6/13  To OR today for VPS removal through NSG      Plan:     Neuro: Diagnoses: AMS/acute 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; seizure disorder; Cerebral palsy with quadriplegia  - s/p removal of  shunt 6/13  - Meropenem day 16, ID following  - repeat CSF cultures 6/10 show few colonies of Serratia  - repeat CSF cultures 6/13 negative for growth  - repeat CSF cultures 6/17 negative for bacteria, show high protein concentration  - Further Abx deescalation per ID  - CAM-ICU, delirium precautions  - Continue Keppra and Trileptal  - Continue Flexeril and Valium  - to OR today for shunt removal with NSG     CV: Hypotension  - softer pressures during hospitalization, but stable this AM  - no fluid bolus in last 24 hours   - monitor closely     Lung: Chronic respiratory failure status post trach  - Continue trach collar oxygenation  - Currently PSV 10/5 at 30%  - Continue pulmicort  - Wean vent as able, has been difficult so far     GI: No acute issues  - bowel reg in place     FEN:   Fluids: none  Lytes: Stable, replete as required  Nutrition: TF (Peptamen), cyclic at 50 cc per hour for 16 hours per day; NPO currently for procedure     : No acute issues, no Her in place  - trend UOP and creatinine     ID: Serratia CSF infection  - Meropenem Day 16  - most recent repeat culture shows no growth  - ID following, recs appreciated  - retroperitoneal fluid collection drained by IR 6/5     Heme: Anemia  - baseline Hgb around 11  - Transfuse as needed to maintain Hbg > 7 0  - Hbg 9 2 this AM     Endo: History of DI  - DDAVP qDaily  - monitor sugars with inpatient goal of 140-180                Msk/Skin: Local wound care  LDA:  - R PICC 6/4  - G tube  - external drain 6/13  - Shiley cuffed     Disposition: Continue critical care    Chief Complaint: Fever, AMS, and respiratory failure    HPI/24hr events: Switched back to pressure support  Reportedly has brief episodes of apnea that spontaneously resolve  No other acute evens reported  Physical Exam:     Vitals:    06/18/20 0400 06/18/20 0409 06/18/20 0500 06/18/20 0600   BP:  149/92     BP Location:       Pulse: (!) 114 (!) 110 (!) 116 (!) 110   Resp: 22 22 12 20   Temp: 100 3 °F (37 9 °C)      TempSrc: Oral      SpO2: 99% 99% 95% 100%   Weight:    59 4 kg (130 lb 15 3 oz)   Height:           Physical Exam   Constitutional: She appears well-developed and well-nourished  No distress  HENT:   Head: Normocephalic  EVD in place   Eyes: Pupils are equal, round, and reactive to light  Conjunctivae are normal  No scleral icterus  Neck: Neck supple  No JVD present  Cardiovascular: Normal rate, regular rhythm, normal heart sounds and intact distal pulses  Exam reveals no gallop and no friction rub  No murmur heard  Pulmonary/Chest: Effort normal  No stridor  No respiratory distress  She has no wheezes  She has no rales  Ventilator breath sounds  Abdominal: Soft  Bowel sounds are normal  She exhibits no distension and no mass  There is no tenderness  There is no rebound and no guarding  Musculoskeletal: She exhibits no edema, tenderness or deformity  Lymphadenopathy:     She has no cervical adenopathy     Neurological:   Neuro exam limited secondary cerebral palsy  Patient nonverbal  Eyes do not track  Withdraws to pain in all 4 extremities  Stable overall from previous exams  Skin: Skin is warm and dry  Capillary refill takes less than 2 seconds  She is not diaphoretic  Nursing note and vitals reviewed  Temperature:   Temp (24hrs), Av 2 °F (37 3 °C), Min:98 2 °F (36 8 °C), Max:100 3 °F (37 9 °C)    Current: Temperature: 100 3 °F (37 9 °C)    Weights:   IBW: 22 5 kg    Body mass index is 36 83 kg/m²  Weight (last 2 days)     Date/Time   Weight    20 0600   59 4 (130 95)    20 0556   61 2 (134 92)              Hemodynamic Monitoring:  N/A     Non-Invasive/Invasive Ventilation Settings:  Respiratory    Lab Data (Last 4 hours)    None         O2/Vent Data (Last 4 hours)       0316           Vent Mode CPAP/PS Spont       FIO2 (%) (%) 30       PEEP (cmH2O) (cmH2O) 5       Pressure Support (cmH2O) (cmH20) 10       MV (Obs) 6 1       RSBI 76                 No results found for: PHART, NPN7QIY, PO2ART, CWX3QCL, T3VQTRQF, BEART, SOURCE      Intake and Outputs:  I/O        0701 -  0700  07 -  0700    P  O   0    I V  (mL/kg) 50 (0 8) 100 (1 7)    NG/ 312    IV Piggyback 400 380    Feedings 783 666    Total Intake(mL/kg) 1553 (25 4) 1458 (24 5)    Urine (mL/kg/hr) 1207 (0 8) 1059 (0 7)    Emesis/NG output 0 0    Drains 0 0    Stool  0    Total Output 1207 1059    Net +346 +399          Unmeasured Urine Occurrence 1 x 2 x    Unmeasured Stool Occurrence  1 x        UOP: +1 458, -1 059 L over 24 hours  Nutrition:        Diet Orders   (From admission, onward)             Start     Ordered    20 0000  Diet NPO; Sips with meds  Diet effective midnight     Question Answer Comment   Diet Type NPO    NPO Except: Sips with meds        20 0600              TF currently held      Labs:   Results from last 7 days   Lab Units 20  0503 20  0458 20  0428   WBC Thousand/uL 9  43 7 78 9 69   HEMOGLOBIN g/dL 9 2* 8 6* 9 3*   HEMATOCRIT % 31 3* 29 4* 31 1*   PLATELETS Thousands/uL 441* 421* 422*   NEUTROS PCT % 76* 74 87*   MONOS PCT % 6 8 6    Results from last 7 days   Lab Units 06/18/20  0503 06/17/20  0458 06/16/20  0428   SODIUM mmol/L 139 140 137   POTASSIUM mmol/L 4 0 3 6 3 7   CHLORIDE mmol/L 108 107 105   CO2 mmol/L 27 28 27   BUN mg/dL 12 15 16   CREATININE mg/dL 0 38* 0 31* 0 36*   CALCIUM mg/dL 9 6 9 0 9 0     Results from last 7 days   Lab Units 06/18/20  0503 06/17/20  0458 06/16/20  0428   MAGNESIUM mg/dL 2 4 2 6 2 8*     Results from last 7 days   Lab Units 06/18/20  0503 06/17/20  0458 06/16/20  0428   PHOSPHORUS mg/dL 2 0* 2 1* 2 0*              No results found for: TROPONINI    Imaging:  I have personally reviewed pertinent reports  EKG: Reviewed    Micro:  Lab Results   Component Value Date    WOUNDCULT No growth 06/05/2020       Allergies: Allergies   Allergen Reactions    Baclofen Other (See Comments)     Reaction Date: [id number];   apena      Cefepime Hives    Cephalosporins      Other reaction(s): Unknown Reaction  Other reaction(s): Unknown Reaction  Other reaction(s): Unknown Reaction      Clonidine      Other reaction(s): Respiratory Distress, Respiratory Distress    Lactase     Milk-Related Compounds     Penicillins Hives     Reaction Date: [id number];       Phenobarbital Other (See Comments)     Reaction Date: [id number]; Other reaction(s): Other (See Comments)  apnea  Other reaction(s): Other (See Comments)  apnea  apnea      Vancomycin     Zolpidem Other (See Comments)     Other reaction(s):  Other (see comments)  Reaction Date: [id number];   apnea  apnea  apnea      Latex Rash       Medications:   Scheduled Meds:  Current Facility-Administered Medications:  acetaminophen 650 mg Per G Tube Q4H PRN Lavena Kyaw Campos DO    bisacodyl 10 mg Rectal Daily MADAY Alatorre    budesonide 0 5 mg Nebulization BID Elisa Jennings DO calcium carbonate-vitamin D 1 tablet Oral Daily With Breakfast Cinderella Side, CRNP    chlorhexidine 15 mL Swish & Spit Q12H [address] Chris Edouard PA-C    chlorhexidine 15 mL Swish & Spit Once Karen Perez PA-C    cholecalciferol 1,000 Units Oral Daily Cinderella Side, CRANKIT    clindamycin 900 mg Intravenous Once Karen Perez PA-C    cyclobenzaprine 5 mg Per G Tube TID PRN Castro Novka MD    desmopressin 0 05 mg Per G Tube Daily Karie Ballard PA-C    diazepam 5 mg Per G Tube Q8H Castro Novak MD    fentanyl citrate (PF) 50 mcg Intravenous Q1H PRN MADAY Almodovar    glycerin-hypromellose- 1 drop Both Eyes Q3H PRN Elisa Jennings DO    guaiFENesin 400 mg Oral Q8H MADAY Alatorre    heparin (porcine) 5,000 Units Subcutaneous Q8H [address] Julio Grossman DO    levETIRAcetam 1,000 mg Per G Tube BID Castro Novak MD    levETIRAcetam 500 mg Per G Tube Q24H Castro Novak MD    LORazepam 1 mg Intravenous Q4H PRN Jovan Beckman MD    meropenem 2,000 mg Intravenous Q8H Lisa Hooper MD Last Rate: 2,000 mg (06/18/20 0201)   OXcarbazepine 450 mg Per G Tube Q12H [address] Castro Novak MD    potassium-sodium phosphates 2 packet Oral 4x Daily (with meals and at bedtime) Minoo Adams DO    senna-docusate sodium 1 tablet Per G Tube HS Castro Novak MD    sodium chloride 125 mL/hr Intravenous Continuous Trena Gonsalves PA-C      Continuous Infusions:  sodium chloride 125 mL/hr     PRN Meds:    acetaminophen 650 mg Q4H PRN   cyclobenzaprine 5 mg TID PRN   fentanyl citrate (PF) 50 mcg Q1H PRN   glycerin-hypromellose- 1 drop Q3H PRN   LORazepam 1 mg Q4H PRN       VTE Pharmacologic Prophylaxis: Reason for no pharmacologic prophylaxis Heparin held pre-procedure  VTE Mechanical Prophylaxis: sequential compression device    Invasive lines and devices:   Invasive Devices     Peripherally Inserted Central Catheter Line            PICC Line 88/91/44 Right Basilic 13 days          Drain Gastrostomy/Enterostomy -- days    Open Drain Anterior;Right Neck 4 days    External Urinary Catheter 1 day          Airway            Surgical Airway Shiley Cuffed -- days                   Counseling / Coordination of Care  Total Critical Care time spent 30 minutes excluding procedures, teaching and family updates  Code Status: Level 1 - Full Code     Portions of the record may have been created with voice recognition software  Occasional wrong word or "sound a like" substitutions may have occurred due to the inherent limitations of voice recognition software  Read the chart carefully and recognize, using context, where substitutions have occurred       Reed Catalan DO

## 2020-06-19 LAB
ANION GAP SERPL CALCULATED.3IONS-SCNC: 6 MMOL/L (ref 4–13)
BASOPHILS # BLD AUTO: 0.03 THOUSANDS/ΜL (ref 0–0.1)
BASOPHILS NFR BLD AUTO: 0 % (ref 0–1)
BUN SERPL-MCNC: 11 MG/DL (ref 5–25)
CALCIUM SERPL-MCNC: 8.8 MG/DL (ref 8.3–10.1)
CHLORIDE SERPL-SCNC: 107 MMOL/L (ref 100–108)
CO2 SERPL-SCNC: 27 MMOL/L (ref 21–32)
CREAT SERPL-MCNC: 0.32 MG/DL (ref 0.6–1.3)
EOSINOPHIL # BLD AUTO: 0.07 THOUSAND/ΜL (ref 0–0.61)
EOSINOPHIL NFR BLD AUTO: 1 % (ref 0–6)
ERYTHROCYTE [DISTWIDTH] IN BLOOD BY AUTOMATED COUNT: 16.5 % (ref 11.6–15.1)
GFR SERPL CREATININE-BSD FRML MDRD: 156 ML/MIN/1.73SQ M
GLUCOSE SERPL-MCNC: 104 MG/DL (ref 65–140)
GLUCOSE SERPL-MCNC: 107 MG/DL (ref 65–140)
GLUCOSE SERPL-MCNC: 124 MG/DL (ref 65–140)
HCT VFR BLD AUTO: 28.6 % (ref 34.8–46.1)
HGB BLD-MCNC: 8.4 G/DL (ref 11.5–15.4)
IMM GRANULOCYTES # BLD AUTO: 0.03 THOUSAND/UL (ref 0–0.2)
IMM GRANULOCYTES NFR BLD AUTO: 0 % (ref 0–2)
LYMPHOCYTES # BLD AUTO: 2.35 THOUSANDS/ΜL (ref 0.6–4.47)
LYMPHOCYTES NFR BLD AUTO: 31 % (ref 14–44)
MAGNESIUM SERPL-MCNC: 2.2 MG/DL (ref 1.6–2.6)
MCH RBC QN AUTO: 24.7 PG (ref 26.8–34.3)
MCHC RBC AUTO-ENTMCNC: 29.4 G/DL (ref 31.4–37.4)
MCV RBC AUTO: 84 FL (ref 82–98)
MONOCYTES # BLD AUTO: 0.65 THOUSAND/ΜL (ref 0.17–1.22)
MONOCYTES NFR BLD AUTO: 8 % (ref 4–12)
NEUTROPHILS # BLD AUTO: 4.57 THOUSANDS/ΜL (ref 1.85–7.62)
NEUTS SEG NFR BLD AUTO: 60 % (ref 43–75)
NRBC BLD AUTO-RTO: 0 /100 WBCS
PHOSPHATE SERPL-MCNC: 4.3 MG/DL (ref 2.7–4.5)
PLATELET # BLD AUTO: 407 THOUSANDS/UL (ref 149–390)
PMV BLD AUTO: 8.7 FL (ref 8.9–12.7)
POTASSIUM SERPL-SCNC: 3.5 MMOL/L (ref 3.5–5.3)
RBC # BLD AUTO: 3.4 MILLION/UL (ref 3.81–5.12)
SODIUM SERPL-SCNC: 140 MMOL/L (ref 136–145)
WBC # BLD AUTO: 7.7 THOUSAND/UL (ref 4.31–10.16)

## 2020-06-19 PROCEDURE — 83735 ASSAY OF MAGNESIUM: CPT | Performed by: PHYSICIAN ASSISTANT

## 2020-06-19 PROCEDURE — 82948 REAGENT STRIP/BLOOD GLUCOSE: CPT

## 2020-06-19 PROCEDURE — 80048 BASIC METABOLIC PNL TOTAL CA: CPT | Performed by: PHYSICIAN ASSISTANT

## 2020-06-19 PROCEDURE — 84100 ASSAY OF PHOSPHORUS: CPT | Performed by: PHYSICIAN ASSISTANT

## 2020-06-19 PROCEDURE — 94003 VENT MGMT INPAT SUBQ DAY: CPT

## 2020-06-19 PROCEDURE — 99291 CRITICAL CARE FIRST HOUR: CPT | Performed by: EMERGENCY MEDICINE

## 2020-06-19 PROCEDURE — 85025 COMPLETE CBC W/AUTO DIFF WBC: CPT | Performed by: PHYSICIAN ASSISTANT

## 2020-06-19 PROCEDURE — 94640 AIRWAY INHALATION TREATMENT: CPT

## 2020-06-19 PROCEDURE — 94760 N-INVAS EAR/PLS OXIMETRY 1: CPT

## 2020-06-19 PROCEDURE — 99232 SBSQ HOSP IP/OBS MODERATE 35: CPT | Performed by: INTERNAL MEDICINE

## 2020-06-19 PROCEDURE — 99024 POSTOP FOLLOW-UP VISIT: CPT | Performed by: PHYSICIAN ASSISTANT

## 2020-06-19 RX ORDER — POTASSIUM CHLORIDE 20MEQ/15ML
40 LIQUID (ML) ORAL ONCE
Status: COMPLETED | OUTPATIENT
Start: 2020-06-19 | End: 2020-06-19

## 2020-06-19 RX ADMIN — POTASSIUM & SODIUM PHOSPHATES POWDER PACK 280-160-250 MG 2 PACKET: 280-160-250 PACK at 11:32

## 2020-06-19 RX ADMIN — OXCARBAZEPINE 450 MG: 300 TABLET, FILM COATED ORAL at 05:33

## 2020-06-19 RX ADMIN — CHLORHEXIDINE GLUCONATE 0.12% ORAL RINSE 15 ML: 1.2 LIQUID ORAL at 10:05

## 2020-06-19 RX ADMIN — MEROPENEM 2000 MG: 1 INJECTION, POWDER, FOR SOLUTION INTRAVENOUS at 04:00

## 2020-06-19 RX ADMIN — LEVETIRACETAM 1000 MG: 100 SOLUTION ORAL at 13:03

## 2020-06-19 RX ADMIN — DIAZEPAM 5 MG: 5 TABLET ORAL at 17:31

## 2020-06-19 RX ADMIN — MELATONIN 1000 UNITS: at 10:05

## 2020-06-19 RX ADMIN — LEVETIRACETAM 1000 MG: 100 SOLUTION ORAL at 05:32

## 2020-06-19 RX ADMIN — Medication 1 TABLET: at 07:41

## 2020-06-19 RX ADMIN — GUAIFENESIN 400 MG: 100 SOLUTION ORAL at 17:31

## 2020-06-19 RX ADMIN — MEROPENEM 2000 MG: 1 INJECTION, POWDER, FOR SOLUTION INTRAVENOUS at 10:06

## 2020-06-19 RX ADMIN — POTASSIUM & SODIUM PHOSPHATES POWDER PACK 280-160-250 MG 2 PACKET: 280-160-250 PACK at 22:30

## 2020-06-19 RX ADMIN — HEPARIN SODIUM 5000 UNITS: 5000 INJECTION INTRAVENOUS; SUBCUTANEOUS at 22:29

## 2020-06-19 RX ADMIN — HEPARIN SODIUM 5000 UNITS: 5000 INJECTION INTRAVENOUS; SUBCUTANEOUS at 05:32

## 2020-06-19 RX ADMIN — DESMOPRESSIN ACETATE 0.05 MG: 0.1 TABLET ORAL at 10:06

## 2020-06-19 RX ADMIN — GUAIFENESIN 400 MG: 100 SOLUTION ORAL at 04:16

## 2020-06-19 RX ADMIN — BUDESONIDE 0.5 MG: 0.5 INHALANT RESPIRATORY (INHALATION) at 07:20

## 2020-06-19 RX ADMIN — POTASSIUM & SODIUM PHOSPHATES POWDER PACK 280-160-250 MG 2 PACKET: 280-160-250 PACK at 07:40

## 2020-06-19 RX ADMIN — DIAZEPAM 5 MG: 5 TABLET ORAL at 10:05

## 2020-06-19 RX ADMIN — GUAIFENESIN 400 MG: 100 SOLUTION ORAL at 10:05

## 2020-06-19 RX ADMIN — LEVETIRACETAM 500 MG: 100 SOLUTION ORAL at 22:30

## 2020-06-19 RX ADMIN — POTASSIUM CHLORIDE 40 MEQ: 20 SOLUTION ORAL at 15:29

## 2020-06-19 RX ADMIN — MEROPENEM 2000 MG: 1 INJECTION, POWDER, FOR SOLUTION INTRAVENOUS at 18:00

## 2020-06-19 RX ADMIN — CHLORHEXIDINE GLUCONATE 0.12% ORAL RINSE 15 ML: 1.2 LIQUID ORAL at 22:29

## 2020-06-19 RX ADMIN — HEPARIN SODIUM 5000 UNITS: 5000 INJECTION INTRAVENOUS; SUBCUTANEOUS at 13:03

## 2020-06-19 RX ADMIN — OXCARBAZEPINE 450 MG: 300 TABLET, FILM COATED ORAL at 17:32

## 2020-06-19 RX ADMIN — BISACODYL 10 MG: 10 SUPPOSITORY RECTAL at 10:05

## 2020-06-19 RX ADMIN — SENNOSIDES AND DOCUSATE SODIUM 1 TABLET: 8.6; 5 TABLET ORAL at 22:29

## 2020-06-19 RX ADMIN — BUDESONIDE 0.5 MG: 0.5 INHALANT RESPIRATORY (INHALATION) at 19:17

## 2020-06-19 RX ADMIN — DIAZEPAM 5 MG: 5 TABLET ORAL at 04:16

## 2020-06-19 RX ADMIN — POTASSIUM CHLORIDE 40 MEQ: 1.5 SOLUTION ORAL at 10:05

## 2020-06-19 RX ADMIN — POTASSIUM & SODIUM PHOSPHATES POWDER PACK 280-160-250 MG 2 PACKET: 280-160-250 PACK at 15:30

## 2020-06-19 NOTE — ASSESSMENT & PLAN NOTE
POD#1 REMOVAL OF RIGHT SHUNT VENTRICULAR PERITONEAL  · Retained piece of distal catheter due to significant calcification  · Externalization of shunt on 6/13    Pt has a hx of  shunt insertion for congenital hydrocephalus  · P/w SIRS, acute encephalopathy, and seizure like activity  · Pt also had cardiac arrest and was resuscitated while at [place] [address]  · Continue regular neurologic checks  · Ongoing medical management per primary team   · ID and neurology following  · PT/OT  · DVT PPX: SCDs, Heparin    Neurosurgery will follow as needed during hospitalization  Outpatient follow up in 2 weeks for incision check and 6 weeks with MD   Please call with questions or concerns, signed off

## 2020-06-19 NOTE — SOCIAL WORK
Pt reviewed during rounds  As per Dr Mayra Gilbert working on vent weaning  TC to [employer], [facility]  CM left VM to update pt on dcp

## 2020-06-19 NOTE — RESPIRATORY THERAPY NOTE
RT Ventilator Management Note  Karin Bello 21 y o  female MRN: [id number]  Unit/Bed#: ICU 01 Encounter: [id number]      Daily Screen       6/17/2020  0750 6/19/2020  0722          Patient safety screen outcome[de-identified]  Passed  Passed      Spont breathing trial % for 30 min:  No  No      Spont breathing trial outcome[de-identified]    Failed      Spont breathing trial reason failed:    RR < 8 bpm;RSBI > 100      RSBI:    65              Physical Exam:   Assessment Type: (P) Assess only  General Appearance: (P) Sleeping  Respiratory Pattern: (P) Assisted  Chest Assessment: (P) Chest expansion symmetrical  Bilateral Breath Sounds: (P) Coarse  R Breath Sounds: (P) Coarse  L Breath Sounds: (P) Coarse  Suction: (P) Trach  O2 Device: vent      Resp Comments: (P) Pt remains on AC settings  Attempted to wean pt on PS of 10, however she was unable to maintain an acceptable VE  After about a minute it had dropped to 1 5  Returned to [facility] and pt appears comfortable  VS are stable, will continue to monitor

## 2020-06-19 NOTE — PROGRESS NOTES
Progress Note - Mariama Stearns 1995, 21 y o  female MRN: [id number]    Unit/Bed#: ICU 01 Encounter: [id number]    Primary Care Provider: Butch Rodriguez MD   Date and time admitted to hospital: 6/3/2020  2:15 PM        S/P  shunt  Assessment & Plan  POD#1 REMOVAL OF RIGHT SHUNT VENTRICULAR PERITONEAL  · Retained piece of distal catheter due to significant calcification  · Externalization of shunt on 6/13    Pt has a hx of  shunt insertion for congenital hydrocephalus  · P/w SIRS, acute encephalopathy, and seizure like activity  · Pt also had cardiac arrest and was resuscitated while at [place] [address]  · Continue regular neurologic checks  · Ongoing medical management per primary team   · ID and neurology following  · PT/OT  · DVT PPX: SCDs, Heparin    Neurosurgery will follow as needed during hospitalization  Outpatient follow up in 2 weeks for incision check and 6 weeks with MD   Please call with questions or concerns, signed off  Severe sepsis with acute organ dysfunction due to Serratia species [facility])  Assessment & Plan  ID following  · Currently afebrile  · WBC within normal limits  · Continue Meropenem 2g Q8, will likely continue 5-7 days postop  · Daptomycin discontinued  · CSF from shunt tap 6/3 and 6/10 positive for serratia marcescens  · CSF cultures from 6/13 without growth to date, final report  · Repeat CSF cultures taken 6/17  ·   · RBC 46  · Protein 535  · Glucose 30  · No bacteria noted on Gram stain  · Intraoperative cultures 6/18 pending  · Back wound culture and left iliac tap negative  Cerebral palsy ([address] )  Assessment & Plan  · Continue medical management    Acute metabolic encephalopathy  Assessment & Plan  · Ongoing monitoring and management per primary team    * Seizure-like activity [facility])  Assessment & Plan  · Neurology following, AED per their recommendations  · Completed vEEG 6/8 with no noted seizure activity       Subjective/Objective Chief Complaint: POD#1 removal  shunt    Subjective:  Postoperatively was found to be hypotensive and bradycardic with EKG showing sinus bradycardia with QTC within normal limits  Was placed on maintenance fluids  No additional overnight events noted  Objective:  Lying in bed, trach in place, NAD    I/O       06/17 0701 - 06/18 0700 06/18 0701 - 06/19 0700 06/19 0701 - 06/20 0700    P  O  0 0     I V  (mL/kg) 100 (1 7) 2425 8 (40 7) 228 3 (3 8)    NG/ 385     IV Piggyback 380 230     Feedings 666 0     Total Intake(mL/kg) 1458 (24 5) 3040 8 (51) 228 3 (3 8)    Urine (mL/kg/hr) 1059 (0 7) 1200 (0 8)     Emesis/NG output 0 0     Drains 0 0     Stool 0      Total Output 1059 1200     Net +399 +1840 8 +228 3           Unmeasured Urine Occurrence 2 x      Unmeasured Stool Occurrence 1 x            Invasive Devices     Peripherally Inserted Central Catheter Line            PICC Line 94/21/83 Right Basilic 14 days          Drain            Gastrostomy/Enterostomy -- days    External Urinary Catheter 2 days          Airway            Surgical Airway Shiley Cuffed -- days    Surgical Airway Shiley Cuffed less than 1 day                Physical Exam:  Vitals: Blood pressure 101/53, pulse 58, temperature 98 6 °F (37 °C), temperature source Oral, resp  rate 15, height 4' 2" (1 27 m), weight 59 6 kg (131 lb 6 3 oz), SpO2 97 %  ,Body mass index is 36 95 kg/m²        General appearance: alert, no distress  Head: Normocephalic, without obvious abnormality, right frontal incision clean dry intact with mild swelling  Eyes: upward deviated gaze at baseline  Neck: supple, symmetrical, trachea midline, right incision where drain was externalized clean dry intact  Lungs: trach in place  Heart: regular heart rate  Neurologic:   Mental status: GCS 9t (E4, V1t, M4), trach in place, does not follow commands, flexion to pain BUE, minimal movement to pain BLE, nonverbal at baseline  Cranial nerves: grossly intact (Cranial nerves II-XII)  Sensory: intact to crude touch      Lab Results:  Results from last 7 days   Lab Units 06/19/20  0516 06/18/20  0503 06/17/20  0458   WBC Thousand/uL 7 70 9 43 7 78   HEMOGLOBIN g/dL 8 4* 9 2* 8 6*   HEMATOCRIT % 28 6* 31 3* 29 4*   PLATELETS Thousands/uL 407* 441* 421*   NEUTROS PCT % 60 76* 74   MONOS PCT % 8 6 8     Results from last 7 days   Lab Units 06/19/20  0516 06/18/20  0503 06/17/20  0458   POTASSIUM mmol/L 3 5 4 0 3 6   CHLORIDE mmol/L 107 108 107   CO2 mmol/L 27 27 28   BUN mg/dL 11 12 15   CREATININE mg/dL 0 32* 0 38* 0 31*   CALCIUM mg/dL 8 8 9 6 9 0     Results from last 7 days   Lab Units 06/19/20  0516 06/18/20  0503 06/17/20  0458   MAGNESIUM mg/dL 2 2 2 4 2 6     Results from last 7 days   Lab Units 06/19/20  0516 06/18/20  0503 06/17/20  0458   PHOSPHORUS mg/dL 4 3 2 0* 2 1*         No results found for: TROPONINT  ABG:  Lab Results   Component Value Date    PHART 7 427 06/05/2020    ILX7IQK 42 0 06/05/2020    PO2ART 97 0 06/05/2020    VHX8HBY 27 1 06/05/2020    BEART 2 5 06/05/2020    SOURCE Radial, Right 06/05/2020       Imaging Studies: I have personally reviewed pertinent reports  and I have personally reviewed pertinent films in PACS    Xr Skull < 4 Vw    Result Date: 6/6/2020  Impression: Baseline pressure setting is obtained  Workstation performed: [id number]     Ct Guided Perc Drainage Catheter Placeme    Result Date: 6/8/2020  Impression: Impression: Successful CT-guided aspiration left iliac crest collection  Workstation performed: [id number]       EKG, Pathology, and Other Studies: I have personally reviewed pertinent reports        VTE Pharmacologic Prophylaxis: Heparin    VTE Mechanical Prophylaxis: sequential compression device

## 2020-06-19 NOTE — DISCHARGE INSTRUCTIONS
 Monitor incisions daily  Keep incision clean and dry   May shower and wash hair 72 hours after surgery   Avoid rubbing the incision, but gently massage hair   Do not use a hair dryer, and avoid hair products such as mousse, oils, and gels  Do not brush your hair away from the incision since this will put strain on the suture line   Do not dye or perm hair until cleared by MD Marie Thakkar Please remove dressing within 1-2 days after surgery   No soaking in tub   May walk as tolerated: 3 short walks daily   Avoid heavy lifting, pushing or pulling over 10lbs for 2 weeks   Do not drive until cleared by MD/PA   Coumadin, ASA, Plavix may be restarted once cleared by MD Marie Thakkar Follow-up for a 2 week incision check and staple removal as scheduled  Follow-up for a 6 week post-operative visit with MD     **Please contact MD if incision becomes red, swelling, and tender or has increased drainage  Or if you experience increased headaches, difficulties walking, nausea/vomiting, changes in vision, and increased drowsiness or temp>101   **

## 2020-06-19 NOTE — RESPIRATORY THERAPY NOTE
RT Ventilator Management Note  Kerri Mahmood 21 y o  female MRN: [id number]  Unit/Bed#: ICU 01 Encounter: [id number]      Daily Screen       6/14/2020  0800 6/17/2020  0750          Patient safety screen outcome[de-identified]  Passed  Passed      Spont breathing trial % for 30 min:    No              Physical Exam:   Assessment Type: (P) Assess only  General Appearance: (P) Sleeping  Respiratory Pattern: (P) Assisted  Chest Assessment: (P) Chest expansion symmetrical  Bilateral Breath Sounds: (P) Coarse  Suction: Trach  O2 Device: (P) vent      Resp Comments: (P) No changes made at this time, pt is stable on current [facility] settings for the night  No events to report  Will cont to monitor pt per protocol

## 2020-06-19 NOTE — PROGRESS NOTES
Progress Note - Critical Care   Keith Carrillo 21 y o  female MRN: [id number]  Unit/Bed#: ICU 01 Encounter: [id number]    Assessment: 21year old female with PMH significant for cerebral palsy with spastic quadriplegia, bedbound and non-verbal at baseline, asthma, GERD, kidney stones, seizure disorder, pancreatitis, DI  Hx of hydrocephalus with indwelling  shunt and chronic respiratory failure with tracheostomy  Presented to [facility] with fever, AMS, and respiratory failure  Placed on mechanical ventilation at that time  Loaded with AEDs  Couldn't obtain CSF sample to r/o infection  Transferred from [facility] 6/3  Fluid from  shunt grew Serratia, currently on Meropenem per ID   shunt externalization on 6/13  POD #2 for VPS removal through NSG      Plan:     Neuro: Diagnoses: AMS/acute 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; seizure disorder; Cerebral palsy with quadriplegia  - s/p removal of  shunt 6/13  - Meropenem day 18, ID following, recs appreciated: Continue antibiotics for 5-7 days postop; stop date in place  - repeat CSF cultures 6/10 show few colonies of Serratia  - repeat CSF cultures 6/13 negative for growth  - repeat CSF cultures 6/17 negative for bacteria, show high protein concentration  - CAM-ICU, delirium precautions  - Continue Keppra and Trileptal  - Continue Flexeril and Valium  - POD2 for shunt removal with NSG  - preliminary intraoperative cultures show 1+ growth of oxidase positive Gram-negative rods  - NSG signed off     CV: Hypotension  -  pressures have been appropriate over last 24 hours  - monitor closely     Lung: Chronic respiratory failure status post trach  - Continue trach collar oxygenation  - Currently  pressure support 14/5 at 30%  - Continue pulmicort  - Wean vent as able, has been difficult so far     GI: No acute issues  - bowel reg in place     FEN: Fluids: none at this time  Lytes: Stable, replete as required  Nutrition: TF (Peptamen), cyclic at 50 cc per hour for 16 hours per day; resumed following procedure     : No acute issues, no Her in place  - trend UOP and creatinine     ID: Serratia CSF infection  - Meropenem Day 18  - most recent repeat culture shows no growth  - ID following, recs appreciated  - retroperitoneal fluid collection drained by IR 6/5     Heme: Anemia  - baseline Hgb around 11  - Transfuse as needed to maintain Hbg > 7 0  - Hbg 8 4 this AM     Endo: History of DI  - DDAVP qDaily  - monitor sugars with inpatient goal of 140-180                Msk/Skin: Local wound care  LDA:  - R PICC 6/4  - G tube  - Shiley cuffed     Disposition: Continue critical care    Chief Complaint: Fever, AMS, and respiratory failure    HPI/24hr events:  No acute overnight events reported  Unable to obtain review of systems secondary to baseline nonverbal mental status  Physical Exam:     Vitals:    06/19/20 1400 06/19/20 1500 06/19/20 1553 06/19/20 1600   BP: 126/83 134/71  144/67   BP Location:       Pulse: 102 104  102   Resp: 17 18 19   Temp:    99 °F (37 2 °C)   TempSrc:    Rectal   SpO2: 100% 100% 100% 100%   Weight:       Height:         Physical Exam   Constitutional: She appears well-developed and well-nourished  No distress  HENT:   Head: Normocephalic  Closed sutures in place  Eyes: Pupils are equal, round, and reactive to light  Conjunctivae are normal  No scleral icterus  Neck: Neck supple  No JVD present  Cardiovascular: Normal rate, regular rhythm, normal heart sounds and intact distal pulses  Exam reveals no gallop and no friction rub  No murmur heard  Pulmonary/Chest: Effort normal  No stridor  No respiratory distress  She has no wheezes  She has no rales  Ventilator associated breath sounds  No adventitious lung sounds auscultated including rales, rhonchi, or wheezes  Abdominal: Soft  Bowel sounds are normal  She exhibits no distension and no mass  There is no tenderness  There is no rebound and no guarding     G-tube in place Musculoskeletal: She exhibits no edema, tenderness or deformity  Lymphadenopathy:     She has no cervical adenopathy  Neurological:   Neuro exam limited to baseline mental status  Nonverbal   Eyes do not track  Withdraws to pain in all 4 extremities  Skin: Skin is warm and dry  Capillary refill takes less than 2 seconds  She is not diaphoretic  Nursing note and vitals reviewed  Temperature:   Temp (24hrs), Av °F (36 7 °C), Min:96 3 °F (35 7 °C), Max:99 °F (37 2 °C)    Current: Temperature: 99 °F (37 2 °C)    Weights:   IBW: 22 5 kg    Body mass index is 36 95 kg/m²  Weight (last 2 days)     Date/Time   Weight    20 0552   59 6 (131 39)    20 0600   59 4 (130 95)              Hemodynamic Monitoring:  N/A     Non-Invasive/Invasive Ventilation Settings:  Respiratory    Lab Data (Last 4 hours)    None         O2/Vent Data (Last 4 hours)       1553           Vent Mode CPAP/PS Spont       FIO2 (%) (%) 30       PEEP (cmH2O) (cmH2O) 5       Pressure Support (cmH2O) (cmH20) 14       MV (Obs) 7 28       RSBI 75                 No results found for: PHART, AUE9BYH, PO2ART, JMO2JCH, Y3LXNOLE, BEART, SOURCE      Intake and Outputs:  I/O        07 -  0700  07 -  0700  07 -  0700    P  O  0 0     I V  (mL/kg) 100 (1 7) 2425 8 (40 7) 228 3 (3 8)    NG/ 385 300    IV Piggyback 380 230 100    Feedings 666 0     Total Intake(mL/kg) 1458 (24 5) 3040 8 (51) 628 3 (10 5)    Urine (mL/kg/hr) 1059 (0 7) 1200 (0 8) 800 (1 3)    Emesis/NG output 0 0     Drains 0 0     Stool 0      Total Output 1059 1200 800    Net +399 +1840 8 -171 7           Unmeasured Urine Occurrence 2 x      Unmeasured Stool Occurrence 1 x          UOP: + 1 67, -1 6 L over 24 hours   Nutrition:        Diet Orders   (From admission, onward)             Start     Ordered    20  Diet Enteral/Parenteral; Tube Feeding No Oral Diet; Non-Formulary; Cyclic; 50; 16 hours; Prosource Protein Liquid - Two Packets  Diet effective now     Comments:  Use peptamen 1 2 from home   Question Answer Comment   Diet Type Enteral/Parenteral    Enteral/Parenteral Tube Feeding No Oral Diet    Tube Feeding Formula: Non-Formulary    Bolus/Cyclic/Continuous Cyclic    Tube Feeding Cyclic Rate (mL/hr): 50    Tube Feeding Cyclic: Administer over: 16 hours    Prosource Protein Liquid - No Carb Prosource Protein Liquid - Two Packets    RD to adjust diet per protocol? Yes        06/18/20 2127              TF currently running at 50 cc/hour with a goal of 50 cc per hour over 16 hours a day  Formula:  Peptamen    Labs:   Results from last 7 days   Lab Units 06/19/20  0516 06/18/20  0503 06/17/20  0458   WBC Thousand/uL 7 70 9 43 7 78   HEMOGLOBIN g/dL 8 4* 9 2* 8 6*   HEMATOCRIT % 28 6* 31 3* 29 4*   PLATELETS Thousands/uL 407* 441* 421*   NEUTROS PCT % 60 76* 74   MONOS PCT % 8 6 8    Results from last 7 days   Lab Units 06/19/20  0516 06/18/20  0503 06/17/20  0458   SODIUM mmol/L 140 139 140   POTASSIUM mmol/L 3 5 4 0 3 6   CHLORIDE mmol/L 107 108 107   CO2 mmol/L 27 27 28   BUN mg/dL 11 12 15   CREATININE mg/dL 0 32* 0 38* 0 31*   CALCIUM mg/dL 8 8 9 6 9 0     Results from last 7 days   Lab Units 06/19/20  0516 06/18/20  0503 06/17/20  0458   MAGNESIUM mg/dL 2 2 2 4 2 6     Results from last 7 days   Lab Units 06/19/20  0516 06/18/20  0503 06/17/20  0458   PHOSPHORUS mg/dL 4 3 2 0* 2 1*              No results found for: TROPONINI    Imaging:  I have personally reviewed pertinent reports  EKG:  Reviewed    Micro:  Lab Results   Component Value Date    WOUNDCULT No growth 06/05/2020       Allergies:    Allergies   Allergen Reactions    Baclofen Other (See Comments)     Reaction Date: [id number];   apena      Cefepime Hives    Cephalosporins      Other reaction(s): Unknown Reaction  Other reaction(s): Unknown Reaction  Other reaction(s): Unknown Reaction      Clonidine      Other reaction(s): Respiratory Distress, Respiratory Distress    Lactase     Milk-Related Compounds     Penicillins Hives     Reaction Date: [id number];       Phenobarbital Other (See Comments)     Reaction Date: [id number]; Other reaction(s): Other (See Comments)  apnea  Other reaction(s): Other (See Comments)  apnea  apnea      Vancomycin     Zolpidem Other (See Comments)     Other reaction(s):  Other (see comments)  Reaction Date: [id number];   apnea  apnea  apnea      Latex Rash       Medications:   Scheduled Meds:  Current Facility-Administered Medications:  acetaminophen 650 mg Per G Tube Q4H PRN Cortney Geena PA-C    bisacodyl 10 mg Rectal Daily Cortney Bitorri PA-C    budesonide 0 5 mg Nebulization BID Cortney NATALIE Seay-C    calcium carbonate-vitamin D 1 tablet Oral Daily With Breakfast NATALIE Burnham-C    chlorhexidine 15 mL Swish & Spit Q12H [address] Cortney Bitorri PA-C    cholecalciferol 1,000 Units Oral Daily Cortney Bitorri PA-C    cyclobenzaprine 5 mg Per G Tube TID PRN Cortney Bitorri PA-C    desmopressin 0 05 mg Per G Tube Daily Cortney Bitorri PA-C    diazepam 5 mg Per G Tube Q8H Cortney Biannieo, PA-C    fentanyl citrate (PF) 50 mcg Intravenous Q1H PRN Cortney Biundo, PA-C    glycerin-hypromellose- 1 drop Both Eyes Q3H PRN Cortney Biannieo PA-C    guaiFENesin 400 mg Oral Q8H Cortney Biundo, PA-C    heparin (porcine) 5,000 Units Subcutaneous Q8H [address] Cortney Biundo PA-C    levETIRAcetam 1,000 mg Per G Tube BID Cortney Biundo, PA-C    levETIRAcetam 500 mg Per G Tube Q24H Cortney Biundo, PA-C    meropenem 2,000 mg Intravenous Q8H Julio Grossman DO Last Rate: Stopped (06/19/20 1029)   OXcarbazepine 450 mg Per G Tube Q12H [address] Cortney Seay PA-C    potassium-sodium phosphates 2 packet Oral 4x Daily (with meals and at bedtime) Cortney Seay PA-C    senna-docusate sodium 1 tablet Per G Tube HS Cortney Seay PA-C      Continuous Infusions:   PRN Meds:    acetaminophen 650 mg Q4H PRN   cyclobenzaprine 5 mg TID PRN   fentanyl citrate (PF) 50 mcg Q1H PRN   glycerin-hypromellose- 1 drop Q3H PRN       VTE Pharmacologic Prophylaxis: Heparin  VTE Mechanical Prophylaxis: sequential compression device    Invasive lines and devices: Invasive Devices     Peripherally Inserted Central Catheter Line            PICC Line 10/68/13 Right Basilic 15 days          Drain            Gastrostomy/Enterostomy -- days    External Urinary Catheter 2 days          Airway            Surgical Airway Shiley Cuffed -- days    Surgical Airway Shiley Cuffed 1 day                   Counseling / Coordination of Care  Total Critical Care time spent 35 minutes excluding procedures, teaching and family updates  Code Status: Level 1 - Full Code     Portions of the record may have been created with voice recognition software  Occasional wrong word or "sound a like" substitutions may have occurred due to the inherent limitations of voice recognition software  Read the chart carefully and recognize, using context, where substitutions have occurred       Chaitanya Lorenz DO

## 2020-06-19 NOTE — PROGRESS NOTES
Progress Note - Infectious Disease   Kylah Mills Confer 21 y o  female MRN: [id number]  Unit/Bed#: ICU 01 Encounter: [id number]      Impression/Recommendations:  1  Sepsis POA   Fever, tachycardia, tachypnea   Likely due to #2   Consider also aspiration given reported respiratory distress upon presentation to [facility] other clear source appreciated   Blood cultures at [facility] negative   Improved  Rec:  ? Continue antibiotics as below  ? Follow temperatures closely     2   Serratia  shunt infection   Consider translocation from GI tract although no reported intra-abdominal fluid collection   Repeat CSF cultures positive for Serratia despite 10 days IV antibiotics represents antibiotic failure   Status post VPS externalization with tolerance of clamping trial   Now status post complete proximal CNA VPS removal   Some distal extra-CNS portion remains due to heavy calcification  Rec:  ? Continue meropenem through 6/23/20 for 5 days post (3 weeks total) antibiotics     3  Acute on chronic hypoxic respiratory failure   No overt pneumonia on chest imaging from [place]   Consider role of aspiration versus encephalopathy   Patient on minimal vent requirements  Rec:  ? Antibiotics above would treat any concomitant pneumonia  ? Follow respiratory status closely  ? Supportive care as per the primary service     4  Seizure disorder   Initial concern for seizures at [facility] but vEEG here negative for seizure activity   Rhythmic blinking does not correlate to EEG findings per Neurology  Rec:  ? Continue antiepileptics per Neurology with close follow-up ongoing     5   Retroperitoneal collection   Reportedly chronic, seen on imaging done at [place] [id number] significance   Status post aspiration of the collection with negative cultures  Rec:  ?  No additional workup or treatment from an ID perspective     6   CP   With spastic quadriplegia, profound MR, hydrocephalus status post  shunt     7   Chronic respiratory failure   Status post trach      8   Recent VFib arrest   Consider anoxic encephalopathy  The patient is stable from an ID standpoint  I will reassess the patient on   Please call in the interim with new questions      Antibiotics:  Meropenem #17  POD #1    Subjective:  Patient seen on AM rounds  Unable to provide ROS due to nonverbal state  24 Hour Events:  Went to OR yesterday for complete removal of proximal  shunt, partial removal of distal shunt due to significant calcification  No documented fevers, chills, sweats, nausea, vomiting, or diarrhea  Objective:  Vitals:  Temp:  [96 3 °F (35 7 °C)-98 8 °F (37 1 °C)] 97 °F (36 1 °C)  HR:  [] 104  Resp:  [10-30] 18  BP: ()/(53-96) 134/71  SpO2:  [93 %-100 %] 100 %  Temp (24hrs), Av 8 °F (36 6 °C), Min:96 3 °F (35 7 °C), Max:98 8 °F (37 1 °C)  Current: Temperature: (!) 97 °F (36 1 °C)    Physical Exam:   General:  No acute distress  Psychiatric:  Obtunded   Pulmonary:  Normal respiratory excursion without accessory muscle use  Abdomen:  Soft, nontender  Extremities:  No edema  Skin:  No rashes    Lab Results:  I have personally reviewed pertinent labs  Results from last 7 days   Lab Units 20  0516 20  0503 20  0458   POTASSIUM mmol/L 3 5 4 0 3 6   CHLORIDE mmol/L 107 108 107   CO2 mmol/L 27 27 28   BUN mg/dL 11 12 15   CREATININE mg/dL 0 32* 0 38* 0 31*   EGFR ml/min/1 73sq m 156 148 158   CALCIUM mg/dL 8 8 9 6 9 0     Results from last 7 days   Lab Units 20  0516 20  0503 20  0458   WBC Thousand/uL 7 70 9 43 7 78   HEMOGLOBIN g/dL 8 4* 9 2* 8 6*   PLATELETS Thousands/uL 407* 441* 421*     Results from last 7 days   Lab Units 20  1433 20  1040 20  1946   GRAM STAIN RESULT  No Polys or Bacteria seen 2+ Polys  No bacteria seen No bacteria seen  No polys seen       Imaging Studies:   I have personally reviewed pertinent imaging study reports and images in PACS      EKG, Pathology, and Other Studies:   I have personally reviewed pertinent reports

## 2020-06-19 NOTE — PLAN OF CARE

## 2020-06-19 NOTE — PROGRESS NOTES
Progress Note - Critical Care   Kerri Mahmood 21 y o  female MRN: [id number]  Unit/Bed#: ICU 01 Encounter: [id number]    Assessment: 21year old female with PMH significant for cerebral palsy with spastic quadriplegia, bedbound and non-verbal at baseline, asthma, GERD, kidney stones, seizure disorder, pancreatitis, DI  Hx of hydrocephalus with indwelling  shunt and chronic respiratory failure with tracheostomy  Presented to [facility] with fever, AMS, and respiratory failure  Placed on mechanical ventilation at that time  Loaded with AEDs  Couldn't obtain CSF sample to r/o infection  Transferred from [facility] 6/3  Fluid from  shunt grew Serratia, currently on Meropenem per ID   shunt externalization on 6/13  POD #1 for VPS removal through NSG      Plan:     Neuro: Diagnoses: AMS/acute 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; seizure disorder; Cerebral palsy with quadriplegia  - s/p removal of  shunt 6/13  - Meropenem day 17, ID following, recs appreciated: Continue antibiotics for 5-7 days postop  - repeat CSF cultures 6/10 show few colonies of Serratia  - repeat CSF cultures 6/13 negative for growth  - repeat CSF cultures 6/17 negative for bacteria, show high protein concentration  - CAM-ICU, delirium precautions  - Continue Keppra and Trileptal  - Continue Flexeril and Valium  - POD1 for shunt removal with NSG  - preliminary intraoperative cultures pending     CV: Hypotension  - yesterday, following procedure, exhibited softer blood pressures with sinus bradycardia  - was started on maintenance fluids with good response  - pressures have since normalized after fluids and bradycardia as resolved spontaneously  - monitor closely     Lung: Chronic respiratory failure status post trach  - Continue trach collar oxygenation  - Currently ACVC 15/250/5/30%  - Continue pulmicort  - Wean vent as able, has been difficult so far     GI: No acute issues  - bowel reg in place     FEN:   Fluids: none at this time  Lytes: Stable, replete as required  Nutrition: TF (Peptamen), cyclic at 50 cc per hour for 16 hours per day; resumed following procedure     : No acute issues, no Her in place  - trend UOP and creatinine     ID: Serratia CSF infection  - Meropenem Day 17  - most recent repeat culture shows no growth  - ID following, recs appreciated  - retroperitoneal fluid collection drained by IR 6/5     Heme: Anemia  - baseline Hgb around 11  - Transfuse as needed to maintain Hbg > 7 0  - Hbg 8 4 this AM     Endo: History of DI  - DDAVP qDaily  - monitor sugars with inpatient goal of 140-180                Msk/Skin: Local wound care  LDA:  - R PICC 6/4  - G tube  - Shiley cuffed     Disposition: Continue critical care    Chief Complaint: Fever, AMS, and respiratory failure    HPI/24hr events:  Patient went down to OR yesterday for BP shot removal with neurosurgery  Procedure without identified immediate complications  She tolerated this procedure well  Following her presentation back to the floor, she was noted to be mildly hypotensive and bradycardic  EKG showed sinus bradycardia with QTC within normal limits  She was placed on maintenance fluids with good response  No other acute overnight events reported  Unable to obtain review of systems secondary to baseline mental status  Physical Exam:     Vitals:    06/19/20 0317 06/19/20 0400 06/19/20 0416 06/19/20 0552   BP:  101/53     BP Location:  Left arm     Pulse:  56 58    Resp:  15 15    Temp:   98 6 °F (37 °C)    TempSrc:   Oral    SpO2: 98% 99% 99%    Weight:    59 6 kg (131 lb 6 3 oz)   Height:         Physical Exam   Constitutional: She appears well-developed and well-nourished  No distress  HENT:   Head: Normocephalic  Closed sutures in place  Eyes: Pupils are equal, round, and reactive to light  Conjunctivae are normal  No scleral icterus  Neck: Neck supple  No JVD present     Cardiovascular: Normal rate, regular rhythm, normal heart sounds and intact distal pulses  Exam reveals no gallop and no friction rub  No murmur heard  Pulmonary/Chest: Effort normal  No stridor  No respiratory distress  She has no wheezes  She has no rales  Mildly tachypneic  Ventilator associated breath sounds  Abdominal: Soft  Bowel sounds are normal  She exhibits no distension and no mass  There is no tenderness  There is no rebound and no guarding  G tube in place   Musculoskeletal: She exhibits no edema, tenderness or deformity  Lymphadenopathy:     She has no cervical adenopathy  Neurological:   Neuro exam limited secondary CP  Patient nonverbal  Eyes do not track  Withdraws to pain in all 4 extremities  Stable exam       Skin: Skin is warm and dry  Capillary refill takes less than 2 seconds  She is not diaphoretic  Nursing note and vitals reviewed  Temperature:   Temp (24hrs), Av °F (36 7 °C), Min:96 8 °F (36 °C), Max:98 8 °F (37 1 °C)    Current: Temperature: 98 6 °F (37 °C)    Weights:   IBW: 22 5 kg    Body mass index is 36 95 kg/m²  Weight (last 2 days)     Date/Time   Weight    20 0552   59 6 (131 39)    20 0600   59 4 (130 95)              Hemodynamic Monitoring:  N/A     Non-Invasive/Invasive Ventilation Settings:  Respiratory    Lab Data (Last 4 hours)    None         O2/Vent Data (Last 4 hours)       0317           Vent Mode AC/VC       Resp Rate (BPM) (BPM) 15       Vt (mL) (mL) 250       FIO2 (%) (%) 30       PEEP (cmH2O) (cmH2O) 5       MV 4 8                 No results found for: PHART, QOG2JER, PO2ART, VIJ8UNI, S9CNUYYT, BEART, SOURCE      Intake and Outputs:  I/O        07 -  0700  07 -  0700    P  O  0 0    I V  (mL/kg) 100 (1 7) 2425 8 (40 7)    NG/ 385    IV Piggyback 380 230    Feedings 666 0    Total Intake(mL/kg) 1458 (24 5) 3040 8 (51)    Urine (mL/kg/hr) 1059 (0 7) 1200 (0 8)    Emesis/NG output 0 0    Drains 0 0    Stool 0     Total Output 1059 1200    Net +399 +1840 8          Unmeasured Urine Occurrence 2 x     Unmeasured Stool Occurrence 1 x         UOP: +3 04 L, -1 2 L over last 24 hours   Nutrition:        Diet Orders   (From admission, onward)             Start     Ordered    06/18/20 2126  Diet Enteral/Parenteral; Tube Feeding No Oral Diet; Non-Formulary; Cyclic; 50; 16 hours; Prosource Protein Liquid - Two Packets  Diet effective now     Comments:  Use peptamen 1 2 from home   Question Answer Comment   Diet Type Enteral/Parenteral    Enteral/Parenteral Tube Feeding No Oral Diet    Tube Feeding Formula: Non-Formulary    Bolus/Cyclic/Continuous Cyclic    Tube Feeding Cyclic Rate (mL/hr): 50    Tube Feeding Cyclic: Administer over: 16 hours    Prosource Protein Liquid - No Carb Prosource Protein Liquid - Two Packets    RD to adjust diet per protocol? Yes        06/18/20 2127              TF currently running at 50 cc/hour with a goal of 50 cc/hour for 16 hours over 24 hours  Formula: Peptamen    Labs:   Results from last 7 days   Lab Units 06/19/20  0516 06/18/20  0503 06/17/20  0458   WBC Thousand/uL 7 70 9 43 7 78   HEMOGLOBIN g/dL 8 4* 9 2* 8 6*   HEMATOCRIT % 28 6* 31 3* 29 4*   PLATELETS Thousands/uL 407* 441* 421*   NEUTROS PCT % 60 76* 74   MONOS PCT % 8 6 8    Results from last 7 days   Lab Units 06/19/20  0516 06/18/20  0503 06/17/20  0458   SODIUM mmol/L 140 139 140   POTASSIUM mmol/L 3 5 4 0 3 6   CHLORIDE mmol/L 107 108 107   CO2 mmol/L 27 27 28   BUN mg/dL 11 12 15   CREATININE mg/dL 0 32* 0 38* 0 31*   CALCIUM mg/dL 8 8 9 6 9 0     Results from last 7 days   Lab Units 06/19/20  0516 06/18/20  0503 06/17/20  0458   MAGNESIUM mg/dL 2 2 2 4 2 6     Results from last 7 days   Lab Units 06/19/20  0516 06/18/20  0503 06/17/20  0458   PHOSPHORUS mg/dL 4 3 2 0* 2 1*              No results found for: TROPONINI    Imaging:  I have personally reviewed pertinent reports        EKG:  Reviewed    Micro:  Lab Results   Component Value Date    WOUNDCULT No growth 06/05/2020       Allergies: Allergies   Allergen Reactions    Baclofen Other (See Comments)     Reaction Date: [id number];   apena      Cefepime Hives    Cephalosporins      Other reaction(s): Unknown Reaction  Other reaction(s): Unknown Reaction  Other reaction(s): Unknown Reaction      Clonidine      Other reaction(s): Respiratory Distress, Respiratory Distress    Lactase     Milk-Related Compounds     Penicillins Hives     Reaction Date: [id number];       Phenobarbital Other (See Comments)     Reaction Date: [id number]; Other reaction(s): Other (See Comments)  apnea  Other reaction(s): Other (See Comments)  apnea  apnea      Vancomycin     Zolpidem Other (See Comments)     Other reaction(s):  Other (see comments)  Reaction Date: [id number];   apnea  apnea  apnea      Latex Rash       Medications:   Scheduled Meds:  Current Facility-Administered Medications:  acetaminophen 650 mg Per G Tube Q4H PRN Cortney BiMATTHEW wild    bisacodyl 10 mg Rectal Daily Cortney BiNATALIE wild-HUGO    budesonide 0 5 mg Nebulization BID Cortney MATTHEW Seay    calcium carbonate-vitamin D 1 tablet Oral Daily With Breakfast CortneyNATALIE Trujillo-HUGO    chlorhexidine 15 mL Swish & Spit Q12H [facility] Cortney MATTHEW Seay    cholecalciferol 1,000 Units Oral Daily Cortney BiNATALIE wild-HUGO    cyclobenzaprine 5 mg Per G Tube TID PRN Cortney BiNATALIE wild-HUGO    desmopressin 0 05 mg Per G Tube Daily Cortney Biannieo, PA-HUGO    diazepam 5 mg Per G Tube Q8H Cortney NATALIE Seay-HUGO    fentanyl citrate (PF) 50 mcg Intravenous Q1H PRN Cortney Biundo, PA-C    glycerin-hypromellose- 1 drop Both Eyes Q3H PRN Cortney Biannieo, PA-C    guaiFENesin 400 mg Oral Q8H Cortney Bitorri, PA-C    heparin (porcine) 5,000 Units Subcutaneous Q8H [facility] Cortney Biannieo, PA-HUGO    levETIRAcetam 1,000 mg Per G Tube BID Cortney BiNATALIE wild-HUGO    levETIRAcetam 500 mg Per G Tube Q24H Cortney Seay PA-C    meropenem 2,000 mg Intravenous Q8H Cortney MATTHEW Seay Last Rate: 2,000 mg (06/19/20 0400)   OXcarbazepine 450 mg Per G Tube Q12H [facility] Cortney Seay PA-C    potassium-sodium phosphates 2 packet Oral 4x Daily (with meals and at bedtime) Cortney Seay PA-C    senna-docusate sodium 1 tablet Per G Tube HS Cortney Seay PA-C      Continuous Infusions:   PRN Meds:    acetaminophen 650 mg Q4H PRN   cyclobenzaprine 5 mg TID PRN   fentanyl citrate (PF) 50 mcg Q1H PRN   glycerin-hypromellose- 1 drop Q3H PRN       VTE Pharmacologic Prophylaxis: Heparin  VTE Mechanical Prophylaxis: sequential compression device    Invasive lines and devices: Invasive Devices     Peripherally Inserted Central Catheter Line            PICC Line 70/91/39 Right Basilic 14 days          Drain            Gastrostomy/Enterostomy -- days    External Urinary Catheter 2 days          Airway            Surgical Airway Shiley Cuffed -- days    Surgical Airway Shiley Cuffed less than 1 day                   Counseling / Coordination of Care  Total Critical Care time spent 35 minutes excluding procedures, teaching and family updates  Code Status: Level 1 - Full Code     Portions of the record may have been created with voice recognition software  Occasional wrong word or "sound a like" substitutions may have occurred due to the inherent limitations of voice recognition software  Read the chart carefully and recognize, using context, where substitutions have occurred       Bryan Boston DO

## 2020-06-19 NOTE — ASSESSMENT & PLAN NOTE
ID following  · Currently afebrile  · WBC within normal limits  · Continue Meropenem 2g Q8, will likely continue 5-7 days postop  · Daptomycin discontinued  · CSF from shunt tap 6/3 and 6/10 positive for serratia marcescens  · CSF cultures from 6/13 without growth to date, final report  · Repeat CSF cultures taken 6/17  ·   · RBC 46  · Protein 535  · Glucose 30  · No bacteria noted on Gram stain  · Intraoperative cultures 6/18 pending  · Back wound culture and left iliac tap negative

## 2020-06-20 LAB
ANION GAP SERPL CALCULATED.3IONS-SCNC: 3 MMOL/L (ref 4–13)
BACTERIA CSF CULT: NO GROWTH
BUN SERPL-MCNC: 12 MG/DL (ref 5–25)
CALCIUM SERPL-MCNC: 9.6 MG/DL (ref 8.3–10.1)
CHLORIDE SERPL-SCNC: 107 MMOL/L (ref 100–108)
CO2 SERPL-SCNC: 28 MMOL/L (ref 21–32)
CREAT SERPL-MCNC: 0.34 MG/DL (ref 0.6–1.3)
GFR SERPL CREATININE-BSD FRML MDRD: 153 ML/MIN/1.73SQ M
GLUCOSE SERPL-MCNC: 111 MG/DL (ref 65–140)
GLUCOSE SERPL-MCNC: 120 MG/DL (ref 65–140)
GLUCOSE SERPL-MCNC: 123 MG/DL (ref 65–140)
GLUCOSE SERPL-MCNC: 131 MG/DL (ref 65–140)
GLUCOSE SERPL-MCNC: 141 MG/DL (ref 65–140)
POTASSIUM SERPL-SCNC: 4.1 MMOL/L (ref 3.5–5.3)
SODIUM SERPL-SCNC: 138 MMOL/L (ref 136–145)

## 2020-06-20 PROCEDURE — 94640 AIRWAY INHALATION TREATMENT: CPT

## 2020-06-20 PROCEDURE — 80048 BASIC METABOLIC PNL TOTAL CA: CPT | Performed by: NURSE PRACTITIONER

## 2020-06-20 PROCEDURE — 94760 N-INVAS EAR/PLS OXIMETRY 1: CPT

## 2020-06-20 PROCEDURE — 94003 VENT MGMT INPAT SUBQ DAY: CPT

## 2020-06-20 PROCEDURE — 82948 REAGENT STRIP/BLOOD GLUCOSE: CPT

## 2020-06-20 PROCEDURE — 99291 CRITICAL CARE FIRST HOUR: CPT | Performed by: EMERGENCY MEDICINE

## 2020-06-20 RX ADMIN — GUAIFENESIN 400 MG: 100 SOLUTION ORAL at 17:40

## 2020-06-20 RX ADMIN — CHLORHEXIDINE GLUCONATE 0.12% ORAL RINSE 15 ML: 1.2 LIQUID ORAL at 21:48

## 2020-06-20 RX ADMIN — LEVETIRACETAM 1000 MG: 100 SOLUTION ORAL at 05:01

## 2020-06-20 RX ADMIN — Medication 1 TABLET: at 08:04

## 2020-06-20 RX ADMIN — MEROPENEM 2000 MG: 1 INJECTION, POWDER, FOR SOLUTION INTRAVENOUS at 02:27

## 2020-06-20 RX ADMIN — CHLORHEXIDINE GLUCONATE 0.12% ORAL RINSE 15 ML: 1.2 LIQUID ORAL at 08:03

## 2020-06-20 RX ADMIN — LEVETIRACETAM 1000 MG: 100 SOLUTION ORAL at 13:21

## 2020-06-20 RX ADMIN — ACETAMINOPHEN 650 MG: 325 TABLET ORAL at 17:40

## 2020-06-20 RX ADMIN — POTASSIUM & SODIUM PHOSPHATES POWDER PACK 280-160-250 MG 2 PACKET: 280-160-250 PACK at 13:21

## 2020-06-20 RX ADMIN — OXCARBAZEPINE 450 MG: 300 TABLET, FILM COATED ORAL at 17:41

## 2020-06-20 RX ADMIN — GUAIFENESIN 400 MG: 100 SOLUTION ORAL at 02:27

## 2020-06-20 RX ADMIN — HEPARIN SODIUM 5000 UNITS: 5000 INJECTION INTRAVENOUS; SUBCUTANEOUS at 13:20

## 2020-06-20 RX ADMIN — SENNOSIDES AND DOCUSATE SODIUM 1 TABLET: 8.6; 5 TABLET ORAL at 21:49

## 2020-06-20 RX ADMIN — POTASSIUM & SODIUM PHOSPHATES POWDER PACK 280-160-250 MG 2 PACKET: 280-160-250 PACK at 08:02

## 2020-06-20 RX ADMIN — FENTANYL CITRATE 50 MCG: 50 INJECTION, SOLUTION INTRAMUSCULAR; INTRAVENOUS at 21:49

## 2020-06-20 RX ADMIN — DIAZEPAM 5 MG: 5 TABLET ORAL at 10:00

## 2020-06-20 RX ADMIN — POTASSIUM & SODIUM PHOSPHATES POWDER PACK 280-160-250 MG 2 PACKET: 280-160-250 PACK at 17:40

## 2020-06-20 RX ADMIN — DESMOPRESSIN ACETATE 0.05 MG: 0.1 TABLET ORAL at 08:03

## 2020-06-20 RX ADMIN — MEROPENEM 2000 MG: 1 INJECTION, POWDER, FOR SOLUTION INTRAVENOUS at 11:23

## 2020-06-20 RX ADMIN — POTASSIUM & SODIUM PHOSPHATES POWDER PACK 280-160-250 MG 2 PACKET: 280-160-250 PACK at 21:49

## 2020-06-20 RX ADMIN — BUDESONIDE 0.5 MG: 0.5 INHALANT RESPIRATORY (INHALATION) at 06:45

## 2020-06-20 RX ADMIN — HEPARIN SODIUM 5000 UNITS: 5000 INJECTION INTRAVENOUS; SUBCUTANEOUS at 21:49

## 2020-06-20 RX ADMIN — MELATONIN 1000 UNITS: at 08:02

## 2020-06-20 RX ADMIN — DIAZEPAM 5 MG: 5 TABLET ORAL at 17:40

## 2020-06-20 RX ADMIN — OXCARBAZEPINE 450 MG: 300 TABLET, FILM COATED ORAL at 09:47

## 2020-06-20 RX ADMIN — LEVETIRACETAM 500 MG: 100 SOLUTION ORAL at 21:50

## 2020-06-20 RX ADMIN — GUAIFENESIN 400 MG: 100 SOLUTION ORAL at 10:00

## 2020-06-20 RX ADMIN — MEROPENEM 2000 MG: 1 INJECTION, POWDER, FOR SOLUTION INTRAVENOUS at 18:42

## 2020-06-20 RX ADMIN — DIAZEPAM 5 MG: 5 TABLET ORAL at 02:27

## 2020-06-20 RX ADMIN — BUDESONIDE 0.5 MG: 0.5 INHALANT RESPIRATORY (INHALATION) at 19:13

## 2020-06-20 RX ADMIN — HEPARIN SODIUM 5000 UNITS: 5000 INJECTION INTRAVENOUS; SUBCUTANEOUS at 05:01

## 2020-06-20 NOTE — RESPIRATORY THERAPY NOTE
RT Ventilator Management Note  Meda Curling 21 y o  female MRN: [id number]  Unit/Bed#: ICU 01 Encounter: [id number]      Daily Screen       6/19/2020  0722 6/20/2020  0646          Patient safety screen outcome[de-identified]  Passed  Passed      Spont breathing trial % for 30 min:  No        Spont breathing trial outcome[de-identified]  Failed        Spont breathing trial reason failed:  RR < 8 bpm;RSBI > 100        RSBI:  65  25              Physical Exam:   Assessment Type: (P) Assess only  General Appearance: (P) Awake  Respiratory Pattern: (P) Assisted  Chest Assessment: (P) Chest expansion symmetrical  Bilateral Breath Sounds: (P) Coarse  R Breath Sounds: (P) Coarse  L Breath Sounds: (P) Coarse  Suction: (P) ET Tube  O2 Device: Trach to vent  Subjective Data: trach      Resp Comments: (P) Pt placed on psv wean for the day  Suctioned for a large amount of secretions  VS are stable and pt appears comfortable on current settings  Will continue to monitor

## 2020-06-20 NOTE — RESPIRATORY THERAPY NOTE
06/20/20 0421   Respiratory Assessment   Resp Comments Pt was recieved on Ps mode but switched to ac mode for hs  Pt tolerated acvc 15/250/5/30% with no issues overnight  Will cont  to monitor  O2 Device Trach to vent  Surgical Airway Shiley Cuffed   No Placement Date or Time found  Tube Size: 6 mm  Previously placed by?: Present on admission  Type: Tracheostomy  Brand: Ta  Style: Cuffed   Status Cuff Inflated   Site Assessment Clean;Dry   Ties Assessment Clean;Dry; Intact   Surgical Airway Cuff Pressure 15 cm H2O   Equipment at bedside BVM; Wall Suction setup; Additional inner cannula   Surgical Airway Shiley Cuffed   Placement Date/Time: 06/18/20 1330   Tube Size: 6 mm  Previously placed by?: Other hospital  Type: Tracheostomy  Brand: deCartalubna  Style: Cuffed   Status Secured   Site Assessment Clean   Ties Assessment Clean;Dry; Intact   Equipment at bedside BVM; Wall Suction setup   Additional Assessments   SpO2 99 %   RT Ventilator Management Note  Afshin Santacruz 21 y o  female MRN: [id number]  Unit/Bed#: ICU 01 Encounter: [id number]      Daily Screen       6/17/2020  0750 6/19/2020  0722          Patient safety screen outcome[de-identified]  Passed  Passed      Spont breathing trial % for 30 min:  No  No      Spont breathing trial outcome[de-identified]    Failed      Spont breathing trial reason failed:    RR < 8 bpm;RSBI > 100      RSBI:    65              Physical Exam:   O2 Device: (P) Trach to vent  Subjective Data: trach      Resp Comments: (P) Pt was recieved on Ps mode but switched to ac mode for hs  Pt tolerated acvc 15/250/5/30% with no issues overnight  Will cont  to monitor

## 2020-06-21 LAB
BACTERIA SPEC ANAEROBE CULT: NORMAL
BACTERIA TISS AEROBE CULT: ABNORMAL
BACTERIA TISS AEROBE CULT: ABNORMAL
GLUCOSE SERPL-MCNC: 118 MG/DL (ref 65–140)
GLUCOSE SERPL-MCNC: 120 MG/DL (ref 65–140)
GLUCOSE SERPL-MCNC: 122 MG/DL (ref 65–140)
GLUCOSE SERPL-MCNC: 125 MG/DL (ref 65–140)
GLUCOSE SERPL-MCNC: 136 MG/DL (ref 65–140)
GRAM STN SPEC: ABNORMAL

## 2020-06-21 PROCEDURE — 99291 CRITICAL CARE FIRST HOUR: CPT | Performed by: EMERGENCY MEDICINE

## 2020-06-21 PROCEDURE — 94760 N-INVAS EAR/PLS OXIMETRY 1: CPT

## 2020-06-21 PROCEDURE — 94003 VENT MGMT INPAT SUBQ DAY: CPT

## 2020-06-21 PROCEDURE — 99233 SBSQ HOSP IP/OBS HIGH 50: CPT | Performed by: INTERNAL MEDICINE

## 2020-06-21 PROCEDURE — 82948 REAGENT STRIP/BLOOD GLUCOSE: CPT

## 2020-06-21 PROCEDURE — 94640 AIRWAY INHALATION TREATMENT: CPT

## 2020-06-21 RX ORDER — NALOXONE HYDROCHLORIDE 0.4 MG/ML
0.4 INJECTION, SOLUTION INTRAMUSCULAR; INTRAVENOUS; SUBCUTANEOUS ONCE
Status: COMPLETED | OUTPATIENT
Start: 2020-06-21 | End: 2020-06-21

## 2020-06-21 RX ORDER — NALOXONE HYDROCHLORIDE 0.4 MG/ML
INJECTION, SOLUTION INTRAMUSCULAR; INTRAVENOUS; SUBCUTANEOUS
Status: COMPLETED
Start: 2020-06-21 | End: 2020-06-21

## 2020-06-21 RX ADMIN — SENNOSIDES AND DOCUSATE SODIUM 1 TABLET: 8.6; 5 TABLET ORAL at 22:00

## 2020-06-21 RX ADMIN — GUAIFENESIN 400 MG: 100 SOLUTION ORAL at 02:13

## 2020-06-21 RX ADMIN — BUDESONIDE 0.5 MG: 0.5 INHALANT RESPIRATORY (INHALATION) at 07:04

## 2020-06-21 RX ADMIN — MEROPENEM 2000 MG: 1 INJECTION, POWDER, FOR SOLUTION INTRAVENOUS at 10:07

## 2020-06-21 RX ADMIN — HEPARIN SODIUM 5000 UNITS: 5000 INJECTION INTRAVENOUS; SUBCUTANEOUS at 22:00

## 2020-06-21 RX ADMIN — DAPTOMYCIN 175 MG: 500 INJECTION, POWDER, LYOPHILIZED, FOR SOLUTION INTRAVENOUS at 12:46

## 2020-06-21 RX ADMIN — LEVETIRACETAM 1000 MG: 100 SOLUTION ORAL at 05:41

## 2020-06-21 RX ADMIN — ACETAMINOPHEN 650 MG: 325 TABLET ORAL at 05:40

## 2020-06-21 RX ADMIN — DESMOPRESSIN ACETATE 0.05 MG: 0.1 TABLET ORAL at 08:00

## 2020-06-21 RX ADMIN — POTASSIUM & SODIUM PHOSPHATES POWDER PACK 280-160-250 MG 2 PACKET: 280-160-250 PACK at 17:11

## 2020-06-21 RX ADMIN — Medication 1 TABLET: at 07:58

## 2020-06-21 RX ADMIN — MEROPENEM 2000 MG: 1 INJECTION, POWDER, FOR SOLUTION INTRAVENOUS at 02:13

## 2020-06-21 RX ADMIN — DIAZEPAM 5 MG: 5 TABLET ORAL at 18:11

## 2020-06-21 RX ADMIN — DIAZEPAM 5 MG: 5 TABLET ORAL at 02:13

## 2020-06-21 RX ADMIN — HEPARIN SODIUM 5000 UNITS: 5000 INJECTION INTRAVENOUS; SUBCUTANEOUS at 05:40

## 2020-06-21 RX ADMIN — DIAZEPAM 5 MG: 5 TABLET ORAL at 10:07

## 2020-06-21 RX ADMIN — BUDESONIDE 0.5 MG: 0.5 INHALANT RESPIRATORY (INHALATION) at 19:06

## 2020-06-21 RX ADMIN — BISACODYL 10 MG: 10 SUPPOSITORY RECTAL at 08:00

## 2020-06-21 RX ADMIN — MELATONIN 1000 UNITS: at 08:00

## 2020-06-21 RX ADMIN — NALOXONE HYDROCHLORIDE 0.4 MG: 0.4 INJECTION, SOLUTION INTRAMUSCULAR; INTRAVENOUS; SUBCUTANEOUS at 10:07

## 2020-06-21 RX ADMIN — POTASSIUM & SODIUM PHOSPHATES POWDER PACK 280-160-250 MG 2 PACKET: 280-160-250 PACK at 12:46

## 2020-06-21 RX ADMIN — HEPARIN SODIUM 5000 UNITS: 5000 INJECTION INTRAVENOUS; SUBCUTANEOUS at 13:42

## 2020-06-21 RX ADMIN — GUAIFENESIN 400 MG: 100 SOLUTION ORAL at 10:07

## 2020-06-21 RX ADMIN — OXCARBAZEPINE 450 MG: 300 TABLET, FILM COATED ORAL at 17:11

## 2020-06-21 RX ADMIN — CHLORHEXIDINE GLUCONATE 0.12% ORAL RINSE 15 ML: 1.2 LIQUID ORAL at 08:00

## 2020-06-21 RX ADMIN — LEVETIRACETAM 1000 MG: 100 SOLUTION ORAL at 13:42

## 2020-06-21 RX ADMIN — OXCARBAZEPINE 450 MG: 300 TABLET, FILM COATED ORAL at 05:41

## 2020-06-21 RX ADMIN — MEROPENEM 2000 MG: 1 INJECTION, POWDER, FOR SOLUTION INTRAVENOUS at 18:11

## 2020-06-21 RX ADMIN — FENTANYL CITRATE 50 MCG: 50 INJECTION, SOLUTION INTRAMUSCULAR; INTRAVENOUS at 01:42

## 2020-06-21 RX ADMIN — GUAIFENESIN 400 MG: 100 SOLUTION ORAL at 17:11

## 2020-06-21 RX ADMIN — CHLORHEXIDINE GLUCONATE 0.12% ORAL RINSE 15 ML: 1.2 LIQUID ORAL at 21:00

## 2020-06-21 RX ADMIN — POTASSIUM & SODIUM PHOSPHATES POWDER PACK 280-160-250 MG 2 PACKET: 280-160-250 PACK at 07:58

## 2020-06-21 RX ADMIN — POTASSIUM & SODIUM PHOSPHATES POWDER PACK 280-160-250 MG 2 PACKET: 280-160-250 PACK at 22:00

## 2020-06-21 RX ADMIN — LEVETIRACETAM 500 MG: 100 SOLUTION ORAL at 22:00

## 2020-06-21 NOTE — RESPIRATORY THERAPY NOTE
RT Ventilator Management Note  Yancy Favre 21 y o  female MRN: [id number]  Unit/Bed#: ICU 01 Encounter: [id number]      Daily Screen       6/20/2020  0646 6/21/2020  0705          Patient safety screen outcome[de-identified]  Passed  Passed      Spont breathing trial % for 30 min:          RSBI:  25                Physical Exam:   Assessment Type: (P) Assess only  General Appearance: (P) Sleeping  Respiratory Pattern: (P) Assisted  Chest Assessment: (P) Chest expansion symmetrical  Bilateral Breath Sounds: (P) Coarse  R Breath Sounds: (P) Coarse  L Breath Sounds: (P) Coarse  Suction: (P) Trach      Resp Comments: (P) Pt remains on psv through out the night  However this am she was alarming apneic  Per the RN she had been alarming apneic repeatedly  Returned to [facility] @ this time  Will attempt to wean later in the day

## 2020-06-21 NOTE — PROGRESS NOTES
Progress Note - Infectious Disease   Kylah Tineo 21 y o  female MRN: [id number]  Unit/Bed#: ICU 01 Encounter: [id number]      Impression/Recommendations:  1  Sepsis POA   Fever, tachycardia, tachypnea   Likely due to #2   Consider also aspiration given reported respiratory distress upon presentation to [facility] other clear source appreciated   Blood cultures at [facility]  Now with new low-grade fever and diaphoresis  Consider drug fever due to prolonged antibiotics  Consider of evolving right chest wall cellulitis in setting of recent difficult  shunt removal   Rec:  ? Continue meropenem for now  ? Add daptomycin  ? Follow temperatures closely  ? If fevers continue, discontinue meropenem  ? If develops T>101 check blood cultures  ? Supportive care as per the primary service     2   Serratia  shunt infection   Consider translocation from GI tract although no reported intra-abdominal fluid collection   Repeat CSF cultures positive for Serratia despite 10 days IV antibiotics represents antibiotic failure   Status post VPS externalization with tolerance of clamping trial   Now status post complete proximal CNA VPS removal   Some distal extra-CNS portion remains due to heavy calcification  Rec:  ? Continue meropenem through 6/23/20 for 5 days post (3 weeks total) antibiotics  ? Add vancomycin as above given new chest wall cellulitis     3  Acute on chronic hypoxic respiratory failure   No overt pneumonia on chest imaging from [place]   Consider role of aspiration versus encephalopathy   Patient on minimal vent requirements  Rec:  ? Antibiotics above would treat any concomitant pneumonia  ? Follow respiratory status closely  ? Supportive care as per the primary service     4  Seizure disorder   Initial concern for seizures at [facility] but vEEG here negative for seizure activity   Rhythmic blinking does not correlate to EEG findings per Neurology    Rec:  ? Continue antiepileptics per Neurology with close follow-up ongoing     5   Retroperitoneal collection   Reportedly chronic, seen on imaging done at [facility] 30 significance   Status post aspiration of the collection with negative cultures  Rec:  ? No additional workup or treatment from an ID perspective     6   CP   With spastic quadriplegia, profound MR, hydrocephalus status post  shunt     7   Chronic respiratory failure   Status post trach      8   Recent VFib arrest   Consider anoxic encephalopathy      The above plan was discussed in detail with Dr Jak Garcia      Antibiotics:  Meropenem #19  POD #3    Subjective:  Patient seen on AM rounds  Unable to provide ROS due to nonverbal state  24 Hour Events:  Low-grade fever and sweats noted this morning  No documented nausea, vomiting, or diarrhea  Objective:  Vitals:  Temp:  [96 1 °F (35 6 °C)-100 6 °F (38 1 °C)] 96 1 °F (35 6 °C)  HR:  [] 70  Resp:  [8-34] 23  BP: ()/(45-95) 109/81  SpO2:  [93 %-100 %] 100 %  Temp (24hrs), Av 3 °F (37 4 °C), Min:96 1 °F (35 6 °C), Max:100 6 °F (38 1 °C)  Current: Temperature: (!) 96 1 °F (35 6 °C)    Physical Exam:   General:  No acute distress  Eyes:  Normal lids, eyes closed  ENT:  Normal external ears and nose  Neck:  Neck symmetric with midline trachea  Chest:  Right anterior chest wall incision intact with mild ecchymosis  Streaking blanching erythema lateral to this extending towards axilla  Pulmonary:  Normal respiratory effort without accessory muscle use  Cardiovascular:  Regular rate and rhythm; no peripheral edema  Gastrointestinal:  No tenderness or distention  Musculoskeletal:  No digital clubbing or cyanosis  Skin:  No visible rashes; No palpable nodules  Neurologic:  Sensation grossly intact to light touch  Psychiatric:  Sleeping    Lab Results:  I have personally reviewed pertinent labs    Results from last 7 days   Lab Units 20  0435 20  0516 20  0503   POTASSIUM mmol/L 4 1 3 5 4 0   CHLORIDE mmol/L 107 107 108   CO2 mmol/L 28 27 27   BUN mg/dL 12 11 12   CREATININE mg/dL 0 34* 0 32* 0 38*   EGFR ml/min/1 73sq m 153 156 148   CALCIUM mg/dL 9 6 8 8 9 6     Results from last 7 days   Lab Units 06/19/20  0516 06/18/20  0503 06/17/20  0458   WBC Thousand/uL 7 70 9 43 7 78   HEMOGLOBIN g/dL 8 4* 9 2* 8 6*   PLATELETS Thousands/uL 407* 441* 421*     Results from last 7 days   Lab Units 06/18/20  1433 06/17/20  1040   GRAM STAIN RESULT  No Polys or Bacteria seen 2+ Polys  No bacteria seen       Imaging Studies:   I have personally reviewed pertinent imaging study reports and images in PACS  EKG, Pathology, and Other Studies:   I have personally reviewed pertinent reports

## 2020-06-21 NOTE — PROGRESS NOTES
Daily Progress Note - Critical Care   Leonie Pedersen 21 y o  female MRN: [id number]  Unit/Bed#: ICU 01 Encounter: [id number]        ----------------------------------------------------------------------------------------  HPI/24hr events: Febrile this morning  Intermittent tachypnea and tachycardia overnight with decreased symptoms after fentanyl PRNs  Tissue culture from shunt now growing 1+ pseudomonas  ---------------------------------------------------------------------------------------  SUBJECTIVE  21year old female with PMH significant for cerebral palsy with spastic quadriplegia, bedbound and non-verbal at baseline, asthma, GERD, kidney stones, seizure disorder, pancreatitis, DI  Hx of hydrocephalus with indwelling  shunt and chronic respiratory failure with tracheostomy  Presented to [facility] with fever, AMS, and respiratory failure  Placed on mechanical ventilation at that time  Loaded with AEDs  Couldn't obtain CSF sample to r/o infection  Transferred from [facility] 6/3 after cardiac arrest from placement of central line  Fluid from  shunt grew Serratia, currently on Meropenem per ID   shunt externalization on 6/13  POD #3 for VPS removal through NSG  Review of Systems  Review of systems was unable to be performed secondary to Tracheostomy  ---------------------------------------------------------------------------------------  Assessment and Plan:    Neuro: Diagnoses: AMS/acute 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; seizure disorder; Cerebral palsy with quadriplegia  -  shunt removal 6/13  - CAM-ICU, delirium precautions  - Continue Keppra and Trileptal  - Continue Flexeril and Valium  - POD3 for shunt removal with NSG  - NSG signed off     CV: Hypotension  - Normotensive over last 24 hours     Lung: Chronic respiratory failure status post trach  - PS   Wean to trach collar as toerated  - Continue pulmicort       GI: No acute issues  - bowel regimen     FEN: Fluids: none at this time  Lytes: Lab holiday today, replete as required  Nutrition: TF (Peptamen), cyclic at 50 cc per hour for 16 hours per day     : No acute issues, no Her in place  - trend UOP and creatinine     ID:     - Febrile this morning to 100 6    - Tissue culture from 6/18 growing Pseudomonas aeruginosa and few colonies of geovany hemolytic strep    - CSF cultures   6/10 show few colonies of Serratia   6/13 negative for growth   6/17 negative for bacteria, show high protein concentration    - Meropenem Day 19     antibiotics for 5-7 days postop; stop date in place    - ID following, recs appreciated  - retroperitoneal fluid collection drained by IR 6/5     Heme: Anemia  - baseline Hgb around 11  - Transfuse as needed to maintain Hbg > 7 0       Endo: History of DI  - DDAVP Daily  - monitor sugars with inpatient goal of 140-180                Msk/Skin: Local wound care  LDA:  - R PICC 6/4  - G tube  - Shiley cuffed      Disposition: Continue Critical Care   Code Status: Level 1 - Full Code  ---------------------------------------------------------------------------------------  ICU CORE MEASURES    Prophylaxis   VTE Pharmacologic Prophylaxis: Heparin  VTE Mechanical Prophylaxis: sequential compression device  Stress Ulcer Prophylaxis: Prophylaxis Not Indicated     ABCDE Protocol (if indicated)  Plan to perform spontaneous awakening trial today? Not applicable  Plan to perform spontaneous breathing trial today? Yes  Obvious barriers to extubation? Yes  CAM-ICU: Unable to assess    Invasive Devices Review  Invasive Devices     Peripherally Inserted Central Catheter Line            PICC Line 31/96/51 Right Basilic 16 days          Drain            Gastrostomy/Enterostomy -- days    External Urinary Catheter 4 days          Airway            Surgical Airway Shiley Cuffed -- days    Surgical Airway Shiley Cuffed 2 days              Can any invasive devices be discontinued today? No  ---------------------------------------------------------------------------------------  OBJECTIVE    Vitals   Vitals:    20 1700 20 1739 20 1906 20 2305   BP: 130/76      BP Location:       Pulse: (!) 112      Resp: 19      Temp:  (!) 100 6 °F (38 1 °C)     TempSrc:  Oral     SpO2: 100%  100% 98%   Weight:       Height:         Temp (24hrs), Av 4 °F (37 4 °C), Min:98 7 °F (37 1 °C), Max:100 6 °F (38 1 °C)  Current: Temperature: (!) 100 6 °F (38 1 °C)  HR: 112  BP: 130/76  RR: 19  SpO2: 100    Respiratory:  SpO2: SpO2: 98 %  O2 Flow Rate (L/min): 30 L/min    Invasive/non-invasive ventilation settings   Respiratory    Lab Data (Last 4 hours)    None         O2/Vent Data (Last 4 hours)    None                Physical Exam   Constitutional: No distress  HENT:   Head: Normocephalic  Nose: Nose normal    Mouth/Throat: Oropharynx is clear and moist    Eyes:   Unable to assess as patient does not open eyes   Neck: Neck supple  Cardiovascular: Normal rate and regular rhythm  Pulmonary/Chest: Effort normal    Abdominal: Soft  G-tube in place   Musculoskeletal: She exhibits no edema  Neurological:   Unable to assess due to baseline mental status  Withdraws from pain   Skin: Skin is warm  She is not diaphoretic           Laboratory and Diagnostics:  Results from last 7 days   Lab Units 20  0516 20  0503 20  0458 20  0428 06/15/20  0510 20  0452   WBC Thousand/uL 7 70 9 43 7 78 9 69 4 89 4 41   HEMOGLOBIN g/dL 8 4* 9 2* 8 6* 9 3* 9 0* 7 6*   HEMATOCRIT % 28 6* 31 3* 29 4* 31 1* 29 4* 24 7*   PLATELETS Thousands/uL 407* 441* 421* 422* 281 286   NEUTROS PCT % 60 76* 74 87* 65  --    MONOS PCT % 8 6 8 6 7  --      Results from last 7 days   Lab Units 20  0435 20  0516 20  0503 20  0458 20  0428 06/15/20  0510 20  0452   SODIUM mmol/L 138 140 139 140 137 140 140   POTASSIUM mmol/L 4 1 3 5 4 0 3 6 3 7 4 1 4 0   CHLORIDE mmol/L 107 107 108 107 105 109* 106   CO2 mmol/L 28 27 27 28 27 29 28   ANION GAP mmol/L 3* 6 4 5 5 2* 6   BUN mg/dL 12 11 12 15 16 15 13   CREATININE mg/dL 0 34* 0 32* 0 38* 0 31* 0 36* 0 37* 0 26*   CALCIUM mg/dL 9 6 8 8 9 6 9 0 9 0 9 0 8 1*   GLUCOSE RANDOM mg/dL 111 104 117 113 125 92 77     Results from last 7 days   Lab Units 06/19/20  0516 06/18/20  0503 06/17/20  0458 06/16/20  0428 06/15/20  0510 06/14/20  0452   MAGNESIUM mg/dL 2 2 2 4 2 6 2 8* 2 7* 2 8*   PHOSPHORUS mg/dL 4 3 2 0* 2 1* 2 0* 2 4* 3 0                   ABG:    VBG:  Results from last 7 days   Lab Units 06/18/20  2300   PH MERI  7 397   PCO2 MERI mm Hg 43 6   PO2 MERI mm Hg 46 6*   HCO3 MERI mmol/L 26 2   BASE EXC MERI mmol/L 1 2           Micro  Results from last 7 days   Lab Units 06/18/20  1433 06/17/20  1040   GRAM STAIN RESULT  No Polys or Bacteria seen 2+ Polys  No bacteria seen       EKG: Normal sinus  Imaging: Xr Skull < 4 Vw    Result Date: 6/6/2020  Impression: Baseline pressure setting is obtained  Workstation performed: [id number]     Ct Guided Perc Drainage Catheter Placeme    Result Date: 6/8/2020  Impression: Impression: Successful CT-guided aspiration left iliac crest collection  Workstation performed: [id number]    I have personally reviewed pertinent films in PACS    Intake and Output  I/O       06/19 0701 - 06/20 0700 06/20 0701 - 06/21 0700    I V  (mL/kg) 228 3 (3 9)     NG/ 200    IV Piggyback 300 300    Feedings 783 767    Total Intake(mL/kg) 1671 3 (28 4) 1267 (21 5)    Urine (mL/kg/hr) 1600 (1 1) 1600 (1 1)    Emesis/NG output 0     Stool 0     Total Output 1600 1600    Net +71 3 -333          Unmeasured Urine Occurrence 1 x 1 x    Unmeasured Stool Occurrence 1 x         UOP: approximately 80 ml/hr     Height and Weights   Height: 4' 2" (127 cm)  IBW: 22 5 kg  Body mass index is 36 52 kg/m²    Weight (last 2 days)     Date/Time   Weight    06/20/20 0532   58 9 (129 85)    06/19/20 0552   59 6 (131 39) Nutrition       Diet Orders   (From admission, onward)             Start     Ordered    06/18/20 2126  Diet Enteral/Parenteral; Tube Feeding No Oral Diet; Non-Formulary; Cyclic; 50; 16 hours; Prosource Protein Liquid - Two Packets  Diet effective now     Comments:  Use peptamen 1 2 from home   Question Answer Comment   Diet Type Enteral/Parenteral    Enteral/Parenteral Tube Feeding No Oral Diet    Tube Feeding Formula: Non-Formulary    Bolus/Cyclic/Continuous Cyclic    Tube Feeding Cyclic Rate (mL/hr): 50    Tube Feeding Cyclic: Administer over: 16 hours    Prosource Protein Liquid - No Carb Prosource Protein Liquid - Two Packets    RD to adjust diet per protocol? Yes        06/18/20 2127              TF currently running at 50 ml/hr with a goal of 50 ml/hr   Formula: peptamen 1 2      Active Medications  Scheduled Meds:  Current Facility-Administered Medications:  acetaminophen 650 mg Per G Tube Q4H PRN Cortney Saey PA-C    bisacodyl 10 mg Rectal Daily Cortney Seay PA-C    budesonide 0 5 mg Nebulization BID Cortney Seay PA-C    calcium carbonate-vitamin D 1 tablet Oral Daily With Breakfast Cortney Seay PA-C    chlorhexidine 15 mL Swish & Spit Q12H [address] Cortney Seay PA-C    cholecalciferol 1,000 Units Oral Daily Cortney Seay PA-C    cyclobenzaprine 5 mg Per G Tube TID PRN Cortney Seay PA-C    desmopressin 0 05 mg Per G Tube Daily Cortney Seay PA-C    diazepam 5 mg Per G Tube Q8H NATALIE Burnham-HUGO    fentanyl citrate (PF) 50 mcg Intravenous Q1H PRN Cortney NATALIE Seay-HUGO    glycerin-hypromellose- 1 drop Both Eyes Q3H PRN Cortney NATALIE Seay-HUGO    guaiFENesin 400 mg Oral Q8H CortneyNATALIE Mireles-HUGO    heparin (porcine) 5,000 Units Subcutaneous Q8H [address] Cortney NATALIE Seay-HUGO    levETIRAcetam 1,000 mg Per G Tube BID NATALIE Burnham-HUGO    levETIRAcetam 500 mg Per G Tube Q24H Cortney Seay PA-C    meropenem 2,000 mg Intravenous Q8H Julio DO Chauncey Last Rate: Stopped (06/21/20 0313)   OXcarbazepine 450 mg Per G Tube Q12H [address] Cortney Seay PA-C    potassium-sodium phosphates 2 packet Oral 4x Daily (with meals and at bedtime) Cortney Seay PA-C    senna-docusate sodium 1 tablet Per G Tube HS Cortney Seay PA-C      Continuous Infusions:     PRN Meds:     acetaminophen 650 mg Q4H PRN   cyclobenzaprine 5 mg TID PRN   fentanyl citrate (PF) 50 mcg Q1H PRN   glycerin-hypromellose- 1 drop Q3H PRN       Allergies   Allergies   Allergen Reactions    Baclofen Other (See Comments)     Reaction Date: [id number];   apena      Cefepime Hives    Cephalosporins      Other reaction(s): Unknown Reaction  Other reaction(s): Unknown Reaction  Other reaction(s): Unknown Reaction      Clonidine      Other reaction(s): Respiratory Distress, Respiratory Distress    Lactase     Milk-Related Compounds     Penicillins Hives     Reaction Date: [id number];       Phenobarbital Other (See Comments)     Reaction Date: [id number]; Other reaction(s): Other (See Comments)  apnea  Other reaction(s): Other (See Comments)  apnea  apnea      Vancomycin     Zolpidem Other (See Comments)     Other reaction(s): Other (see comments)  Reaction Date: [id number];   apnea  apnea  apnea      Latex Rash     ---------------------------------------------------------------------------------------  Advance Directive and Living Will:      Power of :    POLST:    ---------------------------------------------------------------------------------------  Care Time Delivered:           Portions of the record may have been created with voice recognition software  Occasional wrong word or "sound a like" substitutions may have occurred due to the inherent limitations of voice recognition software    Read the chart carefully and recognize, using context, where substitutions have occurred

## 2020-06-22 ENCOUNTER — APPOINTMENT (INPATIENT)
Dept: GASTROENTEROLOGY | Facility: HOSPITAL | Age: 25
DRG: 711 | End: 2020-06-22
Payer: COMMERCIAL

## 2020-06-22 ENCOUNTER — APPOINTMENT (INPATIENT)
Dept: RADIOLOGY | Facility: HOSPITAL | Age: 25
DRG: 711 | End: 2020-06-22
Payer: COMMERCIAL

## 2020-06-22 PROBLEM — G03.9 MENINGITIS: Status: ACTIVE | Noted: 2020-06-22

## 2020-06-22 LAB
ANION GAP SERPL CALCULATED.3IONS-SCNC: 8 MMOL/L (ref 4–13)
BACTERIA SPEC ANAEROBE CULT: ABNORMAL
BACTERIA SPEC ANAEROBE CULT: ABNORMAL
BASOPHILS # BLD AUTO: 0.04 THOUSANDS/ΜL (ref 0–0.1)
BASOPHILS NFR BLD AUTO: 0 % (ref 0–1)
BUN SERPL-MCNC: 14 MG/DL (ref 5–25)
CALCIUM SERPL-MCNC: 9 MG/DL (ref 8.3–10.1)
CHLORIDE SERPL-SCNC: 101 MMOL/L (ref 100–108)
CO2 SERPL-SCNC: 26 MMOL/L (ref 21–32)
CREAT SERPL-MCNC: 0.34 MG/DL (ref 0.6–1.3)
EOSINOPHIL # BLD AUTO: 0.26 THOUSAND/ΜL (ref 0–0.61)
EOSINOPHIL NFR BLD AUTO: 3 % (ref 0–6)
ERYTHROCYTE [DISTWIDTH] IN BLOOD BY AUTOMATED COUNT: 16.7 % (ref 11.6–15.1)
FUNGUS SPEC CULT: NORMAL
GFR SERPL CREATININE-BSD FRML MDRD: 153 ML/MIN/1.73SQ M
GLUCOSE SERPL-MCNC: 111 MG/DL (ref 65–140)
HCT VFR BLD AUTO: 31.5 % (ref 34.8–46.1)
HGB BLD-MCNC: 9.4 G/DL (ref 11.5–15.4)
IMM GRANULOCYTES # BLD AUTO: 0.08 THOUSAND/UL (ref 0–0.2)
IMM GRANULOCYTES NFR BLD AUTO: 1 % (ref 0–2)
LYMPHOCYTES # BLD AUTO: 2.14 THOUSANDS/ΜL (ref 0.6–4.47)
LYMPHOCYTES NFR BLD AUTO: 21 % (ref 14–44)
MCH RBC QN AUTO: 24.7 PG (ref 26.8–34.3)
MCHC RBC AUTO-ENTMCNC: 29.8 G/DL (ref 31.4–37.4)
MCV RBC AUTO: 83 FL (ref 82–98)
MONOCYTES # BLD AUTO: 0.58 THOUSAND/ΜL (ref 0.17–1.22)
MONOCYTES NFR BLD AUTO: 6 % (ref 4–12)
NEUTROPHILS # BLD AUTO: 7.23 THOUSANDS/ΜL (ref 1.85–7.62)
NEUTS SEG NFR BLD AUTO: 69 % (ref 43–75)
NRBC BLD AUTO-RTO: 0 /100 WBCS
PLATELET # BLD AUTO: 453 THOUSANDS/UL (ref 149–390)
PMV BLD AUTO: 9.5 FL (ref 8.9–12.7)
POTASSIUM SERPL-SCNC: 4 MMOL/L (ref 3.5–5.3)
RBC # BLD AUTO: 3.81 MILLION/UL (ref 3.81–5.12)
SODIUM SERPL-SCNC: 135 MMOL/L (ref 136–145)
WBC # BLD AUTO: 10.33 THOUSAND/UL (ref 4.31–10.16)

## 2020-06-22 PROCEDURE — 31622 DX BRONCHOSCOPE/WASH: CPT | Performed by: EMERGENCY MEDICINE

## 2020-06-22 PROCEDURE — 99232 SBSQ HOSP IP/OBS MODERATE 35: CPT | Performed by: INTERNAL MEDICINE

## 2020-06-22 PROCEDURE — 74177 CT ABD & PELVIS W/CONTRAST: CPT

## 2020-06-22 PROCEDURE — 94640 AIRWAY INHALATION TREATMENT: CPT

## 2020-06-22 PROCEDURE — 85025 COMPLETE CBC W/AUTO DIFF WBC: CPT | Performed by: PHYSICIAN ASSISTANT

## 2020-06-22 PROCEDURE — 99291 CRITICAL CARE FIRST HOUR: CPT | Performed by: EMERGENCY MEDICINE

## 2020-06-22 PROCEDURE — 94760 N-INVAS EAR/PLS OXIMETRY 1: CPT

## 2020-06-22 PROCEDURE — 80048 BASIC METABOLIC PNL TOTAL CA: CPT | Performed by: PHYSICIAN ASSISTANT

## 2020-06-22 PROCEDURE — 94003 VENT MGMT INPAT SUBQ DAY: CPT

## 2020-06-22 PROCEDURE — 71260 CT THORAX DX C+: CPT

## 2020-06-22 PROCEDURE — 70450 CT HEAD/BRAIN W/O DYE: CPT

## 2020-06-22 RX ORDER — POLYETHYLENE GLYCOL 3350 17 G/17G
17 POWDER, FOR SOLUTION ORAL DAILY PRN
Status: DISCONTINUED | OUTPATIENT
Start: 2020-06-22 | End: 2020-06-28

## 2020-06-22 RX ADMIN — GUAIFENESIN 400 MG: 100 SOLUTION ORAL at 02:00

## 2020-06-22 RX ADMIN — GUAIFENESIN 400 MG: 100 SOLUTION ORAL at 09:50

## 2020-06-22 RX ADMIN — OXCARBAZEPINE 450 MG: 300 TABLET, FILM COATED ORAL at 18:00

## 2020-06-22 RX ADMIN — MEROPENEM 2000 MG: 1 INJECTION, POWDER, FOR SOLUTION INTRAVENOUS at 03:04

## 2020-06-22 RX ADMIN — BUDESONIDE 0.5 MG: 0.5 INHALANT RESPIRATORY (INHALATION) at 07:24

## 2020-06-22 RX ADMIN — DIAZEPAM 5 MG: 5 TABLET ORAL at 09:49

## 2020-06-22 RX ADMIN — POLYETHYLENE GLYCOL 3350 17 G: 17 POWDER, FOR SOLUTION ORAL at 13:18

## 2020-06-22 RX ADMIN — IOHEXOL 100 ML: 350 INJECTION, SOLUTION INTRAVENOUS at 13:17

## 2020-06-22 RX ADMIN — MEROPENEM 2000 MG: 1 INJECTION, POWDER, FOR SOLUTION INTRAVENOUS at 18:00

## 2020-06-22 RX ADMIN — HEPARIN SODIUM 5000 UNITS: 5000 INJECTION INTRAVENOUS; SUBCUTANEOUS at 13:18

## 2020-06-22 RX ADMIN — POTASSIUM & SODIUM PHOSPHATES POWDER PACK 280-160-250 MG 2 PACKET: 280-160-250 PACK at 09:23

## 2020-06-22 RX ADMIN — OXCARBAZEPINE 450 MG: 300 TABLET, FILM COATED ORAL at 05:25

## 2020-06-22 RX ADMIN — POTASSIUM & SODIUM PHOSPHATES POWDER PACK 280-160-250 MG 2 PACKET: 280-160-250 PACK at 13:18

## 2020-06-22 RX ADMIN — DIAZEPAM 5 MG: 5 TABLET ORAL at 03:04

## 2020-06-22 RX ADMIN — LEVETIRACETAM 1000 MG: 100 SOLUTION ORAL at 13:20

## 2020-06-22 RX ADMIN — HEPARIN SODIUM 5000 UNITS: 5000 INJECTION INTRAVENOUS; SUBCUTANEOUS at 05:27

## 2020-06-22 RX ADMIN — CHLORHEXIDINE GLUCONATE 0.12% ORAL RINSE 15 ML: 1.2 LIQUID ORAL at 21:13

## 2020-06-22 RX ADMIN — POTASSIUM & SODIUM PHOSPHATES POWDER PACK 280-160-250 MG 2 PACKET: 280-160-250 PACK at 21:13

## 2020-06-22 RX ADMIN — CHLORHEXIDINE GLUCONATE 0.12% ORAL RINSE 15 ML: 1.2 LIQUID ORAL at 09:49

## 2020-06-22 RX ADMIN — DESMOPRESSIN ACETATE 0.05 MG: 0.1 TABLET ORAL at 09:50

## 2020-06-22 RX ADMIN — MELATONIN 1000 UNITS: at 09:50

## 2020-06-22 RX ADMIN — MEROPENEM 2000 MG: 1 INJECTION, POWDER, FOR SOLUTION INTRAVENOUS at 11:24

## 2020-06-22 RX ADMIN — POTASSIUM & SODIUM PHOSPHATES POWDER PACK 280-160-250 MG 2 PACKET: 280-160-250 PACK at 16:55

## 2020-06-22 RX ADMIN — DIAZEPAM 5 MG: 5 TABLET ORAL at 17:59

## 2020-06-22 RX ADMIN — LEVETIRACETAM 500 MG: 100 SOLUTION ORAL at 21:13

## 2020-06-22 RX ADMIN — SENNOSIDES AND DOCUSATE SODIUM 1 TABLET: 8.6; 5 TABLET ORAL at 21:13

## 2020-06-22 RX ADMIN — LEVETIRACETAM 1000 MG: 100 SOLUTION ORAL at 05:25

## 2020-06-22 RX ADMIN — GUAIFENESIN 400 MG: 100 SOLUTION ORAL at 16:55

## 2020-06-22 RX ADMIN — DAPTOMYCIN 175 MG: 500 INJECTION, POWDER, LYOPHILIZED, FOR SOLUTION INTRAVENOUS at 13:18

## 2020-06-22 RX ADMIN — HEPARIN SODIUM 5000 UNITS: 5000 INJECTION INTRAVENOUS; SUBCUTANEOUS at 21:13

## 2020-06-22 RX ADMIN — Medication 1 TABLET: at 09:23

## 2020-06-22 RX ADMIN — BISACODYL 10 MG: 10 SUPPOSITORY RECTAL at 09:34

## 2020-06-22 RX ADMIN — BUDESONIDE 0.5 MG: 0.5 INHALANT RESPIRATORY (INHALATION) at 19:12

## 2020-06-22 NOTE — SOCIAL WORK
Pt reviewed during rounds  Pt needs continued ventilator weaning  Pt unable to return to [facility] at [facility] on a vent  TC to pt mother, Vianey Manning  CM left  requesting return call  TC to Chiara Price at [address] [facility] updated her that pt will need a weaning facility at d/c  P[facility] and MA-51 faxed to [facility]  Pt is a [employer] under IDD and ORC  Update:  TC received from [place] with [facility] [phone]  [place] requested H&P, med list, face sheet, and progress notes faxed to her @ [phone]  CM Faxed

## 2020-06-22 NOTE — PROGRESS NOTES
Progress Note - Infectious Disease   Kylah Ival Patch 21 y o  female MRN: [id number]  Unit/Bed#: ICU 01 Encounter: [id number]      Impression/Recommendations:  1  Sepsis POA   Fever, tachycardia, tachypnea   Likely due to #2   Consider also aspiration given reported respiratory distress upon presentation to [facility] other clear source appreciated   Blood cultures at [facility]  Now with new low-grade fever  Consider drug fever due to prolonged antibiotics  Consider of evolving right chest wall cellulitis in setting of recent difficult  shunt removal   Improving with addition of daptomycin  Rec:  ? Continue meropenem as below  ? Continue daptomycin for now  ? Serial chest wall exams  ? Follow temperatures closely  ? If develops T>101 check blood cultures  ? Supportive care as per the primary service     2   Serratia  shunt infection   Consider translocation from GI tract although no reported intra-abdominal fluid collection   Repeat CSF cultures positive for Serratia despite 10 days IV antibiotics represents antibiotic failure   Status post VPS externalization with tolerance of clamping trial   Now status post complete proximal CNA VPS removal   Some distal extra-CNS portion remains due to heavy calcification  Distal peritoneal culture with Pseudomonas, Enterococcus but culture of CNS portion negative  Rec:  ? Continue meropenem through 6/23/20 for 5 days post (3 weeks total) antibiotics     3  Acute on chronic hypoxic respiratory failure   No overt pneumonia on chest imaging from [facility]   Consider role of aspiration versus encephalopathy   Patient on minimal vent requirements  Rec:  ? Follow respiratory status closely  ? Supportive care as per the primary service     4  Seizure disorder   Initial concern for seizures at [facility] but vEEG here negative for seizure activity   Rhythmic blinking does not correlate to EEG findings per Neurology    Rec:  ? Continue antiepileptics per Neurology with close follow-up ongoing     5   Retroperitoneal collection   Reportedly chronic, seen on imaging done at [facility] 30 significance   Status post aspiration of the collection with negative cultures  Rec:  ? No additional workup or treatment from an ID perspective     6   CP   With spastic quadriplegia, profound MR, hydrocephalus status post  shunt     7   Chronic respiratory failure   Status post trach      8   Recent VFib arrest   Consider anoxic encephalopathy      Antibiotics:  Vancomycin #2  Meropenem #20  POD #4    Subjective:  Patient seen on AM rounds  Unable to provide ROS due to nonverbal state  24 Hour Events:  Low grade temps  No documented fevers, chills, sweats, nausea, vomiting, or diarrhea  Objective:  Vitals:  Temp:  [96 8 °F (36 °C)-100 °F (37 8 °C)] 99 7 °F (37 6 °C)  HR:  [] 104  Resp:  [12-32] 23  BP: (109-136)/(62-95) 132/74  SpO2:  [97 %-100 %] 100 %  Temp (24hrs), Av 2 °F (37 3 °C), Min:96 8 °F (36 °C), Max:100 °F (37 8 °C)  Current: Temperature: 99 7 °F (37 6 °C)    Physical Exam:   General:  No acute distress  Psychiatric:  Awake and alert  Chest:  Improved erythema at surgical incision and right lateral breast  Pulmonary:  Normal respiratory excursion without accessory muscle use  Abdomen:  Soft, nontender  Extremities:  No edema  Skin:  No rashes    Lab Results:  I have personally reviewed pertinent labs    Results from last 7 days   Lab Units 20  0524 20  0435 20  0516   POTASSIUM mmol/L 4 0 4 1 3 5   CHLORIDE mmol/L 101 107 107   CO2 mmol/L 26 28 27   BUN mg/dL 14 12 11   CREATININE mg/dL 0 34* 0 34* 0 32*   EGFR ml/min/1 73sq m 153 153 156   CALCIUM mg/dL 9 0 9 6 8 8     Results from last 7 days   Lab Units 20  0524 20  0516 20  0503   WBC Thousand/uL 10 33* 7 70 9 43   HEMOGLOBIN g/dL 9 4* 8 4* 9 2*   PLATELETS Thousands/uL 453* 407* 441*     Results from last 7 days   Lab Units 06/18/20  1433 06/17/20  1040   GRAM STAIN RESULT  No Polys or Bacteria seen 2+ Polys  No bacteria seen       Imaging Studies:   I have personally reviewed pertinent imaging study reports and images in PACS  EKG, Pathology, and Other Studies:   I have personally reviewed pertinent reports

## 2020-06-22 NOTE — UTILIZATION REVIEW
Continued Stay Review    Date: 6-22-20                     Current Patient Class: inpatient  Current Level of Care: critical care    HPI:24 y o  female initially admitted on 6-3-20   For seizure like activity, sepsis,  Serratia  shunt infection     Assessment/Plan:     Low grade fever, tachycardia and tachypnea   Wbc rising to 10 33  On iv meropenem and daptomycin  Will need blood cultures if fever rises above 101  Infectious disease suspects aspiration, drug fever or evolving right chest wall cellulitis in setting of recent difficult  shunt removal   Cellulitis is improving on iv daptomycin  Continue meropenem through 6-23  Patient on ventilator and has peg tube        Pertinent Labs/Diagnostic Results:   Results from last 7 days   Lab Units 06/17/20  1625   SARS-COV-2  Negative     Results from last 7 days   Lab Units 06/22/20  0524 06/19/20  0516 06/18/20  0503 06/17/20  0458 06/16/20  0428   WBC Thousand/uL 10 33* 7 70 9 43 7 78 9 69   HEMOGLOBIN g/dL 9 4* 8 4* 9 2* 8 6* 9 3*   HEMATOCRIT % 31 5* 28 6* 31 3* 29 4* 31 1*   PLATELETS Thousands/uL 453* 407* 441* 421* 422*   NEUTROS ABS Thousands/µL 7 23 4 57 7 19 5 76 8 41*         Results from last 7 days   Lab Units 06/22/20  0524 06/20/20  0435 06/19/20  0516 06/18/20  0503 06/17/20  0458 06/16/20  0428   SODIUM mmol/L 135* 138 140 139 140 137   POTASSIUM mmol/L 4 0 4 1 3 5 4 0 3 6 3 7   CHLORIDE mmol/L 101 107 107 108 107 105   CO2 mmol/L 26 28 27 27 28 27   ANION GAP mmol/L 8 3* 6 4 5 5   BUN mg/dL 14 12 11 12 15 16   CREATININE mg/dL 0 34* 0 34* 0 32* 0 38* 0 31* 0 36*   EGFR ml/min/1 73sq m 153 153 156 148 158 150   CALCIUM mg/dL 9 0 9 6 8 8 9 6 9 0 9 0   MAGNESIUM mg/dL  --   --  2 2 2 4 2 6 2 8*   PHOSPHORUS mg/dL  --   --  4 3 2 0* 2 1* 2 0*         Results from last 7 days   Lab Units 06/21/20  2346 06/21/20  1805 06/21/20  1134 06/21/20  0526 06/21/20  0038 06/20/20  1809 06/20/20  1146 06/20/20  0628 06/19/20  2348 06/19/20  1740 06/19/20  1200 06/18/20  1743   POC GLUCOSE mg/dl 136 125 122 118 120 131 123 120 141* 124 107 112     Results from last 7 days   Lab Units 06/22/20  0524 06/20/20  0435 06/19/20  0516 06/18/20  0503 06/17/20  0458 06/16/20  0428   GLUCOSE RANDOM mg/dL 111 111 104 117 113 125           Results from last 7 days   Lab Units 06/18/20  2300 06/17/20  2304 06/15/20  2024   PH MERI  7 397 7 432* 7 468*   PCO2 MERI mm Hg 43 6 43 5 43 9   PO2 MERI mm Hg 46 6* 45 3* 54 7*   HCO3 MERI mmol/L 26 2 28 4 31 1*   BASE EXC MERI mmol/L 1 2 3 7 6 7   O2 CONTENT MERI ml/dL 9 3 10 7 12 8   O2 HGB, VENOUS % 76 4 76 3 85 4*       Results from last 7 days   Lab Units 06/18/20  1433 06/17/20  1040   GRAM STAIN RESULT  No Polys or Bacteria seen 2+ Polys  No bacteria seen         Results from last 7 days   Lab Units 06/17/20  1610 06/17/20  1254 06/17/20  0845 06/17/20  0842   APPEARANCE CSF  CLOUDY  --   --   --    WBC CSF /uL 221*  --   --   --    XANTHOCHROMIA  Yes*  --   --   --    GLUCOSE CSF mg/dL  --   --  30*  --    PROTEIN CSF mg/dL  --   --  535*  --    RBC CSF uL  --  46*  --   --    CSF CULTURE   --   --   --  No growth       Vital Signs:   Date/Time  Temp  Pulse  Resp  BP  MAP (mmHg)  SpO2  FiO2 (%)  O2 Device    06/22/20 1133            99 %        06/22/20 0722            100 %        06/22/20 0600  99 8 °F (37 7 °C)  104  23Abnormal   132/74  89  100 %        06/22/20 0500  99 9 °F (37 7 °C)  104  20  128/80  85  99 %        06/22/20 0400  99 9 °F (37 7 °C)  104  19  122/78  88  100 %    Ventilator    06/22/20 0300  100 °F (37 8 °C)  110Abnormal   24Abnormal   126/79  91  100 %        06/22/20 0200  100 °F (37 8 °C)  106Abnormal   28Abnormal   126/81  98  100 %        06/22/20 0100  100 °F (37 8 °C)  108Abnormal   31Abnormal   128/78  97  97 %        06/22/20 0000  100 °F (37 8 °C)  108Abnormal   18  127/77  95  100 %    Ventilator              Medications:   Scheduled Medications:    Medications:  bisacodyl 10 mg Rectal Daily   budesonide 0 5 mg Nebulization BID   calcium carbonate-vitamin D 1 tablet Oral Daily With Breakfast   chlorhexidine 15 mL Swish & Spit Q12H [address]   cholecalciferol 1,000 Units Oral Daily   DAPTOmycin 4 mg/kg (Adjusted) Intravenous Q24H   desmopressin 0 05 mg Per G Tube Daily   diazepam 5 mg Per G Tube Q8H   guaiFENesin 400 mg Oral Q8H   heparin (porcine) 5,000 Units Subcutaneous Q8H [address]   levETIRAcetam 1,000 mg Per G Tube BID   levETIRAcetam 500 mg Per G Tube Q24H   meropenem 2,000 mg Intravenous Q8H   OXcarbazepine 450 mg Per G Tube Q12H [address]   potassium-sodium phosphates 2 packet Oral 4x Daily (with meals and at bedtime)   senna-docusate sodium 1 tablet Per G Tube HS     Continuous IV Infusions:     PRN Meds:    acetaminophen 650 mg Per G Tube Q4H PRN   cyclobenzaprine 5 mg Per G Tube TID PRN   glycerin-hypromellose- 1 drop Both Eyes Q3H PRN   polyethylene glycol 17 g Oral Daily PRN       Discharge Plan: To be determined     Network Utilization Review Department  [email or web address]  ATTENTION: Please call with any questions or concerns to [phone] and carefully listen to the prompts so that you are directed to the right person  All voicemails are confidential   Namrata Skipper all requests for admission clinical reviews, approved or denied determinations and any other requests to dedicated fax number below belonging to the campus where the patient is receiving treatment   List of dedicated fax numbers for the Facilities:  FACILITY NAME UR FAX NUMBER   ADMISSION DENIALS (Administrative/Medical Necessity) [phone]   [address] (Maternity/NICU/Pediatrics) [phone]   Yael De Oliveira [phone]   Filiberto Castaneda [phone]   Izabela Bowen [phone]   [address] [phone] Maria Del Carmen Garcia [phone] [address] [facility], [facility]  [phone] [address] [address] [address] [facility] [phone]

## 2020-06-22 NOTE — PROGRESS NOTES
Daily Progress Note - Critical Care   Romana Fly 21 y o  female MRN: [id number]  Unit/Bed#: ICU 01 Encounter: [id number]        ----------------------------------------------------------------------------------------  HPI/24hr events:  No overnight events    ---------------------------------------------------------------------------------------  SUBJECTIVE  Patient continues to remain ventilated via her tracheostomy  Unresponsive to voice, somnolent, does not open eyes  ROS unable to be assessed secondary to mental status  ---------------------------------------------------------------------------------------  Assessment and Plan:    Neuro:    Diagnosis:  Altered mental status, 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, seizure disorder, cerebral palsy with quadriplegia  o Plan:   shunt removed on 06/18   o Cam ICU, delete ear precautions  o Continue Keppra, Trileptal, Flexeril, Valium  o Neurosurgery signed off  o Antibiotics as described below      CV:    Diagnosis:  Hypotension  o Plan:  Normotensive the last 24 hours  o Continue to monitor    Diagnosis:  Cardiac arrest  Brief episode of cardiac arrest while central line was placed  Continue management described above and below        Pulm:   Diagnosis:  Chronic respiratory failure status post tracheostomy  o Plan:  Currently on ventilation, FiO2 30%  o Continue spontaneous breathing trials, attempt to wean  o Continue Pulmicort      GI:    Diagnosis:  No acute issues  o Plan:  Continue bowel regimen    :    Diagnosis:  No acute issues, pure wick in place  o Plan:  Continue to trend urine output and creatinine        F/E/N:    Plan:  Maintain off fluids   Electrolytes: Replete as necessary   Nutrition:  Tube feeds 50 cc/hour of Peptamen for 16 hours per day        Heme/Onc:    Diagnosis:  Anemia  o Plan:  Transfuse for hemoglobin less than 7        Endo:    Diagnosis:  History diabetes insipidus  o Plan:  Continue DDAVP daily  o Monitor blood sugars, inpatient goal 140-180      ID:    Diagnosis:  Serratia CNS Infection, possible chest wall cellulitis   Afebrile for the past 24 hours, T-max of a 100° F   Tissue cultures from 6/18 show Pseudomonas and some gamma hemolytic strep   CHF cultures were positive on 06/10 for Serratia   6/17 was negative for bacteria which showed high protein concentration   Meropenem day 20, will continue till 6/23   Vancomycin and daptomycin added, Day 2   Continue to monitor      MSK/Skin:    Diagnosis:  Local wound care        Disposition: Continue Critical Care   Code Status: Level 1 - Full Code  ---------------------------------------------------------------------------------------  ICU CORE MEASURES    Prophylaxis   VTE Pharmacologic Prophylaxis: Pharmacologic VTE Prophylaxis contraindicated due to Hemorrhage  VTE Mechanical Prophylaxis: sequential compression device  Stress Ulcer Prophylaxis: None    ABCDE Protocol (if indicated)  Plan to perform spontaneous awakening trial today? Yes  Plan to perform spontaneous breathing trial today? Yes  Obvious barriers to extubation? Not applicable  CAM-ICU: Negative    Invasive Devices Review  Invasive Devices     Peripherally Inserted Central Catheter Line            PICC Line 89/67/94 Right Basilic 17 days          Drain            Gastrostomy/Enterostomy -- days    External Urinary Catheter 5 days          Airway            Surgical Airway Shiley Cuffed -- days    Surgical Airway Shiley Cuffed 3 days              Can any invasive devices be discontinued today? Not applicable  ---------------------------------------------------------------------------------------  OBJECTIVE    Physical Exam   Constitutional: She has a sickly appearance  She appears ill  No distress  HENT:   Head: Normocephalic and atraumatic  Some tongue enlargement   Eyes: Pupils are equal, round, and reactive to light   Conjunctivae are normal    Cardiovascular: Normal rate, regular rhythm, normal heart sounds and intact distal pulses  Exam reveals no gallop and no friction rub  No murmur heard  Pulmonary/Chest: Effort normal and breath sounds normal  No stridor  No respiratory distress  She has no wheezes  Abdominal: Soft  Bowel sounds are normal  She exhibits distension  There is no tenderness  There is no guarding  Musculoskeletal: She exhibits no edema  Trace edema bilaterally   Neurological:   Nonverbal at baseline, no eye opening, withdraws from pain  Skin: Skin is warm and dry  Vitals   Vitals:    20 1908 20 2333 20 0300   BP:  121/74     BP Location:  Left arm     Pulse:  (!) 106     Resp:  (!) 32     Temp:  99 °F (37 2 °C)     TempSrc:  Rectal     SpO2: 100% 97% 100% 100%   Weight:       Height:         Temp (24hrs), Av 2 °F (36 8 °C), Min:96 1 °F (35 6 °C), Max:100 °F (37 8 °C)  Current: Temperature: 99 °F (37 2 °C)      Invasive/non-invasive ventilation settings   Respiratory    Lab Data (Last 4 hours)    None         O2/Vent Data (Last 4 hours)       0300           Vent Mode CPAP/PS Spont       FIO2 (%) (%) 30       PEEP (cmH2O) (cmH2O) 5       Pressure Support (cmH2O) (cmH20) 10       MV (Obs) 9 8       RSBI 88                   Height and Weights   Height: 4' 2" (127 cm)  IBW: 22 5 kg  Body mass index is 36 52 kg/m²    Weight (last 2 days)     Date/Time   Weight    20 0532   58 9 (129 85)                Intake and Output  I/O        0701 -  0700  07 -  0700    NG/ 120    IV Piggyback 300 350    Feedings 767 806    Total Intake(mL/kg) 1267 (21 5) 1276 (21 7)    Urine (mL/kg/hr) 1600 (1 1) 1500 (1 1)    Total Output 1600 1500    Net -333 -224          Unmeasured Urine Occurrence 1 x         UOP:1 1 ml/kg/hr     Nutrition       Diet Orders   (From admission, onward)             Start     Ordered    20  Diet Enteral/Parenteral; Tube Feeding No Oral Diet; Non-Formulary; Cyclic; 50; 16 hours; Prosource Protein Liquid - Two Packets  Diet effective now     Comments:  Use peptamen 1 2 from home   Question Answer Comment   Diet Type Enteral/Parenteral    Enteral/Parenteral Tube Feeding No Oral Diet    Tube Feeding Formula: Non-Formulary    Bolus/Cyclic/Continuous Cyclic    Tube Feeding Cyclic Rate (mL/hr): 50    Tube Feeding Cyclic: Administer over: 16 hours    Prosource Protein Liquid - No Carb Prosource Protein Liquid - Two Packets    RD to adjust diet per protocol? Yes        06/18/20 2127              TF currently running at 50 ml/hr with a goal of 50 ml/hr   Formula: Peptamen    Laboratory and Diagnostics:  Results from last 7 days   Lab Units 06/19/20  0516 06/18/20  0503 06/17/20  0458 06/16/20  0428   WBC Thousand/uL 7 70 9 43 7 78 9 69   HEMOGLOBIN g/dL 8 4* 9 2* 8 6* 9 3*   HEMATOCRIT % 28 6* 31 3* 29 4* 31 1*   PLATELETS Thousands/uL 407* 441* 421* 422*   NEUTROS PCT % 60 76* 74 87*   MONOS PCT % 8 6 8 6     Results from last 7 days   Lab Units 06/20/20  0435 06/19/20  0516 06/18/20  0503 06/17/20  0458 06/16/20  0428   SODIUM mmol/L 138 140 139 140 137   POTASSIUM mmol/L 4 1 3 5 4 0 3 6 3 7   CHLORIDE mmol/L 107 107 108 107 105   CO2 mmol/L 28 27 27 28 27   ANION GAP mmol/L 3* 6 4 5 5   BUN mg/dL 12 11 12 15 16   CREATININE mg/dL 0 34* 0 32* 0 38* 0 31* 0 36*   CALCIUM mg/dL 9 6 8 8 9 6 9 0 9 0   GLUCOSE RANDOM mg/dL 111 104 117 113 125     Results from last 7 days   Lab Units 06/19/20  0516 06/18/20  0503 06/17/20  0458 06/16/20  0428   MAGNESIUM mg/dL 2 2 2 4 2 6 2 8*   PHOSPHORUS mg/dL 4 3 2 0* 2 1* 2 0*                   ABG:    VBG:  Results from last 7 days   Lab Units 06/18/20  2300   PH MERI  7 397   PCO2 MERI mm Hg 43 6   PO2 MERI mm Hg 46 6*   HCO3 MERI mmol/L 26 2   BASE EXC MERI mmol/L 1 2           Micro  Results from last 7 days   Lab Units 06/18/20  1433 06/17/20  1040   GRAM STAIN RESULT  No Polys or Bacteria seen 2+ Polys  No bacteria seen       EKG: Sinus rhythm  Imaging:  I have personally reviewed pertinent reports        Active Medications  Scheduled Meds:  Current Facility-Administered Medications:  acetaminophen 650 mg Per G Tube Q4H PRN Cortneymorris Seay PA-C    bisacodyl 10 mg Rectal Daily Cortney BiannieoMATTHEW    budesonide 0 5 mg Nebulization BID Cortney BiannieoMATTHEW    calcium carbonate-vitamin D 1 tablet Oral Daily With Breakfast Cortneycrissy Seay PA-C    chlorhexidine 15 mL Swish & Spit Q12H [facility] Cortney BiannieoMATTHEW    cholecalciferol 1,000 Units Oral Daily Cortney BiannieoMATTHEW    cyclobenzaprine 5 mg Per G Tube TID PRN Cortney BiMATTHEW wild    DAPTOmycin 4 mg/kg (Adjusted) Intravenous Q24H Eveline Sun MD Last Rate: Stopped (06/21/20 1601)   desmopressin 0 05 mg Per G Tube Daily Cortney MATTHEW Seay    diazepam 5 mg Per G Tube Q8H Cortney BiannieoMATTHEW    glycerin-hypromellose- 1 drop Both Eyes Q3H PRN Cortney BiannieoMATTHEW    guaiFENesin 400 mg Oral Q8H Cortney BiundoMATTHEW    heparin (porcine) 5,000 Units Subcutaneous Q8H [facility] Cortney BiannieoMATTHEW    levETIRAcetam 1,000 mg Per G Tube BID Cortney BiannieoMATTHEW    levETIRAcetam 500 mg Per G Tube Q24H Cortney BiannieoMATTHEW    meropenem 2,000 mg Intravenous Q8H Julio Grossman DO Last Rate: Stopped (06/22/20 0404)   OXcarbazepine 450 mg Per G Tube Q12H [facility] Cortney BiMATTHEW wild    potassium-sodium phosphates 2 packet Oral 4x Daily (with meals and at bedtime) Cortney BiannieoMATTHEW    senna-docusate sodium 1 tablet Per G Tube HS Cortneycrissy Seay PA-C      Continuous Infusions:     PRN Meds:     acetaminophen 650 mg Q4H PRN   cyclobenzaprine 5 mg TID PRN   glycerin-hypromellose- 1 drop Q3H PRN       Allergies   Allergies   Allergen Reactions    Baclofen Other (See Comments)     Reaction Date: [id number];   apena      Cefepime Hives    Cephalosporins      Other reaction(s): Unknown Reaction  Other reaction(s): Unknown Reaction  Other reaction(s): Unknown Reaction      Clonidine      Other reaction(s): Respiratory Distress, Respiratory Distress    Lactase     Milk-Related Compounds     Penicillins Hives     Reaction Date: [id number];       Phenobarbital Other (See Comments)     Reaction Date: [id number]; Other reaction(s): Other (See Comments)  apnea  Other reaction(s): Other (See Comments)  apnea  apnea      Vancomycin     Zolpidem Other (See Comments)     Other reaction(s): Other (see comments)  Reaction Date: 22Nov2011;   apnea  apnea  apnea      Latex Rash       Advance Directive and Living Will:      Power of :    POLST:        Counseling / Coordination of Care  Total Critical Care time spent 30 minutes excluding procedures, teaching and family updates  Craig Segundo DO        Portions of the record may have been created with voice recognition software  Occasional wrong word or "sound a like" substitutions may have occurred due to the inherent limitations of voice recognition software    Read the chart carefully and recognize, using context, where substitutions have occurred

## 2020-06-23 ENCOUNTER — APPOINTMENT (INPATIENT)
Dept: RADIOLOGY | Facility: HOSPITAL | Age: 25
DRG: 711 | End: 2020-06-23
Payer: COMMERCIAL

## 2020-06-23 ENCOUNTER — APPOINTMENT (INPATIENT)
Dept: GASTROENTEROLOGY | Facility: HOSPITAL | Age: 25
DRG: 711 | End: 2020-06-23
Payer: COMMERCIAL

## 2020-06-23 LAB
ANION GAP SERPL CALCULATED.3IONS-SCNC: 5 MMOL/L (ref 4–13)
ARTERIAL PATENCY WRIST A: YES
ATRIAL RATE: 56 BPM
BASE EXCESS BLDA CALC-SCNC: 1.9 MMOL/L
BUN SERPL-MCNC: 16 MG/DL (ref 5–25)
CALCIUM SERPL-MCNC: 8.9 MG/DL (ref 8.3–10.1)
CHLORIDE SERPL-SCNC: 103 MMOL/L (ref 100–108)
CK SERPL-CCNC: 26 U/L (ref 26–192)
CO2 SERPL-SCNC: 29 MMOL/L (ref 21–32)
CREAT SERPL-MCNC: 0.29 MG/DL (ref 0.6–1.3)
ERYTHROCYTE [DISTWIDTH] IN BLOOD BY AUTOMATED COUNT: 16.5 % (ref 11.6–15.1)
GFR SERPL CREATININE-BSD FRML MDRD: 161 ML/MIN/1.73SQ M
GLUCOSE SERPL-MCNC: 107 MG/DL (ref 65–140)
GLUCOSE SERPL-MCNC: 122 MG/DL (ref 65–140)
HCO3 BLDA-SCNC: 25.3 MMOL/L (ref 22–28)
HCT VFR BLD AUTO: 28.1 % (ref 34.8–46.1)
HGB BLD-MCNC: 8.6 G/DL (ref 11.5–15.4)
HOROWITZ INDEX BLDA+IHG-RTO: 40 MM[HG]
MAGNESIUM SERPL-MCNC: 2.3 MG/DL (ref 1.6–2.6)
MCH RBC QN AUTO: 25.1 PG (ref 26.8–34.3)
MCHC RBC AUTO-ENTMCNC: 30.6 G/DL (ref 31.4–37.4)
MCV RBC AUTO: 82 FL (ref 82–98)
O2 CT BLDA-SCNC: 13.2 ML/DL (ref 16–23)
OXYHGB MFR BLDA: 98 % (ref 94–97)
P AXIS: 21 DEGREES
PCO2 BLDA: 34.9 MM HG (ref 36–44)
PEEP RESPIRATORY: 5 CM[H2O]
PH BLDA: 7.48 [PH] (ref 7.35–7.45)
PHOSPHATE SERPL-MCNC: 4.2 MG/DL (ref 2.7–4.5)
PLATELET # BLD AUTO: 299 THOUSANDS/UL (ref 149–390)
PMV BLD AUTO: 8.9 FL (ref 8.9–12.7)
PO2 BLDA: 172.2 MM HG (ref 75–129)
POTASSIUM SERPL-SCNC: 4.1 MMOL/L (ref 3.5–5.3)
PR INTERVAL: 183 MS
QRS AXIS: 50 DEGREES
QRSD INTERVAL: 88 MS
QT INTERVAL: 438 MS
QTC INTERVAL: 423 MS
RBC # BLD AUTO: 3.43 MILLION/UL (ref 3.81–5.12)
SODIUM SERPL-SCNC: 137 MMOL/L (ref 136–145)
SPECIMEN SOURCE: ABNORMAL
T WAVE AXIS: 25 DEGREES
VENT AC: 15
VENT- AC: AC
VENTRICULAR RATE: 56 BPM
VT SETTING VENT: 325 ML
WBC # BLD AUTO: 9.1 THOUSAND/UL (ref 4.31–10.16)

## 2020-06-23 PROCEDURE — 99233 SBSQ HOSP IP/OBS HIGH 50: CPT | Performed by: INTERNAL MEDICINE

## 2020-06-23 PROCEDURE — 82805 BLOOD GASES W/O2 SATURATION: CPT | Performed by: STUDENT IN AN ORGANIZED HEALTH CARE EDUCATION/TRAINING PROGRAM

## 2020-06-23 PROCEDURE — 0BJ08ZZ INSPECTION OF TRACHEOBRONCHIAL TREE, VIA NATURAL OR ARTIFICIAL OPENING ENDOSCOPIC: ICD-10-PCS | Performed by: EMERGENCY MEDICINE

## 2020-06-23 PROCEDURE — 82948 REAGENT STRIP/BLOOD GLUCOSE: CPT

## 2020-06-23 PROCEDURE — 85027 COMPLETE CBC AUTOMATED: CPT | Performed by: STUDENT IN AN ORGANIZED HEALTH CARE EDUCATION/TRAINING PROGRAM

## 2020-06-23 PROCEDURE — 71045 X-RAY EXAM CHEST 1 VIEW: CPT

## 2020-06-23 PROCEDURE — 94760 N-INVAS EAR/PLS OXIMETRY 1: CPT

## 2020-06-23 PROCEDURE — 83735 ASSAY OF MAGNESIUM: CPT | Performed by: STUDENT IN AN ORGANIZED HEALTH CARE EDUCATION/TRAINING PROGRAM

## 2020-06-23 PROCEDURE — 94003 VENT MGMT INPAT SUBQ DAY: CPT

## 2020-06-23 PROCEDURE — 80048 BASIC METABOLIC PNL TOTAL CA: CPT | Performed by: STUDENT IN AN ORGANIZED HEALTH CARE EDUCATION/TRAINING PROGRAM

## 2020-06-23 PROCEDURE — 94640 AIRWAY INHALATION TREATMENT: CPT

## 2020-06-23 PROCEDURE — 31622 DX BRONCHOSCOPE/WASH: CPT | Performed by: EMERGENCY MEDICINE

## 2020-06-23 PROCEDURE — 36600 WITHDRAWAL OF ARTERIAL BLOOD: CPT

## 2020-06-23 PROCEDURE — 84100 ASSAY OF PHOSPHORUS: CPT | Performed by: STUDENT IN AN ORGANIZED HEALTH CARE EDUCATION/TRAINING PROGRAM

## 2020-06-23 PROCEDURE — 99291 CRITICAL CARE FIRST HOUR: CPT | Performed by: EMERGENCY MEDICINE

## 2020-06-23 PROCEDURE — 93010 ELECTROCARDIOGRAM REPORT: CPT | Performed by: INTERNAL MEDICINE

## 2020-06-23 PROCEDURE — 82550 ASSAY OF CK (CPK): CPT | Performed by: STUDENT IN AN ORGANIZED HEALTH CARE EDUCATION/TRAINING PROGRAM

## 2020-06-23 PROCEDURE — 93005 ELECTROCARDIOGRAM TRACING: CPT

## 2020-06-23 RX ORDER — LIDOCAINE HYDROCHLORIDE 10 MG/ML
INJECTION, SOLUTION EPIDURAL; INFILTRATION; INTRACAUDAL; PERINEURAL
Status: COMPLETED
Start: 2020-06-23 | End: 2020-06-23

## 2020-06-23 RX ORDER — SODIUM CHLORIDE, SODIUM GLUCONATE, SODIUM ACETATE, POTASSIUM CHLORIDE, MAGNESIUM CHLORIDE, SODIUM PHOSPHATE, DIBASIC, AND POTASSIUM PHOSPHATE .53; .5; .37; .037; .03; .012; .00082 G/100ML; G/100ML; G/100ML; G/100ML; G/100ML; G/100ML; G/100ML
500 INJECTION, SOLUTION INTRAVENOUS ONCE
Status: DISCONTINUED | OUTPATIENT
Start: 2020-06-23 | End: 2020-06-23

## 2020-06-23 RX ORDER — MIDODRINE HYDROCHLORIDE 2.5 MG/1
2.5 TABLET ORAL
Status: DISCONTINUED | OUTPATIENT
Start: 2020-06-23 | End: 2020-06-23

## 2020-06-23 RX ORDER — ALBUMIN, HUMAN INJ 5% 5 %
12.5 SOLUTION INTRAVENOUS ONCE
Status: COMPLETED | OUTPATIENT
Start: 2020-06-23 | End: 2020-06-23

## 2020-06-23 RX ORDER — MIDODRINE HYDROCHLORIDE 5 MG/1
10 TABLET ORAL
Status: DISCONTINUED | OUTPATIENT
Start: 2020-06-23 | End: 2020-06-28

## 2020-06-23 RX ORDER — LIDOCAINE HYDROCHLORIDE 10 MG/ML
10 INJECTION, SOLUTION EPIDURAL; INFILTRATION; INTRACAUDAL; PERINEURAL ONCE
Status: DISCONTINUED | OUTPATIENT
Start: 2020-06-23 | End: 2020-06-23

## 2020-06-23 RX ORDER — LIDOCAINE HYDROCHLORIDE 10 MG/ML
10 INJECTION, SOLUTION EPIDURAL; INFILTRATION; INTRACAUDAL; PERINEURAL ONCE
Status: COMPLETED | OUTPATIENT
Start: 2020-06-23 | End: 2020-06-23

## 2020-06-23 RX ADMIN — CHLORHEXIDINE GLUCONATE 0.12% ORAL RINSE 15 ML: 1.2 LIQUID ORAL at 21:42

## 2020-06-23 RX ADMIN — LIDOCAINE HYDROCHLORIDE 10 ML: 10 INJECTION, SOLUTION EPIDURAL; INFILTRATION; INTRACAUDAL; PERINEURAL at 10:42

## 2020-06-23 RX ADMIN — POTASSIUM & SODIUM PHOSPHATES POWDER PACK 280-160-250 MG 2 PACKET: 280-160-250 PACK at 16:33

## 2020-06-23 RX ADMIN — POTASSIUM & SODIUM PHOSPHATES POWDER PACK 280-160-250 MG 2 PACKET: 280-160-250 PACK at 21:42

## 2020-06-23 RX ADMIN — CHLORHEXIDINE GLUCONATE 0.12% ORAL RINSE 15 ML: 1.2 LIQUID ORAL at 08:03

## 2020-06-23 RX ADMIN — DIAZEPAM 5 MG: 5 TABLET ORAL at 09:39

## 2020-06-23 RX ADMIN — HEPARIN SODIUM 5000 UNITS: 5000 INJECTION INTRAVENOUS; SUBCUTANEOUS at 05:34

## 2020-06-23 RX ADMIN — POTASSIUM & SODIUM PHOSPHATES POWDER PACK 280-160-250 MG 2 PACKET: 280-160-250 PACK at 11:28

## 2020-06-23 RX ADMIN — GUAIFENESIN 400 MG: 100 SOLUTION ORAL at 09:39

## 2020-06-23 RX ADMIN — HEPARIN SODIUM 5000 UNITS: 5000 INJECTION INTRAVENOUS; SUBCUTANEOUS at 14:12

## 2020-06-23 RX ADMIN — SODIUM CHLORIDE 2 MCG/MIN: 0.9 INJECTION, SOLUTION INTRAVENOUS at 17:33

## 2020-06-23 RX ADMIN — DIAZEPAM 5 MG: 5 TABLET ORAL at 17:49

## 2020-06-23 RX ADMIN — DESMOPRESSIN ACETATE 0.05 MG: 0.1 TABLET ORAL at 08:04

## 2020-06-23 RX ADMIN — HEPARIN SODIUM 5000 UNITS: 5000 INJECTION INTRAVENOUS; SUBCUTANEOUS at 21:42

## 2020-06-23 RX ADMIN — MIDODRINE HYDROCHLORIDE 10 MG: 5 TABLET ORAL at 11:27

## 2020-06-23 RX ADMIN — LEVETIRACETAM 1000 MG: 100 SOLUTION ORAL at 14:12

## 2020-06-23 RX ADMIN — BUDESONIDE 0.5 MG: 0.5 INHALANT RESPIRATORY (INHALATION) at 07:13

## 2020-06-23 RX ADMIN — POTASSIUM & SODIUM PHOSPHATES POWDER PACK 280-160-250 MG 2 PACKET: 280-160-250 PACK at 08:03

## 2020-06-23 RX ADMIN — MEROPENEM 2000 MG: 1 INJECTION, POWDER, FOR SOLUTION INTRAVENOUS at 02:31

## 2020-06-23 RX ADMIN — GUAIFENESIN 400 MG: 100 SOLUTION ORAL at 02:24

## 2020-06-23 RX ADMIN — MIDODRINE HYDROCHLORIDE 10 MG: 5 TABLET ORAL at 16:33

## 2020-06-23 RX ADMIN — MELATONIN 1000 UNITS: at 08:03

## 2020-06-23 RX ADMIN — GUAIFENESIN 400 MG: 100 SOLUTION ORAL at 16:33

## 2020-06-23 RX ADMIN — SENNOSIDES AND DOCUSATE SODIUM 1 TABLET: 8.6; 5 TABLET ORAL at 21:42

## 2020-06-23 RX ADMIN — LEVETIRACETAM 500 MG: 100 SOLUTION ORAL at 21:42

## 2020-06-23 RX ADMIN — OXCARBAZEPINE 450 MG: 300 TABLET, FILM COATED ORAL at 05:34

## 2020-06-23 RX ADMIN — DAPTOMYCIN 175 MG: 500 INJECTION, POWDER, LYOPHILIZED, FOR SOLUTION INTRAVENOUS at 11:42

## 2020-06-23 RX ADMIN — DIAZEPAM 5 MG: 5 TABLET ORAL at 02:24

## 2020-06-23 RX ADMIN — OXCARBAZEPINE 450 MG: 300 TABLET, FILM COATED ORAL at 17:49

## 2020-06-23 RX ADMIN — ALBUMIN (HUMAN) 12.5 G: 12.5 INJECTION, SOLUTION INTRAVENOUS at 12:51

## 2020-06-23 RX ADMIN — BUDESONIDE 0.5 MG: 0.5 INHALANT RESPIRATORY (INHALATION) at 19:02

## 2020-06-23 RX ADMIN — LEVETIRACETAM 1000 MG: 100 SOLUTION ORAL at 05:34

## 2020-06-23 RX ADMIN — Medication 1 TABLET: at 08:03

## 2020-06-23 NOTE — RESPIRATORY THERAPY NOTE
RT Ventilator Management Note  Arminda Lo 21 y o  female MRN: [id number]  Unit/Bed#: ICU 01 Encounter: [id number]      Daily Screen       6/21/2020  0705 6/22/2020  0722          Patient safety screen outcome[de-identified]  Passed  Passed      Spont breathing trial % for 30 min:                  Physical Exam:   Assessment Type: (P) Assess only  Respiratory Pattern: Assisted  Bilateral Breath Sounds: (P) Coarse  Suction: (P) Trach      Resp Comments: (P) Pt  placed back on A/C secondary to episodes of apnea  Pressure support was lowered in attempt to normalize the respiratory patteren but the apneic episodes persisted

## 2020-06-23 NOTE — PROGRESS NOTES
Progress Note - Infectious Disease   Kylah Hanr 21 y o  female MRN: [id number]  Unit/Bed#: ICU 01 Encounter: [id number]      Impression/Recommendations:  1  Sepsis POA   Fever, tachycardia, tachypnea   Likely due to #2   Consider also aspiration given reported respiratory distress upon presentation to [facility] other clear source appreciated   Blood cultures at [facility] negative   Initially improved   Now with new low-grade fever likely due to # 2  Improving with daptomycin  New hypotension today likely due to hypovolemia  Doubt ongoing sepsis  Remains afebrile with normal WBC  Rec:  ? Discontinue meropenem today to complete treatment course  ? Continue daptomycin as below  ? Follow temperatures closely  ? Supportive care as per the primary service     2   Mild chest wall cellulitis  In setting of recent difficult  shunt removal   Improving with addition of daptomycin  Rec:  · Continue daptomycin for 2 more days  · Serial chest wall exam    3  Serratia  shunt infection   Consider translocation from GI tract although no reported intra-abdominal fluid collection   Repeat CSF cultures positive for Serratia despite 10 days IV antibiotics represents antibiotic failure   Status post VPS externalization with tolerance of clamping trial   Now status post complete proximal CNA VPS removal   Some distal extra-CNS portion remains due to heavy calcification  Distal peritoneal culture with Pseudomonas, Enterococcus but culture of CNS portion negative  Rec:  ? Discontinue meropenem today to complete 5 days post (3 weeks total) antibiotics     4  Acute on chronic hypoxic respiratory failure   With chronic tracheostomy  No overt pneumonia on chest imaging from [place]   Consider role of aspiration versus encephalopathy   Patient on minimal vent requirements  Rec:  ? Follow respiratory status closely  ?  Supportive care as per the primary service     5  Seizure disorder   Initial concern for seizures at [place] but vEEG here negative for seizure activity   Rhythmic blinking does not correlate to EEG findings per Neurology  Rec:  ? Continue antiepileptics per Neurology with close follow-up ongoing     6   Retroperitoneal collection   Reportedly chronic, seen on imaging done at [place] [address] significance   Status post aspiration of the collection with negative cultures  Rec:  ? No additional workup or treatment from an ID perspective     7   CP   With spastic quadriplegia, profound MR, hydrocephalus status post  shunt     8   Recent VFib arrest   Consider anoxic encephalopathy      The above plan was discussed in detail with the primary service  Antibiotics:  Daptomycin # 3  Meropenem #21  POD #5    Subjective:  Patient seen on PM rounds  Unable to provider ROS due to nonverbal state  24 Hour Events:  Had hypotension today  Bedside echo suggested hypovolemia  Started on albumin midodrine  Weaning on ventilator  No documented fevers, chills, sweats, nausea, vomiting, or diarrhea  Underwent CT C/A/P yesterday which showed no acute abnormality  Objective:  Vitals:  Temp:  [97 1 °F (36 2 °C)-99 3 °F (37 4 °C)] 97 1 °F (36 2 °C)  HR:  [] 52  Resp:  [8-37] 21  BP: ()/(57-80) 103/62  SpO2:  [99 %-100 %] 100 %  Temp (24hrs), Av °F (36 7 °C), Min:97 1 °F (36 2 °C), Max:99 3 °F (37 4 °C)  Current: Temperature: (!) 97 1 °F (36 2 °C)    Physical Exam:   General:  No acute distress  Psychiatric:  Awake and alert  Pulmonary:  Normal respiratory excursion without accessory muscle use  Abdomen:  Soft, nontender  Extremities:  No edema  Skin:  No rashes    Lab Results:  I have personally reviewed pertinent labs    Results from last 7 days   Lab Units 20  0437 20  0524 20  0435   POTASSIUM mmol/L 4 1 4 0 4 1   CHLORIDE mmol/L 103 101 107   CO2 mmol/L 29 26 28   BUN mg/dL 16 14 12   CREATININE mg/dL 0 29* 0 34* 0 34*   EGFR ml/min/1 73sq m 161 153 153   CALCIUM mg/dL 8 9 9 0 9 6     Results from last 7 days   Lab Units 06/23/20  0437 06/22/20  0524 06/19/20  0516   WBC Thousand/uL 9 10 10 33* 7 70   HEMOGLOBIN g/dL 8 6* 9 4* 8 4*   PLATELETS Thousands/uL 299 453* 407*     Results from last 7 days   Lab Units 06/18/20  1433 06/17/20  1040   GRAM STAIN RESULT  No Polys or Bacteria seen 2+ Polys  No bacteria seen       Imaging Studies:   I have personally reviewed pertinent imaging study reports and images in PACS  EKG, Pathology, and Other Studies:   I have personally reviewed pertinent reports

## 2020-06-23 NOTE — RESPIRATORY THERAPY NOTE
RT Ventilator Management Note  Natividad Mcdonald 21 y o  female MRN: [id number]  Unit/Bed#: ICU 01 Encounter: [id number]      Daily Screen       6/22/2020  0722 6/23/2020  0710          Patient safety screen outcome[de-identified]  Passed  Passed      Spont breathing trial % for 30 min:                  Physical Exam:   Assessment Type: During-treatment  General Appearance: Awake  Respiratory Pattern: Assisted, Spontaneous  Chest Assessment: Chest expansion symmetrical  Bilateral Breath Sounds: Coarse  Cough: Productive  Suction: Trach  O2 Device: vent  Subjective Data: trach      Resp Comments: ABG done on AC settings   Pt going into apnea ventilation x4, placed back on AC settings to rest

## 2020-06-23 NOTE — PROGRESS NOTES
Daily Progress Note - Critical Care   Kelly Vaughn 21 y o  female MRN: [id number]  Unit/Bed#: ICU 01 Encounter: [id number]        ----------------------------------------------------------------------------------------  HPI/24hr events:  Overnight patient noted to have dark red secretion on suctioning  Had episodes of apnea, was placed back on AC/VC  Had 1 bowel movement     ---------------------------------------------------------------------------------------  SUBJECTIVE  Patient continues to remain ventilated via her tracheostomy  Responsive to voice, will move lips, but still somewhat somnolent, does not open eyes  ROS unable to be assessed secondary to mental status  ---------------------------------------------------------------------------------------  Assessment and Plan:    Neuro:    Diagnosis:  Altered mental status, 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, seizure disorder, cerebral palsy with quadriplegia  o Plan:   shunt removed on 06/18   o CAM ICU, Delirium precautions  o Continue Keppra, Trileptal, Flexeril, Valium  o Neurosurgery signed off  o Antibiotics as described below      CV:    Diagnosis:  Hypotension  o Plan:  Normotensive the last 24 hours  o Continue to monitor    Diagnosis:  Cardiac arrest  Brief episode of cardiac arrest while central line was placed  Continue management described above and below        Pulm:   Diagnosis:  Chronic respiratory failure status post tracheostomy  o Plan:  Currently on Pressure support, FiO2 30% 10/5  o Continue spontaneous breathing trials, attempt to wean  o Given secretions, consider bronchoscopy today    o Continue Pulmicort      GI:    Diagnosis:  No acute issues  o Plan:  Continue bowel regimen    :    Diagnosis:  No acute issues, pure wick in place  o Plan:  Continue to trend urine output and creatinine        F/E/N:    Plan:  Maintain off fluids   Electrolytes: Replete as necessary   Nutrition:  Tube feeds 50 cc/hour of Peptamen for 16 hours per day  Heme/Onc:    Diagnosis:  Anemia  o Plan:  Transfuse for hemoglobin less than 7      Endo:    Diagnosis:  History diabetes insipidus  o Plan:  Continue DDAVP daily  o Monitor blood sugars      ID:    Diagnosis:  Serratia CNS Infection, possible chest wall cellulitis   Afebrile for the past 24 hours, T-max of a 9   Tissue cultures from 6/18 show Pseudomonas and some gamma hemolytic strep   CHF cultures were positive on 06/10 for Serratia   6/17 was negative for bacteria which showed high protein concentration   Noted to have purulent drainage from small site on back   Meropenem completed today   Daptomycin added, Day 3   ID following, appreciated   Continue to monitor      MSK/Skin:    Diagnosis:  Local wound care        Disposition: Continue Critical Care   Code Status: Level 1 - Full Code  ---------------------------------------------------------------------------------------  ICU CORE MEASURES    Prophylaxis   VTE Pharmacologic Prophylaxis: Pharmacologic VTE Prophylaxis contraindicated due to Hemorrhage  VTE Mechanical Prophylaxis: sequential compression device  Stress Ulcer Prophylaxis: None    ABCDE Protocol (if indicated)  Plan to perform spontaneous awakening trial today? Yes  Plan to perform spontaneous breathing trial today? Yes  Obvious barriers to extubation? Not applicable  CAM-ICU: Negative    Invasive Devices Review  Invasive Devices     Peripherally Inserted Central Catheter Line            PICC Line 93/85/55 Right Basilic 18 days          Drain            Gastrostomy/Enterostomy -- days    External Urinary Catheter 6 days          Airway            Surgical Airway Shiley Cuffed -- days    Surgical Airway Shiley Cuffed 4 days              Can any invasive devices be discontinued today?  Not applicable  ---------------------------------------------------------------------------------------  OBJECTIVE    Physical Exam   Constitutional: She has a sickly appearance  She appears ill  No distress  HENT:   Head: Normocephalic and atraumatic  Some tongue protrusion   Eyes: Pupils are equal, round, and reactive to light  Conjunctivae are normal    Neck: Neck supple  Cardiovascular: Normal rate, regular rhythm, normal heart sounds and intact distal pulses  Exam reveals no gallop and no friction rub  No murmur heard  Pulmonary/Chest: Effort normal  No stridor  No respiratory distress  She has no wheezes  She has rhonchi in the right middle field, the right lower field, the left middle field and the left lower field  Abdominal: Soft  Bowel sounds are normal  She exhibits no distension  There is no tenderness  There is no guarding  Musculoskeletal: She exhibits no edema  Trace edema bilaterally   Neurological:   Nonverbal at baseline, no eye opening, moves lips when talked to  Skin: Skin is warm and dry  Vitals   Vitals:    20 0200 20 0215 20 0330 20 0402   BP:  105/62     BP Location:       Pulse: 98 96  92   Resp: (!) 37 12  20   Temp:    98 2 °F (36 8 °C)   TempSrc:    Oral   SpO2: 100% 100% 100% 100%   Weight:       Height:         Temp (24hrs), Av 9 °F (36 1 °C), Min:66 9 °F (19 4 °C), Max:99 7 °F (37 6 °C)  Current: Temperature: 98 2 °F (36 8 °C)      Invasive/non-invasive ventilation settings   Respiratory    Lab Data (Last 4 hours)    None         O2/Vent Data (Last 4 hours)       0230  0330         Vent Mode  AC/VC      Resp Rate (BPM) (BPM)  15      Vt (mL) (mL)  250      FIO2 (%) (%)  30      PEEP (cmH2O) (cmH2O)  5      MV  7 4      Pressure Support (cmH2O) (cmH20) 10                   Height and Weights   Height: 4' 2" (127 cm)  IBW: 22 5 kg  Body mass index is 36 52 kg/m²    Weight (last 2 days)     None            Intake and Output  I/O        07 -  0700  07 -  0700    NG/ 120    IV Piggyback 300 350    Feedings 767 806    Total Intake(mL/kg) 1267 (21 5) 1276 (21 7) Urine (mL/kg/hr) 1600 (1 1) 1500 (1 1)    Total Output 1600 1500    Net -333 -224          Unmeasured Urine Occurrence 1 x         UOP:1 1 ml/kg/hr     Nutrition       Diet Orders   (From admission, onward)             Start     Ordered    06/18/20 2126  Diet Enteral/Parenteral; Tube Feeding No Oral Diet; Non-Formulary; Cyclic; 50; 16 hours; Prosource Protein Liquid - Two Packets  Diet effective now     Comments:  Use peptamen 1 2 from home   Question Answer Comment   Diet Type Enteral/Parenteral    Enteral/Parenteral Tube Feeding No Oral Diet    Tube Feeding Formula: Non-Formulary    Bolus/Cyclic/Continuous Cyclic    Tube Feeding Cyclic Rate (mL/hr): 50    Tube Feeding Cyclic: Administer over: 16 hours    Prosource Protein Liquid - No Carb Prosource Protein Liquid - Two Packets    RD to adjust diet per protocol? Yes        06/18/20 2127              TF currently running at 50 ml/hr with a goal of 50 ml/hr   Formula: Peptamen    Laboratory and Diagnostics:  Results from last 7 days   Lab Units 06/23/20  0437 06/22/20  0524 06/19/20  0516 06/18/20  0503 06/17/20  0458   WBC Thousand/uL 9 10 10 33* 7 70 9 43 7 78   HEMOGLOBIN g/dL 8 6* 9 4* 8 4* 9 2* 8 6*   HEMATOCRIT % 28 1* 31 5* 28 6* 31 3* 29 4*   PLATELETS Thousands/uL 299 453* 407* 441* 421*   NEUTROS PCT %  --  69 60 76* 74   MONOS PCT %  --  6 8 6 8     Results from last 7 days   Lab Units 06/23/20  0437 06/22/20  0524 06/20/20  0435 06/19/20  0516 06/18/20  0503 06/17/20  0458   SODIUM mmol/L 137 135* 138 140 139 140   POTASSIUM mmol/L 4 1 4 0 4 1 3 5 4 0 3 6   CHLORIDE mmol/L 103 101 107 107 108 107   CO2 mmol/L 29 26 28 27 27 28   ANION GAP mmol/L 5 8 3* 6 4 5   BUN mg/dL 16 14 12 11 12 15   CREATININE mg/dL 0 29* 0 34* 0 34* 0 32* 0 38* 0 31*   CALCIUM mg/dL 8 9 9 0 9 6 8 8 9 6 9 0   GLUCOSE RANDOM mg/dL 107 111 111 104 117 113     Results from last 7 days   Lab Units 06/23/20  0437 06/19/20  0516 06/18/20  0503 06/17/20  0458   MAGNESIUM mg/dL 2 3 2 2 2 4 2 6   PHOSPHORUS mg/dL 4 2 4 3 2 0* 2 1*                   ABG:    VBG:  Results from last 7 days   Lab Units 06/18/20  2300   PH MERI  7 397   PCO2 MERI mm Hg 43 6   PO2 MERI mm Hg 46 6*   HCO3 MERI mmol/L 26 2   BASE EXC MERI mmol/L 1 2           Micro  Results from last 7 days   Lab Units 06/18/20  1433 06/17/20  1040   GRAM STAIN RESULT  No Polys or Bacteria seen 2+ Polys  No bacteria seen       EKG: Sinus rhythm  Imaging:  I have personally reviewed pertinent reports        Active Medications  Scheduled Meds:    Current Facility-Administered Medications:  acetaminophen 650 mg Per G Tube Q4H PRN Cortney Seay PA-C    bisacodyl 10 mg Rectal Daily Cortney MATTHEW Seay    budesonide 0 5 mg Nebulization BID Cortneycrissy Seay PA-C    calcium carbonate-vitamin D 1 tablet Oral Daily With Breakfast Cortney Seay PA-C    chlorhexidine 15 mL Swish & Spit Q12H [facility] Cortney Seay PA-C    cholecalciferol 1,000 Units Oral Daily Cortney BiMATTHEW wild    cyclobenzaprine 5 mg Per G Tube TID PRN Cortney MATTHEW eSay    DAPTOmycin 4 mg/kg (Adjusted) Intravenous Q24H Isa Vogel MD Last Rate: 175 mg (06/22/20 1318)   desmopressin 0 05 mg Per G Tube Daily Cortney MATTHEW Seay    diazepam 5 mg Per G Tube Q8H Cortneycrissy Seay PA-C    glycerin-hypromellose- 1 drop Both Eyes Q3H PRN Cortney BiMATTHEW wild    guaiFENesin 400 mg Oral Q8H Cortney NATALIE Seay-HUGO    heparin (porcine) 5,000 Units Subcutaneous Q8H [facility] Cortney NATALIE Seay-HUGO    levETIRAcetam 1,000 mg Per G Tube BID Cortney BiNATALIE wild-HUGO    levETIRAcetam 500 mg Per G Tube Q24H Cortneycrissy Seay PA-C    OXcarbazepine 450 mg Per G Tube Q12H [facility] Cortney Seay PA-C    polyethylene glycol 17 g Oral Daily PRN Padma Baeza MD    potassium-sodium phosphates 2 packet Oral 4x Daily (with meals and at bedtime) Cortney Seay PA-C    senna-docusate sodium 1 tablet Per G Tube HS Cortney Seay PA-C      Continuous Infusions: PRN Meds:     acetaminophen 650 mg Q4H PRN   cyclobenzaprine 5 mg TID PRN   glycerin-hypromellose- 1 drop Q3H PRN   polyethylene glycol 17 g Daily PRN       Allergies   Allergies   Allergen Reactions    Baclofen Other (See Comments)     Reaction Date: [id number];   apena      Cefepime Hives    Cephalosporins      Other reaction(s): Unknown Reaction  Other reaction(s): Unknown Reaction  Other reaction(s): Unknown Reaction      Clonidine      Other reaction(s): Respiratory Distress, Respiratory Distress    Lactase     Milk-Related Compounds     Penicillins Hives     Reaction Date: [id number];       Phenobarbital Other (See Comments)     Reaction Date: [id number]; Other reaction(s): Other (See Comments)  apnea  Other reaction(s): Other (See Comments)  apnea  apnea      Vancomycin     Zolpidem Other (See Comments)     Other reaction(s): Other (see comments)  Reaction Date: 22Nov2011;   apnea  apnea  apnea      Latex Rash       Advance Directive and Living Will:      Power of :    POLST:        Counseling / Coordination of Care  Total Critical Care time spent 30 minutes excluding procedures, teaching and family updates  Craig Segundo DO        Portions of the record may have been created with voice recognition software  Occasional wrong word or "sound a like" substitutions may have occurred due to the inherent limitations of voice recognition software    Read the chart carefully and recognize, using context, where substitutions have occurred

## 2020-06-23 NOTE — RESPIRATORY THERAPY NOTE
RT Ventilator Management Note  Katherine Walden 21 y o  female MRN: [id number]  Unit/Bed#: ICU 01 Encounter: [id number]      Daily Screen       6/22/2020  0722 6/23/2020  0710          Patient safety screen outcome[de-identified]  Passed  Passed      Spont breathing trial % for 30 min:                  Physical Exam:   Assessment Type: During-treatment  General Appearance: Awake  Respiratory Pattern: Assisted, Spontaneous  Chest Assessment: Chest expansion symmetrical  Bilateral Breath Sounds: Coarse  Cough: Productive  Suction: Trach  O2 Device: vent  Subjective Data: trach      Resp Comments: Pt placed on PS 10 wean and tolerating well  Will continue to monitor

## 2020-06-24 LAB
ANION GAP SERPL CALCULATED.3IONS-SCNC: 7 MMOL/L (ref 4–13)
BUN SERPL-MCNC: 19 MG/DL (ref 5–25)
CALCIUM SERPL-MCNC: 8.9 MG/DL (ref 8.3–10.1)
CHLORIDE SERPL-SCNC: 102 MMOL/L (ref 100–108)
CO2 SERPL-SCNC: 27 MMOL/L (ref 21–32)
CREAT SERPL-MCNC: 0.39 MG/DL (ref 0.6–1.3)
ERYTHROCYTE [DISTWIDTH] IN BLOOD BY AUTOMATED COUNT: 16.4 % (ref 11.6–15.1)
GFR SERPL CREATININE-BSD FRML MDRD: 146 ML/MIN/1.73SQ M
GLUCOSE SERPL-MCNC: 102 MG/DL (ref 65–140)
GLUCOSE SERPL-MCNC: 113 MG/DL (ref 65–140)
HCT VFR BLD AUTO: 29.3 % (ref 34.8–46.1)
HGB BLD-MCNC: 8.9 G/DL (ref 11.5–15.4)
MCH RBC QN AUTO: 24.9 PG (ref 26.8–34.3)
MCHC RBC AUTO-ENTMCNC: 30.4 G/DL (ref 31.4–37.4)
MCV RBC AUTO: 82 FL (ref 82–98)
PLATELET # BLD AUTO: 335 THOUSANDS/UL (ref 149–390)
PMV BLD AUTO: 9.2 FL (ref 8.9–12.7)
POTASSIUM SERPL-SCNC: 4.2 MMOL/L (ref 3.5–5.3)
RBC # BLD AUTO: 3.57 MILLION/UL (ref 3.81–5.12)
SODIUM SERPL-SCNC: 136 MMOL/L (ref 136–145)
WBC # BLD AUTO: 9.09 THOUSAND/UL (ref 4.31–10.16)

## 2020-06-24 PROCEDURE — 94760 N-INVAS EAR/PLS OXIMETRY 1: CPT

## 2020-06-24 PROCEDURE — 94640 AIRWAY INHALATION TREATMENT: CPT

## 2020-06-24 PROCEDURE — 99255 IP/OBS CONSLTJ NEW/EST HI 80: CPT | Performed by: PHYSICIAN ASSISTANT

## 2020-06-24 PROCEDURE — 99291 CRITICAL CARE FIRST HOUR: CPT | Performed by: EMERGENCY MEDICINE

## 2020-06-24 PROCEDURE — 99233 SBSQ HOSP IP/OBS HIGH 50: CPT | Performed by: INTERNAL MEDICINE

## 2020-06-24 PROCEDURE — 82948 REAGENT STRIP/BLOOD GLUCOSE: CPT

## 2020-06-24 PROCEDURE — 85027 COMPLETE CBC AUTOMATED: CPT | Performed by: STUDENT IN AN ORGANIZED HEALTH CARE EDUCATION/TRAINING PROGRAM

## 2020-06-24 PROCEDURE — 94003 VENT MGMT INPAT SUBQ DAY: CPT

## 2020-06-24 PROCEDURE — 80048 BASIC METABOLIC PNL TOTAL CA: CPT | Performed by: STUDENT IN AN ORGANIZED HEALTH CARE EDUCATION/TRAINING PROGRAM

## 2020-06-24 RX ORDER — NOREPINEPHRINE BITARTRATE 1 MG/ML
INJECTION, SOLUTION INTRAVENOUS
Status: DISPENSED
Start: 2020-06-24 | End: 2020-06-25

## 2020-06-24 RX ADMIN — DAPTOMYCIN 175 MG: 500 INJECTION, POWDER, LYOPHILIZED, FOR SOLUTION INTRAVENOUS at 12:18

## 2020-06-24 RX ADMIN — MIDODRINE HYDROCHLORIDE 10 MG: 5 TABLET ORAL at 17:45

## 2020-06-24 RX ADMIN — LEVETIRACETAM 500 MG: 100 SOLUTION ORAL at 22:10

## 2020-06-24 RX ADMIN — MIDODRINE HYDROCHLORIDE 10 MG: 5 TABLET ORAL at 08:06

## 2020-06-24 RX ADMIN — BUDESONIDE 0.5 MG: 0.5 INHALANT RESPIRATORY (INHALATION) at 07:14

## 2020-06-24 RX ADMIN — MIDODRINE HYDROCHLORIDE 10 MG: 5 TABLET ORAL at 12:14

## 2020-06-24 RX ADMIN — HEPARIN SODIUM 5000 UNITS: 5000 INJECTION INTRAVENOUS; SUBCUTANEOUS at 22:10

## 2020-06-24 RX ADMIN — DIAZEPAM 5 MG: 5 TABLET ORAL at 17:45

## 2020-06-24 RX ADMIN — BISACODYL 10 MG: 10 SUPPOSITORY RECTAL at 08:00

## 2020-06-24 RX ADMIN — SENNOSIDES AND DOCUSATE SODIUM 1 TABLET: 8.6; 5 TABLET ORAL at 22:09

## 2020-06-24 RX ADMIN — POTASSIUM & SODIUM PHOSPHATES POWDER PACK 280-160-250 MG 2 PACKET: 280-160-250 PACK at 12:14

## 2020-06-24 RX ADMIN — POTASSIUM & SODIUM PHOSPHATES POWDER PACK 280-160-250 MG 2 PACKET: 280-160-250 PACK at 08:06

## 2020-06-24 RX ADMIN — Medication 1 TABLET: at 08:06

## 2020-06-24 RX ADMIN — POTASSIUM & SODIUM PHOSPHATES POWDER PACK 280-160-250 MG 2 PACKET: 280-160-250 PACK at 17:45

## 2020-06-24 RX ADMIN — CHLORHEXIDINE GLUCONATE 0.12% ORAL RINSE 15 ML: 1.2 LIQUID ORAL at 22:00

## 2020-06-24 RX ADMIN — OXCARBAZEPINE 450 MG: 300 TABLET, FILM COATED ORAL at 17:46

## 2020-06-24 RX ADMIN — HEPARIN SODIUM 5000 UNITS: 5000 INJECTION INTRAVENOUS; SUBCUTANEOUS at 14:32

## 2020-06-24 RX ADMIN — BUDESONIDE 0.5 MG: 0.5 INHALANT RESPIRATORY (INHALATION) at 19:24

## 2020-06-24 RX ADMIN — HEPARIN SODIUM 5000 UNITS: 5000 INJECTION INTRAVENOUS; SUBCUTANEOUS at 05:01

## 2020-06-24 RX ADMIN — OXCARBAZEPINE 450 MG: 300 TABLET, FILM COATED ORAL at 05:01

## 2020-06-24 RX ADMIN — GUAIFENESIN 400 MG: 100 SOLUTION ORAL at 08:30

## 2020-06-24 RX ADMIN — DESMOPRESSIN ACETATE 0.05 MG: 0.1 TABLET ORAL at 08:06

## 2020-06-24 RX ADMIN — DIAZEPAM 5 MG: 5 TABLET ORAL at 12:14

## 2020-06-24 RX ADMIN — DIAZEPAM 5 MG: 5 TABLET ORAL at 02:35

## 2020-06-24 RX ADMIN — GUAIFENESIN 400 MG: 100 SOLUTION ORAL at 02:30

## 2020-06-24 RX ADMIN — POTASSIUM & SODIUM PHOSPHATES POWDER PACK 280-160-250 MG 2 PACKET: 280-160-250 PACK at 22:09

## 2020-06-24 RX ADMIN — LEVETIRACETAM 1000 MG: 100 SOLUTION ORAL at 05:01

## 2020-06-24 RX ADMIN — CHLORHEXIDINE GLUCONATE 0.12% ORAL RINSE 15 ML: 1.2 LIQUID ORAL at 08:06

## 2020-06-24 RX ADMIN — GUAIFENESIN 400 MG: 100 SOLUTION ORAL at 17:45

## 2020-06-24 RX ADMIN — MELATONIN 1000 UNITS: at 08:06

## 2020-06-24 RX ADMIN — LEVETIRACETAM 1000 MG: 100 SOLUTION ORAL at 14:32

## 2020-06-24 NOTE — RESPIRATORY THERAPY NOTE
RT Ventilator Management Note  Ciro Crowe 21 y o  female MRN: [id number]  Unit/Bed#: ICU 01 Encounter: [id number]      Daily Screen       6/22/2020  0722 6/23/2020  0710          Patient safety screen outcome[de-identified]  Passed  Passed      Spont breathing trial % for 30 min:                  Physical Exam:   Assessment Type: (P) Assess only      Resp Comments: (P) Pt  doing well on A/C  No changes throughout the night

## 2020-06-24 NOTE — RESPIRATORY THERAPY NOTE
RT Ventilator Management Note  Curtis Castro 21 y o  female MRN: [id number]  Unit/Bed#: ICU 01 Encounter: [id number]      Daily Screen       6/23/2020  0710 6/24/2020  0707          Patient safety screen outcome[de-identified]  Passed  Passed      Spont breathing trial % for 30 min:                  Physical Exam:   Assessment Type: Assess only  O2 Device: (P) vent      Resp Comments: (P) pt appears comfortable on current settings, will place pt on a cpap/ps trial, will continue to monitor

## 2020-06-24 NOTE — RESPIRATORY THERAPY NOTE
RT Ventilator Management Note  Nikki Ramos 21 y o  female MRN: [id number]  Unit/Bed#: ICU 01 Encounter: [id number]      Daily Screen       6/24/2020  0707 6/24/2020  1127          Patient safety screen outcome[de-identified]  Passed  Passed      Not Ready for Weaning due to[de-identified]    Underline problem not resolved;Poor inspiratory effort      Spont breathing trial outcome[de-identified]    Failed      Spont breathing trial reason failed:    RR < 8 bpm;RSBI > 100              Physical Exam:   Assessment Type: Assess only  O2 Device: vent      Resp Comments: (P) no other vent changes today, pt appears comfortable on current settings, will continue to monitor

## 2020-06-24 NOTE — PROGRESS NOTES
Daily Progress Note - Critical Care   Renea Boxer 21 y o  female MRN: [id number]  Unit/Bed#: ICU 01 Encounter: [id number]        ----------------------------------------------------------------------------------------  HPI/24hr events:  Overnight patient noted to be maintained on AC/VC  On minimal levophed    ---------------------------------------------------------------------------------------  SUBJECTIVE  Patient continues to remain ventilated via her tracheostomy  Responsive to voice, will move lips, and turn head but still somewhat somnolent, does not open eyes  ROS unable to be assessed secondary to mental status  ---------------------------------------------------------------------------------------  Assessment and Plan:    Neuro:    Diagnosis:  Altered mental status, 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, seizure disorder, cerebral palsy with quadriplegia  o Plan:   shunt removed on 06/18   o CAM ICU, Delirium precautions  o Continue Keppra, Trileptal, Flexeril, Valium  o Neurosurgery signed off  o Antibiotics as described below  o Had discussion on goals of care with family, mother Fatuma Solano, Palliative care consult ordered      CV:    Diagnosis:  Hypotension/Bradycardia  o Plan:  Hypotensive on Levo, unclear eitology  o Low suspicion for sepsis, cardiogenic shock  o Continue midodrine    Diagnosis:  Cardiac arrest  Brief episode of cardiac arrest while central line was placed at [address] management described above and below        Pulm:   Diagnosis:  Chronic respiratory failure status post tracheostomy  o Plan:  Currently on Pressure support, FiO2 30% 10/5  o Continue spontaneous breathing trials, attempt to wean  o Given secretions, consider bronchoscopy today    o Continue Pulmicort      GI:    Diagnosis:  No acute issues  o Plan:  Continue bowel regimen    :    Diagnosis:  No acute issues, pure wick in place  o Plan:  Continue to trend urine output and creatinine        F/E/N:    Plan:  Maintain off fluids   Electrolytes: Replete as necessary   Nutrition:  Tube feeds 50 cc/hour of Peptamen for 16 hours per day  Heme/Onc:    Diagnosis:  Anemia  o Plan: Hgb stable   o  Transfuse for hemoglobin less than 7      Endo:    Diagnosis:  History diabetes insipidus  o Plan:  Continue DDAVP daily  o Monitor blood sugars      ID:    Diagnosis:  Serratia CNS Infection, possible chest wall cellulitis   Afebrile for the past 24 hours, T-max of a 9   Tissue cultures from 6/18 show Pseudomonas and some gamma hemolytic strep   CHF cultures were positive on 06/10 for Serratia   6/17 was negative for bacteria which showed high protein concentration   Noted to have purulent drainage from small site on back   Meropenem completed today   Daptomycin added, Day 4/5   ID following, appreciated   Continue to monitor      MSK/Skin:    Diagnosis:  Local wound care        Disposition: Continue Critical Care   Code Status: Level 1 - Full Code  ---------------------------------------------------------------------------------------  ICU CORE MEASURES    Prophylaxis   VTE Pharmacologic Prophylaxis: Heparin  VTE Mechanical Prophylaxis: sequential compression device  Stress Ulcer Prophylaxis: None    ABCDE Protocol (if indicated)  Plan to perform spontaneous awakening trial today? Yes  Plan to perform spontaneous breathing trial today? Yes  Obvious barriers to extubation? Not applicable  CAM-ICU: Negative    Invasive Devices Review  Invasive Devices     Peripherally Inserted Central Catheter Line            PICC Line 78/63/78 Right Basilic 19 days          Drain            Gastrostomy/Enterostomy -- days    External Urinary Catheter 7 days          Airway            Surgical Airway Shiley Cuffed -- days    Surgical Airway Shiley Cuffed 5 days              Can any invasive devices be discontinued today?  Not applicable  ---------------------------------------------------------------------------------------  OBJECTIVE    Physical Exam   Constitutional: She has a sickly appearance  She appears ill  No distress  HENT:   Head: Normocephalic and atraumatic  Some tongue protrusion   Eyes: Pupils are equal, round, and reactive to light  Conjunctivae are normal    Neck: Neck supple  Cardiovascular: Regular rhythm, normal heart sounds and intact distal pulses  Bradycardia present  Exam reveals no gallop and no friction rub  No murmur heard  Pulmonary/Chest: Effort normal  No stridor  No respiratory distress  She has no wheezes  She has rhonchi in the right lower field and the left lower field  Abdominal: Soft  Bowel sounds are normal  She exhibits no distension  There is no tenderness  There is no guarding  Musculoskeletal: She exhibits no edema  Trace edema bilaterally   Neurological:   Nonverbal at baseline, no eye opening, moves lips and head when talked to  Withdraws to pain  Skin: Skin is warm and dry  Vitals   Vitals:    20 0330 20 0400 20 0411 20 0415   BP: 134/83 140/84     BP Location:  Left arm     Pulse: 74 74 76    Resp: 15 15 15    Temp:   98 °F (36 7 °C)    TempSrc:   Oral    SpO2: 100% 100% 100% 100%   Weight:       Height:         Temp (24hrs), Av 7 °F (36 5 °C), Min:97 °F (36 1 °C), Max:98 6 °F (37 °C)  Current: Temperature: 98 °F (36 7 °C)      Invasive/non-invasive ventilation settings   Respiratory    Lab Data (Last 4 hours)    None         O2/Vent Data (Last 4 hours)      415           Vent Mode AC/VC       Resp Rate (BPM) (BPM) 15       Vt (mL) (mL) 325       FIO2 (%) (%) 30       PEEP (cmH2O) (cmH2O) 5       MV 5 2                   Height and Weights   Height: 4' 2" (127 cm)  IBW: 22 5 kg  Body mass index is 36 58 kg/m²    Weight (last 2 days)     Date/Time   Weight    20 0600   59 (130 07)                Intake and Output  I/O 06/20 0701 - 06/21 0700 06/21 0701 - 06/22 0700    NG/ 120    IV Piggyback 300 350    Feedings 767 806    Total Intake(mL/kg) 1267 (21 5) 1276 (21 7)    Urine (mL/kg/hr) 1600 (1 1) 1500 (1 1)    Total Output 1600 1500    Net -333 -224          Unmeasured Urine Occurrence 1 x         UOP:1 1 ml/kg/hr     Nutrition       Diet Orders   (From admission, onward)             Start     Ordered    06/18/20 2126  Diet Enteral/Parenteral; Tube Feeding No Oral Diet; Non-Formulary; Cyclic; 50; 16 hours; Prosource Protein Liquid - Two Packets  Diet effective now     Comments:  Use peptamen 1 2 from home   Question Answer Comment   Diet Type Enteral/Parenteral    Enteral/Parenteral Tube Feeding No Oral Diet    Tube Feeding Formula: Non-Formulary    Bolus/Cyclic/Continuous Cyclic    Tube Feeding Cyclic Rate (mL/hr): 50    Tube Feeding Cyclic: Administer over: 16 hours    Prosource Protein Liquid - No Carb Prosource Protein Liquid - Two Packets    RD to adjust diet per protocol? Yes        06/18/20 2127              TF currently running at 50 ml/hr with a goal of 50 ml/hr   Formula: Peptamen    Laboratory and Diagnostics:  Results from last 7 days   Lab Units 06/24/20  0440 06/23/20  0437 06/22/20  0524 06/19/20  0516 06/18/20  0503   WBC Thousand/uL 9 09 9 10 10 33* 7 70 9 43   HEMOGLOBIN g/dL 8 9* 8 6* 9 4* 8 4* 9 2*   HEMATOCRIT % 29 3* 28 1* 31 5* 28 6* 31 3*   PLATELETS Thousands/uL 335 299 453* 407* 441*   NEUTROS PCT %  --   --  69 60 76*   MONOS PCT %  --   --  6 8 6     Results from last 7 days   Lab Units 06/24/20  0440 06/23/20  0437 06/22/20  0524 06/20/20  0435 06/19/20  0516 06/18/20  0503   SODIUM mmol/L 136 137 135* 138 140 139   POTASSIUM mmol/L 4 2 4 1 4 0 4 1 3 5 4 0   CHLORIDE mmol/L 102 103 101 107 107 108   CO2 mmol/L 27 29 26 28 27 27   ANION GAP mmol/L 7 5 8 3* 6 4   BUN mg/dL 19 16 14 12 11 12   CREATININE mg/dL 0 39* 0 29* 0 34* 0 34* 0 32* 0 38*   CALCIUM mg/dL 8 9 8 9 9 0 9 6 8 8 9 6   GLUCOSE RANDOM mg/dL 113 107 111 111 104 117     Results from last 7 days   Lab Units 06/23/20  0437 06/19/20  0516 06/18/20  0503   MAGNESIUM mg/dL 2 3 2 2 2 4   PHOSPHORUS mg/dL 4 2 4 3 2 0*                   ABG:  Results from last 7 days   Lab Units 06/23/20  1427   PH ART  7 479*   PCO2 ART mm Hg 34 9*   PO2 ART mm Hg 172 2*   HCO3 ART mmol/L 25 3   BASE EXC ART mmol/L 1 9   ABG SOURCE  Radial, Right     VBG:  Results from last 7 days   Lab Units 06/23/20  1427 06/18/20  2300   PH MERI   --  7 397   PCO2 MERI mm Hg  --  43 6   PO2 MERI mm Hg  --  46 6*   HCO3 MERI mmol/L  --  26 2   BASE EXC MERI mmol/L  --  1 2   ABG SOURCE  Radial, Right  --            Micro  Results from last 7 days   Lab Units 06/18/20  1433 06/17/20  1040   GRAM STAIN RESULT  No Polys or Bacteria seen 2+ Polys  No bacteria seen       EKG: Sinus rhythm  Imaging:  I have personally reviewed pertinent reports        Active Medications  Scheduled Meds:    Current Facility-Administered Medications:  acetaminophen 650 mg Per G Tube Q4H PRN Cortney Seay PA-C    bisacodyl 10 mg Rectal Daily Cortney Seay PA-C    budesonide 0 5 mg Nebulization BID Cortney Seay PA-C    calcium carbonate-vitamin D 1 tablet Oral Daily With Breakfast Cortney Seay PA-C    chlorhexidine 15 mL Swish & Spit Q12H [address] Cortneymorris Seay PA-C    cholecalciferol 1,000 Units Oral Daily CortneyNATALIE Trujillo-HUGO    cyclobenzaprine 5 mg Per G Tube TID PRN Cortneymorris Seay PA-C    DAPTOmycin 4 mg/kg (Adjusted) Intravenous Q24H Sonja De Paz MD Last Rate: 175 mg (06/23/20 1142)   desmopressin 0 05 mg Per G Tube Daily NATALIE Burnham-HUGO    diazepam 5 mg Per G Tube Q8H Cortneymorris Seay PA-C    glycerin-hypromellose- 1 drop Both Eyes Q3H PRN Cortney Geena, PA-C    guaiFENesin 400 mg Oral Q8H Cortney Seay PA-C    heparin (porcine) 5,000 Units Subcutaneous Q8H [address] Cortney Seay PA-C    levETIRAcetam 1,000 mg Per G Tube BID Cortney Seay PA-C levETIRAcetam 500 mg Per G Tube Q24H Cortney Seay PA-C    midodrine 10 mg Oral TID AC Rita Veliz MD    norepinephrine 1-30 mcg/min Intravenous Titrated Rita Veliz MD Last Rate: 1 mcg/min (06/24/20 0422)   OXcarbazepine 450 mg Per G Tube Q12H [facility] Isaías Lacy PA-C    polyethylene glycol 17 g Oral Daily PRN Chastity Tang MD    potassium-sodium phosphates 2 packet Oral 4x Daily (with meals and at bedtime) Isaías Lacy PA-C    senna-docusate sodium 1 tablet Per G Tube HS Cortney Seay PA-C      Continuous Infusions:    norepinephrine 1-30 mcg/min Last Rate: 1 mcg/min (06/24/20 0422)     PRN Meds:     acetaminophen 650 mg Q4H PRN   cyclobenzaprine 5 mg TID PRN   glycerin-hypromellose- 1 drop Q3H PRN   polyethylene glycol 17 g Daily PRN       Allergies   Allergies   Allergen Reactions    Baclofen Other (See Comments)     Reaction Date: 22Nov2011;   apena      Cefepime Hives    Cephalosporins      Other reaction(s): Unknown Reaction  Other reaction(s): Unknown Reaction  Other reaction(s): Unknown Reaction      Clonidine      Other reaction(s): Respiratory Distress, Respiratory Distress    Lactase     Milk-Related Compounds     Penicillins Hives     Reaction Date: [id number];       Phenobarbital Other (See Comments)     Reaction Date: [id number]; Other reaction(s): Other (See Comments)  apnea  Other reaction(s): Other (See Comments)  apnea  apnea      Vancomycin     Zolpidem Other (See Comments)     Other reaction(s): Other (see comments)  Reaction Date: 22Nov2011;   apnea  apnea  apnea      Latex Rash       Advance Directive and Living Will:      Power of :    POLST:        Counseling / Coordination of Care  Total Critical Care time spent 30 minutes excluding procedures, teaching and family updates  Craig Segundo DO        Portions of the record may have been created with voice recognition software    Occasional wrong word or "sound a like" substitutions may have occurred due to the inherent limitations of voice recognition software    Read the chart carefully and recognize, using context, where substitutions have occurred

## 2020-06-24 NOTE — CONSULTS
Consultation - Palliative and Supportive Care   Darryl Hernandez 21 y o  female [id number]    Assessment:  Patient Active Problem List   Diagnosis    Abnormal LFTs    [facility]jun[facility] tube present ([facility]    Gastrostomy tube in place [facility])    Chronic idiopathic constipation    Skin excoriation    Hidradenitis    Acute metabolic encephalopathy    Cerebral palsy ([facility] )    Cardiac arrest ([facility] )    Seizure-like activity ([facility] )    Severe sepsis with acute organ dysfunction due to Serratia species ([facility] )    S/P  shunt    Meningitis     Plan:  1  Symptom management -    - per primary team    2  Goals - level 1 full code   - Attempt to arrange discussion regarding goals of care tomorrow following completion of MRI brain/spine    - Goal will be to include primary team, case management, ID, palliative care, patient's parents, and  from previous long-term care facility  3  Social support   - [place], [place] following     Code Status: full - Level 1   Decisional apparatus:  Patient is not competent on my exam today  If competence is lost, patient's substitute decision maker would default to parents/legal guardians by [place]  Advance Directive / Living Will / POLST:  Guardianship documentation on file     I have reviewed the patient's controlled substance dispensing history in the Prescription Drug Monitoring Program in compliance with the [facility] regulations before prescribing any controlled substances  We appreciate the invitation to be involved in this patient's care  We will continue to follow  Please do not hesitate to reach our on call provider through our clinic answering service at  should you have acute symptom control concerns  Darling Lester PA-C  Palliative and Supportive Care  Clinic/Answering Service: [phone]  You can find me on [employer]!        IDENTIFICATION:  Inpatient consult to Palliative Care  Consult performed by: Darling Lester PA-C  Consult ordered by: Claude Llamas DO        Physician Requesting Consult: Chucky Robles DO  Reason for Consult / Principal Problem: support/goals of care  Hx and PE limited by: n/a    HISTORY OF PRESENT ILLNESS:        Shima Carey is a 21 y o  female with history of cerebral palsy, seizure disorder, hydrocephalus s/p VPS, trach/PEG, previously residing in long-term care presented on 6/3 as a transfer from [facility] for altered mental status  She also suffered v fib arrest requiring CPR  On arrival she was ultimately diagnosed with meningitis for which VPS was ex-planted on 6/18  She has since completed the course of her antibiotics, but remains on ventilatory support and levophed  Patient has MRI brain/spine pending to rule out neurologic etiology of inability to wean vent  Palliative care has been consulted for support and assistance with goals of care  Patient's parents live in [place] and per report have not seen her for over a year  Attempt has been made to call mother to arrange meeting, awaiting return of call       Review of Systems   Unable to perform ROS: patient nonverbal       Past Medical History:   Diagnosis Date    Asthma     Cerebral palsy ([address] )     Cortical blindness     Diabetes insipidus ([address] )     Elevated LFTs     Encephalopathy     Herpes encephalopathy    GERD (gastroesophageal reflux disease)     History of chronic respiratory failure     Hydrocephalus (HCC)     Nephrolithiasis     Profound intellectual disability     Pulmonary valve stenosis     Scoliosis     Seizure disorder (HCC)     Spastic quadriplegia (HCC)      Past Surgical History:   Procedure Laterality Date    CSF SHUNT      CT GUIDED PERC DRAINAGE CATHETER PLACEMENT  6/5/2020    HIP SURGERY Bilateral     IR PEG/GJ TUBE PLACEMENT  2016    NISSEN FUNDOPLICATION      PEG TUBE PLACEMENT      PEG TUBE REMOVAL  2016    SPINAL FUSION W/ JOSE MARIA UNIT RAJAT      Fontenot rajat    TRACHEOSTOMY      VENTRICULOPERITONEAL SHUNT Right 6/18/2020    Procedure: REMOVAL OF RIGHT SHUNT VENTRICULAR PERITONEAL;  Surgeon: Isauro Lobo MD;  [facility];  Service: Neurosurgery     Social History     Socioeconomic History    Marital status: Single     Spouse name: Not on file    Number of children: Not on file    Years of education: Not on file    Highest education level: Not on file   Occupational History    Not on file   Social Needs    Financial resource strain: Not on file    Food insecurity:     Worry: Not on file     Inability: Not on file    Transportation needs:     Medical: Not on file     Non-medical: Not on file   Tobacco Use    Smoking status: Never Smoker    Smokeless tobacco: Never Used   Substance and Sexual Activity    Alcohol use: Never     Frequency: Never    Drug use: Never    Sexual activity: Not on file   Lifestyle    Physical activity:     Days per week: Not on file     Minutes per session: Not on file    Stress: Not on file   Relationships    Social connections:     Talks on phone: Not on file     Gets together: Not on file     Attends Hinduism service: Not on file     Active member of club or organization: Not on file     Attends meetings of clubs or organizations: Not on file     Relationship status: Not on file    Intimate partner violence:     Fear of current or ex partner: Not on file     Emotionally abused: Not on file     Physically abused: Not on file     Forced sexual activity: Not on file   Other Topics Concern    Not on file   Social History Narrative    Not on file     Family History   Problem Relation Age of Onset    No Known Problems Mother     No Known Problems Father     No Known Problems Sister     No Known Problems Sister        MEDICATIONS / ALLERGIES:    all current active meds have been reviewed    Allergies   Allergen Reactions    Baclofen Other (See Comments)     Reaction Date: 22Nov2011;   apena      Cefepime Hives    Cephalosporins Other reaction(s): Unknown Reaction  Other reaction(s): Unknown Reaction  Other reaction(s): Unknown Reaction      Clonidine      Other reaction(s): Respiratory Distress, Respiratory Distress    Lactase     Milk-Related Compounds     Penicillins Hives     Reaction Date: [id number];       Phenobarbital Other (See Comments)     Reaction Date: [id number]; Other reaction(s): Other (See Comments)  apnea  Other reaction(s): Other (See Comments)  apnea  apnea      Vancomycin     Zolpidem Other (See Comments)     Other reaction(s): Other (see comments)  Reaction Date: 22Nov2011;   apnea  apnea  apnea      Latex Rash       OBJECTIVE:    Physical Exam  Physical Exam   Constitutional:   lying in bed, in no distress  HENT:   Incision secondary to shunt explant CDI   Eyes: Conjunctivae are normal    Briefly opens eyes spontaneously   Cardiovascular: Normal rate  Pulmonary/Chest:   Ventilated via trach   Abdominal: She exhibits no distension  There is no guarding  Neurological:   Opens eyes, minimal spontaneous UE movement, otherwise no interraction   Skin: Skin is warm and dry  Lab Results: I have personally reviewed pertinent labs  Imaging Studies: reviewed pertinent studies  EKG, Pathology, and Other Studies: reviewed pertinent studise    Counseling / Coordination of Care    Total floor / unit time spent today 35+ minutes  Greater than 50% of total time was spent with the patient and / or family counseling and / or coordination of care  A description of the counseling / coordination of care: time spent communicating with RN, primary team, CM, and assessing patient

## 2020-06-24 NOTE — RESPIRATORY THERAPY NOTE
resp care      06/24/20 0719   Respiratory Assessment   Resp Comments attempted to place pt on cpap/ps, pt apenic, placed back on previous settings, will continue to monitor

## 2020-06-24 NOTE — PROGRESS NOTES
Progress Note - Infectious Disease   Kylah Cárdenas 21 y o  female MRN: [id number]  Unit/Bed#: ICU 01 Encounter: [id number]      Impression/Recommendations:  1  Sepsis POA   Fever, tachycardia, tachypnea   Likely due to #2   Consider also aspiration given reported respiratory distress upon presentation to [facility] other clear source appreciated   Blood cultures at [facility] negative   Initially improved   Now with new low-grade fever likely due to # 2  Improving with daptomycin  New hypotension likely due to hypovolemia  Doubt ongoing sepsis  Remains afebrile with normal WBC  Repeat CT head/C/A/P unremarkable  Rec:  ? Continue daptomycin as below  ? No additional ID workup at this point  ? Follow temperatures closely  ? Supportive care as per the primary service     2   Mild chest wall cellulitis  In setting of recent difficult  shunt removal   Improving with addition of daptomycin  Rec:  ? Continue daptomycin for 1 more day  ? Serial chest wall exams     3  Serratia  shunt infection   Consider translocation from GI tract although no reported intra-abdominal fluid collection   Repeat CSF cultures positive for Serratia despite 10 days IV antibiotics represents antibiotic failure   Status post VPS externalization with tolerance of clamping trial   Now status post complete proximal CNA VPS removal   Some distal extra-CNS portion remains due to heavy calcification   Distal peritoneal culture with Pseudomonas, Enterococcus but culture of CNS portion negative  Now status post 3 weeks total IV antibiotics, 5 days of which were postoperative from shunt removal   Rec:  ? Continue to follow closely off antibiotics      4  Acute on chronic hypoxic respiratory failure   With chronic tracheostomy  No overt pneumonia on chest imaging from [facility]   Consider role of aspiration versus encephalopathy   Patient on minimal vent requirements  Rec:  ? Follow respiratory status closely  ?  Supportive care as per the primary service     5  Seizure disorder   Initial concern for seizures at [facility] but vEEG here negative for seizure activity   Rhythmic blinking does not correlate to EEG findings per Neurology  Rec:  ? Continue antiepileptics per Neurology with close follow-up ongoing     6   Retroperitoneal collection   Reportedly chronic, seen on imaging done at [place] [address] significance   Status post aspiration of the collection with negative cultures  Rec:  ? No additional workup or treatment from an ID perspective     7   CP   With spastic quadriplegia, profound MR, hydrocephalus status post  shunt     8   Recent VFib arrest   Consider anoxic encephalopathy      The patient is stable from an ID standpoint      Antibiotics:  Daptomycin # 4  POD #6    Subjective:  Patient seen on AM rounds  Unable to provide history due to nonverbal state  24 Hour Events:  Noted to be having apneic events when placed on pressure support  Concern for possible tachy-jama syndrome per primary service  Remains on low-dose pressors  No documented fevers, chills, sweats, nausea, vomiting, or diarrhea  Objective:  Vitals:  Temp:  [97 °F (36 1 °C)-98 6 °F (37 °C)] 97 2 °F (36 2 °C)  HR:  [40-88] 78  Resp:  [11-33] 15  BP: ()/(49-89) 113/63  SpO2:  [90 %-100 %] 100 %  Temp (24hrs), Av 7 °F (36 5 °C), Min:97 °F (36 1 °C), Max:98 6 °F (37 °C)  Current: Temperature: (!) 97 2 °F (36 2 °C)    Physical Exam:   General:  No acute distress  Psychiatric:  Sleeping  Pulmonary:  Ventilated respiratory excursion without accessory muscle use  Chest:  Resolved cellulitis  Abdomen:  Soft, nontender  Extremities:  No edema  Skin:  No rashes    Lab Results:  I have personally reviewed pertinent labs    Results from last 7 days   Lab Units 20  0440 20  0437 20  0524   POTASSIUM mmol/L 4 2 4 1 4 0   CHLORIDE mmol/L 102 103 101   CO2 mmol/L 27 29 26   BUN mg/dL 19 16 14   CREATININE mg/dL 0 39* 0 29* 0 34*   EGFR ml/min/1 73sq m 146 161 153   CALCIUM mg/dL 8 9 8 9 9 0     Results from last 7 days   Lab Units 06/24/20  0440 06/23/20  0437 06/22/20  0524   WBC Thousand/uL 9 09 9 10 10 33*   HEMOGLOBIN g/dL 8 9* 8 6* 9 4*   PLATELETS Thousands/uL 335 299 453*     Results from last 7 days   Lab Units 06/18/20  1433   GRAM STAIN RESULT  No Polys or Bacteria seen       Imaging Studies:   I have personally reviewed pertinent imaging study reports and images in PACS  EKG, Pathology, and Other Studies:   I have personally reviewed pertinent reports

## 2020-06-25 ENCOUNTER — APPOINTMENT (INPATIENT)
Dept: RADIOLOGY | Facility: HOSPITAL | Age: 25
DRG: 711 | End: 2020-06-25
Payer: COMMERCIAL

## 2020-06-25 LAB
ANION GAP SERPL CALCULATED.3IONS-SCNC: 8 MMOL/L (ref 4–13)
ARTERIAL PATENCY WRIST A: NO
BASE EX.OXY STD BLDV CALC-SCNC: 77.3 % (ref 60–80)
BASE EX.OXY STD BLDV CALC-SCNC: 77.5 % (ref 60–80)
BASE EXCESS BLDV CALC-SCNC: -0.9 MMOL/L
BASE EXCESS BLDV CALC-SCNC: -5.2 MMOL/L
BODY TEMPERATURE: 97.8 DEGREES FEHRENHEIT
BUN SERPL-MCNC: 17 MG/DL (ref 5–25)
CALCIUM SERPL-MCNC: 9.3 MG/DL (ref 8.3–10.1)
CHLORIDE SERPL-SCNC: 104 MMOL/L (ref 100–108)
CO2 SERPL-SCNC: 25 MMOL/L (ref 21–32)
CREAT SERPL-MCNC: 0.4 MG/DL (ref 0.6–1.3)
ERYTHROCYTE [DISTWIDTH] IN BLOOD BY AUTOMATED COUNT: 16.6 % (ref 11.6–15.1)
GFR SERPL CREATININE-BSD FRML MDRD: 145 ML/MIN/1.73SQ M
GLUCOSE SERPL-MCNC: 112 MG/DL (ref 65–140)
GLUCOSE SERPL-MCNC: 96 MG/DL (ref 65–140)
HCO3 BLDV-SCNC: 19.4 MMOL/L (ref 24–30)
HCO3 BLDV-SCNC: 23.7 MMOL/L (ref 24–30)
HCT VFR BLD AUTO: 30.5 % (ref 34.8–46.1)
HGB BLD-MCNC: 9.2 G/DL (ref 11.5–15.4)
HOROWITZ INDEX BLDA+IHG-RTO: 30 MM[HG]
MAGNESIUM SERPL-MCNC: 2.7 MG/DL (ref 1.6–2.6)
MCH RBC QN AUTO: 24.8 PG (ref 26.8–34.3)
MCHC RBC AUTO-ENTMCNC: 30.2 G/DL (ref 31.4–37.4)
MCV RBC AUTO: 82 FL (ref 82–98)
O2 CT BLDV-SCNC: 10.9 ML/DL
O2 CT BLDV-SCNC: 11.1 ML/DL
PCO2 BLDV: 34.4 MM HG (ref 42–50)
PCO2 BLDV: 38.9 MM HG (ref 42–50)
PEEP RESPIRATORY: 5 CM[H2O]
PH BLDV: 7.37 [PH] (ref 7.3–7.4)
PH BLDV: 7.4 [PH] (ref 7.3–7.4)
PHOSPHATE SERPL-MCNC: 5.2 MG/DL (ref 2.7–4.5)
PLATELET # BLD AUTO: 397 THOUSANDS/UL (ref 149–390)
PMV BLD AUTO: 9.6 FL (ref 8.9–12.7)
PO2 BLDV: 50.8 MM HG (ref 35–45)
PO2 BLDV: 50.8 MM HG (ref 35–45)
POTASSIUM SERPL-SCNC: 4 MMOL/L (ref 3.5–5.3)
RBC # BLD AUTO: 3.71 MILLION/UL (ref 3.81–5.12)
SODIUM SERPL-SCNC: 137 MMOL/L (ref 136–145)
VENT AC: 10
VENT- AC: AC
VT SETTING VENT: 325 ML
WBC # BLD AUTO: 9.88 THOUSAND/UL (ref 4.31–10.16)

## 2020-06-25 PROCEDURE — 72148 MRI LUMBAR SPINE W/O DYE: CPT

## 2020-06-25 PROCEDURE — 82805 BLOOD GASES W/O2 SATURATION: CPT | Performed by: ANESTHESIOLOGY

## 2020-06-25 PROCEDURE — 94640 AIRWAY INHALATION TREATMENT: CPT

## 2020-06-25 PROCEDURE — 99233 SBSQ HOSP IP/OBS HIGH 50: CPT | Performed by: ANESTHESIOLOGY

## 2020-06-25 PROCEDURE — 94760 N-INVAS EAR/PLS OXIMETRY 1: CPT

## 2020-06-25 PROCEDURE — 80048 BASIC METABOLIC PNL TOTAL CA: CPT | Performed by: STUDENT IN AN ORGANIZED HEALTH CARE EDUCATION/TRAINING PROGRAM

## 2020-06-25 PROCEDURE — 94003 VENT MGMT INPAT SUBQ DAY: CPT

## 2020-06-25 PROCEDURE — 83735 ASSAY OF MAGNESIUM: CPT | Performed by: STUDENT IN AN ORGANIZED HEALTH CARE EDUCATION/TRAINING PROGRAM

## 2020-06-25 PROCEDURE — 84100 ASSAY OF PHOSPHORUS: CPT | Performed by: STUDENT IN AN ORGANIZED HEALTH CARE EDUCATION/TRAINING PROGRAM

## 2020-06-25 PROCEDURE — 70553 MRI BRAIN STEM W/O & W/DYE: CPT

## 2020-06-25 PROCEDURE — 72146 MRI CHEST SPINE W/O DYE: CPT

## 2020-06-25 PROCEDURE — 85027 COMPLETE CBC AUTOMATED: CPT | Performed by: STUDENT IN AN ORGANIZED HEALTH CARE EDUCATION/TRAINING PROGRAM

## 2020-06-25 PROCEDURE — 99232 SBSQ HOSP IP/OBS MODERATE 35: CPT | Performed by: INTERNAL MEDICINE

## 2020-06-25 PROCEDURE — 82805 BLOOD GASES W/O2 SATURATION: CPT | Performed by: FAMILY MEDICINE

## 2020-06-25 PROCEDURE — 82948 REAGENT STRIP/BLOOD GLUCOSE: CPT

## 2020-06-25 RX ADMIN — OXCARBAZEPINE 450 MG: 300 TABLET, FILM COATED ORAL at 18:20

## 2020-06-25 RX ADMIN — POTASSIUM & SODIUM PHOSPHATES POWDER PACK 280-160-250 MG 2 PACKET: 280-160-250 PACK at 11:53

## 2020-06-25 RX ADMIN — SODIUM CHLORIDE 1.01 MCG/MIN: 0.9 INJECTION, SOLUTION INTRAVENOUS at 23:00

## 2020-06-25 RX ADMIN — POTASSIUM & SODIUM PHOSPHATES POWDER PACK 280-160-250 MG 2 PACKET: 280-160-250 PACK at 08:11

## 2020-06-25 RX ADMIN — POTASSIUM & SODIUM PHOSPHATES POWDER PACK 280-160-250 MG 2 PACKET: 280-160-250 PACK at 22:18

## 2020-06-25 RX ADMIN — MIDODRINE HYDROCHLORIDE 10 MG: 5 TABLET ORAL at 08:00

## 2020-06-25 RX ADMIN — LEVETIRACETAM 1000 MG: 100 SOLUTION ORAL at 05:37

## 2020-06-25 RX ADMIN — MELATONIN 1000 UNITS: at 08:11

## 2020-06-25 RX ADMIN — HEPARIN SODIUM 5000 UNITS: 5000 INJECTION INTRAVENOUS; SUBCUTANEOUS at 22:18

## 2020-06-25 RX ADMIN — GUAIFENESIN 400 MG: 100 SOLUTION ORAL at 10:07

## 2020-06-25 RX ADMIN — CHLORHEXIDINE GLUCONATE 0.12% ORAL RINSE 15 ML: 1.2 LIQUID ORAL at 08:11

## 2020-06-25 RX ADMIN — MIDODRINE HYDROCHLORIDE 10 MG: 5 TABLET ORAL at 16:37

## 2020-06-25 RX ADMIN — LEVETIRACETAM 1000 MG: 100 SOLUTION ORAL at 15:00

## 2020-06-25 RX ADMIN — OXCARBAZEPINE 450 MG: 300 TABLET, FILM COATED ORAL at 05:37

## 2020-06-25 RX ADMIN — SENNOSIDES AND DOCUSATE SODIUM 1 TABLET: 8.6; 5 TABLET ORAL at 22:18

## 2020-06-25 RX ADMIN — BUDESONIDE 0.5 MG: 0.5 INHALANT RESPIRATORY (INHALATION) at 07:19

## 2020-06-25 RX ADMIN — Medication 1 TABLET: at 08:11

## 2020-06-25 RX ADMIN — LEVETIRACETAM 500 MG: 100 SOLUTION ORAL at 22:20

## 2020-06-25 RX ADMIN — GUAIFENESIN 400 MG: 100 SOLUTION ORAL at 16:36

## 2020-06-25 RX ADMIN — HEPARIN SODIUM 5000 UNITS: 5000 INJECTION INTRAVENOUS; SUBCUTANEOUS at 05:37

## 2020-06-25 RX ADMIN — DIAZEPAM 5 MG: 5 TABLET ORAL at 10:07

## 2020-06-25 RX ADMIN — MIDODRINE HYDROCHLORIDE 10 MG: 5 TABLET ORAL at 11:53

## 2020-06-25 RX ADMIN — POTASSIUM & SODIUM PHOSPHATES POWDER PACK 280-160-250 MG 2 PACKET: 280-160-250 PACK at 16:36

## 2020-06-25 RX ADMIN — DIAZEPAM 5 MG: 5 TABLET ORAL at 02:42

## 2020-06-25 RX ADMIN — BUDESONIDE 0.5 MG: 0.5 INHALANT RESPIRATORY (INHALATION) at 19:03

## 2020-06-25 RX ADMIN — DAPTOMYCIN 175 MG: 500 INJECTION, POWDER, LYOPHILIZED, FOR SOLUTION INTRAVENOUS at 11:53

## 2020-06-25 RX ADMIN — HEPARIN SODIUM 5000 UNITS: 5000 INJECTION INTRAVENOUS; SUBCUTANEOUS at 15:00

## 2020-06-25 RX ADMIN — CHLORHEXIDINE GLUCONATE 0.12% ORAL RINSE 15 ML: 1.2 LIQUID ORAL at 20:49

## 2020-06-25 RX ADMIN — GUAIFENESIN 400 MG: 100 SOLUTION ORAL at 02:30

## 2020-06-25 RX ADMIN — BISACODYL 10 MG: 10 SUPPOSITORY RECTAL at 08:11

## 2020-06-25 RX ADMIN — DIAZEPAM 5 MG: 5 TABLET ORAL at 18:30

## 2020-06-25 RX ADMIN — DESMOPRESSIN ACETATE 0.05 MG: 0.1 TABLET ORAL at 08:12

## 2020-06-25 NOTE — SOCIAL WORK
TC received from Petey Lyons with [address] requesting mtg time for pt  CM informed Anahy that Kojo Hernandez would contact her to inform hr of time  Anahy aware mtg time is pending pt MRI  As per Sean Lui pt also has a MONTAJ, Ashlie Loving [phone] that should be apart of mtg  As per Sean Lui although pt does have a legal guardian (pt mother) they do follow the MR bulletin - 2 physicians would need to document pt has a terminal condition or in a persistent vegetative state  TC to [facility] with Palliative - CM informed her of above including the supports coordinator contact information and MR bulletin guidelines  Adriane to reach out to both [place] for [employer]

## 2020-06-25 NOTE — RESPIRATORY THERAPY NOTE
RT Ventilator Management Note  Afshin Santacruz 21 y o  female MRN: [id number]  Unit/Bed#: ICU 01 Encounter: [id number]      Daily Screen       6/24/2020  1127 6/25/2020  0720          Patient safety screen outcome[de-identified]  Passed  Passed      Not Ready for Weaning due to[de-identified]  Underline problem not resolved;Poor inspiratory effort  Underline problem not resolved      Spont breathing trial outcome[de-identified]  Failed        Spont breathing trial reason failed:  RR < 8 bpm;RSBI > 100                Physical Exam:   Assessment Type: Assess only  General Appearance: Sleeping  Respiratory Pattern: Assisted  Chest Assessment: Chest expansion symmetrical  Bilateral Breath Sounds: Diminished, Coarse  Cough: None  Suction: ET Tube  O2 Device: Ventilator      Resp Comments: pt continues on A/C VC settings no SBT or changes ath this time, pt is synchronous with the vent   will continue to monitor

## 2020-06-25 NOTE — RESPIRATORY THERAPY NOTE
RT Ventilator Management Note  Opal Perez 21 y o  female MRN: [id number]  Unit/Bed#: ICU 01 Encounter: [id number]      Daily Screen       6/24/2020  1127 6/25/2020  0720          Patient safety screen outcome[de-identified]  Passed  Passed      Not Ready for Weaning due to[de-identified]  Underline problem not resolved;Poor inspiratory effort  Underline problem not resolved      Spont breathing trial outcome[de-identified]  Failed        Spont breathing trial reason failed:  RR < 8 bpm;RSBI > 100                Physical Exam:   Assessment Type: (P) Assess only  General Appearance: (P) Sleeping  Respiratory Pattern: (P) Assisted  Chest Assessment: (P) Chest expansion symmetrical  Bilateral Breath Sounds: (P) Diminished, Clear      Resp Comments: (P) pt tolerating current vent settings no complications throughout the day   will continue to monitor

## 2020-06-25 NOTE — RESPIRATORY THERAPY NOTE
RT Ventilator Management Note  Curtis Castro 21 y o  female MRN: [id number]  Unit/Bed#: ICU 01 Encounter: [id number]      Daily Screen       6/24/2020  0707 6/24/2020  1127          Patient safety screen outcome[de-identified]  Passed  Passed      Not Ready for Weaning due to[de-identified]    Underline problem not resolved;Poor inspiratory effort      Spont breathing trial outcome[de-identified]    Failed      Spont breathing trial reason failed:    RR < 8 bpm;RSBI > 100              Physical Exam:   Assessment Type: (P) Assess only  General Appearance: (P) Sleeping  Respiratory Pattern: (P) Assisted  Chest Assessment: (P) Chest expansion symmetrical  Bilateral Breath Sounds: (P) Diminished, Coarse  Cough: (P) None  Suction: (P) ET Tube  O2 Device: (P) Ventilator      Resp Comments: (P) Pt  remains stable on AC mode  No problems throughout the night  Will continue to monitor pt  per protocol

## 2020-06-25 NOTE — PROGRESS NOTES
Daily Progress Note - Critical Care   Susan Bartholomew 21 y o  female MRN: [id number]  Unit/Bed#: ICU 01 Encounter: [id number]        ----------------------------------------------------------------------------------------  HPI/24hr events:  Overnight patient noted to be maintained on AC/VC  On minimal levophed    ---------------------------------------------------------------------------------------  SUBJECTIVE  Patient continues to remain ventilated via her tracheostomy  Responsive to voice, will move lips, and keep eyes open  ROS unable to be assessed secondary to mental status  ---------------------------------------------------------------------------------------  Assessment and Plan:    Neuro:    Diagnosis:  Altered mental status, 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, seizure disorder, cerebral palsy with quadriplegia  o Plan:   shunt removed on 06/18   o CAM ICU, Delirium precautions  o Continue Keppra, Trileptal, Flexeril, Valium  o Neurosurgery signed off  o Antibiotics as described below  o Had discussion on goals of care with family, mother Tess Ingram,   o Palliative care consulted apprecaited  o Thelma await results of MRI of head and spine before discussing further  CV:    Diagnosis:  Hypotension/Bradycardia  o Plan:  Hypotensive on Levo, unclear eitology  o Low suspicion for sepsis, cardiogenic shock  o Continue midodrine  o May be sick sinus syndrome and potentially need pacemaker but will discuss with mother Tess Ingram before further intervention is necessary  Diagnosis:  Cardiac arrest  Brief episode of cardiac arrest while central line was placed at [address] [facility] management described above and below        Pulm:   Diagnosis:  Chronic respiratory failure status post tracheostomy  o Plan:  Currently on AC/VC, FiO2 30% 10/5  o Continue spontaneous breathing trials, attempt to wean  o Given secretions, consider bronchoscopy today    o Continue Pulmicort      GI:  Diagnosis:  No acute issues  o Plan:  Continue bowel regimen    :    Diagnosis:  Decrease in urine output   o Plan:  Continue to trend urine output and creatinine, consider lasix      F/E/N:    Plan:  Maintain off fluids   Electrolytes: Replete as necessary   Nutrition:  Tube feeds 50 cc/hour of Peptamen for 16 hours per day  Heme/Onc:    Diagnosis:  Anemia  o Plan: Hgb stable   o  Transfuse for hemoglobin less than 7      Endo:    Diagnosis:  History diabetes insipidus  o Plan:  Continue DDAVP daily  o Monitor blood sugars      ID:    Diagnosis:  Serratia CNS Infection, possible chest wall cellulitis   Afebrile for the past 24 hours, T-max of a 9   Tissue cultures from 6/18 show Pseudomonas and some gamma hemolytic strep   CHF cultures were positive on 06/10 for Serratia   6/17 was negative for bacteria which showed high protein concentration   Noted to have purulent drainage from small site on back   Meropenem completed today   Daptomycin added, Day 5/5   ID following, appreciated   Continue to monitor      MSK/Skin:    Diagnosis:  Local wound care        Disposition: Continue Critical Care   Code Status: Level 1 - Full Code  ---------------------------------------------------------------------------------------  ICU CORE MEASURES    Prophylaxis   VTE Pharmacologic Prophylaxis: Heparin  VTE Mechanical Prophylaxis: sequential compression device  Stress Ulcer Prophylaxis: None    ABCDE Protocol (if indicated)  Plan to perform spontaneous awakening trial today? Yes  Plan to perform spontaneous breathing trial today? Yes  Obvious barriers to extubation?  Not applicable  CAM-ICU: Negative    Invasive Devices Review  Invasive Devices     Peripherally Inserted Central Catheter Line            PICC Line 04/65/16 Right Basilic 20 days          Drain            Gastrostomy/Enterostomy -- days    External Urinary Catheter 8 days          Airway            Surgical Airway Shiley Cuffed -- days Surgical Airway Shiley Cuffed 6 days              Can any invasive devices be discontinued today? Not applicable  ---------------------------------------------------------------------------------------  OBJECTIVE    Physical Exam   Constitutional: She has a sickly appearance  She appears ill  No distress  HENT:   Head: Normocephalic and atraumatic  Some tongue protrusion   Eyes: Pupils are equal, round, and reactive to light  Conjunctivae are normal    Neck: Neck supple  Cardiovascular: Normal rate, regular rhythm, normal heart sounds and intact distal pulses  Exam reveals no gallop and no friction rub  No murmur heard  Pulmonary/Chest: Effort normal  No stridor  No respiratory distress  She has no wheezes  She has rhonchi in the right lower field and the left lower field  Abdominal: Soft  Bowel sounds are normal  She exhibits no distension  There is no tenderness  There is no guarding  Musculoskeletal: She exhibits no edema  Neurological:   Nonverbal at baseline, Keeps eyes open, moves lips and head when talked to  Withdraws to pain  Skin: Skin is warm and dry  Vitals   Vitals:    20 0300 20 0307 20 0400 20 0553   BP: 126/79  117/66    BP Location:   Left arm    Pulse: 78  78    Resp: 15  15    Temp:   (!) 97 4 °F (36 3 °C)    TempSrc:   Rectal    SpO2: 100% 100% 98%    Weight:    60 1 kg (132 lb 7 9 oz)   Height:         Temp (24hrs), Av 4 °F (36 3 °C), Min:97 °F (36 1 °C), Max:97 6 °F (36 4 °C)  Current: Temperature: (!) 97 4 °F (36 3 °C)      Invasive/non-invasive ventilation settings   Respiratory    Lab Data (Last 4 hours)    None         O2/Vent Data (Last 4 hours)      307           Vent Mode AC/VC       Resp Rate (BPM) (BPM) 15       Vt (mL) (mL) 325       FIO2 (%) (%) 30       PEEP (cmH2O) (cmH2O) 5       MV 5 28                   Height and Weights   Height: 4' 2" (127 cm)  IBW: 22 5 kg  Body mass index is 37 26 kg/m²    Weight (last 2 days) Date/Time   Weight    06/25/20 0553   60 1 (132 5)    06/24/20 0600   59 (130 07)    06/23/20 0600   59 (130 07)                Intake and Output  I/O       06/20 0701 - 06/21 0700 06/21 0701 - 06/22 0700    NG/ 120    IV Piggyback 300 350    Feedings 767 806    Total Intake(mL/kg) 1267 (21 5) 1276 (21 7)    Urine (mL/kg/hr) 1600 (1 1) 1500 (1 1)    Total Output 1600 1500    Net -333 -224          Unmeasured Urine Occurrence 1 x         UOP:1 1 ml/kg/hr     Nutrition       Diet Orders   (From admission, onward)             Start     Ordered    06/18/20 2126  Diet Enteral/Parenteral; Tube Feeding No Oral Diet; Non-Formulary; Cyclic; 50; 16 hours; Prosource Protein Liquid - Two Packets  Diet effective now     Comments:  Use peptamen 1 2 from home   Question Answer Comment   Diet Type Enteral/Parenteral    Enteral/Parenteral Tube Feeding No Oral Diet    Tube Feeding Formula: Non-Formulary    Bolus/Cyclic/Continuous Cyclic    Tube Feeding Cyclic Rate (mL/hr): 50    Tube Feeding Cyclic: Administer over: 16 hours    Prosource Protein Liquid - No Carb Prosource Protein Liquid - Two Packets    RD to adjust diet per protocol? Yes        06/18/20 2127              TF currently running at 50 ml/hr with a goal of 50 ml/hr   Formula: Peptamen    Laboratory and Diagnostics:  Results from last 7 days   Lab Units 06/24/20 0440 06/23/20 0437 06/22/20  0524 06/19/20  0516   WBC Thousand/uL 9 09 9 10 10 33* 7 70   HEMOGLOBIN g/dL 8 9* 8 6* 9 4* 8 4*   HEMATOCRIT % 29 3* 28 1* 31 5* 28 6*   PLATELETS Thousands/uL 335 299 453* 407*   NEUTROS PCT %  --   --  69 60   MONOS PCT %  --   --  6 8     Results from last 7 days   Lab Units 06/24/20 0440 06/23/20  0437 06/22/20  0524 06/20/20  0435 06/19/20  0516   SODIUM mmol/L 136 137 135* 138 140   POTASSIUM mmol/L 4 2 4 1 4 0 4 1 3 5   CHLORIDE mmol/L 102 103 101 107 107   CO2 mmol/L 27 29 26 28 27   ANION GAP mmol/L 7 5 8 3* 6   BUN mg/dL 19 16 14 12 11   CREATININE mg/dL 0 39* 0 29* 0 34* 0 34* 0 32*   CALCIUM mg/dL 8 9 8 9 9 0 9 6 8 8   GLUCOSE RANDOM mg/dL 113 107 111 111 104     Results from last 7 days   Lab Units 06/23/20  0437 06/19/20  0516   MAGNESIUM mg/dL 2 3 2 2   PHOSPHORUS mg/dL 4 2 4 3                   ABG:  Results from last 7 days   Lab Units 06/23/20  1427   PH ART  7 479*   PCO2 ART mm Hg 34 9*   PO2 ART mm Hg 172 2*   HCO3 ART mmol/L 25 3   BASE EXC ART mmol/L 1 9   ABG SOURCE  Radial, Right     VBG:  Results from last 7 days   Lab Units 06/23/20  1427 06/18/20  2300   PH MERI   --  7 397   PCO2 MERI mm Hg  --  43 6   PO2 MERI mm Hg  --  46 6*   HCO3 MERI mmol/L  --  26 2   BASE EXC MERI mmol/L  --  1 2   ABG SOURCE  Radial, Right  --            Micro  Results from last 7 days   Lab Units 06/18/20  1433   GRAM STAIN RESULT  No Polys or Bacteria seen       EKG: Sinus rhythm  Imaging:  I have personally reviewed pertinent reports        Active Medications  Scheduled Meds:    Current Facility-Administered Medications:  acetaminophen 650 mg Per G Tube Q4H PRN Cortney Seay PA-C    bisacodyl 10 mg Rectal Daily Cortney Seay PA-C    budesonide 0 5 mg Nebulization BID Cortney Seay PA-C    calcium carbonate-vitamin D 1 tablet Oral Daily With Breakfast Cortney Seay PA-C    chlorhexidine 15 mL Swish & Spit Q12H [address] Cortneymorris Seay PA-C    cholecalciferol 1,000 Units Oral Daily Cortneymorris Seay PA-C    cyclobenzaprine 5 mg Per G Tube TID PRN Cortneymorris Seay PA-C    DAPTOmycin 4 mg/kg (Adjusted) Intravenous Q24H Craig G Ratna, DO Last Rate: 175 mg (06/24/20 1218)   desmopressin 0 05 mg Per G Tube Daily Cortney Seay PA-C    diazepam 5 mg Per G Tube Q8H Cortney Seay PA-C    glycerin-hypromellose- 1 drop Both Eyes Q3H PRN Cortney Biundo, PA-C    guaiFENesin 400 mg Oral Q8H Cortney Seay PA-C    heparin (porcine) 5,000 Units Subcutaneous Q8H [address] Cortney Seay PA-C    levETIRAcetam 1,000 mg Per G Tube BID Krishan Redden, MATTHEW    levETIRAcetam 500 mg Per G Tube Q24H Cortney Seay PA-C    midodrine 10 mg Oral TID AC Brandon Peacock MD    norepinephrine        norepinephrine 1-30 mcg/min Intravenous Titrated Brandon Peacock MD Last Rate: 1 mcg/min (06/24/20 0422)   OXcarbazepine 450 mg Per G Tube Q12H [facility] Cortney Seay PA-C    polyethylene glycol 17 g Oral Daily PRN Shelley Fleischer, MD    potassium-sodium phosphates 2 packet Oral 4x Daily (with meals and at bedtime) Carlene DeleonMATTHEW    senna-docusate sodium 1 tablet Per G Tube HS Cortney Seay PA-C      Continuous Infusions:    norepinephrine 1-30 mcg/min Last Rate: 1 mcg/min (06/24/20 0422)     PRN Meds:     acetaminophen 650 mg Q4H PRN   cyclobenzaprine 5 mg TID PRN   glycerin-hypromellose- 1 drop Q3H PRN   polyethylene glycol 17 g Daily PRN       Allergies   Allergies   Allergen Reactions    Baclofen Other (See Comments)     Reaction Date: 22Nov2011;   apena      Cefepime Hives    Cephalosporins      Other reaction(s): Unknown Reaction  Other reaction(s): Unknown Reaction  Other reaction(s): Unknown Reaction      Clonidine      Other reaction(s): Respiratory Distress, Respiratory Distress    Lactase     Milk-Related Compounds     Penicillins Hives     Reaction Date: [id number];       Phenobarbital Other (See Comments)     Reaction Date: [id number]; Other reaction(s): Other (See Comments)  apnea  Other reaction(s): Other (See Comments)  apnea  apnea      Vancomycin     Zolpidem Other (See Comments)     Other reaction(s): Other (see comments)  Reaction Date: 22Nov2011;   apnea  apnea  apnea      Latex Rash       Advance Directive and Living Will:      Power of :    POLST:        Counseling / Coordination of Care  Total Critical Care time spent 30 minutes excluding procedures, teaching and family updates  Craig Segundo DO        Portions of the record may have been created with voice recognition software    Occasional wrong word or "sound a like" substitutions may have occurred due to the inherent limitations of voice recognition software    Read the chart carefully and recognize, using context, where substitutions have occurred

## 2020-06-25 NOTE — CONSULTS
Consult Note - Wound   Katherine Walden 21 y o  female MRN: [id number]  Unit/Bed#: ICU 01 Encounter: [id number]      History and Present Illness: Patient is seen for wound care consult today   The patient is a 21year old female that has cerebral palsy with acute on chronic respiratory failure , ventilator , meningitis  on meropenem, shunt removal 6/18 , quadriplegia and a low lumbar cyst removal              Assessment Findings:   1  Bilateral heels dry and intact   2  Sacral is dry and intact   3  Right temporal area with sutured incisional area that is dry and intact well approximated   4  Right tracheal upper chest area - incisional area dry and intact well approximated   5  Mid lower back area - small partial thickness open area with small amount of drainage serous tan form the area   The area is located at the proximal end of a old scar  RN reports that it drains moderate amounts at times   No noted erythema or foul odor noted       Discussed the plan of care with the bedside RN and orders placed  Skin care plans:  1-Hydraguard to bilateral sacrum, buttock and heels BID and PRN  2-Elevate heels to offload pressure  3-Ehob cushion in chair when out of bed  4-Moisturize skin daily with skin nourishing cream   5-Turn/reposition q2h or when medically stable for pressure re-distribution on skin  6  Mid lower back incisional area - cleanse with Normal saline then apply maxorb then top with the Allevyn foam change daily           Vitals: Blood pressure 97/50, pulse (!) 54, temperature 97 5 °F (36 4 °C), temperature source Probe, resp  rate 12, height 4' 2" (1 27 m), weight 60 1 kg (132 lb 7 9 oz), SpO2 99 %, not currently breastfeeding  ,Body mass index is 37 26 kg/m²  Wound 06/03/20 Back Mid;Lower (Active)   Wound Image   6/25/2020 11:29 AM   Wound Description Clean;Fragile;Pink 6/25/2020  4:54 PM   Sandra-wound Assessment Clean;Dry; Intact 6/25/2020  4:54 PM   Wound Length (cm) 0 3 cm 6/25/2020 4:54 PM   Wound Width (cm) 0 3 cm 6/25/2020  4:54 PM   Wound Depth (cm) 0 1 6/25/2020  4:54 PM   Calculated Wound Area (cm^2) 0 09 cm^2 6/25/2020  4:54 PM   Calculated Wound Volume (cm^3) 0 01 cm^3 6/25/2020  4:54 PM   Drainage Amount Small 6/25/2020  4:54 PM   Drainage Description Serous; Rudd 6/25/2020  4:54 PM   Non-staged Wound Description Partial thickness 6/25/2020  4:54 PM   Treatments Cleansed;Irrigation with NSS 6/25/2020  4:54 PM   Dressing Calcium Alginate; Foam, Silicon (eg  Allevyn, etc) 6/25/2020  4:54 PM   Dressing Changed Changed 6/25/2020  4:54 PM   Patient Tolerance Tolerated well 6/25/2020  4:54 PM   Dressing Status Clean;Dry; Intact 6/25/2020  4:03 PM       Wound 06/18/20 Incision Chest Right (Active)   Wound Image   6/25/2020 11:23 AM   Wound Description Clean;Dry; Intact 6/25/2020  4:03 PM   Sandra-wound Assessment Clean;Dry; Intact 6/25/2020  4:03 PM   Closure Sutures 6/25/2020  4:03 PM   Drainage Amount None 6/25/2020  4:03 PM   Dressing Open to air 6/25/2020  4:03 PM   Dressing Status Clean;Dry; Intact 6/19/2020  4:00 PM       Wound 06/18/20 Incision Head Right (Active)   Wound Image   6/25/2020 11:22 AM   Wound Description Clean;Dry; Intact 6/25/2020  4:03 PM   Sandra-wound Assessment Clean;Dry; Intact 6/25/2020  4:03 PM   Closure Sutures 6/25/2020  4:03 PM   Drainage Amount None 6/25/2020  4:03 PM   Dressing Open to air 6/25/2020  4:03 PM     Wound care will follow weekly call or tiger text with questions or concerns       Tonie Baca RN BSN Jose Bone

## 2020-06-25 NOTE — SOCIAL WORK
LSW spoke to patient's mother and scheduled a meeting to be attended by mom, dad, INDIRA Becker ([phone]) and Beth Higginbotham        Meeting scheduled for Friday at 1:00 - in 6th floor conference room     Invited teams:  MAURY  Palliative - Confirmed  Critical Care   Neurosurgery   Infectious Disease - confirmed

## 2020-06-25 NOTE — PROGRESS NOTES
Progress Note - Infectious Disease   Kylah Cifuentes 21 y o  female MRN: [id number]  Unit/Bed#: ICU 01 Encounter: [id number]      Impression/Recommendations:  1  Sepsis POA   Fever, tachycardia, tachypnea   Likely due to #2   Consider also aspiration given reported respiratory distress upon presentation to [facility] other clear source appreciated   Blood cultures at [facility] negative   Initially improved   Now with new low-grade fever likely due to # 2  Improving with daptomycin   New hypotension likely due to hypovolemia   Doubt ongoing sepsis   Remains afebrile with normal WBC  Repeat CT head/C/A/P unremarkable  Rec:  ? Discontinue daptomycin as below to complete treatment course  ? No additional ID workup at this point  ? Follow temperatures closely  ? Supportive care as per the primary service     2   Mild chest wall cellulitis   In setting of recent difficult  shunt removal   Improving with addition of daptomycin  Rec:  ? DIscontinue daptomycin today to complete treatment course  ? Serial chest wall exams     3   Serratia  shunt infection   Consider translocation from GI tract although no reported intra-abdominal fluid collection   Repeat CSF cultures positive for Serratia despite 10 days IV antibiotics represents antibiotic failure   Status post VPS externalization with tolerance of clamping trial   Now status post complete proximal CNA VPS removal   Some distal extra-CNS portion remains due to heavy calcification   Distal peritoneal culture with Pseudomonas, Enterococcus but culture of CNS portion negative    Now status post 3 weeks total IV antibiotics, 5 days of which were postoperative from shunt removal   Rec:  ? Continue to follow closely off antibiotics      4  Acute on chronic hypoxic respiratory failure   With chronic tracheostomy   No overt pneumonia on chest imaging from [place]   Consider role of aspiration versus encephalopathy   Patient on minimal vent requirements but unable to be weaned  Rec:  ? Follow respiratory status closely  ? Supportive care as per the primary service     5  Seizure disorder   Initial concern for seizures at [facility] but vEEG here negative for seizure activity   Rhythmic blinking does not correlate to EEG findings per Neurology  Rec:  ? Continue antiepileptics per Neurology with close follow-up ongoing     6   Retroperitoneal collection   Reportedly chronic, seen on imaging done at [facility] 30 significance   Status post aspiration of the collection with negative cultures  Rec:  ? No additional workup or treatment from an ID perspective     7   CP   With spastic quadriplegia, profound MR, hydrocephalus status post  shunt     8   Recent VFib arrest   Consider anoxic encephalopathy      The patient is stable from an ID standpoint      Antibiotics:  Daptomycin # 5  POD #7    Subjective:  Patient seen on AM rounds  Unable to provide ROS due to nonverbal state  24 Hour Events:  Patient unable to be weaned from vent  Remains on low-dose pressors  Family meeting pending with palliative Medicine Service  No documented fevers, chills, sweats, nausea, vomiting, or diarrhea  Objective:  Vitals:  Temp:  [95 4 °F (35 2 °C)-97 6 °F (36 4 °C)] 95 4 °F (35 2 °C)  HR:  [42-86] 76  Resp:  [14-18] 18  BP: (101-151)/(62-92) 123/88  SpO2:  [98 %-100 %] 100 %  Temp (24hrs), Av 9 °F (36 1 °C), Min:95 4 °F (35 2 °C), Max:97 6 °F (36 4 °C)  Current: Temperature: (!) 95 4 °F (35 2 °C)    Physical Exam:   General:  No acute distress  Psychiatric:  Asleep  Pulmonary:  Ventilated respiratory excursion without accessory muscle use  Abdomen:  Nondistended  Extremities:  No edema  Skin:  No rashes    Lab Results:  I have personally reviewed pertinent labs    Results from last 7 days   Lab Units 20  0553 20  0440 20  0437   POTASSIUM mmol/L 4 0 4 2 4 1   CHLORIDE mmol/L 104 102 103   CO2 mmol/L 25 27 29   BUN mg/dL 17 19 16   CREATININE mg/dL 0 40* 0 39* 0 29*   EGFR ml/min/1 73sq m 145 146 161   CALCIUM mg/dL 9 3 8 9 8 9     Results from last 7 days   Lab Units 06/25/20  0553 06/24/20  0440 06/23/20  0437   WBC Thousand/uL 9 88 9 09 9 10   HEMOGLOBIN g/dL 9 2* 8 9* 8 6*   PLATELETS Thousands/uL 397* 335 299     Results from last 7 days   Lab Units 06/18/20  1433   GRAM STAIN RESULT  No Polys or Bacteria seen       Imaging Studies:   I have personally reviewed pertinent imaging study reports and images in PACS  EKG, Pathology, and Other Studies:   I have personally reviewed pertinent reports

## 2020-06-25 NOTE — DISCHARGE INSTR - OTHER ORDERS
Skin care plans:  1-Hydraguard to bilateral sacrum, buttock and heels 2 times per day   2-Elevate heels to offload pressure  3-Ehob cushion in chair when out of bed  4-Moisturize skin daily with skin nourishing cream   5-Turn/reposition q2h or when medically stable for pressure re-distribution on skin     6  Mid lower back incisional area - cleanse with Normal saline then apply maxorb then top with the Allevyn foam change daily

## 2020-06-26 LAB
ANION GAP SERPL CALCULATED.3IONS-SCNC: 7 MMOL/L (ref 4–13)
BUN SERPL-MCNC: 16 MG/DL (ref 5–25)
CALCIUM SERPL-MCNC: 9.1 MG/DL (ref 8.3–10.1)
CHLORIDE SERPL-SCNC: 104 MMOL/L (ref 100–108)
CO2 SERPL-SCNC: 25 MMOL/L (ref 21–32)
CREAT SERPL-MCNC: 0.4 MG/DL (ref 0.6–1.3)
ERYTHROCYTE [DISTWIDTH] IN BLOOD BY AUTOMATED COUNT: 16.6 % (ref 11.6–15.1)
GFR SERPL CREATININE-BSD FRML MDRD: 145 ML/MIN/1.73SQ M
GLUCOSE SERPL-MCNC: 110 MG/DL (ref 65–140)
GLUCOSE SERPL-MCNC: 111 MG/DL (ref 65–140)
GLUCOSE SERPL-MCNC: 123 MG/DL (ref 65–140)
GLUCOSE SERPL-MCNC: 94 MG/DL (ref 65–140)
HCT VFR BLD AUTO: 30.3 % (ref 34.8–46.1)
HGB BLD-MCNC: 9.2 G/DL (ref 11.5–15.4)
MAGNESIUM SERPL-MCNC: 2.5 MG/DL (ref 1.6–2.6)
MCH RBC QN AUTO: 24.9 PG (ref 26.8–34.3)
MCHC RBC AUTO-ENTMCNC: 30.4 G/DL (ref 31.4–37.4)
MCV RBC AUTO: 82 FL (ref 82–98)
PHOSPHATE SERPL-MCNC: 4.4 MG/DL (ref 2.7–4.5)
PLATELET # BLD AUTO: 462 THOUSANDS/UL (ref 149–390)
PMV BLD AUTO: 9.6 FL (ref 8.9–12.7)
POTASSIUM SERPL-SCNC: 3.9 MMOL/L (ref 3.5–5.3)
RBC # BLD AUTO: 3.69 MILLION/UL (ref 3.81–5.12)
SODIUM SERPL-SCNC: 136 MMOL/L (ref 136–145)
WBC # BLD AUTO: 13.14 THOUSAND/UL (ref 4.31–10.16)

## 2020-06-26 PROCEDURE — 84100 ASSAY OF PHOSPHORUS: CPT | Performed by: FAMILY MEDICINE

## 2020-06-26 PROCEDURE — 94760 N-INVAS EAR/PLS OXIMETRY 1: CPT

## 2020-06-26 PROCEDURE — 80048 BASIC METABOLIC PNL TOTAL CA: CPT | Performed by: FAMILY MEDICINE

## 2020-06-26 PROCEDURE — 94640 AIRWAY INHALATION TREATMENT: CPT

## 2020-06-26 PROCEDURE — 85027 COMPLETE CBC AUTOMATED: CPT | Performed by: FAMILY MEDICINE

## 2020-06-26 PROCEDURE — 82948 REAGENT STRIP/BLOOD GLUCOSE: CPT

## 2020-06-26 PROCEDURE — 83735 ASSAY OF MAGNESIUM: CPT | Performed by: FAMILY MEDICINE

## 2020-06-26 PROCEDURE — 94003 VENT MGMT INPAT SUBQ DAY: CPT

## 2020-06-26 PROCEDURE — A9585 GADOBUTROL INJECTION: HCPCS | Performed by: EMERGENCY MEDICINE

## 2020-06-26 PROCEDURE — 99233 SBSQ HOSP IP/OBS HIGH 50: CPT | Performed by: FAMILY MEDICINE

## 2020-06-26 PROCEDURE — 99233 SBSQ HOSP IP/OBS HIGH 50: CPT | Performed by: INTERNAL MEDICINE

## 2020-06-26 PROCEDURE — 99232 SBSQ HOSP IP/OBS MODERATE 35: CPT | Performed by: INTERNAL MEDICINE

## 2020-06-26 RX ORDER — PROPOFOL 10 MG/ML
INJECTION, EMULSION INTRAVENOUS
Status: DISPENSED
Start: 2020-06-26 | End: 2020-06-26

## 2020-06-26 RX ADMIN — HEPARIN SODIUM 5000 UNITS: 5000 INJECTION INTRAVENOUS; SUBCUTANEOUS at 21:34

## 2020-06-26 RX ADMIN — POTASSIUM & SODIUM PHOSPHATES POWDER PACK 280-160-250 MG 2 PACKET: 280-160-250 PACK at 08:13

## 2020-06-26 RX ADMIN — SENNOSIDES AND DOCUSATE SODIUM 1 TABLET: 8.6; 5 TABLET ORAL at 21:33

## 2020-06-26 RX ADMIN — POTASSIUM & SODIUM PHOSPHATES POWDER PACK 280-160-250 MG 2 PACKET: 280-160-250 PACK at 15:30

## 2020-06-26 RX ADMIN — CHLORHEXIDINE GLUCONATE 0.12% ORAL RINSE 15 ML: 1.2 LIQUID ORAL at 08:12

## 2020-06-26 RX ADMIN — GUAIFENESIN 400 MG: 100 SOLUTION ORAL at 08:33

## 2020-06-26 RX ADMIN — OXCARBAZEPINE 450 MG: 300 TABLET, FILM COATED ORAL at 05:59

## 2020-06-26 RX ADMIN — MIDODRINE HYDROCHLORIDE 10 MG: 5 TABLET ORAL at 06:00

## 2020-06-26 RX ADMIN — DIAZEPAM 5 MG: 5 TABLET ORAL at 02:50

## 2020-06-26 RX ADMIN — GUAIFENESIN 400 MG: 100 SOLUTION ORAL at 02:30

## 2020-06-26 RX ADMIN — DESMOPRESSIN ACETATE 0.05 MG: 0.1 TABLET ORAL at 08:17

## 2020-06-26 RX ADMIN — LEVETIRACETAM 1000 MG: 100 SOLUTION ORAL at 15:30

## 2020-06-26 RX ADMIN — BISACODYL 10 MG: 10 SUPPOSITORY RECTAL at 08:13

## 2020-06-26 RX ADMIN — LEVETIRACETAM 500 MG: 100 SOLUTION ORAL at 21:36

## 2020-06-26 RX ADMIN — LEVETIRACETAM 1000 MG: 100 SOLUTION ORAL at 05:59

## 2020-06-26 RX ADMIN — CHLORHEXIDINE GLUCONATE 0.12% ORAL RINSE 15 ML: 1.2 LIQUID ORAL at 21:33

## 2020-06-26 RX ADMIN — GUAIFENESIN 400 MG: 100 SOLUTION ORAL at 19:06

## 2020-06-26 RX ADMIN — MIDODRINE HYDROCHLORIDE 10 MG: 5 TABLET ORAL at 15:29

## 2020-06-26 RX ADMIN — DIAZEPAM 5 MG: 5 TABLET ORAL at 12:04

## 2020-06-26 RX ADMIN — POTASSIUM & SODIUM PHOSPHATES POWDER PACK 280-160-250 MG 2 PACKET: 280-160-250 PACK at 12:06

## 2020-06-26 RX ADMIN — POTASSIUM & SODIUM PHOSPHATES POWDER PACK 280-160-250 MG 2 PACKET: 280-160-250 PACK at 21:33

## 2020-06-26 RX ADMIN — GADOBUTROL 6 ML: 604.72 INJECTION INTRAVENOUS at 01:00

## 2020-06-26 RX ADMIN — HEPARIN SODIUM 5000 UNITS: 5000 INJECTION INTRAVENOUS; SUBCUTANEOUS at 05:56

## 2020-06-26 RX ADMIN — BUDESONIDE 0.5 MG: 0.5 INHALANT RESPIRATORY (INHALATION) at 19:14

## 2020-06-26 RX ADMIN — BUDESONIDE 0.5 MG: 0.5 INHALANT RESPIRATORY (INHALATION) at 07:15

## 2020-06-26 RX ADMIN — Medication 1 TABLET: at 08:17

## 2020-06-26 RX ADMIN — HEPARIN SODIUM 5000 UNITS: 5000 INJECTION INTRAVENOUS; SUBCUTANEOUS at 15:30

## 2020-06-26 RX ADMIN — DIAZEPAM 5 MG: 5 TABLET ORAL at 19:06

## 2020-06-26 RX ADMIN — MELATONIN 1000 UNITS: at 08:13

## 2020-06-26 RX ADMIN — MIDODRINE HYDROCHLORIDE 10 MG: 5 TABLET ORAL at 12:05

## 2020-06-26 RX ADMIN — OXCARBAZEPINE 450 MG: 300 TABLET, FILM COATED ORAL at 19:08

## 2020-06-26 NOTE — PROGRESS NOTES
Daily Progress Note - Critical Care   Nikki Ramos 21 y o  female MRN: [id number]  Unit/Bed#: MICU 01 Encounter: [id number]        ----------------------------------------------------------------------------------------  HPI/24hr events: Patient completed 5 days post source control for serratia  shunt infection  Palliative care following  MRI Brain and thoraco/lumbar spine ordered  Family meeting planned for 1300    ---------------------------------------------------------------------------------------  SUBJECTIVE  N/A; patient non-verbal with tracheostomy    Review of Systems  Review of systems was unable to be performed secondary to Non-verbal  ---------------------------------------------------------------------------------------  Assessment and Plan:    Neuro:    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/18  o Plan:  Unclear etiology, possibly secondary to GI tract    o Patient was status post 10 days of IV antibiotics with persistent positive cultures, status post VPS extravasation with tolerance of clamp trial, now without any VPS  o Patient is completed 3 weeks of total IV antibiotics, 5 of which were postoperative shunt removal  o Monitoring off antibiotics  o Follow-up MRI   Diagnosis: Severe Developmental Delay  o Plan:  Palliative care consult in following   Diagnosis: Seizure D/o  o Plan:  Continue Keppra, Trileptal, Flexeril, Valium   Diagnosis: Low Lumbar midline incision cyst with drainage  o Plan:  Follow-up MRI    CV:    Diagnosis: Hypotension  o Plan:  Unclear etiology, low suspicion for sepsis  o Continue midodrine 10 t i d   o Currently off Levophed, however was as high as 3 mcg overnight   Diagnosis: Bradycardia  o Plan:  1 episode of sinus bradycardia with rates in the 40s overnight  o May need to discuss permanent pacemaker placement   Diagnosis: S/p Cardiac Arrest  o Plan:  Status post arrest from central line placement  o Unclear if secondary to respiratory insufficiency or secondary to guidewire insertion  o Continue to monitor on tele    Pulm:   Diagnosis: Acute combined respiratory failure with tracheostomy  o Plan:  Patient has been difficult to wean from ventilator  o Presented initially with respiratory distress, at baseline was on trach collar, trach changed to cuff trach at [facility]  o Completed antibiotics for possible aspiration event  o Now has apneic episodes on pressure support ventilation  o Attempt PSV again today  o Palliative care following    GI:    Diagnosis: No acute issues  o Plan: Bowel regimen:  Senokot 1 tablet, q h s  With p r n  MiraLax  o Last bowel movement yesterday      :   No issues  Continue external urinary catheter      F/E/N:   Fluids:  No fluids needed  Electrolytes:  Electrolytes within normal limits, lab holiday tomorrow  Nutrition:  Continue tube feeds at goal      Heme/Onc:    Diagnosis: Chronic Anemia  o Plan:  At baseline    No signs or symptoms of bleeding      Endo:    Diagnosis: History of DI  o Plan:  Continue DDAVP      ID:    Diagnosis: Serratia  shunt infection  o Plan:  Id following, has completed 3 weeks of IV antibiotics, 5 which were after shunt removal  o Monitor off antibiotics   Diagnosis: Chest wall cellulitis  o Plan:  Improved with daptomycin   Diagnosis: Retroperitoneal infection of unclear origin  o Plan:  Reportedly chronic, seen on imaging going to [place], unclear significance  o Follow-up MRI    MSK/Skin:   Frequent turning, offloading of pressure points wound care following      Disposition: Continue Critical Care   Code Status: Level 1 - Full Code  ---------------------------------------------------------------------------------------  ICU CORE MEASURES    Prophylaxis   VTE Pharmacologic Prophylaxis: Heparin  VTE Mechanical Prophylaxis: sequential compression device  Stress Ulcer Prophylaxis: Prophylaxis Not Indicated     ABCDE Protocol (if indicated)  Plan to perform spontaneous awakening trial today? Not applicable, developmental delay  Plan to perform spontaneous breathing trial today? Yes  Obvious barriers to extubation? Yes  CAM-ICU:  Unable to assess due to developmental delay    Invasive Devices Review  Invasive Devices     Peripherally Inserted Central Catheter Line            PICC Line 94/76/12 Right Basilic 21 days          Drain            Gastrostomy/Enterostomy -- days    External Urinary Catheter less than 1 day          Airway            Surgical Airway Shiley Cuffed -- days    Surgical Airway Shiley Cuffed 7 days              Can any invasive devices be discontinued today? No  ---------------------------------------------------------------------------------------  OBJECTIVE    Vitals   Vitals:    20 0347 20 0500 20 0541 20 0557   BP: (!) 83/58 134/96  125/80   BP Location: Left arm      Pulse: 73 78     Resp: 12 14     Temp: 97 7 °F (36 5 °C)      TempSrc: Oral      SpO2: 100% 100%     Weight:   56 5 kg (124 lb 9 oz)    Height:         Temp (24hrs), Av 6 °F (35 9 °C), Min:95 °F (35 °C), Max:98 2 °F (36 8 °C)  Current: Temperature: 97 7 °F (36 5 °C)  Respiratory:  SpO2: SpO2: 99 %, SpO2 Activity: SpO2 Activity: At Rest, SpO2 Device: O2 Device: Ventilator  O2 Flow Rate (L/min): 30 L/min    Invasive/non-invasive ventilation settings   Respiratory    Lab Data (Last 4 hours)    None         O2/Vent Data (Last 4 hours)       0333           Vent Mode AC/VC       Resp Rate (BPM) (BPM) 10       Vt (mL) (mL) 325       FIO2 (%) (%) 30       PEEP (cmH2O) (cmH2O) 5       MV 4 5                   Physical Exam   Constitutional:   Chronically ill-appearing, contracted, CP   HENT:   Well-healing scalp scar from  shunt extraction  Tongue protruded   Eyes: Conjunctivae are normal    Neck: No JVD present  Tracheostomy site clean, dry, intact   Cardiovascular: Normal rate, regular rhythm and normal heart sounds     Pulmonary/Chest: She has decreased breath sounds  She has rhonchi (Fine, throughout)  Abdominal: Soft  Bowel sounds are normal  She exhibits no distension  There is no tenderness  Genitourinary:   Genitourinary Comments: Deferred   Musculoskeletal:   Contracted   Neurological:   Number blood baseline  Eyes open spontaneously, moves lips and head when in pain, but does not withdrawal to pain   Skin: Skin is warm and dry  Nursing note and vitals reviewed          Laboratory and Diagnostics:  Results from last 7 days   Lab Units 06/26/20 0440 06/25/20  0553 06/24/20  0440 06/23/20  0437 06/22/20  0524   WBC Thousand/uL 13 14* 9 88 9 09 9 10 10 33*   HEMOGLOBIN g/dL 9 2* 9 2* 8 9* 8 6* 9 4*   HEMATOCRIT % 30 3* 30 5* 29 3* 28 1* 31 5*   PLATELETS Thousands/uL 462* 397* 335 299 453*   NEUTROS PCT %  --   --   --   --  69   MONOS PCT %  --   --   --   --  6     Results from last 7 days   Lab Units 06/26/20 0440 06/25/20  0553 06/24/20  0440 06/23/20  0437 06/22/20  0524 06/20/20  0435   SODIUM mmol/L 136 137 136 137 135* 138   POTASSIUM mmol/L 3 9 4 0 4 2 4 1 4 0 4 1   CHLORIDE mmol/L 104 104 102 103 101 107   CO2 mmol/L 25 25 27 29 26 28   ANION GAP mmol/L 7 8 7 5 8 3*   BUN mg/dL 16 17 19 16 14 12   CREATININE mg/dL 0 40* 0 40* 0 39* 0 29* 0 34* 0 34*   CALCIUM mg/dL 9 1 9 3 8 9 8 9 9 0 9 6   GLUCOSE RANDOM mg/dL 111 112 113 107 111 111     Results from last 7 days   Lab Units 06/26/20 0440 06/25/20  0553 06/23/20  0437   MAGNESIUM mg/dL 2 5 2 7* 2 3   PHOSPHORUS mg/dL 4 4 5 2* 4 2                   ABG:  Results from last 7 days   Lab Units 06/23/20  1427   PH ART  7 479*   PCO2 ART mm Hg 34 9*   PO2 ART mm Hg 172 2*   HCO3 ART mmol/L 25 3   BASE EXC ART mmol/L 1 9   ABG SOURCE  Radial, Right     VBG:  Results from last 7 days   Lab Units 06/25/20 2018 06/23/20  1427   PH MERI  7 403*   < >  --    PCO2 MERI mm Hg 38 9*   < >  --    PO2 MERI mm Hg 50 8*   < >  --    HCO3 MEIR mmol/L 23 7*   < >  --    BASE EXC MERI mmol/L -0 9   < >  --    ABG SOURCE   --   --  Radial, Right    < > = values in this interval not displayed  Micro        EKG: N/A  Imaging:  I have personally reviewed pertinent films in PACS    Intake and Output  I/O       06/24 0701 - 06/25 0700 06/25 0701 - 06/26 0700    P  O  0 0    I V  (mL/kg) 105 9 (1 8) 122 2 (2 2)    NG/ 130    IV Piggyback 50     Feedings 650 1024    Total Intake(mL/kg) 1165 9 (19 4) 1276 2 (22 6)    Urine (mL/kg/hr) 1475 (1) 800 (0 6)    Emesis/NG output 0 0    Stool  0    Total Output 1475 800    Net -309 1 +476 2          Unmeasured Urine Occurrence 2 x 1 x    Unmeasured Stool Occurrence  1 x          Height and Weights   Height: 4' 2" (127 cm)  IBW: 22 5 kg  Body mass index is 35 03 kg/m²  Weight (last 2 days)     Date/Time   Weight    06/26/20 0541   56 5 (124 56)    06/25/20 0553   60 1 (132 5)    06/24/20 0600   59 (130 07)                Nutrition       Diet Orders   (From admission, onward)             Start     Ordered    06/18/20 2126  Diet Enteral/Parenteral; Tube Feeding No Oral Diet; Non-Formulary; Cyclic; 50; 16 hours; Prosource Protein Liquid - Two Packets  Diet effective now     Comments:  Use peptamen 1 2 from home   Question Answer Comment   Diet Type Enteral/Parenteral    Enteral/Parenteral Tube Feeding No Oral Diet    Tube Feeding Formula: Non-Formulary    Bolus/Cyclic/Continuous Cyclic    Tube Feeding Cyclic Rate (mL/hr): 50    Tube Feeding Cyclic: Administer over: 16 hours    Prosource Protein Liquid - No Carb Prosource Protein Liquid - Two Packets    RD to adjust diet per protocol?  Yes        06/18/20 2127              Active Medications  Scheduled Meds:  Current Facility-Administered Medications:  acetaminophen 650 mg Per G Tube Q4H PRN Cortney Seay PA-C    bisacodyl 10 mg Rectal Daily Cortney MATTHEW Seay    budesonide 0 5 mg Nebulization BID Cortney Seay PA-C    calcium carbonate-vitamin D 1 tablet Oral Daily With Breakfast Cortney Seay PA-C chlorhexidine 15 mL Swish & Spit Q12H [address] Cortney NATALIE Seay-HUGO    cholecalciferol 1,000 Units Oral Daily Cortney NATALIE Seay-HUGO    cyclobenzaprine 5 mg Per G Tube TID PRN Cortney Geena PA-C    desmopressin 0 05 mg Per G Tube Daily Cortney BiNATALIE wild-HUGO    diazepam 5 mg Per G Tube Q8H Cortney Biannieo PA-C    glycerin-hypromellose- 1 drop Both Eyes Q3H PRN Cortney BiNATALIE wild-C    guaiFENesin 400 mg Oral Q8H Cortney Biannieo, PA-HUGO    heparin (porcine) 5,000 Units Subcutaneous Q8H [address] Cortney BiNATALIE wild-C    levETIRAcetam 1,000 mg Per G Tube BID Cortney NATALIE Seay-HUGO    levETIRAcetam 500 mg Per G Tube Q24H CortneyNATALIE Mireles-HUGO    midodrine 10 mg Oral TID AC Pedro Oneill MD    norepinephrine 1-30 mcg/min Intravenous Titrated Pedro Oneill MD Last Rate: 2 mcg/min (06/26/20 0557)   OXcarbazepine 450 mg Per G Tube Q12H [address] Cortney NATALIE Seay-C    polyethylene glycol 17 g Oral Daily PRN Imani Fraga MD    potassium-sodium phosphates 2 packet Oral 4x Daily (with meals and at bedtime) Cortney Saey PA-C    senna-docusate sodium 1 tablet Per G Tube HS NATALIE Burnham-C      Continuous Infusions:    norepinephrine 1-30 mcg/min Last Rate: 2 mcg/min (06/26/20 0557)     PRN Meds:     acetaminophen 650 mg Q4H PRN   cyclobenzaprine 5 mg TID PRN   glycerin-hypromellose- 1 drop Q3H PRN   polyethylene glycol 17 g Daily PRN       Allergies   Allergies   Allergen Reactions    Baclofen Other (See Comments)     Reaction Date: [id number];   apena      Cefepime Hives    Cephalosporins      Other reaction(s): Unknown Reaction  Other reaction(s): Unknown Reaction  Other reaction(s): Unknown Reaction      Clonidine      Other reaction(s): Respiratory Distress, Respiratory Distress    Lactase     Milk-Related Compounds     Penicillins Hives     Reaction Date: [id number];       Phenobarbital Other (See Comments)     Reaction Date: [id number]; Other reaction(s):  Other (See Comments)  apnea  Other reaction(s): Other (See Comments)  apnea  apnea      Vancomycin     Zolpidem Other (See Comments)     Other reaction(s): Other (see comments)  Reaction Date: [id number];   apnea  apnea  apnea      Latex Rash     ---------------------------------------------------------------------------------------  Advance Directive and Living Will:      Power of :    POLST:    ---------------------------------------------------------------------------------------  Care Time Delivered:   No Critical Care time spent     See Lynn PA-C      Portions of the record may have been created with voice recognition software  Occasional wrong word or "sound a like" substitutions may have occurred due to the inherent limitations of voice recognition software    Read the chart carefully and recognize, using context, where substitutions have occurred

## 2020-06-26 NOTE — RESPIRATORY THERAPY NOTE
RT Ventilator Management Note  Afshin Santacruz 21 y o  female MRN: [id number]  Unit/Bed#: MICU 01 Encounter: [id number]      Daily Screen       6/24/2020  1127 6/25/2020  0720          Patient safety screen outcome[de-identified]  Passed  Passed      Not Ready for Weaning due to[de-identified]  Underline problem not resolved;Poor inspiratory effort  Underline problem not resolved      Spont breathing trial outcome[de-identified]  Failed        Spont breathing trial reason failed:  RR < 8 bpm;RSBI > 100                Physical Exam:   Assessment Type: Assess only  General Appearance: Sleeping  Respiratory Pattern: Assisted  Chest Assessment: Chest expansion symmetrical, Trachea midline  Bilateral Breath Sounds: Diminished, Coarse  O2 Device: vent  Subjective Data: Trach      Resp Comments: (P) Pt appears to be comfortable on the vent with no issues, BS are diminished with stable VS  Will cont to monitor per protocol

## 2020-06-26 NOTE — PROGRESS NOTES
Pastoral Care Progress Note    2020  Patient: Xavi Other : 1995  Admission Date & Time: 6/3/2020 1415  MRN: [id number] CSN: [id number]                     Chaplaincy Interventions Utilized:   Empowerment: Clarified, confirmed, or reviewed information from treatment team     Exploration: Explored spiritual needs & resources    Collaboration: Facilitated respect for spiritual/cultural practice during hospitalization      Ritual: Jie Avila provided sacrament of the sick      Chaplaincy Outcomes Achieved:  Arranged for community clergy surrogate    Family requested Anointing of the Sick for pt  Called   to come in and anoint        Spiritual Coping Strategies Utilized:   Spiritual gratitude

## 2020-06-26 NOTE — PROGRESS NOTES
Progress Note - Infectious Disease   Kylah Ival Patch 21 y o  female MRN: [id number]  Unit/Bed#: MICU 01 Encounter: [id number]    Update:  Participated in family meeting with CCM team, HPM team, parents, and group home staff at 1:00PM   At this point given inability to wean from ventilator and overall clinical decline, family to transition to comfort care once they and her care team are able to visit  No further workup or treatment from an ID perspective for now  We will sign off for now  Please call with new questions  Impression/Recommendations:  1  Sepsis POA   Fever, tachycardia, tachypnea   Likely due to #2   Consider also aspiration given reported respiratory distress upon presentation to [facility] other clear source appreciated   Blood cultures at [facility] negative   Initially improved   Now with new low-grade fever likely due to # 2  Improving with daptomycin   New hypotension likely due to hypovolemia, now resolved   Doubt ongoing sepsis   Remains afebrile with normal WBC   Repeat CT head/C/A/P unremarkable  Rec:  ? Continue to follow closely off antibiotics  ? Follow temperatures closely  ? Supportive care as per the primary service     2   Mild chest wall cellulitis   In setting of recent difficult  shunt removal   Improved with addition of daptomycin, now status post 5 days treatment  Rec:  ? Continue to follow closely off antibiotics  ?  Serial chest wall exams     3   Serratia  shunt infection   Consider translocation from GI tract although no reported intra-abdominal fluid collection   Repeat CSF cultures positive for Serratia despite 10 days IV antibiotics represents antibiotic failure   Status post VPS externalization with tolerance of clamping trial   Now status post complete proximal CNA VPS removal   Some distal extra-CNS portion remains due to heavy calcification   Distal peritoneal culture with Pseudomonas, Enterococcus but culture of CNS portion negative   Now status post 3 weeks total IV antibiotics, 5 days of which were postoperative from shunt removal   MRI suggests "new" cerebritis but suspect may be due to recent infection, shunt removal   No clinical evidence for ongoing CNS infection  Rec:  ? Continue to follow closely off antibiotics      4  Acute on chronic hypoxic respiratory failure   With chronic tracheostomy   No overt pneumonia on chest imaging from [place]   Consider role of aspiration versus encephalopathy   Patient on minimal vent requirements but unable to be weaned  Rec:  ? Follow respiratory status closely  ? Supportive care as per the primary service     5  Seizure disorder   Initial concern for seizures at [facility] but vEEG here negative for seizure activity   Rhythmic blinking does not correlate to EEG findings per Neurology  Rec:  ? Continue antiepileptics per Neurology with close follow-up ongoing     6   Retroperitoneal collection   Reportedly chronic, seen on imaging done at [place] [address] significance   Status post aspiration of the collection with negative cultures  Rec:  ? No additional workup or treatment from an ID perspective     7   CP   With spastic quadriplegia, profound MR, hydrocephalus status post  shunt     8   Recent VFib arrest   Consider anoxic encephalopathy      The patient is stable from an ID standpoint  Await family meeting today to establish goals of care      Antibiotics:  None    Subjective:  Patient seen on AM rounds  Unable to perform ROS due to nonverbal state  24 Hour Events:  No documented fevers, chills, sweats, nausea, vomiting, or diarrhea      Objective:  Vitals:  Temp:  [95 °F (35 °C)-98 2 °F (36 8 °C)] 97 7 °F (36 5 °C)  HR:  [54-92] 82  Resp:  [10-34] 10  BP: ()/() 111/80  SpO2:  [94 %-100 %] 100 %  Temp (24hrs), Av °F (36 1 °C), Min:95 °F (35 °C), Max:98 2 °F (36 8 °C)  Current: Temperature: 97 7 °F (36 5 °C)    Physical Exam:   General:  No acute distress  Psychiatric:  Obtunded  Chest: No cellulitis  Pulmonary:  Normal respiratory excursion without accessory muscle use  Abdomen:  Soft, nontender  Extremities:  No edema  Skin:  No rashes    Lab Results:  I have personally reviewed pertinent labs  Results from last 7 days   Lab Units 06/26/20  0440 06/25/20  0553 06/24/20  0440   POTASSIUM mmol/L 3 9 4 0 4 2   CHLORIDE mmol/L 104 104 102   CO2 mmol/L 25 25 27   BUN mg/dL 16 17 19   CREATININE mg/dL 0 40* 0 40* 0 39*   EGFR ml/min/1 73sq m 145 145 146   CALCIUM mg/dL 9 1 9 3 8 9     Results from last 7 days   Lab Units 06/26/20  0440 06/25/20  0553 06/24/20  0440   WBC Thousand/uL 13 14* 9 88 9 09   HEMOGLOBIN g/dL 9 2* 9 2* 8 9*   PLATELETS Thousands/uL 462* 397* 335           Imaging Studies:   I have personally reviewed pertinent imaging study reports and images in PACS  EKG, Pathology, and Other Studies:   I have personally reviewed pertinent reports

## 2020-06-26 NOTE — PROGRESS NOTES
06/26/20 1800   Clinical Encounter Type   Visited With Family   Routine Visit Follow-up   Restoration Encounters   Restoration Needs Prayer   Sacramental Encounters   Sacrament of Sick-Anointing Anointed

## 2020-06-26 NOTE — SOCIAL WORK
LSW participated in multidisciplinary team meeting with parents to discuss patient's current medical status and next steps  Please refer to medical team notes for medical specifics  At this time the family feels that with the current information that they would like to focus on patient's comfort once patient's family and friends visit and say their goodbyes  LSW did reach out to pastoral care to request patient receive last rights  Parents requested information to be emailed to them regarding body donation in an effort to "give back" to the medical community  LSW will e-mail  Family currently would like patient to be a Level 2  [facility] requests that two medical notes designating current medical status be faxed to them prior to patient being transitioned to comfort on Sunday  Mom, dad, and grandmother would like to visit prior to transitioning to comfort  Family would like to be at bedside when patient is made comfortable

## 2020-06-26 NOTE — SOCIAL WORK
CM attended [address] mtg with Palliative - Dr Tate Roberts and Jordan Ware fellow - Dr Kiet Hanson and residents: Dr Mague Ordonez and Dr Radha Boone  By phone: Beatriz Gavin, and their director  Parents: Razia Janet  Supports Coordinator, Rosita  CCM reviewed clinical course  Palliative addressed GOC with pt parents, Roxanna Baumgarten and Orin Vieyra, who are patients legal guardians  Prince Braden from [employer] requested two physicians attestation to pt terminal condition prior to change in code status  CCM, ID, and CM left GOC mtg and Dr Tate Roberts and Geovanna Petty discussed further with [employer], Supports Coordinator, and Parents regarding next steps

## 2020-06-26 NOTE — TREATMENT PLAN
PALLIATIVE AND SUPPORTIVE CARE FAMILY CONFERENCE:    Time of Meetinpm    Participants:   - Dr Rahul Harrington, Infectious Disease  - Dr Diana Godfrey, CM/w  - Kinston Bowels, palliative medicine  - Patient's parents Danay Ch and Claudia Noble present via phone  - Patient's  from prior LTC facility, [facility] Prabhakar and Loy Mathew present by phone    Patient Participation: unable to participate secondary chronic and longstanding cognitive deficits from CP    Patient Support System: [address] [place] [address] care staff     Meeting Location: MICU conference room    Advanced Directive of POLST available: no    A family meeting was held to provide medical update and discuss goals of care  This meeting was necessary for determine the appropriate course of treatment  Topics of Discussion:    - Non-Beneficial Treatment (NBT) / Futility as re: pt's overall care plan  - 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, and reasons that she cannot safely be expected to live outside our MICU     - Even should she surprise us by surviving off pressors and vent, we discussed with Dr Dione Schulte guidance that her infections -- such as we have identified them -- have been acutely treated, with no improvement in her overall status, suggesting a durable non-infectious injury  - More aggressive/curative measures were considered, but the family are clear that they understand her CP is an inherently fatal illness, and they do not wish to put her through additional surgeries such as ICD placement or VPS replacement  Other Content of Meeting:    - Concept of inpatient hospice was briefly addressed, and tabled until pt can demonstrate stabiliyt off the vent and pressors to tolerate Xport to a new location      PLAN:  - Family to visit this w/e  - Planned extubation to comfort on   -  2 now    Time Involved in Meetin+ minutes, beginning at approximately 1pm and ending at approximately 2pm

## 2020-06-26 NOTE — PROGRESS NOTES
Progress note - Palliative and Supportive Care   Swati Beebe 21 y o  female [id number]    Assessment:  Patient Active Problem List   Diagnosis    Abnormal LFTs    Jejun[facility] tube present ([facility] )    Gastrostomy tube in place [facility])    Chronic idiopathic constipation    Skin excoriation    Hidradenitis    Acute metabolic encephalopathy    Cerebral palsy ([facility] )    Cardiac arrest ([facility] )    [facility]    Severe sepsis with acute organ dysfunction due to Serratia species ([facility] )    S/P  shunt    Meningitis     Plan:  1  Symptom management - defer to primary team   - Pt appears obtunded and comfortable, if not vegetative, to my exam     2  Goals - full cares, no limits at this time  Pending fam mtg later today in MICU  - Pt is incompetent and incapacitated at baseline    - Parents are formal guardians, but pt's care team at [facility] is meaningfully involved in care decisions  - We would attest that this unfortunate patient has a terminal illness, and would qualify for hospice cares  = The non-beneficial nature or futility of her cares to be discussed at a multidisciplinary team meeting later today  From my perspective, I attest that there is a high degree of risk of treatment failure, or acute decompensation during aggressive cares, that may exist even despite our best efforts  This assessment is based off the deep-seated infection she has suffered from an unclear, occult, and possibly persistent nidus of infection that we have yet to uncover definitively, complicated by the fact that she will likely need at least one indwelling device to maintain her safety and longevity: a  shunt and/or an ICD/pacer  Given the proven infection of her recently explanted VPS with an atypical organism, and her colonization with MDRO at baseline, she may have infections that will become untreatable with antibiotics, and will potentially end her life, despite all aggressive cares       Code Status: FULL - Level 1   Decisional apparatus:  Patient is not competent on my exam today  If competence is lost, patient's substitute decision maker would default to Guardians/Parents, [place] and Bree [place], by [place]  Advance Directive / Living Will / POLST:  Guardianship paperwork on file  Interval history:       Since last visit, pt has not liberated from vent, but has not otherwise show decompensation in VS   Bloodwork today does not suggest new infection  She remains on pressor therapy intermittently, and her last dose of daptomycin or any antibiotic was 24hrs ago      MEDICATIONS / ALLERGIES:     all current active meds have been reviewed and current meds:   Current Facility-Administered Medications   Medication Dose Route Frequency    acetaminophen (TYLENOL) tablet 650 mg  650 mg Per G Tube Q4H PRN    bisacodyl (DULCOLAX) rectal suppository 10 mg  10 mg Rectal Daily    budesonide (PULMICORT) inhalation solution 0 5 mg  0 5 mg Nebulization BID    calcium carbonate-vitamin D (OSCAL-D) 500 mg-200 units per tablet 1 tablet  1 tablet Oral Daily With Breakfast    chlorhexidine (PERIDEX) 0 12 % oral rinse 15 mL  15 mL Swish & Spit Q12H [address]    cholecalciferol (VITAMIN D3) tablet 1,000 Units  1,000 Units Oral Daily    cyclobenzaprine (FLEXERIL) tablet 5 mg  5 mg Per G Tube TID PRN    desmopressin (DDAVP) tablet 0 05 mg  0 05 mg Per G Tube Daily    diazepam (VALIUM) tablet 5 mg  5 mg Per G Tube Q8H    glycerin-hypromellose- (ARTIFICIAL TEARS) ophthalmic solution 1 drop  1 drop Both Eyes Q3H PRN    guaiFENesin (ROBITUSSIN) oral solution 400 mg  400 mg Oral Q8H    heparin (porcine) subcutaneous injection 5,000 Units  5,000 Units Subcutaneous Q8H [address]    levETIRAcetam (KEPPRA) oral solution 1,000 mg  1,000 mg Per G Tube BID    levETIRAcetam (KEPPRA) oral solution 500 mg  500 mg Per G Tube Q24H    midodrine (PROAMATINE) tablet 10 mg  10 mg Oral TID AC    norepinephrine (LEVOPHED) 4 mg (STANDARD CONCENTRATION) IV in sodium chloride 0 9% 250 mL  1-30 mcg/min Intravenous Titrated    OXcarbazepine (TRILEPTAL) tablet 450 mg  450 mg Per G Tube Q12H [facility]    polyethylene glycol (MIRALAX) packet 17 g  17 g Oral Daily PRN    potassium-sodium phosphates (PHOS-NAK) packet 2 packet  2 packet Oral 4x Daily (with meals and at bedtime)    propofol (DIPRIVAN) 1000 mg in 100 mL infusion (premix) **ADS Override Pull**        senna-docusate sodium (SENOKOT S) 8 6-50 mg per tablet 1 tablet  1 tablet Per G Tube HS       Allergies   Allergen Reactions    Baclofen Other (See Comments)     Reaction Date: [id number];   apena      Cefepime Hives    Cephalosporins      Other reaction(s): Unknown Reaction  Other reaction(s): Unknown Reaction  Other reaction(s): Unknown Reaction      Clonidine      Other reaction(s): Respiratory Distress, Respiratory Distress    Lactase     Milk-Related Compounds     Penicillins Hives     Reaction Date: [id number];       Phenobarbital Other (See Comments)     Reaction Date: [id number]; Other reaction(s): Other (See Comments)  apnea  Other reaction(s): Other (See Comments)  apnea  apnea      Vancomycin     Zolpidem Other (See Comments)     Other reaction(s): Other (see comments)  Reaction Date: [id number];   apnea  apnea  apnea      Latex Rash       OBJECTIVE:    Physical Exam  Physical Exam   Constitutional: No distress  HENT:   Right Ear: External ear normal    Left Ear: External ear normal    Mouth/Throat: No oropharyngeal exudate (membranes tacky)  Dysmorphic and macroglossic features  Well-healing and clean surgical scar over frontal area from recent explant  Eyes: Right eye exhibits no discharge  Left eye exhibits no discharge  Does not open eyes during encounter  Neck: No tracheal deviation present  Cardiovascular:   tachy   Pulmonary/Chest: No stridor  No respiratory distress  Abdominal: Soft  She exhibits no distension  Musculoskeletal: She exhibits no edema  Neurological:   Not alert, not interactive  Facies generally symmetric  Skin: Skin is warm and dry  No rash noted  She is not diaphoretic  No erythema  There is pallor  Psychiatric:   Cannot participate in exam today  Lab Results:   I have personally reviewed pertinent labs  , CBC:   Lab Results   Component Value Date    WBC 13 14 (H) 06/26/2020    HGB 9 2 (L) 06/26/2020    HCT 30 3 (L) 06/26/2020    MCV 82 06/26/2020     (H) 06/26/2020    MCH 24 9 (L) 06/26/2020    MCHC 30 4 (L) 06/26/2020    RDW 16 6 (H) 06/26/2020    MPV 9 6 06/26/2020   , CMP:   Lab Results   Component Value Date    SODIUM 136 06/26/2020    K 3 9 06/26/2020     06/26/2020    CO2 25 06/26/2020    BUN 16 06/26/2020    CREATININE 0 40 (L) 06/26/2020    CALCIUM 9 1 06/26/2020    EGFR 145 06/26/2020     Imaging Studies: MRI brain, cervical, and thoracic spine are reviewed, with multiple area  EKG, Pathology, and Other Studies: reviewed pertinent studies; S marcescens was cultured from CSF; peritoneal cx showed Pseudomonads, Enterococci  Counseling / Coordination of Care    Total floor / unit time spent today 35+ minutes  Greater than 50% of total time was spent with the patient and / or family counseling and / or coordination of care   A description of the counseling / coordination of care: chart review; attempt at symptom pursuit; review and assessment of decisional apparatus and capacity, applicable legal codes and extant documents; consideration of hospice cares; coordinatoin of cares on the unit with KACIE Arambula/MAURY as well as medical team

## 2020-06-27 LAB
GLUCOSE SERPL-MCNC: 84 MG/DL (ref 65–140)
GLUCOSE SERPL-MCNC: 87 MG/DL (ref 65–140)
GLUCOSE SERPL-MCNC: 93 MG/DL (ref 65–140)
GLUCOSE SERPL-MCNC: 95 MG/DL (ref 65–140)

## 2020-06-27 PROCEDURE — 94003 VENT MGMT INPAT SUBQ DAY: CPT

## 2020-06-27 PROCEDURE — 94640 AIRWAY INHALATION TREATMENT: CPT

## 2020-06-27 PROCEDURE — 99233 SBSQ HOSP IP/OBS HIGH 50: CPT | Performed by: PHYSICIAN ASSISTANT

## 2020-06-27 PROCEDURE — 82948 REAGENT STRIP/BLOOD GLUCOSE: CPT

## 2020-06-27 PROCEDURE — 94760 N-INVAS EAR/PLS OXIMETRY 1: CPT

## 2020-06-27 RX ADMIN — HEPARIN SODIUM 5000 UNITS: 5000 INJECTION INTRAVENOUS; SUBCUTANEOUS at 05:42

## 2020-06-27 RX ADMIN — BISACODYL 10 MG: 10 SUPPOSITORY RECTAL at 08:17

## 2020-06-27 RX ADMIN — DESMOPRESSIN ACETATE 0.05 MG: 0.1 TABLET ORAL at 09:47

## 2020-06-27 RX ADMIN — DIAZEPAM 5 MG: 5 TABLET ORAL at 02:27

## 2020-06-27 RX ADMIN — BUDESONIDE 0.5 MG: 0.5 INHALANT RESPIRATORY (INHALATION) at 19:23

## 2020-06-27 RX ADMIN — MIDODRINE HYDROCHLORIDE 10 MG: 5 TABLET ORAL at 13:01

## 2020-06-27 RX ADMIN — GUAIFENESIN 400 MG: 100 SOLUTION ORAL at 09:48

## 2020-06-27 RX ADMIN — DIAZEPAM 5 MG: 5 TABLET ORAL at 18:03

## 2020-06-27 RX ADMIN — GUAIFENESIN 400 MG: 100 SOLUTION ORAL at 18:04

## 2020-06-27 RX ADMIN — POTASSIUM & SODIUM PHOSPHATES POWDER PACK 280-160-250 MG 2 PACKET: 280-160-250 PACK at 16:09

## 2020-06-27 RX ADMIN — SENNOSIDES AND DOCUSATE SODIUM 1 TABLET: 8.6; 5 TABLET ORAL at 21:57

## 2020-06-27 RX ADMIN — DIAZEPAM 5 MG: 5 TABLET ORAL at 09:47

## 2020-06-27 RX ADMIN — POTASSIUM & SODIUM PHOSPHATES POWDER PACK 280-160-250 MG 2 PACKET: 280-160-250 PACK at 08:17

## 2020-06-27 RX ADMIN — OXCARBAZEPINE 450 MG: 300 TABLET, FILM COATED ORAL at 05:42

## 2020-06-27 RX ADMIN — MIDODRINE HYDROCHLORIDE 10 MG: 5 TABLET ORAL at 16:09

## 2020-06-27 RX ADMIN — LEVETIRACETAM 1000 MG: 100 SOLUTION ORAL at 05:42

## 2020-06-27 RX ADMIN — MIDODRINE HYDROCHLORIDE 10 MG: 5 TABLET ORAL at 08:17

## 2020-06-27 RX ADMIN — LEVETIRACETAM 1000 MG: 100 SOLUTION ORAL at 13:01

## 2020-06-27 RX ADMIN — OXCARBAZEPINE 450 MG: 300 TABLET, FILM COATED ORAL at 18:05

## 2020-06-27 RX ADMIN — LEVETIRACETAM 500 MG: 100 SOLUTION ORAL at 22:12

## 2020-06-27 RX ADMIN — POTASSIUM & SODIUM PHOSPHATES POWDER PACK 280-160-250 MG 2 PACKET: 280-160-250 PACK at 13:01

## 2020-06-27 RX ADMIN — BUDESONIDE 0.5 MG: 0.5 INHALANT RESPIRATORY (INHALATION) at 07:17

## 2020-06-27 RX ADMIN — CHLORHEXIDINE GLUCONATE 0.12% ORAL RINSE 15 ML: 1.2 LIQUID ORAL at 08:17

## 2020-06-27 RX ADMIN — HEPARIN SODIUM 5000 UNITS: 5000 INJECTION INTRAVENOUS; SUBCUTANEOUS at 13:01

## 2020-06-27 RX ADMIN — CHLORHEXIDINE GLUCONATE 0.12% ORAL RINSE 15 ML: 1.2 LIQUID ORAL at 21:57

## 2020-06-27 RX ADMIN — POTASSIUM & SODIUM PHOSPHATES POWDER PACK 280-160-250 MG 2 PACKET: 280-160-250 PACK at 21:57

## 2020-06-27 RX ADMIN — GUAIFENESIN 400 MG: 100 SOLUTION ORAL at 02:26

## 2020-06-27 NOTE — NURSING NOTE
Patient's tube feed not in stock  Mother notified at bedside  Stated she will call [place] for more

## 2020-06-27 NOTE — PROGRESS NOTES
Daily Progress Note - Critical Care   Leonie Pedersen 21 y o  female MRN: [id number]  Unit/Bed#: MICU 01 Encounter: [id number]        ----------------------------------------------------------------------------------------  HPI/24hr events:  Family discussion yesterday, now level to, patient's family and caregivers from facility will come in this weekend and then likely transition to comfort care formally  ---------------------------------------------------------------------------------------  SUBJECTIVE  N/A    Review of Systems  Review of systems was unable to be performed secondary to Nonverbal status  ---------------------------------------------------------------------------------------  Assessment and Plan:  Neuro:   · 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/18  ? Plan:  Unclear etiology, possibly secondary to GI tract  ? Patient was status post 10 days of IV antibiotics with persistent positive cultures, status post VPS extravasation with tolerance of clamp trial, now without any VPS  ? Patient is completed 3 weeks of total IV antibiotics, 5 of which were postoperative shunt removal  ? Monitoring off antibiotics  ? MRI brain with dysmorphic cerebral hemispheres with known cerebral palsy, market degree of hydrocephalus, ventriculitis, cerebritis  · Diagnosis: Severe Developmental Delay  ? Plan:  Palliative care consult in following  · Diagnosis: Seizure D/o  ? Plan:  Continue Keppra, Trileptal, Flexeril, Valium  · Diagnosis: Low Lumbar midline incision cyst with drainage  ? Plan:   MRI largely nondiagnostic due to patient's hardware     CV:   · Diagnosis: Hypotension  ? Plan:  Unclear etiology, low suspicion for sepsis  ? Continue midodrine 10 t i d   ? Currently off Levophed  · Diagnosis: Bradycardia  ? Plan:   no indication for permanent pacemaker  · Diagnosis: S/p Cardiac Arrest  ? Plan:  Status post arrest from central line placement  ?  Unclear if secondary to respiratory insufficiency or secondary to guidewire insertion  ? Continue to monitor on tele     Pulm:  · Diagnosis: Acute combined respiratory failure with tracheostomy  ? Plan:  Patient has been difficult to wean from ventilator  ? Presented initially with respiratory distress, at baseline was on trach collar, trach changed to cuff trach at [facility]  ? Completed antibiotics for possible aspiration event  ? Now has apneic episodes on pressure support ventilation  ? Patient's family wanting to transition to level 4 family and caregivers have had a chance to see her  GI:   · Diagnosis: No acute issues  ? Plan: Bowel regimen:  Senokot 1 tablet, q h s  With p r n  MiraLax  ? Last bowel movement yesterday        :   No issues  Continue external urinary catheter        F/E/N:   Fluids:  No fluids needed  Electrolytes:  Electrolytes within normal limits, lab holiday tomorrow  Nutrition:  Continue tube feeds at goal        Heme/Onc:   · Diagnosis: Chronic Anemia  ? Plan:  At baseline  No signs or symptoms of bleeding        Endo:   · Diagnosis: History of DI  ? Plan:  Continue DDAVP        ID:   · Diagnosis: Serratia  shunt infection  ? Plan:  ID following, has completed 3 weeks of IV antibiotics, 5 which were after shunt removal  ? Monitor off antibiotics  · Diagnosis: Chest wall cellulitis  ? Plan:  Improved with daptomycin  · Diagnosis: Retroperitoneal infection of unclear origin  ? Plan:  Reportedly chronic, seen on imaging going to [place], unclear significance  ?  MRI largely nondiagnostic     MSK/Skin:   Frequent turning, offloading of pressure points wound care following    Disposition: Continue Critical Care   Code Status: Level 2 - DNAR: but accepts endotracheal intubation  ---------------------------------------------------------------------------------------  ICU CORE MEASURES    Prophylaxis   VTE Pharmacologic Prophylaxis: Heparin  VTE Mechanical Prophylaxis: sequential compression device  Stress Ulcer Prophylaxis: Famotidine PO    ABCDE Protocol (if indicated)  Plan to perform spontaneous awakening trial today? Not applicable  Plan to perform spontaneous breathing trial today? Not applicable  Obvious barriers to extubation? Not applicable  CAM-ICU: unable to access    Invasive Devices Review  Invasive Devices     Peripherally Inserted Central Catheter Line            PICC Line 82/10/17 Right Basilic 22 days          Drain            Gastrostomy/Enterostomy -- days    External Urinary Catheter 1 day          Airway            Surgical Airway Shiley Cuffed -- days    Surgical Airway Shiley Cuffed 8 days              Can any invasive devices be discontinued today? No  ---------------------------------------------------------------------------------------  OBJECTIVE    Vitals   Vitals:    20 2310 20 0000 20 0110 20 0448   BP: 116/78 117/77 127/84    BP Location:  Left arm     Pulse: 92 88 94    Resp: 17 18 (!) 23    Temp:  98 5 °F (36 9 °C)     TempSrc:  Oral     SpO2: 99% 100% 100% 100%   Weight:       Height:         Temp (24hrs), Av 3 °F (36 8 °C), Min:97 7 °F (36 5 °C), Max:99 1 °F (37 3 °C)  Current: Temperature: 98 5 °F (36 9 °C)    Respiratory:  SpO2: SpO2: 100 %, SpO2 Activity: SpO2 Activity: At Rest, SpO2 Device: O2 Device: Ventilator  O2 Flow Rate (L/min): 30 L/min    Invasive/non-invasive ventilation settings   Respiratory    Lab Data (Last 4 hours)    None         O2/Vent Data (Last 4 hours)       0448           Vent Mode AC/VC       Resp Rate (BPM) (BPM) 10       Vt (mL) (mL) 325       FIO2 (%) (%) 30       PEEP (cmH2O) (cmH2O) 5       MV 6                   Physical Exam   Constitutional:   Chronically ill-appearing, contracted, CP   HENT:   Well-healing scalp scar     Tongue protruded   Eyes:   Eyes spontaneously open, does not follow command, does not blink to threat   Neck:   Tracheostomy site clean, dry, intact   Cardiovascular: Normal rate and regular rhythm  Pulmonary/Chest: She has decreased breath sounds  Abdominal: Soft  She exhibits no distension  There is no tenderness  PEG tube site clean, dry, intact   Genitourinary:   Genitourinary Comments: Deferred   Musculoskeletal:   Contracted   Neurological:   Eyes open spontaneously, does not withdraw to pain, but does slightly grimace   Skin: Skin is warm  Capillary refill takes less than 2 seconds  Nursing note and vitals reviewed          Laboratory and Diagnostics:  Results from last 7 days   Lab Units 06/26/20 0440 06/25/20  0553 06/24/20 0440 06/23/20  0437 06/22/20  0524   WBC Thousand/uL 13 14* 9 88 9 09 9 10 10 33*   HEMOGLOBIN g/dL 9 2* 9 2* 8 9* 8 6* 9 4*   HEMATOCRIT % 30 3* 30 5* 29 3* 28 1* 31 5*   PLATELETS Thousands/uL 462* 397* 335 299 453*   NEUTROS PCT %  --   --   --   --  69   MONOS PCT %  --   --   --   --  6     Results from last 7 days   Lab Units 06/26/20 0440 06/25/20 0553 06/24/20 0440 06/23/20 0437 06/22/20  0524   SODIUM mmol/L 136 137 136 137 135*   POTASSIUM mmol/L 3 9 4 0 4 2 4 1 4 0   CHLORIDE mmol/L 104 104 102 103 101   CO2 mmol/L 25 25 27 29 26   ANION GAP mmol/L 7 8 7 5 8   BUN mg/dL 16 17 19 16 14   CREATININE mg/dL 0 40* 0 40* 0 39* 0 29* 0 34*   CALCIUM mg/dL 9 1 9 3 8 9 8 9 9 0   GLUCOSE RANDOM mg/dL 111 112 113 107 111     Results from last 7 days   Lab Units 06/26/20 0440 06/25/20 0553 06/23/20  0437   MAGNESIUM mg/dL 2 5 2 7* 2 3   PHOSPHORUS mg/dL 4 4 5 2* 4 2                   ABG:  Results from last 7 days   Lab Units 06/23/20  1427   PH ART  7 479*   PCO2 ART mm Hg 34 9*   PO2 ART mm Hg 172 2*   HCO3 ART mmol/L 25 3   BASE EXC ART mmol/L 1 9   ABG SOURCE  Radial, Right     VBG:  Results from last 7 days   Lab Units 06/25/20  2018  06/23/20  1427   PH MERI  7 403*   < >  --    PCO2 MERI mm Hg 38 9*   < >  --    PO2 MERI mm Hg 50 8*   < >  --    HCO3 MERI mmol/L 23 7*   < >  --    BASE EXC MERI mmol/L -0 9   < >  --    ABG SOURCE   --   --  Radial, Right    < > = values in this interval not displayed  Micro        EKG: N/A  Imaging:  I have personally reviewed pertinent films in PACS    Intake and Output  I/O       06/25 0701 - 06/26 0700 06/26 0701 - 06/27 0700    P  O  0 0    I V  (mL/kg) 123 5 (2 2)     NG/ 500    Feedings 1024 700    Total Intake(mL/kg) 1277 5 (22 6) 1200 (21 2)    Urine (mL/kg/hr) 800 (0 6) 1410 (1)    Emesis/NG output 0 0    Stool 0 0    Total Output 800 1410    Net +477 5 -210          Unmeasured Urine Occurrence 1 x 1 x    Unmeasured Stool Occurrence 1 x 1 x          Height and Weights   Height: 4' 2" (127 cm)  IBW: 22 5 kg  Body mass index is 35 03 kg/m²  Weight (last 2 days)     Date/Time   Weight    06/26/20 0541   56 5 (124 56)    06/25/20 0553   60 1 (132 5)                Nutrition       Diet Orders   (From admission, onward)             Start     Ordered    06/18/20 2126  Diet Enteral/Parenteral; Tube Feeding No Oral Diet; Non-Formulary; Cyclic; 50; 16 hours; Prosource Protein Liquid - Two Packets  Diet effective now     Comments:  Use peptamen 1 2 from home   Question Answer Comment   Diet Type Enteral/Parenteral    Enteral/Parenteral Tube Feeding No Oral Diet    Tube Feeding Formula: Non-Formulary    Bolus/Cyclic/Continuous Cyclic    Tube Feeding Cyclic Rate (mL/hr): 50    Tube Feeding Cyclic: Administer over: 16 hours    Prosource Protein Liquid - No Carb Prosource Protein Liquid - Two Packets    RD to adjust diet per protocol?  Yes        06/18/20 2127                  Active Medications  Scheduled Meds:  Current Facility-Administered Medications:  acetaminophen 650 mg Per G Tube Q4H PRN Cortney Bitorri, PA-C    bisacodyl 10 mg Rectal Daily Cortney Geena PA-HUGO    budesonide 0 5 mg Nebulization BID Cortney NATALIE Seay-HUGO    chlorhexidine 15 mL Swish & Spit Q12H [facility] Cortney Bitorri PA-C    cyclobenzaprine 5 mg Per G Tube TID PRN Cortney Bitorri PA-HUGO    desmopressin 0 05 mg Per G Tube Daily Cortney Geena, PA-C diazepam 5 mg Per G Tube Q8H CortneyNATALIE Trujillo-HUGO    glycerin-hypromellose- 1 drop Both Eyes Q3H PRN Cortney Seay PA-C    guaiFENesin 400 mg Oral Q8H CortneyNATALIE Mireles-HUGO    heparin (porcine) 5,000 Units Subcutaneous Q8H [address] Cortney NATALIE Seay-HUGO    levETIRAcetam 1,000 mg Per G Tube BID Cortney NATALIE Seay-HUGO    levETIRAcetam 500 mg Per G Tube Q24H Cortney Seay PA-C    midodrine 10 mg Oral TID AC Rita Veliz MD    norepinephrine 1-30 mcg/min Intravenous Titrated Rita Veliz MD Last Rate: Stopped (06/26/20 6136)   OXcarbazepine 450 mg Per G Tube Q12H [address] NATALIE Burnham-HUGO    polyethylene glycol 17 g Oral Daily PRN Chastity Tang MD    potassium-sodium phosphates 2 packet Oral 4x Daily (with meals and at bedtime) Cortney Seay PA-C    senna-docusate sodium 1 tablet Per G Tube HS Cortney Seay PA-C      Continuous Infusions:    norepinephrine 1-30 mcg/min Last Rate: Stopped (06/26/20 6281)     PRN Meds:     acetaminophen 650 mg Q4H PRN   cyclobenzaprine 5 mg TID PRN   glycerin-hypromellose- 1 drop Q3H PRN   polyethylene glycol 17 g Daily PRN       Allergies   Allergies   Allergen Reactions    Baclofen Other (See Comments)     Reaction Date: [id number];   apena      Cefepime Hives    Cephalosporins      Other reaction(s): Unknown Reaction  Other reaction(s): Unknown Reaction  Other reaction(s): Unknown Reaction      Clonidine      Other reaction(s): Respiratory Distress, Respiratory Distress    Lactase     Milk-Related Compounds     Penicillins Hives     Reaction Date: [id number];       Phenobarbital Other (See Comments)     Reaction Date: [id number]; Other reaction(s): Other (See Comments)  apnea  Other reaction(s): Other (See Comments)  apnea  apnea      Vancomycin     Zolpidem Other (See Comments)     Other reaction(s):  Other (see comments)  Reaction Date: [id number];   apnea  apnea  apnea      Latex Rash ---------------------------------------------------------------------------------------  Advance Directive and Living Will:      Power of :    POLST:    ---------------------------------------------------------------------------------------  Care Time Delivered:   No Critical Care time spent     Travis Cardenas PA-C      Portions of the record may have been created with voice recognition software  Occasional wrong word or "sound a like" substitutions may have occurred due to the inherent limitations of voice recognition software    Read the chart carefully and recognize, using context, where substitutions have occurred

## 2020-06-27 NOTE — RESPIRATORY THERAPY NOTE
RT Ventilator Management Note  Yancy Favre 21 y o  female MRN: [id number]  Unit/Bed#: MICU 01 Encounter: [id number]      Daily Screen       6/25/2020  0720 6/26/2020  0718          Patient safety screen outcome[de-identified]  Passed  Passed      Not Ready for Weaning due to[de-identified]  Underline problem not resolved                Physical Exam:   Assessment Type: Assess only  General Appearance: Sleeping  Respiratory Pattern: Assisted  Chest Assessment: Chest expansion symmetrical  Bilateral Breath Sounds: Rhonchi  Cough: Productive  Suction: Trach      Resp Comments: (P) Pt placed back to A/C VC mode due to apnea episodes  Pt remains comfortable and stable on current vent settings

## 2020-06-28 LAB — GLUCOSE SERPL-MCNC: 106 MG/DL (ref 65–140)

## 2020-06-28 PROCEDURE — 99233 SBSQ HOSP IP/OBS HIGH 50: CPT | Performed by: PHYSICIAN ASSISTANT

## 2020-06-28 PROCEDURE — 94760 N-INVAS EAR/PLS OXIMETRY 1: CPT

## 2020-06-28 PROCEDURE — 94640 AIRWAY INHALATION TREATMENT: CPT | Performed by: SOCIAL WORKER

## 2020-06-28 PROCEDURE — 82948 REAGENT STRIP/BLOOD GLUCOSE: CPT

## 2020-06-28 PROCEDURE — 94003 VENT MGMT INPAT SUBQ DAY: CPT

## 2020-06-28 PROCEDURE — 94760 N-INVAS EAR/PLS OXIMETRY 1: CPT | Performed by: SOCIAL WORKER

## 2020-06-28 RX ORDER — LORAZEPAM 2 MG/ML
0.5 INJECTION INTRAMUSCULAR EVERY 4 HOURS PRN
Status: DISCONTINUED | OUTPATIENT
Start: 2020-06-28 | End: 2020-06-30 | Stop reason: HOSPADM

## 2020-06-28 RX ORDER — GLYCOPYRROLATE 0.2 MG/ML
0.1 INJECTION INTRAMUSCULAR; INTRAVENOUS EVERY 4 HOURS PRN
Status: DISCONTINUED | OUTPATIENT
Start: 2020-06-28 | End: 2020-06-30 | Stop reason: HOSPADM

## 2020-06-28 RX ADMIN — LEVETIRACETAM 500 MG: 100 SOLUTION ORAL at 21:43

## 2020-06-28 RX ADMIN — DESMOPRESSIN ACETATE 0.05 MG: 0.1 TABLET ORAL at 08:20

## 2020-06-28 RX ADMIN — OXCARBAZEPINE 450 MG: 300 TABLET, FILM COATED ORAL at 08:18

## 2020-06-28 RX ADMIN — BUDESONIDE 0.5 MG: 0.5 INHALANT RESPIRATORY (INHALATION) at 07:26

## 2020-06-28 RX ADMIN — GUAIFENESIN 400 MG: 100 SOLUTION ORAL at 10:15

## 2020-06-28 RX ADMIN — DIAZEPAM 5 MG: 5 TABLET ORAL at 01:30

## 2020-06-28 RX ADMIN — Medication 0.5 MG/HR: at 16:27

## 2020-06-28 RX ADMIN — LEVETIRACETAM 1000 MG: 100 SOLUTION ORAL at 06:17

## 2020-06-28 RX ADMIN — CHLORHEXIDINE GLUCONATE 0.12% ORAL RINSE 15 ML: 1.2 LIQUID ORAL at 08:18

## 2020-06-28 RX ADMIN — GLYCOPYRROLATE 0.1 MG: 0.2 INJECTION, SOLUTION INTRAMUSCULAR; INTRAVENOUS at 15:05

## 2020-06-28 RX ADMIN — POTASSIUM & SODIUM PHOSPHATES POWDER PACK 280-160-250 MG 2 PACKET: 280-160-250 PACK at 06:31

## 2020-06-28 RX ADMIN — GUAIFENESIN 400 MG: 100 SOLUTION ORAL at 01:29

## 2020-06-28 RX ADMIN — DIAZEPAM 5 MG: 5 TABLET ORAL at 18:30

## 2020-06-28 RX ADMIN — MIDODRINE HYDROCHLORIDE 10 MG: 5 TABLET ORAL at 06:17

## 2020-06-28 RX ADMIN — MIDODRINE HYDROCHLORIDE 10 MG: 5 TABLET ORAL at 11:33

## 2020-06-28 RX ADMIN — MORPHINE SULFATE 2 MG: 2 INJECTION, SOLUTION INTRAMUSCULAR; INTRAVENOUS at 15:05

## 2020-06-28 RX ADMIN — POTASSIUM & SODIUM PHOSPHATES POWDER PACK 280-160-250 MG 2 PACKET: 280-160-250 PACK at 11:33

## 2020-06-28 RX ADMIN — DIAZEPAM 5 MG: 5 TABLET ORAL at 10:15

## 2020-06-28 RX ADMIN — OXCARBAZEPINE 450 MG: 300 TABLET, FILM COATED ORAL at 17:14

## 2020-06-28 RX ADMIN — LEVETIRACETAM 1000 MG: 100 SOLUTION ORAL at 17:14

## 2020-06-28 NOTE — DISCHARGE SUMMARY
Discharge Summary - Valeria Boyd 21 y o  female MRN: [id number]    [facility] Encounter: [id number] PCP: Ping Lopez MD    Admission Date:   Admission Orders (From admission, onward)     Ordered        06/03/20 1444  Inpatient Admission  Once         06/03/20 1444  Inpatient Admission  Once                     Admitting Diagnosis: Status epilepticus ([address] ) [G40 901]    HPI:   Ms Valeria Boyd was an unfortunate 21year old female with a PMH of  Cerebral palsy with spastic quadraplegia, profound developmental delay (non-verbal, bed and wheelchair bound at baseline), hydrocephalous with  shunt, chronic respiratory failure s/p tracheostomy, asthma, GERD, nephrolithiasis, seizure disorder, and chronic central diabetes insipidus that presented to [facility] on 6/1 from her longterm care facility on 06/01 with a fever of 101 5, heart rate of 169, respiratory rate of 82 and respiratory distress  At baseline, she does have a tracheostomy, but is on trach collar oxygen  She had her tracheostomy changed for cuff trach and was ventilated  She had an attempt at a internal jugular central venous catheter which caused a VFib arrest, ROSC was achieved with CPR, shock x1, epinephrine x1  Following EKG had ST elevations in V3, AVF which are more pronounced than prior  She was noted to have left facial and left lower extremity twitching shortly after concerning for seizure with possible differential of benzodiazepine withdrawal verses anoxic encephalopathy  She was loaded with Keppra and Trileptal per Neurology  She continued having fevers of 104, CT scan showed collections in her left retroperitoneum longer iliac crest and paraspinal vertebrae   shunt series with lucency and possible infection adjacent to the right SI joint iliac bone  She has several attempts said LP with concern for meningitis given  shunt communicating with pelvic infections    LP unfortunately was unsuccessful both at bedside and under fluoroscopy  She was started on daptomycin and meropenem as she has allergies to vancomycin cefepime  She was transferred to [facility] on 06/03 for ongoing management and possible externalization of her  shunt  Procedures Performed:   6/3: LP/CSF Cx  6/13:  shunt removal    Summary of Hospital Course:   Patient was admitted and started on video EEG  She had a successful lumbar puncture on 06/03  CSF cultures were negative initially but then returned Serratia  Retroperitoneal collection was found to be chronic  Repeat cultures sent from  shunt growing Serratia the patient was continued on meropenem  Neurosurgery was consulted, however patient was initially responding to antibiotics, therefore patient had shunt re-cultured on 06/10  Unfortunately, these return positive despite 10 days of antibiotics, therefore neurosurgery externalized patient's  shunt on 06/13  After externalization trial, patient had shunt clamped, she did not have significant hydrocephalus above her baseline, therefore patient had EVD removed and shunt was not replaced  Patient received a total of 3 weeks of IV antibiotics, 5 days of which were postoperative from shunt removal   Unfortunately, patient's mental status continued to be poor  She would have significant periods of apnea on the ventilator requiring AC ventilation, new significant bradycardia, she was much less responsive than her baseline  She also remained on low-dose Levophed  She had an MRI on 6-24 to look for any additional pathology that could be causing her depressed mental status  This showed possible new cerebritis but id suspected this was due to recent infection shunt removal and that there was no evidence for ongoing CNS infection  Palliative care was consulted for family meeting with patient's guardian who is her mother    After family meeting on 6-25, plan was for additional family members to visit and transition to comfort measures only  ***      Significant Findings, Care, Treatment and Services Provided:   6/3: LP/CSF Cx: Serratia  6/10: CSF Cx: Serratia   6/13: CSF Cx: No growth  6/17: CSF Cx: No growth    Images:  MRI lumbar spine wo contrast   Final Result by Ck Acharya MD (06/26 1351)      Nondiagnostic examination for lumbar spine assessment      No evidence of abscess or fluid collection in the paraspinal soft tissues      Workstation performed: [id number]         MRI thoracic spine wo contrast   Final Result by Ck Acharya MD (06/26 1279)      Nondiagnostic examination obscured by metallic artifact from extensive spinal stabilization hardware             Workstation performed: [id number]         MRI brain seizure wo and w contrast   Final Result by Ck Acharya MD (06/26 5882)   Grossly dysmorphic cerebral hemispheres consistent with cerebral palsy  Marked degree of hydrocephalus status post shunt removal   Development of diffuse enhancement of the ependyma throughout the 3rd  and lateral ventricles consistent with acute    ventriculitis  Enhancement of the arachnoid overlying the convexities suspicious for cerebritis  Findings personally discussed by phone with Dr Jesse Alcazar at 8:10 AM, 6/26/2020            Workstation performed: [id number]         XR chest portable   Final Result by Woo Dee MD (06/23 0809)      No acute cardiopulmonary disease  Workstation performed: [id number]         CT head wo contrast   Final Result by Carla Dahl DO (06/22 6598)      Essentially stable ventriculomegaly with extensive porencephaly/encephalomalacia bilaterally  Patient has undergone interval right transverse frontal shunt catheter removal                   Workstation performed: [id number]         CT chest abdomen pelvis w contrast   Final Result by Lydia Hare MD (06/22 0027)      Bibasilar subsegmental atelectasis  Small right pleural effusion        Small amount of gas and air within the tract of the previous ventriculoperitoneal shunt tubing in the upper chest       Mild left renal atrophy  Workstation performed: [id number]         CT head wo contrast   Final Result by Kristi Rees MD (06/17 4589)      No acute intracranial abnormality  Stable right frontal ventriculostomy shunt catheter position and degree of ventriculomegaly when compared to the prior exam   Redemonstrated bilateral cerebral gliosis and encephalomalacia  Overall, no significant interval change when compared to a CT brain dated June 16, 2020  Workstation performed: [id number]         CT head wo contrast   Final Result by Suzy Benitez MD (06/16 7886)      Degree of ventriculomegaly when comparing to 6/14/2020 appears roughly unchanged  Ventricular shunt catheter is also unchanged in position  Otherwise no significant changes from 6/14/2020  Workstation performed: [id number]         XR chest portable   Final Result by Suzy Benitez MD (06/16 7425)      No focal consolidation, pleural effusion, or pneumothorax  Workstation performed: [id number]         CT head wo contrast   Final Result by Najma Cardoza MD (06/15 0805)      Stable ventricular shunt catheter position and degree of ventriculomegaly when comparing to the prior study of June 1, 2020  Redemonstrated bilateral cerebral gliosis and encephalomalacia  Interval development of fluid in the right mastoid air cells and inflammatory mucosal thickening in the right sphenoid sinus with scant sphenoid sinus frothy secretions  Correlate for mastoiditis and sinusitis  I concur with the preliminary interpretation reported by the [facility] radiologist   Additional findings include inflammatory paranasal sinus disease  The study was marked in [facility] for immediate notification                    Workstation performed: [id number]         XR chest portable ICU   Final Result by Sara Pace, MD ( 1502)      Minor right basilar atelectasis  Workstation performed: [id number]         XR skull < 4 vw   Final Result by Auburn Alpers, MD ( 946)      Baseline pressure setting is obtained  Workstation performed: [id number]         CT guided perc drainage catheter placeme   Final Result by Bing Brock MD ( 096)   Impression: Successful CT-guided aspiration left iliac crest collection           Workstation performed: [id number]             Complications: None    Disposition:      Final Diagnosis:   Principal Problem:    Seizure-like activity ([address] )  Active Problems:    Abnormal LFTs    Acute metabolic encephalopathy    Cerebral palsy (HCC)    Cardiac arrest (HCC)    Severe sepsis with acute organ dysfunction due to Serratia species ([facility] [address] )    S/P  shunt    Meningitis      Resolved Problems  Date Reviewed: 2020          Resolved    Bradycardia 2020     Resolved by  Malia Roa PA-C          Condition at Time of Death: ***        Death Note:    No notes on file

## 2020-06-28 NOTE — RESPIRATORY THERAPY NOTE
resp care      06/28/20 1522   Respiratory Protocol   Respiratory Plan Discontinue Protocol   Respiratory Assessment   Resp Comments Pt made comfort care level 4  Pt placed on RMA trach collar per adv  prac  order  Cuff deflated  Vent and protocol d/c'd

## 2020-06-28 NOTE — PLAN OF CARE

## 2020-06-28 NOTE — RESPIRATORY THERAPY NOTE
RT Ventilator Management Note  Samuel Baird 21 y o  female MRN: [id number]  Unit/Bed#: [facility] 01 Encounter: [id number]      Daily Screen       6/25/2020  0720 6/26/2020  0718          Patient safety screen outcome[de-identified]  Passed  Passed      Not Ready for Weaning due to[de-identified]  Underline problem not resolved                Physical Exam:   Assessment Type: (P) Assess only  General Appearance: (P) Sleeping  Respiratory Pattern: (P) Assisted  Chest Assessment: (P) Chest expansion symmetrical  Bilateral Breath Sounds: (P) Diminished, Clear  O2 Device: (P) vent      Resp Comments: (P) No changes made overnight to the pts AC vent settings  Will cont to monitor pt  No events to report  Will cont to monitor pt per protocol

## 2020-06-28 NOTE — RESPIRATORY THERAPY NOTE
RT Ventilator Management Note  Shima Carey 21 y o  female MRN: [id number]  Unit/Bed#: [facility] 01 Encounter: [id number]      Daily Screen       6/26/2020  0718 6/28/2020  0750          Patient safety screen outcome[de-identified]  Passed  Failed  (Pended)               Physical Exam:   Assessment Type: Assess only  General Appearance: Sleeping  Respiratory Pattern: Assisted  Chest Assessment: Chest expansion symmetrical  Bilateral Breath Sounds: Diminished, Clear  O2 Device: vent      Resp Comments: pt to be made comfort care today  trach collar/wean not started

## 2020-06-28 NOTE — ACP (ADVANCE CARE PLANNING)
Called to bedside by patient's mother, Stacy Childers  She is requesting level 4 comfort care and discontinuation of mechanical ventilation  I explained that we will discontinue the ventilator, place the patient on humidified air, and give medication (dilaudid) to help with shortness of breath and air hunger  I also discussed that I would continue her seizure medications but discontinue all other medications  Gerhardt Canny and patient's father at bedside for conversation and agree to plan  Our deepest condolences were passed on to them both      Yordan Simmons PA-C

## 2020-06-29 ENCOUNTER — TELEPHONE (OUTPATIENT)
Dept: NEUROSURGERY | Facility: CLINIC | Age: 25
End: 2020-06-29

## 2020-06-29 PROCEDURE — NC001 PR NO CHARGE: Performed by: NURSE PRACTITIONER

## 2020-06-29 PROCEDURE — 99233 SBSQ HOSP IP/OBS HIGH 50: CPT | Performed by: PHYSICIAN ASSISTANT

## 2020-06-29 PROCEDURE — 94760 N-INVAS EAR/PLS OXIMETRY 1: CPT | Performed by: SOCIAL WORKER

## 2020-06-29 RX ADMIN — OXCARBAZEPINE 450 MG: 300 TABLET, FILM COATED ORAL at 05:37

## 2020-06-29 RX ADMIN — LEVETIRACETAM 1000 MG: 100 SOLUTION ORAL at 05:37

## 2020-06-29 RX ADMIN — DIAZEPAM 5 MG: 5 TABLET ORAL at 17:49

## 2020-06-29 RX ADMIN — DIAZEPAM 5 MG: 5 TABLET ORAL at 02:55

## 2020-06-29 RX ADMIN — DESMOPRESSIN ACETATE 0.05 MG: 0.1 TABLET ORAL at 10:17

## 2020-06-29 RX ADMIN — DIAZEPAM 5 MG: 5 TABLET ORAL at 10:17

## 2020-06-29 RX ADMIN — LEVETIRACETAM 1000 MG: 100 SOLUTION ORAL at 13:51

## 2020-06-29 RX ADMIN — LEVETIRACETAM 500 MG: 100 SOLUTION ORAL at 23:40

## 2020-06-29 RX ADMIN — MORPHINE SULFATE 2 MG: 2 INJECTION, SOLUTION INTRAMUSCULAR; INTRAVENOUS at 14:00

## 2020-06-29 RX ADMIN — OXCARBAZEPINE 450 MG: 300 TABLET, FILM COATED ORAL at 17:49

## 2020-06-29 NOTE — PLAN OF CARE
Problem: PAIN - ADULT  Goal: Verbalizes/displays adequate comfort level or baseline comfort level  Description  Interventions:  - Encourage patient to monitor pain and request assistance  - Assess pain using appropriate pain scale  - Administer analgesics based on type and severity of pain and evaluate response  - Implement non-pharmacological measures as appropriate and evaluate response  - Consider cultural and social influences on pain and pain management  - Notify physician/advanced practitioner if interventions unsuccessful or patient reports new pain  Outcome: Adequate for Discharge     Problem: INFECTION - ADULT  Goal: Absence or prevention of progression during hospitalization  Description  INTERVENTIONS:  - Assess and monitor for signs and symptoms of infection  - Monitor lab/diagnostic results  - Monitor all insertion sites, i e  indwelling lines, tubes, and drains  - Monitor endotracheal if appropriate and nasal secretions for changes in amount and color  - [place] appropriate cooling/warming therapies per order  - Administer medications as ordered  - Instruct and encourage patient and family to use good hand hygiene technique  - Identify and instruct in appropriate isolation precautions for identified infection/condition  Outcome: Adequate for Discharge     Problem: SAFETY ADULT  Goal: Maintain or return mobility status to optimal level  Description  INTERVENTIONS:  - Assess patient's baseline mobility status (ambulation, transfers, stairs, etc )    - Identify cognitive and physical deficits and behaviors that affect mobility  - Identify mobility aids required to assist with transfers and/or ambulation (gait belt, sit-to-stand, lift, walker, cane, etc )  - [place] fall precautions as indicated by assessment  - Record patient progress and toleration of activity level on Mobility SBAR; progress patient to next Phase/Stage  - Instruct patient to call for assistance with activity based on assessment  - Consider rehabilitation consult to assist with strengthening/weightbearing, etc   Outcome: Adequate for Discharge     Problem: DISCHARGE PLANNING  Goal: Discharge to home or other facility with appropriate resources  Description  INTERVENTIONS:  - Identify barriers to discharge w/patient and caregiver  - Arrange for needed discharge resources and transportation as appropriate  - Identify discharge learning needs (meds, wound care, etc )  - Arrange for interpretive services to assist at discharge as needed  - Refer to Case Management Department for coordinating discharge planning if the patient needs post-hospital services based on physician/advanced practitioner order or complex needs related to functional status, cognitive ability, or social support system  Outcome: Adequate for Discharge     Problem: Knowledge Deficit  Goal: Patient/family/caregiver demonstrates understanding of disease process, treatment plan, medications, and discharge instructions  Description  Complete learning assessment and assess knowledge base    Interventions:  - Provide teaching at level of understanding  - Provide teaching via preferred learning methods  Outcome: Adequate for Discharge     Problem: Prexisting or High Potential for Compromised Skin Integrity  Goal: Skin integrity is maintained or improved  Description  INTERVENTIONS:  - Identify patients at risk for skin breakdown  - Assess and monitor skin integrity  - Assess and monitor nutrition and hydration status  - Monitor labs   - Assess for incontinence   - Turn and reposition patient  - Assist with mobility/ambulation  - Relieve pressure over bony prominences  - Avoid friction and shearing  - Provide appropriate hygiene as needed including keeping skin clean and dry  - Evaluate need for skin moisturizer/barrier cream  - Collaborate with interdisciplinary team   - Patient/family teaching  - Consider wound care consult   Outcome: Adequate for Discharge     Problem: Potential for Falls  Goal: Patient will remain free of falls  Description  INTERVENTIONS:  - Assess patient frequently for physical needs  -  Identify cognitive and physical deficits and behaviors that affect risk of falls  -  [place] fall precautions as indicated by assessment   - Educate patient/family on patient safety including physical limitations  - Instruct patient to call for assistance with activity based on assessment  - Modify environment to reduce risk of injury  - Consider OT/PT consult to assist with strengthening/mobility  Outcome: Adequate for Discharge     Problem: Nutrition/Hydration-ADULT  Goal: Nutrient/Hydration intake appropriate for improving, restoring or maintaining nutritional needs  Description  Monitor and assess patient's nutrition/hydration status for malnutrition  Collaborate with interdisciplinary team and initiate plan and interventions as ordered  Monitor patient's weight and dietary intake as ordered or per policy  Utilize nutrition screening tool and intervene as necessary  Determine patient's food preferences and provide high-protein, high-caloric foods as appropriate       INTERVENTIONS:  - Monitor oral intake, urinary output, labs, and treatment plans  - Assess nutrition and hydration status and recommend course of action  - Evaluate amount of meals eaten  - Assist patient with eating if necessary   - Allow adequate time for meals  - Recommend/ encourage appropriate diets, oral nutritional supplements, and vitamin/mineral supplements  - Order, calculate, and assess calorie counts as needed  - Recommend, monitor, and adjust tube feedings and TPN/PPN based on assessed needs  - Assess need for intravenous fluids  - Provide specific nutrition/hydration education as appropriate  - Include patient/family/caregiver in decisions related to nutrition  Outcome: Adequate for Discharge     Problem: NEUROSENSORY - ADULT  Goal: Achieves stable or improved neurological status  Description  INTERVENTIONS  - Monitor and report changes in neurological status  - Monitor vital signs such as temperature, blood pressure, glucose, and any other labs ordered   - Initiate measures to prevent increased intracranial pressure  - Monitor for seizure activity and implement precautions if appropriate      Outcome: Adequate for Discharge  Goal: Remains free of injury related to seizures activity  Description  INTERVENTIONS  - Maintain airway, patient safety  and administer oxygen as ordered  - Monitor patient for seizure activity, document and report duration and description of seizure to physician/advanced practitioner  - If seizure occurs,  ensure patient safety during seizure  - Reorient patient post seizure  - Seizure pads on all 4 side rails  - Instruct patient/family to notify RN of any seizure activity including if an aura is experienced  - Instruct patient/family to call for assistance with activity based on nursing assessment  - Administer anti-seizure medications if ordered    Outcome: Adequate for Discharge     Problem: RESPIRATORY - ADULT  Goal: Achieves optimal ventilation and oxygenation  Description  INTERVENTIONS:  - Assess for changes in respiratory status  - Assess for changes in mentation and behavior  - Position to facilitate oxygenation and minimize respiratory effort  - Oxygen administered by appropriate delivery if ordered  - Initiate smoking cessation education as indicated  - Encourage broncho-pulmonary hygiene including cough, deep breathe, Incentive Spirometry  - Assess the need for suctioning and aspirate as needed  - Assess and instruct to report SOB or any respiratory difficulty  - Respiratory Therapy support as indicated  Outcome: Adequate for Discharge     Problem: GASTROINTESTINAL - ADULT  Goal: Maintains adequate nutritional intake  Description  INTERVENTIONS:  - Monitor percentage of each meal consumed  - Identify factors contributing to decreased intake, treat as appropriate  - Assist with meals as needed  - Monitor I&O, weight, and lab values if indicated  - Obtain nutrition services referral as needed  Outcome: Adequate for Discharge     Problem: SKIN/TISSUE INTEGRITY - ADULT  Goal: Skin integrity remains intact  Description  INTERVENTIONS  - Identify patients at risk for skin breakdown  - Assess and monitor skin integrity  - Assess and monitor nutrition and hydration status  - Monitor labs (i e  albumin)  - Assess for incontinence   - Turn and reposition patient  - Assist with mobility/ambulation  - Relieve pressure over bony prominences  - Avoid friction and shearing  - Provide appropriate hygiene as needed including keeping skin clean and dry  - Evaluate need for skin moisturizer/barrier cream  - Collaborate with interdisciplinary team (i e  Nutrition, Rehabilitation, etc )   - Patient/family teaching  Outcome: Adequate for Discharge  Patient continues with Dilaudid gtt  PRN morphine  Appears comfortable  Family at the bedside

## 2020-06-29 NOTE — PROGRESS NOTES
Daily Progress Note - Critical Care   Natividad Mcdonald 21 y o  female MRN: [id number]  Unit/Bed#: MICU 01 Encounter: [id number]        ----------------------------------------------------------------------------------------  HPI/24hr events: Pt transitioned to Level 4 code status at 1500 yesterday  Started on dialudid infusion with morphine PRN  Has only used one PRN dose in the last 24 hrs     ---------------------------------------------------------------------------------------  SUBJECTIVE  Unable to assess due to MS    Review of Systems   Unable to perform ROS: Patient nonverbal     ---------------------------------------------------------------------------------------  Assessment and Plan:  Principal Problem:    Seizure-like activity ([facility] )  Active Problems:    Abnormal LFTs    Acute metabolic encephalopathy    Cerebral palsy ([facility] )    Cardiac arrest ([facility] )    Severe sepsis with acute organ dysfunction due to Serratia species ([facility] )    S/P  shunt    Meningitis      Neuro:    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/18, unclear etiology ? Secondary to GI tract  o Plan: S/P 10 days of abx with persistent (+) cultures s/p VPS extravasation with tolerance of clamp trial, now w/o any VPS  o Transitioned to Level 4 code status on 6/28  - Continue dilaudid infusion with PRN morphine, pt has only required 1 dose of PRN medication in the last 24 hrs     Diagnosis: Hx seizures, severe developmental delay, lower lumbar midline incision cyst with drainage  o Plan: Continue keppra, trileptal, and valium      CV:    Diagnosis: Hypotension, Bradycardia, S/P Vfib cardiac arrest  o Midodrine stopped yesterday, Level 4 code status      Pulm:   Diagnosis: Acute combined respiratory failure with tracheostomy  o Plan: Transitioned to level 4 code status yesterday and taken off ventilator  o Continue with TC      GI:    Diagnosis: No acute issues, LBM 6/27      :    Diagnosis: No acute issues        Heme/Onc:  Diagnosis: Chronic anemia  o Plan: No longer checking labs      Endo:    Diagnosis: Hx of DI  o Plan: Continue DDAVP      ID:    Diagnosis: Serratia  shunt infection  o Plan: Appreciate ID input, has completed 3 weeks of IV Abx   Diagnosis: Chest wall cellulitis: improved   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      MSK/Skin:   o Continue repositioning for comfort      Disposition: Transfer to Stepdown Level 1   Code Status: Level 4 - Comfort Care  ---------------------------------------------------------------------------------------  ICU CORE MEASURES    Prophylaxis   VTE Pharmacologic Prophylaxis: Pharmacologic VTE Prophylaxis contraindicated due to Level 4 code status  VTE Mechanical Prophylaxis: reason for no mechanical VTE prophylaxis Level 4 code status  Stress Ulcer Prophylaxis: Prophylaxis Not Indicated         Invasive Devices Review  Invasive Devices     Peripherally Inserted Central Catheter Line            PICC Line 38/46/79 Right Basilic 24 days          Drain            Gastrostomy/Enterostomy -- days    External Urinary Catheter 3 days          Airway            Surgical Airway Shiley Cuffed -- days    Surgical Airway Shiley Cuffed 10 days              Can any invasive devices be discontinued today? Not applicable  ---------------------------------------------------------------------------------------  OBJECTIVE    Vitals   Vitals:    20 0800 20 1145 20 1230 20 1625   BP:       BP Location:       Pulse:   88 88   Resp:   12 (!) 58   Temp: 98 1 °F (36 7 °C)  98 °F (36 7 °C)    TempSrc: Oral  Oral    SpO2:  100% 100% 98%   Weight:       Height:         Temp (24hrs), Av 1 °F (36 7 °C), Min:98 °F (36 7 °C), Max:98 1 °F (36 7 °C)  Current: Temperature: 98 °F (36 7 °C)  HR: 72    Respiratory:  SpO2: SpO2: 98 %  O2 Flow Rate (L/min): 30 L/min    Physical Exam   Constitutional: She appears well-nourished  No distress     HENT:   Head: Normocephalic and atraumatic  Neck: Normal range of motion  Neck supple  (+)Trach with TC   Cardiovascular: Normal heart sounds and intact distal pulses  Exam reveals no gallop and no friction rub  No murmur heard  Pulmonary/Chest: No respiratory distress  On TC, LS in upper fields cta, shallow respirations    Abdominal: Soft  Bowel sounds are normal  She exhibits no distension  There is no tenderness  Musculoskeletal: She exhibits no edema  Neurological:   Sleeping comfortably with no sign of pain or discomfort   Skin: She is not diaphoretic  Laboratory and Diagnostics:  Results from last 7 days   Lab Units 06/26/20  0440 06/25/20  0553 06/24/20  0440 06/23/20  0437   WBC Thousand/uL 13 14* 9 88 9 09 9 10   HEMOGLOBIN g/dL 9 2* 9 2* 8 9* 8 6*   HEMATOCRIT % 30 3* 30 5* 29 3* 28 1*   PLATELETS Thousands/uL 462* 397* 335 299     Results from last 7 days   Lab Units 06/26/20 0440 06/25/20  0553 06/24/20  0440 06/23/20  0437   SODIUM mmol/L 136 137 136 137   POTASSIUM mmol/L 3 9 4 0 4 2 4 1   CHLORIDE mmol/L 104 104 102 103   CO2 mmol/L 25 25 27 29   ANION GAP mmol/L 7 8 7 5   BUN mg/dL 16 17 19 16   CREATININE mg/dL 0 40* 0 40* 0 39* 0 29*   CALCIUM mg/dL 9 1 9 3 8 9 8 9   GLUCOSE RANDOM mg/dL 111 112 113 107     Results from last 7 days   Lab Units 06/26/20 0440 06/25/20  0553 06/23/20  0437   MAGNESIUM mg/dL 2 5 2 7* 2 3   PHOSPHORUS mg/dL 4 4 5 2* 4 2                   ABG:  Results from last 7 days   Lab Units 06/23/20  1427   PH ART  7 479*   PCO2 ART mm Hg 34 9*   PO2 ART mm Hg 172 2*   HCO3 ART mmol/L 25 3   BASE EXC ART mmol/L 1 9   ABG SOURCE  Radial, Right     VBG:  Results from last 7 days   Lab Units 06/25/20  2018  06/23/20  1427   PH MERI  7 403*   < >  --    PCO2 MERI mm Hg 38 9*   < >  --    PO2 MERI mm Hg 50 8*   < >  --    HCO3 MERI mmol/L 23 7*   < >  --    BASE EXC MERI mmol/L -0 9   < >  --    ABG SOURCE   --   --  Radial, Right    < > = values in this interval not displayed  Micro        Imaging: None new in last 24 hrs    Intake and Output  I/O       06/27 0701 - 06/28 0700 06/28 0701 - 06/29 0700    I V  (mL/kg)  5 3 (0 1)    NG/GT 90 140    Feedings 740     Total Intake(mL/kg) 830 (14 7) 145 3 (2 6)    Urine (mL/kg/hr) 725 (0 5) 500 (0 4)    Stool 0     Total Output 725 500    Net +105 -354 7          Unmeasured Urine Occurrence 4 x 1 x    Unmeasured Stool Occurrence 1 x            Height and Weights   Height: 4' 2" (127 cm)  IBW: 22 5 kg  Body mass index is 35 03 kg/m²  Weight (last 2 days)     None            Nutrition       Diet Orders   (From admission, onward)             Start     Ordered    06/28/20 1456  Diet NPO  Diet effective now     Question Answer Comment   Diet Type NPO    RD to adjust diet per protocol?  No        06/28/20 1456                  Active Medications  Scheduled Meds:  Current Facility-Administered Medications:  desmopressin 0 05 mg Per G Tube Daily NATALIE Burnham-HUGO    diazepam 5 mg Per G Tube Q8H Cortney Geena PA-C    glycopyrrolate 0 1 mg Intravenous Q4H PRN Jez Oats, PA-C    HYDROmorphone 0 5 mg/hr Intravenous Continuous Jez Oats, PA-C Last Rate: 0 5 mg/hr (06/28/20 1627)   levETIRAcetam 1,000 mg Per G Tube BID Cortney Bitorri, PA-C    levETIRAcetam 500 mg Per G Tube Q24H Cortney Geena, PA-C    LORazepam 0 5 mg Intravenous Q4H PRN Jez Oats, PA-C    morphine injection 2 mg Intravenous Q10 Min PRN Jez Oats, PA-C    OXcarbazepine 450 mg Per G Tube Q12H [facility] assisted Cortney Geena PA-C      Continuous Infusions:    HYDROmorphone 0 5 mg/hr Last Rate: 0 5 mg/hr (06/28/20 1627)     PRN Meds:     glycopyrrolate 0 1 mg Q4H PRN   LORazepam 0 5 mg Q4H PRN   morphine injection 2 mg Q10 Min PRN       Allergies   Allergies   Allergen Reactions    Baclofen Other (See Comments)     Reaction Date: [id number];   apena      Cefepime Hives    Cephalosporins      Other reaction(s): Unknown Reaction  Other reaction(s): Unknown Reaction  Other reaction(s): Unknown Reaction      Clonidine      Other reaction(s): Respiratory Distress, Respiratory Distress    Lactase     Milk-Related Compounds     Penicillins Hives     Reaction Date: [id number];       Phenobarbital Other (See Comments)     Reaction Date: [id number]; Other reaction(s): Other (See Comments)  apnea  Other reaction(s): Other (See Comments)  apnea  apnea      Vancomycin     Zolpidem Other (See Comments)     Other reaction(s): Other (see comments)  Reaction Date: [id number];   apnea  apnea  apnea      Latex Rash     ---------------------------------------------------------------------------------------  Advance Directive and Living Will:      Power of :    POLST:    ---------------------------------------------------------------------------------------  Care Time Delivered:   No Critical Care time spent     [facility]      Portions of the record may have been created with voice recognition software  Occasional wrong word or "sound a like" substitutions may have occurred due to the inherent limitations of voice recognition software    Read the chart carefully and recognize, using context, where substitutions have occurred

## 2020-06-29 NOTE — ASSESSMENT & PLAN NOTE
· Patient may level 4 on 06/28/2020 at 1500 at that time she was disconnected from the ventilator and placed on trach collar  · Appears comfortable on Dilaudid infusion morphine p r n  · Continuing home Keppra and DDAVP for comfort, patient with history of seizures and DI  · Case management following from inpatient hospice placement  Currently referrals out to [facility], after not able to obtain the will go to [facility] inpatient

## 2020-06-29 NOTE — UTILIZATION REVIEW
Continued Stay Review    Date: 6/28/20                         Current Patient Class: Inpatient Current Level of Care: Critical Care    HPI:24 y o  female initially admitted on 6-3-20  for seizure like activity, sepsis,  Serratia  shunt infection   6/23 Critical Care: s /p  shunt removal 6/18  Continue ABX- Meropenem end date 6/23/20; Daptomycin added 6/21  ID following  Tissue culture growing Pseudomonas  Continue to wean vent with goal trach collar  On PS 10 to 14  CT head/chest/abd/pelvis 6/22 no acute findings  Place on AC/VC vs PC for rest  Hypotension today- bedside ECHO shows slightly collapsible RV and IVC, Albumin bolus, start Midodrine  ICU supportive care  6/24 Critical Care:  Continue to wean vent with goal trach collar  Becomes apneic on PS, on AC/VC  Hypotension- Levo and Midodrine  ICU supportive care  Palliative consult for goals of care  May need Cardiology/ EP consult for possible PPM placement- ?tachy/jama syndrome  6/25 Critical Care: Patient continues to remain ventilated via her tracheostomy  Currently on AC/VC, FiO2 30% 10/5  Responsive to voice, will move lips, and keep eyes open  Low Lumbar midline incision cyst with drainage- MRI spine pending  6/26 Critical Care:  Patient remains on the ventilator pressure support 12/5 and FiO2 of 30%  Titrate norepinephrine to maintain mean arterial pressure in more than 55  Continue midodrine 10 mg 3 times a day  Tolerating tube feeding  Family meeting scheduled this afternoon to discuss goals of care  6/27 Critical Care: Remains ventilator dependent  MRI brain with dysmorphic cerebral hemispheres with known cerebral palsy, market degree of hydrocephalus, ventriculitis, cerebritis   Family discussion yesterday, patient's family and caregivers from facility will come in this weekend and then likely transition to comfort care formally      6/28/20 Critical Care:   Due to poor mental status, and unable to wean from ventilator, transitioned to comfort care today  Will continue Keppra, Trileptal, Flexeril and Valium  Patient was removed from the ventilator and placed on RMA trach collar  Pertinent Labs/Diagnostic Results:     6/26 MRI lumbar spine: Nondiagnostic examination for lumbar spine assessment  No evidence of abscess or fluid collection in the paraspinal soft tissues  6/26 MRI brain: Grossly dysmorphic cerebral hemispheres consistent with cerebral palsy  Marked degree of hydrocephalus status post shunt removal   Development of diffuse enhancement of the ependyma throughout the 3rd  and lateral ventricles consistent with acute ventriculitis  Enhancement of the arachnoid overlying the convexities suspicious for cerebritis          Results from last 7 days   Lab Units 06/26/20  0440 06/25/20  0553 06/24/20  0440 06/23/20  0437   WBC Thousand/uL 13 14* 9 88 9 09 9 10   HEMOGLOBIN g/dL 9 2* 9 2* 8 9* 8 6*   HEMATOCRIT % 30 3* 30 5* 29 3* 28 1*   PLATELETS Thousands/uL 462* 397* 335 299         Results from last 7 days   Lab Units 06/26/20  0440 06/25/20  0553 06/24/20  0440 06/23/20  0437   SODIUM mmol/L 136 137 136 137   POTASSIUM mmol/L 3 9 4 0 4 2 4 1   CHLORIDE mmol/L 104 104 102 103   CO2 mmol/L 25 25 27 29   ANION GAP mmol/L 7 8 7 5   BUN mg/dL 16 17 19 16   CREATININE mg/dL 0 40* 0 40* 0 39* 0 29*   EGFR ml/min/1 73sq m 145 145 146 161   CALCIUM mg/dL 9 1 9 3 8 9 8 9   MAGNESIUM mg/dL 2 5 2 7*  --  2 3   PHOSPHORUS mg/dL 4 4 5 2*  --  4 2         Results from last 7 days   Lab Units 06/28/20  0615 06/27/20  1919 06/27/20  1310 06/27/20  0540 06/26/20  2152 06/26/20  1638 06/26/20  0554 06/25/20  2232 06/25/20  1511 06/24/20  1501 06/23/20  1405   POC GLUCOSE mg/dl 106 95 84 93 87 94 110 123 96 102 122     Results from last 7 days   Lab Units 06/26/20  0440 06/25/20  0553 06/24/20  0440 06/23/20  0437   GLUCOSE RANDOM mg/dL 111 112 113 107               Results from last 7 days   Lab Units 06/23/20  1427   PH ART  7 479* PCO2 ART mm Hg 34 9*   PO2 ART mm Hg 172 2*   HCO3 ART mmol/L 25 3   BASE EXC ART mmol/L 1 9   O2 CONTENT ART mL/dL 13 2*   O2 HGB, ARTERIAL % 98 0*   ABG SOURCE  Radial, Right     Results from last 7 days   Lab Units 06/25/20  2018 06/25/20  1209   PH MERI  7 403* 7 369   PCO2 MERI mm Hg 38 9* 34 4*   PO2 MERI mm Hg 50 8* 50 8*   HCO3 MERI mmol/L 23 7* 19 4*   BASE EXC MERI mmol/L -0 9 -5 2   O2 CONTENT MERI ml/dL 11 1 10 9   O2 HGB, VENOUS % 77 3 77 5         Results from last 7 days   Lab Units 06/23/20  0437   CK TOTAL U/L 26             Vital Signs:     Date/Time  Temp  Pulse  Resp  BP  MAP (mmHg)  SpO2  FiO2 (%)  O2 Flow Rate (L/min)  O2 Device   06/28/20 1926              21    Trach mask   06/28/20 1625    88  58Abnormal       98 %         06/28/20 1230  98 °F (36 7 °C)  88  12      100 %         06/28/20 1145            100 %         06/28/20 0750            99 %         06/28/20 0600    68  15      100 %         06/28/20 0500    76  12      100 %         06/28/20 0428            100 %         06/28/20 0400  98 °F (36 7 °C)  70  11Abnormal       100 %  30    Ventilator   06/28/20 0345    68  13  96/62  74           06/28/20 0300    68  11Abnormal       100 %         06/28/20 0200    54Abnormal   14      100 %         06/28/20 0100    70  10Abnormal       99 %         06/28/20 0000  98 1 °F (36 7 °C)  86  13      100 %  30    Ventilator   06/27/20 2345    70  13  122/69  91           06/27/20 2327            100 %         06/27/20 2300    86  14      100 %         06/27/20 2200    86  17      100 %         06/27/20 2100    64  14      100 %         06/27/20 2000  98 3 °F (36 8 °C)  62  15      100 %  30    Ventilator   06/27/20 1945    60  13  92/65  73           06/27/20 1925            100 %         06/27/20 1900    82  16               06/27/20 1805    62  10Abnormal 108/73  93  100 %         06/27/20 1535    66  10Abnormal   98/68  79  100 %         06/27/20 1400    92  15      100 %         06/27/20 1333        133/92  113           06/27/20 1214    86        100 %         06/27/20 1203        125/86  105           06/27/20 1126    92  15               06/27/20 1122            98 %         06/27/20 1103        116/83  93           06/27/20 1100    92  15      98 %         06/27/20 1033        127/84  100           06/27/20 1000    90  14      100 %         06/27/20 0933        104/75  83           06/27/20 0900    88  17      98 %         06/27/20 0833        133/87  103           06/27/20 0800    104  19      99 %         06/27/20 0733        133/93  106           06/27/20 0610    96  20  123/80  97  100 %         06/27/20 0545    96  17  131/79  94  99 %         06/27/20 0510    94  22  116/68  85  100 %         06/27/20 0448            100 %         06/27/20 0410  98 3 °F (36 8 °C)  98  17  109/70  87  100 %  30       06/27/20 0345    94  15  120/77  93  100 %      Ventilator   06/27/20 0310    96  20  135/79  98  100 %         06/27/20 0210    100  25Abnormal   123/77  95  95 %         06/27/20 0200    98  22      95 %         06/27/20 0145    94  26Abnormal   132/86  100  100 %         06/27/20 0110    94  23Abnormal   127/84  100  100 %         06/27/20 0000  98 5 °F (36 9 °C)  88  18  117/77  90  100 %      Ventilator           Medications:   Scheduled Medications:    Scheduled Meds:  Current Facility-Administered Medications:  desmopressin 0 05 mg Per G Tube Daily   diazepam 5 mg Per G Tube Q8H   glycopyrrolate 0 1 mg Intravenous Q4H PRN   levETIRAcetam 1,000 mg Per G Tube BID   levETIRAcetam 500 mg Per G Tube Q24H   LORazepam 0 5 mg Intravenous Q4H PRN   morphine injection 2 mg Intravenous Q10 Min PRN OXcarbazepine 450 mg Per G Tube Q12H [address]     Continuous Infusions:  HYDROmorphone 0 5 mg/hr Last Rate: 0 5 mg/hr (06/28/20 1627)        Continuous Infusions:     HYDROmorphone (DILAUDID) 50 mg in sodium chloride 0 9% 50mL drip   Rate: 0 5 mL/hr Dose: 0 5 mg/hr  Freq: Continuous Route: IV  Indications Comment: End of life comfort  Last Dose: 0 5 mg/hr (06/28/20 1627)  Start: 06/28/20 1500           Discharge Plan: TBD        Network Utilization Review Department  [email or web address]  ATTENTION: Please call with any questions or concerns to [phone] and carefully listen to the prompts so that you are directed to the right person  All voicemails are confidential   Harlene Pair all requests for admission clinical reviews, approved or denied determinations and any other requests to dedicated fax number below belonging to the campus where the patient is receiving treatment   List of dedicated fax numbers for the Facilities:  [address] DENIALS (Administrative/Medical Necessity) [phone]   [address] (Maternity/NICU/Pediatrics) [phone]   Ellen Finn [phone]   Samantha Roys [phone]   Toy Samuel [phone]   [facility] [phone]   [address] [place] [address] [phone]   [facility]  [phone]   [address] [facility] [address]  [address] [facility] [address] [facility] [phone]

## 2020-06-29 NOTE — SOCIAL WORK
CM consulted for Hospice  CM informed by [address] [facility] with Palliative family preference is [address] if visitors are allowed  CM met with pt parents/legal guardians, Vianey Manning and Tory Yo, at bedside  Both confirmed preference is [facility]  CM sent referral via [address]  TC to Chiara Price at [address] [facility] [address] to f/u from [facility] on Friday and if there is any additional documentation the facility would need  CM requested return call

## 2020-06-29 NOTE — PROGRESS NOTES
Patient is resting comfortably, vital signs and physical assessment show no signs of pain or discomfort  Rounded with family, they are at peace with the hospice process thus far and are in agreement on patient's comfort level  No PRNs needed at this time  Will continue to monitor and administer medications as needed

## 2020-06-29 NOTE — PROGRESS NOTES
Progress note - Palliative and Supportive Care   Luis Enrique Lopes 21 y o  female [id number]    Assessment:  Patient Active Problem List   Diagnosis    Abnormal LFTs    Jejunostomy tube present ([facility] )    Gastrostomy tube in place [facility])    Chronic idiopathic constipation    Skin excoriation    Hidradenitis    Acute metabolic encephalopathy    Cerebral palsy ([facility] )    Cardiac arrest ([facility] )    Seizure-like activity ([facility] )    Severe sepsis with acute organ dysfunction due to Serratia species ([facility] )    S/P  shunt    Meningitis     Plan:  1  Symptom management - currently well controlled   - continue anti-epileptic regimen at this time, will likely be transitioned to hospice formulary medications if transferred to hospice IPU   - continue hydromorphone gtt at 0 5mg/hr   - morphine 2mg IV Q10 minutes PRN pain or increased work of breathing   - ativan 0 5mg IV Q4H PRN anxiety, seizures   - robinul 0 5mg IV Q4H PRN   - Please page palliative if frequent PRNs utilized or if symptoms become uncontrolled despite above regimen    2  Goals - level 4 comfort care   - Patient was transitioned to comfort measures yesterday afternoon as previously discussed in family meeting on 6/26  As patient is incapacitated at baseline parents Vianey Kerri and Tory Yo (guardians) have made this decision in accordance with the non-beneficial nature of further treatments  The totality of patient's condition including severe CP, seizure-disorder, infected  shunt s/p removal, colonization with MDRO, and 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    - Plan to pursue hosp[facility] placement at [facility] (secondary choice is [facility] ) so long as patient remains stable for transport      3  Social support   - Turner Clubs, palliative care LSW following   - Mother Libia Padilla) and Father Shreya Spann) at bedside     Code Status: comfort - Level 4   Decisional apparatus:  Patient is not competent on my exam today  If competence is lost, patient's substitute decision maker would default to guardians (Parents) by [place]  Advance Directive / Living Will / POLST:  Guardianship paperwork on file    Interval history:       No events overnight  Patient required 1 prn dose of morphine and 1 PRN dose of robinul in the past 24H  Otherwise she remains comfortable on hydromorphone gtt  Parents at bedside  MEDICATIONS / ALLERGIES:     all current active meds have been reviewed    Allergies   Allergen Reactions    Baclofen Other (See Comments)     Reaction Date: [id number];   apena      Cefepime Hives    Cephalosporins      Other reaction(s): Unknown Reaction  Other reaction(s): Unknown Reaction  Other reaction(s): Unknown Reaction      Clonidine      Other reaction(s): Respiratory Distress, Respiratory Distress    Lactase     Milk-Related Compounds     Penicillins Hives     Reaction Date: [id number];       Phenobarbital Other (See Comments)     Reaction Date: [id number]; Other reaction(s): Other (See Comments)  apnea  Other reaction(s): Other (See Comments)  apnea  apnea      Vancomycin     Zolpidem Other (See Comments)     Other reaction(s): Other (see comments)  Reaction Date: 22Nov2011;   apnea  apnea  apnea      Latex Rash       OBJECTIVE:    Physical Exam  Physical Exam   Constitutional:   Lying in bed, FLACC 0/10   Eyes:   Intermittent spontaneous eye opening   Cardiovascular: Normal rate  Pulmonary/Chest: No respiratory distress  On trach collar   Abdominal: She exhibits no distension  Musculoskeletal:   CP posturing in UE and LE   Neurological:   Intermittent eye opening, but no tracking   Skin:   Cranial incision CDI     Lab Results: I have personally reviewed pertinent labs    Results from last 7 days   Lab Units 06/26/20  0440 06/25/20  0553 06/24/20  0440   WBC Thousand/uL 13 14* 9 88 9 09   HEMOGLOBIN g/dL 9 2* 9 2* 8 9*   HEMATOCRIT % 30 3* 30 5* 29 3*   PLATELETS Thousands/uL 462* 397* 335     Results from last 7 days   Lab Units 06/26/20  0440 06/25/20  0553 06/24/20  0440   POTASSIUM mmol/L 3 9 4 0 4 2   CHLORIDE mmol/L 104 104 102   CO2 mmol/L 25 25 27   BUN mg/dL 16 17 19   CREATININE mg/dL 0 40* 0 40* 0 39*   CALCIUM mg/dL 9 1 9 3 8 9     Results from last 7 days   Lab Units 06/26/20 0440 06/25/20  0553 06/23/20  0437   MAGNESIUM mg/dL 2 5 2 7* 2 3     Imaging Studies: no new  EKG, Pathology, and Other Studies: no new    Counseling / Coordination of Care    Total floor / unit time spent today 35+ minutes  Greater than 50% of total time was spent with the patient and / or family counseling and / or coordination of care  A description of the counseling / coordination of care: time spent assessing patient, communicating with primary team, CM, and family at bedside to coordinate comfort cares

## 2020-06-29 NOTE — PROGRESS NOTES
Progress Note - Renea Boxer 1995, 21 y o  female MRN: [id number]    Unit/Bed#: MICU 01 Encounter: [id number]    Primary Care Provider: Carmen Swanson MD   Date and time admitted to hospital: 6/3/2020  2:15 PM        Severe sepsis with acute organ dysfunction due to Serratia species [facility])  Assessment & Plan  · Patient may level 4 on 06/28/2020 at 1500 at that time she was disconnected from the ventilator and placed on trach collar  · Appears comfortable on Dilaudid infusion morphine p r n  · Continuing home Keppra and DDAVP for comfort, patient with history of seizures and DI  · Case management following from inpatient hospice placement  Currently referrals out to [facility], after not able to obtain the will go to [facility] inpatient  Code Status: Level 4 - Comfort Care  POA:    POLST:      Reason for ICU admission:    shunt infection requiring externalized    Active problems:   Principal Problem:    Seizure-like activity ([facility] [address] )  Active Problems:    Abnormal LFTs    Acute metabolic encephalopathy    Cerebral palsy ([facility] [address] )    Cardiac arrest ([facility] [address] )    Severe sepsis with acute organ dysfunction due to Serratia species (HCC)    S/P  shunt    Meningitis  Resolved Problems:    Bradycardia      Consultants:   Wound care, palliative, neurosurgery, ID, Neurology    History of Present Illness:   Renea Boxer was an unfortunate 21year old female with a PMH of  Cerebral palsy with spastic quadraplegia, profound developmental delay (non-verbal, bed and wheelchair bound at baseline), hydrocephalous with  shunt, chronic respiratory failure s/p tracheostomy, asthma, GERD, nephrolithiasis, seizure disorder, and chronic central diabetes insipidus that presented to [facility] on 6/1 from her longterm care facility on 06/01 with a fever of 101 5, heart rate of 169, respiratory rate of 82 and respiratory distress    At baseline, she does have a tracheostomy, but is on trach collar oxygen  She had her tracheostomy changed for cuff trach and was ventilated  She had an attempt at a internal jugular central venous catheter which caused a VFib arrest, ROSC was achieved with CPR, shock x1, epinephrine x1  Following EKG had ST elevations in V3, AVF which are more pronounced than prior  She was noted to have left facial and left lower extremity twitching shortly after concerning for seizure with possible differential of benzodiazepine withdrawal verses anoxic encephalopathy  She was loaded with Keppra and Trileptal per Neurology  She continued having fevers of 104, CT scan showed collections in her left retroperitoneum longer iliac crest and paraspinal vertebrae   shunt series with lucency and possible infection adjacent to the right SI joint iliac bone  She has several attempts said LP with concern for meningitis given  shunt communicating with pelvic infections  LP unfortunately was unsuccessful both at bedside and under fluoroscopy  She was started on daptomycin and meropenem as she has allergies to vancomycin cefepime  She was transferred to [facility] on 06/03 for ongoing management and possible externalization of her  shunt  Summary of clinical course:   admitted and started on video EEG  She had a successful lumbar puncture on 06/03  CSF cultures were negative initially but then returned Serratia  Retroperitoneal collection was found to be chronic  Repeat cultures sent from  shunt growing Serratia the patient was continued on meropenem  Neurosurgery was consulted, however patient was initially responding to antibiotics, therefore patient had shunt re-cultured on 06/10  Unfortunately, these return positive despite 10 days of antibiotics, therefore neurosurgery externalized patient's  shunt on 06/13  After externalization trial, patient had shunt clamped, she did not have significant hydrocephalus above her baseline, therefore patient had EVD removed and shunt was not replaced  Patient received a total of 3 weeks of IV antibiotics, 5 days of which were postoperative from shunt removal   Unfortunately, patient's mental status continued to be poor  She would have significant periods of apnea on the ventilator requiring AC ventilation, new significant bradycardia, she was much less responsive than her baseline  She also remained on low-dose Levophed  She had an MRI on 6-24 to look for any additional pathology that could be causing her depressed mental status  This showed possible new cerebritis but id suspected this was due to recent infection shunt removal and that there was no evidence for ongoing CNS infection  Palliative care was consulted for family meeting with patient's guardian who is her mother       Recent or scheduled procedures:   [facility] Imaging:    shunt series: showing new lucency around screw of R sacroiliac joint (loosening possibly infection?)  CT CAP- Small collection of L retroperitoneum of left iliac crest (3 3 x 3 1 x 3 3 cm) concerning for hematoma, abscess, seroma + fluid collection left paraspinal region (tiny upper lumbar level)  6/4 PICC  6/05 - left Illiac collection drained by IR - purulent, sent for Cx   6/8 Received DDVAP 0 05 mg for UO of 4 L/24 hr  6/9 shunt tapped  6/13 shunt externalized    6/16 CTH  shunt in position, stable from prev  6/17 CTH stable  6/18 OR for  shunt removal  6/22-CT chest abd pelvis negative, CT head stable ventriculomegaly, encephalomalacia  6/23 Bronch negative      Outstanding/pending diagnostics:   None    Cultures:   6/05 - Cultures from 6/01 [facility]: Sputum + GNR, negative stool/Blood,Urine  6/7-CSF culture 2+Serrati  6/14- CFS cx no growth     Mobilization Plan:   Bed ridden    Nutrition Plan:   NPO    Invasive Devices Review  Invasive Devices     Peripherally Inserted Central Catheter Line            PICC Line 60/09/75 Right Basilic 25 days          Drain            Gastrostomy/Enterostomy -- days          Airway            Surgical Airway Shiley Cuffed -- days    Surgical Airway Shiley Cuffed 11 days                  Spoke with Dr Giorgio Pearson  regarding transfer  Please call with any questions or concerns  Portions of the record may have been created with voice recognition software  Occasional wrong word or "sound a like" substitutions may have occurred due to the inherent limitations of voice recognition software  Read the chart carefully and recognize, using context, where substitutions have occurred

## 2020-06-30 VITALS
HEIGHT: 55 IN | HEART RATE: 71 BPM | WEIGHT: 124.56 LBS | BODY MASS INDEX: 28.83 KG/M2 | RESPIRATION RATE: 24 BRPM | OXYGEN SATURATION: 100 % | TEMPERATURE: 96.7 F | SYSTOLIC BLOOD PRESSURE: 119 MMHG | DIASTOLIC BLOOD PRESSURE: 88 MMHG

## 2020-06-30 PROCEDURE — 99233 SBSQ HOSP IP/OBS HIGH 50: CPT | Performed by: PHYSICIAN ASSISTANT

## 2020-06-30 PROCEDURE — 99239 HOSP IP/OBS DSCHRG MGMT >30: CPT | Performed by: INTERNAL MEDICINE

## 2020-06-30 PROCEDURE — 94760 N-INVAS EAR/PLS OXIMETRY 1: CPT

## 2020-06-30 RX ORDER — SODIUM CHLORIDE 9 MG/ML
10 INJECTION, SOLUTION INTRAVENOUS CONTINUOUS
Status: DISCONTINUED | OUTPATIENT
Start: 2020-06-30 | End: 2020-06-30 | Stop reason: HOSPADM

## 2020-06-30 RX ORDER — GLYCOPYRROLATE 0.2 MG/ML
0.1 INJECTION INTRAMUSCULAR; INTRAVENOUS EVERY 4 HOURS PRN
Qty: 1 ML | Refills: 0
Start: 2020-06-30

## 2020-06-30 RX ORDER — LORAZEPAM 2 MG/ML
0.5 INJECTION INTRAMUSCULAR EVERY 4 HOURS PRN
Qty: 10 ML | Refills: 0
Start: 2020-06-30 | End: 2020-07-10

## 2020-06-30 RX ORDER — LEVETIRACETAM 100 MG/ML
500 SOLUTION ORAL EVERY 24 HOURS
Refills: 0
Start: 2020-06-30

## 2020-06-30 RX ORDER — LEVETIRACETAM 100 MG/ML
1000 SOLUTION ORAL 2 TIMES DAILY
Qty: 473 ML | Refills: 0
Start: 2020-06-30

## 2020-06-30 RX ADMIN — DESMOPRESSIN ACETATE 0.05 MG: 0.1 TABLET ORAL at 10:10

## 2020-06-30 RX ADMIN — SODIUM CHLORIDE 10 ML/HR: 0.9 INJECTION, SOLUTION INTRAVENOUS at 03:58

## 2020-06-30 RX ADMIN — OXCARBAZEPINE 450 MG: 300 TABLET, FILM COATED ORAL at 05:34

## 2020-06-30 RX ADMIN — DIAZEPAM 5 MG: 5 TABLET ORAL at 10:10

## 2020-06-30 RX ADMIN — Medication 0.5 MG/HR: at 03:59

## 2020-06-30 RX ADMIN — LORAZEPAM 0.5 MG: 2 INJECTION INTRAMUSCULAR; INTRAVENOUS at 16:37

## 2020-06-30 RX ADMIN — LEVETIRACETAM 1000 MG: 100 SOLUTION ORAL at 15:42

## 2020-06-30 RX ADMIN — MORPHINE SULFATE 2 MG: 2 INJECTION, SOLUTION INTRAMUSCULAR; INTRAVENOUS at 16:37

## 2020-06-30 RX ADMIN — LEVETIRACETAM 1000 MG: 100 SOLUTION ORAL at 05:34

## 2020-06-30 NOTE — PROGRESS NOTES
Cultivated a relationship of care and support family  Talked with parents about pt's Jehovah's witness and mom requested last rite  Changed pt's Jehovah's witness status on the system  Pt already anointed by Adam Han  Talked with Ladonna Drake about pt's needs spiritually and family requested to see [facility] again  06/30/20 1100   Clinical Encounter Type   Visited With Family; Health care provider   Routine Visit Introduction   Referral From Departmental follow-up   Referral To   (talked with fr Venkat Chaudhari)   Jain Encounters   Jain Needs Prayer

## 2020-06-30 NOTE — TRANSPORTATION MEDICAL NECESSITY
Section I - General Information    Name of Patient: Darryl Hernandez                 : 1995    Medicare #: [id number]  Transport Date: 20 (PCS is valid for round trips on this date and for all repetitive trips in the 60-day range as noted below )  Origin: [address] [facility] [address]                                                         Destination: [facility]  [address]  Is the pt's stay covered under Medicare Part A (PPS/DRG)   []     Closest appropriate facility? If no, why is transport to more distant facility required? Yes  If hospice pt, is this transport related to pt's terminal illness? NA       Section II - Medical Necessity Questionnaire  Ambulance transportation is medically necessary only if other means of transport are contraindicated or would be potentially harmful to the patient  To meet this requirement, the patient must either be "bed confined" or suffer from a condition such that transport by means other than ambulance is contraindicated by the patient's condition  The following questions must be answered by the medical professional signing below for this form to be valid:    1)  Describe the MEDICAL CONDITION (physical and/or mental) of this patient AT [address] [place] [address] that requires the patient to be transported in an ambulance and why transport by other means is contraindicated by the patient's condition:needs an attendant, IV medications,     2) Is the patient "bed confined" as defined below? Yes  To be "be confined" the patient must satisfy all three of the following conditions: (1) unable to get up from bed without Assistance; AND (2) unable to ambulate; AND (3) unable to sit in a chair or wheelchair  3) Can this patient safely be transported by car or wheelchair van (i e , seated during transport without a medical attendant or monitoring)?    No    4) In addition to completing questions 1-3 above, please check any of the following conditions that apply*:   *Note: supporting documentation for any boxes checked must be maintained in the patient's medical records  If hosp-hosp transfer, describe services needed at 2nd facility not available at 1st facility? Danger to self/others  IV meds/fluids required   Medical attendant required   Requires oxygen-unable to self administer  Special handling/isolation/infection control precautions required   Unable to tolerate seated position for time needed to transport       Section III - Signature of Physician or Healthcare Professional  I certify that the above information is true and correct based on my evaluation of this patient, and represent that the patient requires transport by ambulance and that other forms of transport are contraindicated  I understand that this information will be used by the Centers for Medicare and Medicaid Services (CMS) to support the determination of medical necessity for ambulance services, and I represent that I have personal knowledge of the patient's condition at time of transport  []  If this box is checked, I also certify that the patient is physically or mentally incapable of signing the ambulance service's claim and that the institution with which I am affiliated has furnished care, services, or assistance to the patient  My signature below is made on behalf of the patient pursuant to 42 CFR §424 36(b)(4)   In accordance with 42 CFR §424 37, the specific reason(s) that the patient is physically or mentally incapable of signing the claim form is as follows:  Cerebral Palsy,       Signature of Physician* or Healthcare Professional______________________________________________________________  Signature Date 06/30/20 (For scheduled repetitive transports, this form is not valid for transports performed more than 60 days after this date)    Printed Name & Credentials of Physician or Healthcare Professional (MD, DO, RN, etc )____________Elaina Pineda RN CM____________________  *Form must be signed by patient's attending physician for scheduled, repetitive transports   For non-repetitive, unscheduled ambulance transports, if unable to obtain the signature of the attending physician, any of the following may sign (choose appropriate option below)  [] Physician Assistant []  Clinical Nurse Specialist [x]  Registered Nurse  []  Nurse Practitioner  [x] Discharge Planner

## 2020-06-30 NOTE — DISCHARGE SUMMARY
Discharge- Valeria Boyd 1995, 21 y o  female MRN: [id number]    Unit/Bed#: [facility] 830-01 Encounter: [id number]    Primary Care Provider: Ping Lopez MD   Date and time admitted to hospital: 6/3/2020  2:15 PM        Severe sepsis with acute organ dysfunction due to Serratia species [facility])  Assessment & Plan  · Patient may level 4 on 06/28/2020 at 1500 at that time she was disconnected from the ventilator and placed on trach collar  · Appears comfortable on Dilaudid infusion morphine p r n  · Continuing home Keppra and DDAVP for comfort, patient with history of seizures and DI  · Case management following from inpatient hospice placement  Currently referrals out to [facility], after not able to obtain the will go to [facility] inpatient               Discharging Physician / Practitioner: Igor Stauffer MD  PCP: Ping Lopez MD  Admission Date:   Admission Orders (From admission, onward)     Ordered        06/03/20 1444  Inpatient Admission  Once         06/03/20 1444  Inpatient Admission  Once                   Discharge Date: 06/30/20    Resolved Problems  Date Reviewed: 6/30/2020          Resolved    Bradycardia 6/12/2020     Resolved [facility]  [facility] Clemente[facility]:  ·  Wound care, palliative, neurosurgery, ID, Neurology    Procedures Performed:    [facility] Imaging:    shunt series: showing new lucency around screw of R sacroiliac joint (loosening possibly infection?)  CT CAP- Small collection of L retroperitoneum of left iliac crest (3 3 x 3 1 x 3 3 cm) concerning for hematoma, abscess, seroma + fluid collection left paraspinal region (tiny upper lumbar level)  6/4 PICC  6/05 - left Illiac collection drained by IR - purulent, sent for Cx   6/8 Received DDVAP 0 05 mg for UO of 4 L/24 hr  6/9 shunt tapped  6/13 shunt externalized     6/16 [address]  shunt in position, stable from prev  6/17 [address] stable  6/18 OR for  shunt removal  6/22-CT chest abd pelvis negative, CT head stable ventriculomegaly, encephalomalacia  6/23 Bronch negative      Cultures:   6/05 - Cultures from 6/01 [facility]: Sputum + GNR, negative stool/Blood,Urine  6/7-CSF culture 2+Serrati  6/14- CFS cx no growth       Significant Findings / Test Results:   · As above    Incidental Findings:   ·  none    Test Results Pending at Discharge (will require follow up):   · none     Outpatient Tests Requested:  · none    Complications: none    Reason for Admission:  fever    Hospital Course:      History of Present Illness:   Kerri Mahmood was an unfortunate 21year old female with a PMH of  Cerebral palsy with spastic quadraplegia, profound developmental delay (non-verbal, bed and wheelchair bound at baseline), hydrocephalous with  shunt, chronic respiratory failure s/p tracheostomy, asthma, GERD, nephrolithiasis, seizure disorder, and chronic central diabetes insipidus that presented to [facility] on 6/1 from her longterm care facility on 06/01 with a fever of 101 5, heart rate of 169, respiratory rate of 82 and respiratory distress   At baseline, she does have a tracheostomy, but is on trach collar oxygen   She had her tracheostomy changed for cuff trach and was ventilated   She had an attempt at a internal jugular central venous catheter which caused a VFib arrest, ROSC was achieved with CPR, shock x1, epinephrine x1   Following EKG had ST elevations in V3, AVF which are more pronounced than prior   She was noted to have left facial and left lower extremity twitching shortly after concerning for seizure with possible differential of benzodiazepine withdrawal verses anoxic encephalopathy   She was loaded with Keppra and Trileptal per Neurology  [facility] continued having fevers of 104, CT scan showed collections in her left retroperitoneum longer iliac crest and paraspinal vertebrae    shunt series with lucency and possible infection adjacent to the right SI joint iliac bone   She has several attempts said LP with concern for meningitis given  shunt communicating with pelvic infections   LP unfortunately was unsuccessful both at bedside and under fluoroscopy   She was started on daptomycin and meropenem as she has allergies to vancomycin cefepime   She was transferred to [facility] on 06/03 for ongoing management and possible externalization of her  shunt        Summary of clinical course:   admitted and started on video EEG   She had a successful lumbar puncture on 06/03   CSF cultures were negative initially but then returned Serratia   Retroperitoneal collection was found to be chronic  Repeat cultures sent from  shunt growing Serratia the patient was continued on meropenem   Neurosurgery was consulted, however patient was initially responding to antibiotics, therefore patient had shunt re-cultured on 06/10   Unfortunately, these return positive despite 10 days of antibiotics, therefore neurosurgery externalized patient's  shunt on 06/13  After externalization trial, patient had shunt clamped, she did not have significant hydrocephalus above her baseline, therefore patient had EVD removed and shunt was not replaced   Patient received a total of 3 weeks of IV antibiotics, 5 days of which were postoperative from shunt removal   Unfortunately, patient's mental status continued to be poor   She would have significant periods of apnea on the ventilator requiring AC ventilation, new significant bradycardia, she was much less responsive than her baseline   She also remained on low-dose Levophed   She had an MRI on 6-24 to look for any additional pathology that could be causing her depressed mental status   This showed possible new cerebritis but id suspected this was due to recent infection shunt removal and that there was no evidence for ongoing CNS infection   Palliative care was consulted for family meeting with patient's guardian who is her mother           Please see above list of diagnoses and related plan for additional information  Condition at Discharge: poor     Discharge Day Visit / Exam:     * Please refer to separate progress note for these details *    Discussion with Family: mother and father was at bedside, they have no questions and they are waiting for the paper work to complete    Discharge instructions/Information to patient and family:   See after visit summary for information provided to patient and family  Provisions for Follow-Up Care:  See after visit summary for information related to follow-up care and any pertinent home health orders  Disposition:     Other: hospice at [facility]    For Discharges to [facility] SNF:   · [facility] - Not Applicable to this Patient    Planned Readmission: no     Discharge Statement:  I spent 60 minutes discharging the patient  This time was spent on the day of discharge  I had direct contact with the patient on the day of discharge  Greater than 50% of the total time was spent examining patient, answering all patient questions, arranging and discussing plan of care with patient as well as directly providing post-discharge instructions  Additional time then spent on discharge activities  Discharge Medications:  See after visit summary for reconciled discharge medications provided to patient and family        ** Please Note: This note has been constructed using a voice recognition system **

## 2020-06-30 NOTE — SOCIAL WORK
Lorenzo spoke with Vanna Gay at [facility] they do have a bed  Forest Portillo wants to talk to patients nurse to ask clarifying questions  Lorenzo updated patients family and gave RN Rosy Land the phone number to return Xochitl's number [phone]

## 2020-06-30 NOTE — PROGRESS NOTES
Progress note - Palliative and Supportive Care   Destiny Kras 21 y o  female [id number]    Assessment:  Patient Active Problem List   Diagnosis    Abnormal LFTs    Jejunostomy tube present ([facility] )    Gastrost[facility] tube in place [facility])    Chronic idiopathic constipation    Skin excoriation    Hidradenitis    Acute metabolic encephalopathy    C[facility]    Cardiac arrest ([facility] )    Seizure-like activity ([facility] )    Severe sepsis with acute organ dysfunction due to Serratia species ([facility] )    S/P  shunt    Meningitis     Plan:  1  Symptom management - currently well controlled   - continue anti-epileptic regimen at this time, will likely be transitioned to hospice formulary medications if transferred to hospice IPU   - continue hydromorphone gtt at 0 5mg/hr   - morphine 2mg IV Q10 minutes PRN pain or increased work of breathing   - ativan 0 5mg IV Q4H PRN anxiety, seizures   - robinul 0 5mg IV Q4H PRN   - Plan to d/c hydromorphone gtt upon transfer and administer PRN for comfort in route to hospice unit  2  Goals - level 4 comfort care   - Patient was transitioned to comfort measures on 6/28 afternoon as previously discussed in family meeting on 6/26  As patient is incapacitated at baseline parents Brayan Ward and Meka Tamez (guardians) have made this decision in accordance with the non-beneficial nature of further treatments  The totality of patient's condition including severe CP, seizure-disorder, infected  shunt s/p removal, colonization with MDRO, and 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    - Plan to pursue hospice placement at [facility] location, transport scheduled for 4:30pm    3   Social support   - Janifer Pump, palliative care LSW following   - Mother Romulo Young) and Father Justina Johnson) at bedside   - Documentation regarding the non-beneficial nature of further treatment and identification of a terminal illness has been faxed to Sarah by palliative team       Code Status: comfort - Level 4   Decisional apparatus:  Patient is not competent on my exam today  If competence is lost, patient's substitute decision maker would default to guardians (Parents) by [place] 169  Advance Directive / Living Will / POLST:  Guardianship paperwork on file    Interval history:       No events overnight  Patient currently appears comfortable, in no distress  Required 1 PRN dose of morphine overnight  MEDICATIONS / ALLERGIES:     all current active meds have been reviewed    OBJECTIVE:    Physical Exam  Physical Exam   Constitutional:   Appears ill, lying in bed, in no distress   HENT:   Head: Normocephalic and atraumatic  Pulmonary/Chest: No respiratory distress  On trach collar   Abdominal: She exhibits no distension  There is no guarding  Neurological:   Resting comfortably, not awaken for neuro exam   Psychiatric:   No restlessness       Lab Results: I have personally reviewed pertinent labs  Imaging Studies: no new  EKG, Pathology, and Other Studies: no new    Counseling / Coordination of Care    Total floor / unit time spent today 35+ minutes  Greater than 50% of total time was spent with the patient and / or family counseling and / or coordination of care  A description of the counseling / coordination of care: time spent assessing patient, communicating with RN, CM, and parents to coordinate comfort cares

## 2020-06-30 NOTE — SOCIAL WORK
LSW met with patient's parents who were at bedside in an effort to provide them with emotional support  Family is awaiting an inpatient hospice referral and acceptance    LSW will continue to follow as requested

## 2020-06-30 NOTE — SOCIAL WORK
VERN faxed Palliative and Critical Care notes to Edwige Pereira ([phone]) at [facility] [address]     Received completed fax verification

## 2020-06-30 NOTE — ASSESSMENT & PLAN NOTE
· Patient may level 4 on 06/28/2020 at 1500 at that time she was disconnected from the ventilator and placed on trach collar  · Appears comfortable on Dilaudid infusion morphine p r n  · Continuing home Keppra and DDAVP for comfort, patient with history of seizures and DI  · Case management following from inpatient hospice placement  Currently referrals out to [facility], after not able to obtain the will go to [facility] inpatient

## 2020-06-30 NOTE — SOCIAL WORK
Received call from Collette Chand at [facility]  stating they never received the [facility] information   contacted Juan Miguel Wolf with Palliative care and asked her to fax this information to [phone]  Jeny's number is [phone]     Estuardo Olea will have information faxed

## 2020-07-01 NOTE — UTILIZATION REVIEW
Notification of Discharge  This is a Notification of Discharge from our facility [address]  Please be advised that this patient has been discharge from our facility  Below you will find the admission and discharge date and time including the patients disposition  PRESENTATION DATE: 6/3/2020  2:15 PM  OBS ADMISSION DATE:   IP ADMISSION DATE: 6/3/20 1415   DISCHARGE DATE: 6/30/2020  5:12 PM  DISPOSITION: Non [facility] IP Hospice Fac Non [address]   Admission Orders listed below:  Admission Orders (From admission, onward)     Ordered        06/03/20 1444  Inpatient Admission  Once         06/03/20 1444  Inpatient Admission  Once                   Please contact the UR Department if additional information is required to close this patient's authorization/case  [address] Utilization Review Department  Main: [phone] x carefully listen to the prompts  All voicemails are confidential   [email or web address]  Send all requests for admission clinical reviews, approved or denied determinations and any other requests to dedicated fax number below belonging to the campus where the patient is receiving treatment   List of dedicated fax numbers:  [address] DENIALS (Administrative/Medical Necessity) [phone]   [address] (Maternity/NICU/Pediatrics) [phone]   Fe Acharya [phone]   130 [facility] [phone]   Dangjose carlos Esparza [phone]   [facility] [address] [facility] [phone]   [facility] [phone]   [address]  2401 [facility] 1000 [address] [facility] [phone]

## 2020-07-06 LAB — FUNGUS SPEC CULT: NORMAL

## 2020-08-04 LAB
MYCOBACTERIUM SPEC CULT: NORMAL
RHODAMINE-AURAMINE STN SPEC: NORMAL

## 2020-10-02 ENCOUNTER — TELEPHONE (OUTPATIENT)
Dept: GASTROENTEROLOGY | Facility: CLINIC | Age: 25
End: 2020-10-02

## 2020-12-04 ENCOUNTER — TELEPHONE (OUTPATIENT)
Dept: GASTROENTEROLOGY | Facility: CLINIC | Age: 25
End: 2020-12-04

## 2020-12-04 DIAGNOSIS — L73.2 HIDRADENITIS: ICD-10-CM

## 2020-12-04 DIAGNOSIS — R79.89 ABNORMAL LFTS: Primary | ICD-10-CM

## 2021-12-08 NOTE — TELEPHONE ENCOUNTER
Routing to HPR
Spoke with Eitan, the 3-11 nurse  Prachi Cherry was gone for the day  I did review the lab work results that I have with Eitan  I did advise and reassure that she has antibodies to CMV with elevated IgG but that her IgM was normal so she does not have an active CMV infection  She has antibodies to hepatitis A  The additional labs looking for an autoimmune, metabolic or viral etiology for the elevation in her alkaline phosphatase were all normal   We are still awaiting EBV and fibrosis score      Thanks HPR
Tia Ponce from [facility]  asks to review results of blood work from 3/28 at [facility]; is concerned about cytomegalovirus results/wants to discuss; asks for  [phone] 
No

## 2023-04-14 NOTE — SOCIAL WORK
Morteza Parents CNM called, pt now complaining of a headache. New orders received for a one time dose for 650mg of tylenol. Patient accepted by Umberto Cheung Nurse Manager [address] [facility] 114  Cm faxed current Giorgi Hands faxed consents and CM got them signed and faxed back to [facility] fax number [phone]  Lorenzo received a call from Magali Barry [phone] at [facility] hospice but she was out of the (loop) with Annika's conversation with Lorenzo Ventura called [facility] to get auth for transport [phone] Spoke with Raoul Xavier #[phone]  Lorenzo arranged transportation via [employer] for 1630 today  Patient's mother Gerhardt Canny aware

## 2023-09-22 NOTE — RESPIRATORY THERAPY NOTE
RT Ventilator Management Note  Meda Curling 21 y o  female MRN: [id number]  Unit/Bed#: ICU 01 Encounter: [id number]      Daily Screen       6/19/2020  0722 6/20/2020  0646          Patient safety screen outcome[de-identified]  Passed  Passed      Spont breathing trial % for 30 min:  No        Spont breathing trial outcome[de-identified]  Failed        Spont breathing trial reason failed:  RR < 8 bpm;RSBI > 100        RSBI:  65  25              Physical Exam:   Assessment Type: (P) Assess only  Bilateral Breath Sounds: Clear      Resp Comments: (P) Pt  doing well on CPAP with no increased WOB or SOB throughout the night  17

## 2024-03-13 NOTE — PROGRESS NOTES
Daily Progress Note - Critical Care   Shima Carey 21 y o  female MRN: [id number]  Unit/Bed#: MICU 01 Encounter: [id number]        ----------------------------------------------------------------------------------------  HPI/24hr events: No acute issues  Likely transition to comfort care    ---------------------------------------------------------------------------------------  SUBJECTIVE  N/A    Review of Systems  Review of systems was unable to be performed secondary to CP with chronic trach and mechanical ventilation  ---------------------------------------------------------------------------------------  Assessment and Plan:  Neuro:   · Corby Silvius  shunt infection s/p  shunt removal 6/18  ? Plan:  Unclear etiology, possibly secondary to GI tract  ? Patient was status post 10 days of IV antibiotics with persistent positive cultures, status post VPS extravasation with tolerance of clamp trial, now without any VPS  ? Patient is completed 3 weeks of total IV antibiotics, 5 of which were postoperative shunt removal  ? Monitoring off antibiotics  ? MRI brain with dysmorphic cerebral hemispheres with known cerebral palsy, market degree of hydrocephalus, ventriculitis, cerebritis  · Diagnosis: Severe Developmental Delay  ? Plan:  Palliative care consult in following  ? Due to poor mental status and unable to wean from ventilator, likely transition to comfort care today  · Diagnosis: Seizure D/o  ? Plan:  Continue Keppra, Trileptal, Flexeril, Valium, would continue these even if patient comfort care  · Diagnosis: Low Lumbar midline incision cyst with drainage  ? Plan:   MRI largely nondiagnostic due to patient's hardware     CV:   · Diagnosis: Hypotension  ? Plan:  Unclear etiology, low suspicion for sepsis  ? Continue midodrine 10 t i d    · Diagnosis: Bradycardia  ?  Plan:   no indication for permanent pacemaker which family would not want to pursue either way  · Diagnosis: S/p Cardiac Arrest  ? Plan:  Status post arrest from central line placement  ? Unclear if secondary to respiratory insufficiency or secondary to guidewire insertion     Pulm:  · Diagnosis: Acute combined respiratory failure with tracheostomy  ? Plan:  Patient has been difficult to wean from ventilator  ? Presented initially with respiratory distress, at baseline was on trach collar, trach changed to cuff trach at [facility]  ? Completed antibiotics for possible aspiration event  ? Now has apneic episodes on pressure support ventilation  ? Patient's family wanting to transition to level 4 today  GI:   · Diagnosis: No acute issues  ? Plan:  Bowel regimen:  Senokot 1 tablet, q h s  With p r n  MiraLax  ? Last bowel movement yesterday        :   No issues   Continue external urinary catheter        F/E/N:   Fluids:  No fluids needed  Electrolytes:  No indication for laboratories  Nutrition:  Continue tube feeds at goal        Heme/Onc:   · Diagnosis: Chronic Anemia  ? Plan:  No longer checking CBCs         Endo:   · Diagnosis: History of DI  ? Plan:  Continue DDAVP        ID:   · Diagnosis: Serratia  shunt infection  ? Plan:  ID following, has completed 3 weeks of IV antibiotics, 5 which were after shunt removal  ? Monitor off antibiotics  · Diagnosis: Chest wall cellulitis  ? Plan:  Improved  · Diagnosis: Retroperitoneal infection of unclear origin  ? Plan:  Reportedly chronic, seen on imaging going to [place], unclear significance  ?  MRI largely nondiagnostic     MSK/Skin:   Frequent turning, offloading of pressure points wound care following      Disposition: Continue Critical Care   Code Status: Level 2 - DNAR: but accepts endotracheal intubation  ---------------------------------------------------------------------------------------  ICU CORE MEASURES    Prophylaxis   VTE Pharmacologic Prophylaxis: Heparin  VTE Mechanical Prophylaxis: sequential compression device  Stress Ulcer Prophylaxis: Prophylaxis Not Indicated ABCDE Protocol (if indicated)  Plan to perform spontaneous awakening trial today? Not applicable  Plan to perform spontaneous breathing trial today? Not applicable  Obvious barriers to extubation? Yes, mental status  CAM-ICU:  Unable to assess    Invasive Devices Review  Invasive Devices     Peripherally Inserted Central Catheter Line            PICC Line 64/96/38 Right Basilic 23 days          Drain            Gastrostomy/Enterostomy -- days    External Urinary Catheter 2 days          Airway            Surgical Airway Shiley Cuffed -- days    Surgical Airway Shiley Cuffed 9 days              Can any invasive devices be discontinued today? No  ---------------------------------------------------------------------------------------  OBJECTIVE    Vitals   Vitals:    20 0400 20 0428 20 0500 20 0600   BP:       BP Location:       Pulse: 70  76 68   Resp: (!) 11  12 15   Temp: 98 °F (36 7 °C)      TempSrc: Oral      SpO2: 100% 100% 100% 100%   Weight:       Height:         Temp (24hrs), Av 2 °F (36 8 °C), Min:98 °F (36 7 °C), Max:98 4 °F (36 9 °C)  Current: Temperature: 98 °F (36 7 °C)    Respiratory:  SpO2: SpO2: 100 %, SpO2 Activity: SpO2 Activity: At Rest, SpO2 Device: O2 Device: Ventilator  O2 Flow Rate (L/min): 30 L/min    Invasive/non-invasive ventilation settings   Respiratory    Lab Data (Last 4 hours)    None         O2/Vent Data (Last 4 hours)       0428           Vent Mode AC/VC       Resp Rate (BPM) (BPM) 10       Vt (mL) (mL) 325       FIO2 (%) (%) 30       PEEP (cmH2O) (cmH2O) 5       MV 3 9                   Physical Exam   Constitutional:   Chronically ill-appearing female, chronically with tracheostomy in place on ventilator  Contracted, cerebral palsy   HENT:   Well-healing scalp scar    Protruded tongue   Eyes: Conjunctivae are normal    Spontaneously open, does not track, does not blink to threat   Neck: Neck supple  No JVD present     Tracheostomy site clean, dry, intact   Cardiovascular: Normal rate, regular rhythm and normal heart sounds  Pulmonary/Chest: Breath sounds normal  No respiratory distress  Mechanical breath sounds   Abdominal: Soft  Bowel sounds are normal  She exhibits no distension  Genitourinary:   Genitourinary Comments: Deferred   Musculoskeletal:   Contracted   Neurological:   Eyes open spontaneously, does not withdraw to pain, but does grimace to painful stimuli   Skin: Skin is warm  Capillary refill takes less than 2 seconds  Nursing note and vitals reviewed          Laboratory and Diagnostics:  Results from last 7 days   Lab Units 06/26/20 0440 06/25/20  0553 06/24/20 0440 06/23/20  0437 06/22/20  0524   WBC Thousand/uL 13 14* 9 88 9 09 9 10 10 33*   HEMOGLOBIN g/dL 9 2* 9 2* 8 9* 8 6* 9 4*   HEMATOCRIT % 30 3* 30 5* 29 3* 28 1* 31 5*   PLATELETS Thousands/uL 462* 397* 335 299 453*   NEUTROS PCT %  --   --   --   --  69   MONOS PCT %  --   --   --   --  6     Results from last 7 days   Lab Units 06/26/20 0440 06/25/20 0553 06/24/20 0440 06/23/20 0437 06/22/20  0524   SODIUM mmol/L 136 137 136 137 135*   POTASSIUM mmol/L 3 9 4 0 4 2 4 1 4 0   CHLORIDE mmol/L 104 104 102 103 101   CO2 mmol/L 25 25 27 29 26   ANION GAP mmol/L 7 8 7 5 8   BUN mg/dL 16 17 19 16 14   CREATININE mg/dL 0 40* 0 40* 0 39* 0 29* 0 34*   CALCIUM mg/dL 9 1 9 3 8 9 8 9 9 0   GLUCOSE RANDOM mg/dL 111 112 113 107 111     Results from last 7 days   Lab Units 06/26/20 0440 06/25/20 0553 06/23/20  0437   MAGNESIUM mg/dL 2 5 2 7* 2 3   PHOSPHORUS mg/dL 4 4 5 2* 4 2                   ABG:  Results from last 7 days   Lab Units 06/23/20  1427   PH ART  7 479*   PCO2 ART mm Hg 34 9*   PO2 ART mm Hg 172 2*   HCO3 ART mmol/L 25 3   BASE EXC ART mmol/L 1 9   ABG SOURCE  Radial, Right     VBG:  Results from last 7 days   Lab Units 06/25/20  2018  06/23/20  1427   PH MERI  7 403*   < >  --    PCO2 MERI mm Hg 38 9*   < >  --    PO2 MERI mm Hg 50 8*   < >  --    HCO3 MERI mmol/L 23 7* < >  --    BASE EXC MERI mmol/L -0 9   < >  --    ABG SOURCE   --   --  Radial, Right    < > = values in this interval not displayed  Micro        EKG: N/A  Imaging:  I have personally reviewed pertinent films in PACS    Intake and Output  I/O       06/26 0701 - 06/27 0700 06/27 0701 - 06/28 0700 06/28 0701 - 06/29 0700    P  O  0      I V  (mL/kg)       NG/ 90     Feedings 700 740     Total Intake(mL/kg) 1200 (21 2) 830 (14 7)     Urine (mL/kg/hr) 1410 (1) 725 (0 5)     Emesis/NG output 0      Stool 0 0     Total Output 1410 725     Net -210 +105            Unmeasured Urine Occurrence 1 x 4 x     Unmeasured Stool Occurrence 1 x 1 x           Height and Weights   Height: 4' 2" (127 cm)  IBW: 22 5 kg  Body mass index is 35 03 kg/m²  Weight (last 2 days)     Date/Time   Weight    06/26/20 0541   56 5 (124 56)                Nutrition       Diet Orders   (From admission, onward)             Start     Ordered    06/18/20 2126  Diet Enteral/Parenteral; Tube Feeding No Oral Diet; Non-Formulary; Cyclic; 50; 16 hours; Prosource Protein Liquid - Two Packets  Diet effective now     Comments:  Use peptamen 1 2 from home   Question Answer Comment   Diet Type Enteral/Parenteral    Enteral/Parenteral Tube Feeding No Oral Diet    Tube Feeding Formula: Non-Formulary    Bolus/Cyclic/Continuous Cyclic    Tube Feeding Cyclic Rate (mL/hr): 50    Tube Feeding Cyclic: Administer over: 16 hours    Prosource Protein Liquid - No Carb Prosource Protein Liquid - Two Packets    RD to adjust diet per protocol?  Yes        06/18/20 2127                  Active Medications  Scheduled Meds:  Current Facility-Administered Medications:  acetaminophen 650 mg Per G Tube Q4H PRN Cortney MATTHEW Seay    bisacodyl 10 mg Rectal Daily Cortney MATTHEW Seay    budesonide 0 5 mg Nebulization BID Cortney MATTHEW Seay    chlorhexidine 15 mL Swish & Spit Q12H [address] Cortney NATALIE Seay-HUGO    cyclobenzaprine 5 mg Per G Tube TID PRN Audrey Patel PA-C    desmopressin 0 05 mg Per G Tube Daily Cortney Seay PA-C    diazepam 5 mg Per G Tube Q8H Cortney Seay PA-C    glycerin-hypromellose- 1 drop Both Eyes Q3H PRN Cortney Seay PA-C    guaiFENesin 400 mg Oral Q8H Cortney Seay PA-C    heparin (porcine) 5,000 Units Subcutaneous Q8H [address] Cortneycrissy Seay PA-C    levETIRAcetam 1,000 mg Per G Tube BID Cortney Seay PA-C    levETIRAcetam 500 mg Per G Tube Q24H Cortney Seay PA-C    midodrine 10 mg Oral TID AC Krsity Eye, MD    norepinephrine 1-30 mcg/min Intravenous Titrated Kristy MD Jono Last Rate: Stopped (06/26/20 2201)   OXcarbazepine 450 mg Per G Tube Q12H [address] Cortney Seay PA-C    polyethylene glycol 17 g Oral Daily PRN Clarisa Naylor MD    potassium-sodium phosphates 2 packet Oral 4x Daily (with meals and at bedtime) Cortney Seay PA-C    senna-docusate sodium 1 tablet Per G Tube HS Cortney Seay PA-C      Continuous Infusions:    norepinephrine 1-30 mcg/min Last Rate: Stopped (06/26/20 1467)     PRN Meds:     acetaminophen 650 mg Q4H PRN   cyclobenzaprine 5 mg TID PRN   glycerin-hypromellose- 1 drop Q3H PRN   polyethylene glycol 17 g Daily PRN       Allergies   Allergies   Allergen Reactions    Baclofen Other (See Comments)     Reaction Date: [id number];   apena      Cefepime Hives    Cephalosporins      Other reaction(s): Unknown Reaction  Other reaction(s): Unknown Reaction  Other reaction(s): Unknown Reaction      Clonidine      Other reaction(s): Respiratory Distress, Respiratory Distress    Lactase     Milk-Related Compounds     Penicillins Hives     Reaction Date: [id number];       Phenobarbital Other (See Comments)     Reaction Date: [id number]; Other reaction(s): Other (See Comments)  apnea  Other reaction(s): Other (See Comments)  apnea  apnea      Vancomycin     Zolpidem Other (See Comments)     Other reaction(s):  Other (see comments)  Reaction Date: [id number];   apnea  apnea  apnea      Latex Rash     ---------------------------------------------------------------------------------------  Advance Directive and Living Will:      Power of :    POLST:    ---------------------------------------------------------------------------------------  Care Time Delivered:   No Critical Care time spent     Chelle Brandon PA-C      Portions of the record may have been created with voice recognition software  Occasional wrong word or "sound a like" substitutions may have occurred due to the inherent limitations of voice recognition software    Read the chart carefully and recognize, using context, where substitutions have occurred Adam

## 2025-03-05 NOTE — RESPIRATORY THERAPY NOTE
Physical Therapy Treatment     Name: Haritha RUIZ Dominguez  : 1948  MRN: [id number]      Date of Service: 3/5/2025    Evaluating PT:  Bryan Monterroso, PT [id number]    Referring provider/PT Order:  PT Eval and Treat   25 1300  PT eval and treat  Start:  25 1300,   End:  25 1300,   ONE TIME,   Standing Count:  1 Occurrences,   R         Bryan Rivers, DO     Room #:  6422/6422-B  Diagnosis:  Lumbar compression fracture, closed, initial encounter ([facility]) [S32.000A]  Abdominal pain, unspecified abdominal location [R10.9]  Acute midline low back pain without sciatica [M54.50]  Compression fracture of L2 vertebra, initial encounter ([facility]) [S32.020A]  PMHx/PSHx:     has a past medical history of Abrasion of skin of left lower leg, Abrasion of skin of right lower leg, Anxiety, Arthritis, Asthma, Claustrophobia, Decreased dorsalis pedis pulse, Dermatitis, Fracture, GERD (gastroesophageal reflux disease), Hiatal hernia, History of blood transfusion, HTN (hypertension), Hyperlipidemia, Itching, On aspirin at home, Pancreatic cyst, Pneumonia, Sleep apnea, SOB (shortness of breath), and Tachycardia.    has a past surgical history that includes Cholecystectomy; Cataract removal (Bilateral, 2013); back surgery (2014); joint replacement (2016); Total knee arthroplasty (Right); other surgical history (2017); Spinal fixation surgery with implant (); Upper gastrointestinal endoscopy (07/10/2017); and Stimulator Surgery (Right, 2023).     Procedure/Surgery:  none  Precautions:  Falls, FWB (full weight bearing) Bilateral LE flexed posture kyphotic, , Soft TLSO, Droplet Influenza  Equipment Needs: Patient has needed equipment ,    SUBJECTIVE:    ???Patient states she will D/C to her Sister's home where she can be on the first level. [place] with [place] FEL[place]. Patient ambulated independently, with wheeled walker  PTA. Equipment owned: Wheelchair, Cane, and Wheeled Walker ??     Can benefit  RT Ventilator Management Note  Niall Farias 21 y o  female MRN: [id number]  Unit/Bed#: ICU 01 Encounter: [id number]      Daily Screen       6/14/2020  0800 6/17/2020  0750          Patient safety screen outcome[de-identified]  Passed  Passed  (Pended)       Spont breathing trial % for 30 min:    No  (Pended)               Physical Exam:   Assessment Type: Assess only  General Appearance: Sleeping  Respiratory Pattern: Assisted, Spontaneous  Chest Assessment: Chest expansion symmetrical  Bilateral Breath Sounds: Diminished, Clear      Resp Comments: Pt placed on PSV settings at this time  Pt tolerating settings with no increased WOB or oxygen desaturation noted  WIll continue on PSV settings as tolerated per order and monitor per resp protocol

## (undated) DEVICE — PROXIMATE PLUS MD MULTI-DIRECTIONAL RELEASE SKIN STAPLERS CONTAINS 35 STAINLESS STEEL STAPLES APPROXIMATE CLOSED DIMENSIONS: 6.9MM X 3.9MM WIDE: Brand: PROXIMATE

## (undated) DEVICE — ELECTRODE BLADE MOD E-Z CLEAN 2.5IN 6.4CM -0012M

## (undated) DEVICE — INTENDED FOR TISSUE SEPARATION, AND OTHER PROCEDURES THAT REQUIRE A SHARP SURGICAL BLADE TO PUNCTURE OR CUT.: Brand: BARD-PARKER SAFETY BLADES SIZE 10, STERILE

## (undated) DEVICE — RANEY SCALP CLIP STERILE: Brand: AESCULAP

## (undated) DEVICE — ANTIBACTERIAL VIOLET BRAIDED (POLYGLACTIN 910), SYNTHETIC ABSORBABLE SUTURE: Brand: COATED VICRYL

## (undated) DEVICE — PACK BURR HOLE PBDS RF

## (undated) DEVICE — CHLORAPREP HI-LITE 26ML ORANGE

## (undated) DEVICE — DRAPE INTESTINAL ISOLATION BAG

## (undated) DEVICE — 3M™ IOBAN™ 2 ANTIMICROBIAL INCISE DRAPE 6650EZ: Brand: IOBAN™ 2

## (undated) DEVICE — UTILITY MARKER,BLACK WITH LABELS: Brand: DEVON

## (undated) DEVICE — SURGIFOAM 8.5 X 12.5

## (undated) DEVICE — TIBURON SPLIT SHEET: Brand: CONVERTORS

## (undated) DEVICE — INTENDED FOR TISSUE SEPARATION, AND OTHER PROCEDURES THAT REQUIRE A SHARP SURGICAL BLADE TO PUNCTURE OR CUT.: Brand: BARD-PARKER SAFETY BLADES SIZE 15, STERILE

## (undated) DEVICE — GLOVE INDICATOR PI UNDERGLOVE SZ 8 BLUE

## (undated) DEVICE — DRAPE SHEET THREE QUARTER

## (undated) DEVICE — Device

## (undated) DEVICE — ADHESIVE SKIN HIGH VISCOSITY EXOFIN 1ML

## (undated) DEVICE — SPONGE SCRUB 4 PCT CHLORHEXIDINE

## (undated) DEVICE — LIGHT HANDLE COVER SLEEVE DISP BLUE STELLAR

## (undated) DEVICE — SPECIMEN CONTAINER STERILE PEEL PACK